# Patient Record
Sex: MALE | Race: WHITE | NOT HISPANIC OR LATINO | Employment: OTHER | URBAN - METROPOLITAN AREA
[De-identification: names, ages, dates, MRNs, and addresses within clinical notes are randomized per-mention and may not be internally consistent; named-entity substitution may affect disease eponyms.]

---

## 2019-07-02 ENCOUNTER — HOSPITAL ENCOUNTER (OUTPATIENT)
Dept: PHYSICAL THERAPY | Facility: CLINIC | Age: 59
Setting detail: RECURRING SERIES
Discharge: HOME | End: 2019-09-30
Attending: INTERNAL MEDICINE

## 2020-06-20 ENCOUNTER — HOSPITAL ENCOUNTER (OUTPATIENT)
Dept: OTHER | Facility: HOSPITAL | Age: 60
Discharge: HOME | End: 2020-06-20

## 2020-07-01 ENCOUNTER — OFFICE VISIT CONVERTED (OUTPATIENT)
Dept: ORTHOPEDIC SURGERY | Facility: CLINIC | Age: 60
End: 2020-07-01
Attending: ORTHOPAEDIC SURGERY

## 2020-07-17 ENCOUNTER — OFFICE VISIT CONVERTED (OUTPATIENT)
Dept: PULMONOLOGY | Facility: CLINIC | Age: 60
End: 2020-07-17
Attending: PHYSICIAN ASSISTANT

## 2020-07-22 ENCOUNTER — OFFICE VISIT CONVERTED (OUTPATIENT)
Dept: ORTHOPEDIC SURGERY | Facility: CLINIC | Age: 60
End: 2020-07-22
Attending: PHYSICIAN ASSISTANT

## 2020-08-03 ENCOUNTER — OFFICE VISIT CONVERTED (OUTPATIENT)
Dept: ORTHOPEDIC SURGERY | Facility: CLINIC | Age: 60
End: 2020-08-03
Attending: PHYSICIAN ASSISTANT

## 2020-08-03 ENCOUNTER — CONVERSION ENCOUNTER (OUTPATIENT)
Dept: ORTHOPEDIC SURGERY | Facility: CLINIC | Age: 60
End: 2020-08-03

## 2020-08-24 ENCOUNTER — OFFICE VISIT CONVERTED (OUTPATIENT)
Dept: ORTHOPEDIC SURGERY | Facility: CLINIC | Age: 60
End: 2020-08-24
Attending: PHYSICIAN ASSISTANT

## 2020-09-19 ENCOUNTER — HOSPITAL ENCOUNTER (OUTPATIENT)
Dept: PREADMISSION TESTING | Facility: HOSPITAL | Age: 60
Discharge: HOME | End: 2020-09-19
Attending: PHYSICIAN ASSISTANT

## 2020-09-20 LAB — SARS-COV-2 RNA SPEC QL NAA+PROBE: NOT DETECTED

## 2020-09-24 ENCOUNTER — HOSPITAL ENCOUNTER (OUTPATIENT)
Dept: CARDIOLOGY | Facility: HOSPITAL | Age: 60
Discharge: HOME | End: 2020-09-24
Attending: PHYSICIAN ASSISTANT

## 2020-09-25 ENCOUNTER — OFFICE VISIT CONVERTED (OUTPATIENT)
Dept: PULMONOLOGY | Facility: CLINIC | Age: 60
End: 2020-09-25
Attending: INTERNAL MEDICINE

## 2020-11-25 ENCOUNTER — OFFICE VISIT CONVERTED (OUTPATIENT)
Dept: ORTHOPEDIC SURGERY | Facility: CLINIC | Age: 60
End: 2020-11-25
Attending: ORTHOPAEDIC SURGERY

## 2020-11-25 ENCOUNTER — CONVERSION ENCOUNTER (OUTPATIENT)
Dept: ORTHOPEDIC SURGERY | Facility: CLINIC | Age: 60
End: 2020-11-25

## 2020-12-30 ENCOUNTER — CONVERSION ENCOUNTER (OUTPATIENT)
Dept: ORTHOPEDIC SURGERY | Facility: CLINIC | Age: 60
End: 2020-12-30

## 2020-12-30 ENCOUNTER — OFFICE VISIT CONVERTED (OUTPATIENT)
Dept: ORTHOPEDIC SURGERY | Facility: CLINIC | Age: 60
End: 2020-12-30
Attending: PHYSICIAN ASSISTANT

## 2021-01-13 ENCOUNTER — OFFICE VISIT CONVERTED (OUTPATIENT)
Dept: ORTHOPEDIC SURGERY | Facility: CLINIC | Age: 61
End: 2021-01-13
Attending: PHYSICIAN ASSISTANT

## 2021-03-11 ENCOUNTER — APPOINTMENT (OUTPATIENT)
Dept: GENERAL RADIOLOGY | Facility: HOSPITAL | Age: 61
End: 2021-03-11

## 2021-03-11 ENCOUNTER — HOSPITAL ENCOUNTER (INPATIENT)
Facility: HOSPITAL | Age: 61
LOS: 7 days | Discharge: NURSING, REHAB OR LONG-TERM CARE | End: 2021-03-18
Attending: INTERNAL MEDICINE | Admitting: INTERNAL MEDICINE

## 2021-03-11 DIAGNOSIS — A49.1 GROUP B STREPTOCOCCAL INFECTION: Primary | ICD-10-CM

## 2021-03-11 DIAGNOSIS — A49.01 MSSA (METHICILLIN SUSCEPTIBLE STAPHYLOCOCCUS AUREUS) INFECTION: ICD-10-CM

## 2021-03-11 DIAGNOSIS — T84.51XD INFECTION ASSOCIATED WITH INTERNAL RIGHT HIP PROSTHESIS, SUBSEQUENT ENCOUNTER: ICD-10-CM

## 2021-03-11 PROBLEM — G81.91 RIGHT HEMIPARESIS (HCC): Status: ACTIVE | Noted: 2021-03-11

## 2021-03-11 PROBLEM — G47.33 OSA ON CPAP: Status: ACTIVE | Noted: 2021-03-11

## 2021-03-11 PROBLEM — G40.909 SEIZURE DISORDER: Status: ACTIVE | Noted: 2021-03-11

## 2021-03-11 PROBLEM — T84.51XA INFECTION OF RIGHT PROSTHETIC HIP JOINT: Status: ACTIVE | Noted: 2021-03-11

## 2021-03-11 PROBLEM — N39.0 UTI (URINARY TRACT INFECTION): Status: ACTIVE | Noted: 2021-03-11

## 2021-03-11 PROBLEM — Z99.89 OSA ON CPAP: Status: ACTIVE | Noted: 2021-03-11

## 2021-03-11 PROBLEM — I63.9 CVA (CEREBRAL VASCULAR ACCIDENT): Status: ACTIVE | Noted: 2021-03-11

## 2021-03-11 PROBLEM — E11.9 TYPE 2 DIABETES MELLITUS (HCC): Status: ACTIVE | Noted: 2021-03-11

## 2021-03-11 PROBLEM — I10 ESSENTIAL HYPERTENSION: Status: ACTIVE | Noted: 2021-03-11

## 2021-03-11 LAB
ALBUMIN SERPL-MCNC: 3.7 G/DL (ref 3.5–5.2)
ALBUMIN/GLOB SERPL: 0.9 G/DL
ALP SERPL-CCNC: 54 U/L (ref 39–117)
ALT SERPL W P-5'-P-CCNC: 20 U/L (ref 1–41)
ANION GAP SERPL CALCULATED.3IONS-SCNC: 11 MMOL/L (ref 5–15)
AST SERPL-CCNC: 17 U/L (ref 1–40)
BASOPHILS # BLD AUTO: 0.02 10*3/MM3 (ref 0–0.2)
BASOPHILS NFR BLD AUTO: 0.2 % (ref 0–1.5)
BILIRUB SERPL-MCNC: 0.5 MG/DL (ref 0–1.2)
BUN SERPL-MCNC: 10 MG/DL (ref 6–20)
BUN/CREAT SERPL: 12.5 (ref 7–25)
CALCIUM SPEC-SCNC: 9.1 MG/DL (ref 8.6–10.5)
CHLORIDE SERPL-SCNC: 97 MMOL/L (ref 98–107)
CO2 SERPL-SCNC: 26 MMOL/L (ref 22–29)
CREAT SERPL-MCNC: 0.8 MG/DL (ref 0.76–1.27)
CRP SERPL-MCNC: 27.86 MG/DL (ref 0–0.5)
DEPRECATED RDW RBC AUTO: 43.3 FL (ref 37–54)
EOSINOPHIL # BLD AUTO: 0.16 10*3/MM3 (ref 0–0.4)
EOSINOPHIL NFR BLD AUTO: 1.3 % (ref 0.3–6.2)
ERYTHROCYTE [DISTWIDTH] IN BLOOD BY AUTOMATED COUNT: 13.1 % (ref 12.3–15.4)
GFR SERPL CREATININE-BSD FRML MDRD: 99 ML/MIN/1.73
GLOBULIN UR ELPH-MCNC: 3.9 GM/DL
GLUCOSE BLDC GLUCOMTR-MCNC: 192 MG/DL (ref 70–130)
GLUCOSE BLDC GLUCOMTR-MCNC: 246 MG/DL (ref 70–130)
GLUCOSE BLDC GLUCOMTR-MCNC: 267 MG/DL (ref 70–130)
GLUCOSE SERPL-MCNC: 197 MG/DL (ref 65–99)
HBA1C MFR BLD: 9.22 % (ref 4.8–5.6)
HCT VFR BLD AUTO: 36.6 % (ref 37.5–51)
HGB BLD-MCNC: 11.8 G/DL (ref 13–17.7)
IMM GRANULOCYTES # BLD AUTO: 0.05 10*3/MM3 (ref 0–0.05)
IMM GRANULOCYTES NFR BLD AUTO: 0.4 % (ref 0–0.5)
LYMPHOCYTES # BLD AUTO: 2.28 10*3/MM3 (ref 0.7–3.1)
LYMPHOCYTES NFR BLD AUTO: 18.9 % (ref 19.6–45.3)
MCH RBC QN AUTO: 29.2 PG (ref 26.6–33)
MCHC RBC AUTO-ENTMCNC: 32.2 G/DL (ref 31.5–35.7)
MCV RBC AUTO: 90.6 FL (ref 79–97)
MONOCYTES # BLD AUTO: 0.98 10*3/MM3 (ref 0.1–0.9)
MONOCYTES NFR BLD AUTO: 8.1 % (ref 5–12)
NEUTROPHILS NFR BLD AUTO: 71.1 % (ref 42.7–76)
NEUTROPHILS NFR BLD AUTO: 8.55 10*3/MM3 (ref 1.7–7)
NRBC BLD AUTO-RTO: 0 /100 WBC (ref 0–0.2)
PLATELET # BLD AUTO: 233 10*3/MM3 (ref 140–450)
PMV BLD AUTO: 11.1 FL (ref 6–12)
POTASSIUM SERPL-SCNC: 4 MMOL/L (ref 3.5–5.2)
PROT SERPL-MCNC: 7.6 G/DL (ref 6–8.5)
RBC # BLD AUTO: 4.04 10*6/MM3 (ref 4.14–5.8)
SARS-COV-2 ORF1AB RESP QL NAA+PROBE: NOT DETECTED
SODIUM SERPL-SCNC: 134 MMOL/L (ref 136–145)
WBC # BLD AUTO: 12.04 10*3/MM3 (ref 3.4–10.8)

## 2021-03-11 PROCEDURE — 25010000002 HYDROMORPHONE PER 4 MG: Performed by: INTERNAL MEDICINE

## 2021-03-11 PROCEDURE — 72170 X-RAY EXAM OF PELVIS: CPT

## 2021-03-11 PROCEDURE — 25010000003 CEFAZOLIN IN DEXTROSE 2-4 GM/100ML-% SOLUTION: Performed by: INTERNAL MEDICINE

## 2021-03-11 PROCEDURE — 85025 COMPLETE CBC W/AUTO DIFF WBC: CPT | Performed by: ORTHOPAEDIC SURGERY

## 2021-03-11 PROCEDURE — 86140 C-REACTIVE PROTEIN: CPT | Performed by: ORTHOPAEDIC SURGERY

## 2021-03-11 PROCEDURE — U0004 COV-19 TEST NON-CDC HGH THRU: HCPCS | Performed by: PHYSICIAN ASSISTANT

## 2021-03-11 PROCEDURE — 63710000001 INSULIN LISPRO (HUMAN) PER 5 UNITS: Performed by: NURSE PRACTITIONER

## 2021-03-11 PROCEDURE — 82962 GLUCOSE BLOOD TEST: CPT

## 2021-03-11 PROCEDURE — 99255 IP/OBS CONSLTJ NEW/EST HI 80: CPT | Performed by: INTERNAL MEDICINE

## 2021-03-11 PROCEDURE — 80053 COMPREHEN METABOLIC PANEL: CPT | Performed by: NURSE PRACTITIONER

## 2021-03-11 PROCEDURE — 73552 X-RAY EXAM OF FEMUR 2/>: CPT

## 2021-03-11 PROCEDURE — 83036 HEMOGLOBIN GLYCOSYLATED A1C: CPT | Performed by: INTERNAL MEDICINE

## 2021-03-11 RX ORDER — ACETAMINOPHEN 160 MG/5ML
650 SOLUTION ORAL EVERY 4 HOURS PRN
Status: DISCONTINUED | OUTPATIENT
Start: 2021-03-11 | End: 2021-03-18 | Stop reason: HOSPADM

## 2021-03-11 RX ORDER — METOPROLOL SUCCINATE 25 MG/1
12.5 TABLET, EXTENDED RELEASE ORAL EVERY 12 HOURS
COMMUNITY
End: 2021-03-18 | Stop reason: HOSPADM

## 2021-03-11 RX ORDER — AMOXICILLIN 250 MG
1 CAPSULE ORAL DAILY PRN
Status: DISCONTINUED | OUTPATIENT
Start: 2021-03-11 | End: 2021-03-18 | Stop reason: HOSPADM

## 2021-03-11 RX ORDER — ACETAMINOPHEN 325 MG/1
650 TABLET ORAL EVERY 8 HOURS
COMMUNITY
End: 2021-04-01 | Stop reason: HOSPADM

## 2021-03-11 RX ORDER — ACETAMINOPHEN 325 MG/1
650 TABLET ORAL EVERY 4 HOURS PRN
Status: DISCONTINUED | OUTPATIENT
Start: 2021-03-11 | End: 2021-03-18 | Stop reason: HOSPADM

## 2021-03-11 RX ORDER — CLOTRIMAZOLE AND BETAMETHASONE DIPROPIONATE 10; .64 MG/G; MG/G
CREAM TOPICAL 2 TIMES DAILY PRN
Status: DISCONTINUED | OUTPATIENT
Start: 2021-03-11 | End: 2021-03-18 | Stop reason: HOSPADM

## 2021-03-11 RX ORDER — CEFAZOLIN SODIUM 2 G/100ML
2 INJECTION, SOLUTION INTRAVENOUS EVERY 8 HOURS
Status: COMPLETED | OUTPATIENT
Start: 2021-03-11 | End: 2021-03-15

## 2021-03-11 RX ORDER — HYDROXYZINE HYDROCHLORIDE 10 MG/1
10 TABLET, FILM COATED ORAL EVERY 4 HOURS PRN
Status: DISCONTINUED | OUTPATIENT
Start: 2021-03-11 | End: 2021-03-18 | Stop reason: HOSPADM

## 2021-03-11 RX ORDER — ROSUVASTATIN CALCIUM 20 MG/1
40 TABLET, COATED ORAL DAILY
COMMUNITY

## 2021-03-11 RX ORDER — MECLIZINE HCL 25MG 25 MG/1
25 TABLET, CHEWABLE ORAL 3 TIMES DAILY PRN
Status: ON HOLD | COMMUNITY
End: 2021-03-18 | Stop reason: SDUPTHER

## 2021-03-11 RX ORDER — ROSUVASTATIN CALCIUM 40 MG/1
40 TABLET, COATED ORAL NIGHTLY
Status: DISCONTINUED | OUTPATIENT
Start: 2021-03-11 | End: 2021-03-18 | Stop reason: HOSPADM

## 2021-03-11 RX ORDER — GABAPENTIN 600 MG/1
600 TABLET ORAL 3 TIMES DAILY
COMMUNITY
End: 2021-03-18 | Stop reason: HOSPADM

## 2021-03-11 RX ORDER — CLOTRIMAZOLE AND BETAMETHASONE DIPROPIONATE 10; .64 MG/G; MG/G
CREAM TOPICAL 4 TIMES DAILY
COMMUNITY
End: 2023-01-16 | Stop reason: HOSPADM

## 2021-03-11 RX ORDER — HYDROXYZINE HYDROCHLORIDE 10 MG/1
25 TABLET, FILM COATED ORAL EVERY 8 HOURS PRN
COMMUNITY
End: 2021-04-30 | Stop reason: ALTCHOICE

## 2021-03-11 RX ORDER — INSULIN GLARGINE 100 [IU]/ML
15 INJECTION, SOLUTION SUBCUTANEOUS NIGHTLY
Status: ON HOLD | COMMUNITY
End: 2021-03-31 | Stop reason: SDUPTHER

## 2021-03-11 RX ORDER — DEXTROSE MONOHYDRATE 25 G/50ML
25 INJECTION, SOLUTION INTRAVENOUS
Status: DISCONTINUED | OUTPATIENT
Start: 2021-03-11 | End: 2021-03-14

## 2021-03-11 RX ORDER — LISINOPRIL 20 MG/1
20 TABLET ORAL DAILY
COMMUNITY
End: 2021-03-18 | Stop reason: HOSPADM

## 2021-03-11 RX ORDER — AMOXICILLIN 250 MG
1 CAPSULE ORAL DAILY PRN
Status: ON HOLD | COMMUNITY
End: 2021-03-24

## 2021-03-11 RX ORDER — NITROGLYCERIN 0.4 MG/1
0.4 TABLET SUBLINGUAL
Status: DISCONTINUED | OUTPATIENT
Start: 2021-03-11 | End: 2021-03-18 | Stop reason: HOSPADM

## 2021-03-11 RX ORDER — ASPIRIN 81 MG/1
81 TABLET, CHEWABLE ORAL DAILY
COMMUNITY

## 2021-03-11 RX ORDER — ISOSORBIDE MONONITRATE 30 MG/1
30 TABLET, EXTENDED RELEASE ORAL DAILY
COMMUNITY

## 2021-03-11 RX ORDER — ONDANSETRON 2 MG/ML
4 INJECTION INTRAMUSCULAR; INTRAVENOUS EVERY 6 HOURS PRN
Status: DISCONTINUED | OUTPATIENT
Start: 2021-03-11 | End: 2021-03-18 | Stop reason: HOSPADM

## 2021-03-11 RX ORDER — METOPROLOL SUCCINATE 25 MG/1
12.5 TABLET, EXTENDED RELEASE ORAL EVERY 12 HOURS SCHEDULED
Status: DISCONTINUED | OUTPATIENT
Start: 2021-03-11 | End: 2021-03-13

## 2021-03-11 RX ORDER — NICOTINE POLACRILEX 4 MG
15 LOZENGE BUCCAL
Status: DISCONTINUED | OUTPATIENT
Start: 2021-03-11 | End: 2021-03-14

## 2021-03-11 RX ORDER — ISOSORBIDE MONONITRATE 30 MG/1
30 TABLET, EXTENDED RELEASE ORAL DAILY
Status: DISCONTINUED | OUTPATIENT
Start: 2021-03-11 | End: 2021-03-18 | Stop reason: HOSPADM

## 2021-03-11 RX ORDER — ONDANSETRON 4 MG/1
4 TABLET, FILM COATED ORAL EVERY 6 HOURS PRN
Status: DISCONTINUED | OUTPATIENT
Start: 2021-03-11 | End: 2021-03-18 | Stop reason: HOSPADM

## 2021-03-11 RX ORDER — ASPIRIN 81 MG/1
81 TABLET, CHEWABLE ORAL DAILY
Status: DISCONTINUED | OUTPATIENT
Start: 2021-03-11 | End: 2021-03-12

## 2021-03-11 RX ORDER — INSULIN LISPRO 100 [IU]/ML
0-14 INJECTION, SOLUTION INTRAVENOUS; SUBCUTANEOUS
Status: DISCONTINUED | OUTPATIENT
Start: 2021-03-11 | End: 2021-03-14

## 2021-03-11 RX ORDER — HYDROMORPHONE HYDROCHLORIDE 1 MG/ML
0.5 INJECTION, SOLUTION INTRAMUSCULAR; INTRAVENOUS; SUBCUTANEOUS
Status: DISCONTINUED | OUTPATIENT
Start: 2021-03-11 | End: 2021-03-18 | Stop reason: HOSPADM

## 2021-03-11 RX ORDER — LISINOPRIL 20 MG/1
20 TABLET ORAL DAILY
Status: DISCONTINUED | OUTPATIENT
Start: 2021-03-11 | End: 2021-03-14

## 2021-03-11 RX ORDER — HYDROCODONE BITARTRATE AND ACETAMINOPHEN 7.5; 325 MG/1; MG/1
1 TABLET ORAL EVERY 4 HOURS PRN
Status: DISCONTINUED | OUTPATIENT
Start: 2021-03-11 | End: 2021-03-18 | Stop reason: HOSPADM

## 2021-03-11 RX ORDER — ACETAMINOPHEN 650 MG/1
650 SUPPOSITORY RECTAL EVERY 4 HOURS PRN
Status: DISCONTINUED | OUTPATIENT
Start: 2021-03-11 | End: 2021-03-18 | Stop reason: HOSPADM

## 2021-03-11 RX ORDER — CYCLOBENZAPRINE HCL 10 MG
10 TABLET ORAL 3 TIMES DAILY PRN
Status: DISCONTINUED | OUTPATIENT
Start: 2021-03-11 | End: 2021-03-18 | Stop reason: HOSPADM

## 2021-03-11 RX ADMIN — HYDROMORPHONE HYDROCHLORIDE 0.5 MG: 1 INJECTION, SOLUTION INTRAMUSCULAR; INTRAVENOUS; SUBCUTANEOUS at 15:57

## 2021-03-11 RX ADMIN — INSULIN LISPRO 5 UNITS: 100 INJECTION, SOLUTION INTRAVENOUS; SUBCUTANEOUS at 18:06

## 2021-03-11 RX ADMIN — LISINOPRIL 20 MG: 20 TABLET ORAL at 17:26

## 2021-03-11 RX ADMIN — METOPROLOL SUCCINATE 12.5 MG: 25 TABLET, EXTENDED RELEASE ORAL at 20:21

## 2021-03-11 RX ADMIN — HYDROCODONE BITARTRATE AND ACETAMINOPHEN 1 TABLET: 7.5; 325 TABLET ORAL at 20:21

## 2021-03-11 RX ADMIN — ISOSORBIDE MONONITRATE 30 MG: 30 TABLET ORAL at 17:26

## 2021-03-11 RX ADMIN — HYDROCODONE BITARTRATE AND ACETAMINOPHEN 1 TABLET: 7.5; 325 TABLET ORAL at 23:33

## 2021-03-11 RX ADMIN — CEFAZOLIN SODIUM 2 G: 2 INJECTION, SOLUTION INTRAVENOUS at 17:26

## 2021-03-11 RX ADMIN — HYDROMORPHONE HYDROCHLORIDE 0.5 MG: 1 INJECTION, SOLUTION INTRAMUSCULAR; INTRAVENOUS; SUBCUTANEOUS at 22:22

## 2021-03-11 RX ADMIN — CYCLOBENZAPRINE 10 MG: 10 TABLET, FILM COATED ORAL at 17:26

## 2021-03-11 RX ADMIN — CEFAZOLIN SODIUM 2 G: 2 INJECTION, SOLUTION INTRAVENOUS at 22:30

## 2021-03-11 RX ADMIN — ROSUVASTATIN CALCIUM 40 MG: 40 TABLET, FILM COATED ORAL at 20:21

## 2021-03-11 RX ADMIN — HYDROCODONE BITARTRATE AND ACETAMINOPHEN 1 TABLET: 7.5; 325 TABLET ORAL at 14:26

## 2021-03-11 RX ADMIN — ASPIRIN 81 MG: 81 TABLET, CHEWABLE ORAL at 17:26

## 2021-03-11 NOTE — PLAN OF CARE
Goal Outcome Evaluation:  Plan of Care Reviewed With: patient  Progress: improving  Outcome Summary: VSS. Pt admitted to  from [facility]. I&D tomorrow. Will continue to monitor.

## 2021-03-11 NOTE — CONSULTS
Referring Provider: Dr Dejesus    Reason for Consultation: right hip PJI    History of present illness:  Mandeep Tracey is a 61 y.o. who I am asked to evaluate and give opinion for right hip PJI. History is obtained from the patient, his daughter Lina, RN, and review of the outside medical records which I summarize/synthesize as follows: On Monday 3/8 he developed left knee pain and R hip pain. On Tuesday 3/9 he developed a fever and shaking chills. There were no alleviating factors. The pain was very severe, sharp, and worse with movement. He has a history of R hip replacement in June 2020 with Dr Garrido in [place]. He has no other hardware in the body. He has hemiparesis from a prior stroke.     He went to [place] [facility] and had labs done with a leukocytosis. CT showed a large R hip effusion and concerns for septic arthritis. He had a R hip aspiration done with 109,000 WBCs and the culture has grown Group B Strep per my d/w their micro lab. They are going to fax me the result. He grew MSSA in the urine but his blood cultures are negative, and he denies urinary symptoms. He does not use a Chavez catheter or intermittent catheterization. He says he usually passes his urine easily.     He was on vancomycin and cefepime at [place]. He has been transferred to [facility] for further care.     Past Medical History:   Diagnosis Date   • Aphasia    • CPAP (continuous positive airway pressure) dependence    • Diabetes (CMS/[facility])    • Hemiparesis (CMS/[facility])     Right side    • Peptic ulcer disease    • Psoriasis    • Seizures (CMS/[facility])    • Sleep apnea    • Stroke (CMS/[facility]) 2004       Past Surgical History:   Procedure Laterality Date   • CHOLECYSTECTOMY     • EYE SURGERY     • LUMBAR DISC SURGERY         Social History:  Lives w/ his wife  Retired     Family History:  No 1st degree relatives w/ Group B hip infections    Antibiotic allergies and intolerances:    1. Quinolones -  hives    Medications:    Current Facility-Administered Medications:   •  acetaminophen (TYLENOL) tablet 650 mg, 650 mg, Oral, Q4H PRN **OR** acetaminophen (TYLENOL) 160 MG/5ML solution 650 mg, 650 mg, Oral, Q4H PRN **OR** acetaminophen (TYLENOL) suppository 650 mg, 650 mg, Rectal, Q4H PRN, Apolonia Hull APRN  •  aspirin chewable tablet 81 mg, 81 mg, Oral, Daily, Apolonia Hull APRN  •  clotrimazole-betamethasone (LOTRISONE) 1-0.05 % cream, , Topical, BID PRN, Apolonia Hull APRN  •  dextrose (D50W) 25 g/ 50mL Intravenous Solution 25 g, 25 g, Intravenous, Q15 Min PRN, Apolonia Hull APRN  •  dextrose (GLUTOSE) oral gel 15 g, 15 g, Oral, Q15 Min PRN, Apolonia Hull APRN  •  glucagon (human recombinant) (GLUCAGEN DIAGNOSTIC) injection 1 mg, 1 mg, Subcutaneous, Q15 Min PRN, Apolonia Hull APRN  •  HYDROcodone-acetaminophen (NORCO) 7.5-325 MG per tablet 1 tablet, 1 tablet, Oral, Q4H PRN, Angel Luis Dejesus MD  •  HYDROmorphone (DILAUDID) injection 0.5 mg, 0.5 mg, Intravenous, Q2H PRN, Angel Luis Dejesus MD  •  hydrOXYzine (ATARAX) tablet 10 mg, 10 mg, Oral, Q4H PRN, Apolonia Hull APRN  •  insulin lispro (ADMELOG) injection 0-14 Units, 0-14 Units, Subcutaneous, TID AC, Apolonia Hull APRN  •  isosorbide mononitrate (IMDUR) 24 hr tablet 30 mg, 30 mg, Oral, Daily, Apolonia Hull APRN  •  lisinopril (PRINIVIL,ZESTRIL) tablet 20 mg, 20 mg, Oral, Daily, Apolonia Hull APRN  •  metoprolol succinate XL (TOPROL-XL) 24 hr tablet 12.5 mg, 12.5 mg, Oral, Q12H, Apolonia Hull, ESTRELLA  •  nitroglycerin (NITROSTAT) SL tablet 0.4 mg, 0.4 mg, Sublingual, Q5 Min PRN, Apolonia Hull APRN  •  ondansetron (ZOFRAN) tablet 4 mg, 4 mg, Oral, Q6H PRN **OR** ondansetron (ZOFRAN) injection 4 mg, 4 mg, Intravenous, Q6H PRN, Apolonia Hull APRN  •  rosuvastatin (CRESTOR) tablet 40 mg, 40 mg, Oral, Nightly, Apolonia Hull, ESTRELLA  •   sennosides-docusate (PERICOLACE) 8.6-50 MG per tablet 1 tablet, 1 tablet, Oral, Daily DANAN, Apolonia Hull APRN    Review of Systems  All systems were reviewed and are negative unless otherwise stated above in the HPI    Objective   Vital Signs   Temp:  [98.5 °F (36.9 °C)] 98.5 °F (36.9 °C)  Heart Rate:  [109] 109  Resp:  [20] 20  BP: (140)/(97) 140/97    Physical Exam:   General: awake, alert, in pain  Head: atraumatic  Eyes:  no scleral icterus  ENT: poor dentition  Neck: Supple  Cardiovascular: tachycardic, RR, no murmurs,  Respiratory: Lungs are clear to auscultation bilaterally, no rales or wheezing; normal work of breathing on CPAP  GI: Abdomen is obese, soft, non-tender, normal bowel sounds in all four quadrants  :  no Chavez catheter  Musculoskeletal: R hip TTP  Skin: No rashes, lesions, or embolic phenomenon  Neurological: Alert and oriented x 3  Psychiatric: very nice, uncomfortable due to pain  Vasc: no cyanosis    Labs:   OSH Labs:  WBC 17  AST 14  ALT 22  TBili 0.84  Crt 0.88  UA TNTC WBCs    OSH R Hip Arthrocentesis:   109,000 WBCs, 67% PMNs    Microbiology:  3/9 OSH BCx: NGTD  3/9 OSH UCx: >100k MSSA  3/10 OSH R Hip Synovial Cx: Group B Strep    Radiology (personally reviewed report):  OSH CT with R hip arthroplasty w/ large hip effusion and likely air; moderate fluid distension R iliopsoas bursa likely communicated w/ hip joint space cold be septic arthritis    Assessment/Plan   1. 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 Urine culture positive for MSSA  3. Obesity BMI not yet recorded  4. Diabetes type 2 - not yet known if under control  5. History of stroke and hemiparesis    For Group B Strep infection of the right prosthetic hip, I agree with orthopedic consultation for surgical needs. I will start him on cefazolin 2 g IV q8h. This will also target the MSSA in the urine though I'm not entirely convinced this is a pathogen. His blood cultures are negative for bacteria.  I have asked the [facility] to fax me their results to review fully. I anticipate a 6-week course of antibiotic therapy following surgical debridement. Check CRP and A1c.     ID will follow. I discussed the case with his hospitalist team.

## 2021-03-11 NOTE — H&P
Patient Name:  Mandeep Tracey  YOB: 1960  MRN:  [id number]  Admit Date:  3/11/2021  Patient Care Team:  Jazzy Servin APRN as PCP - General (Nurse Practitioner)      Subjective   History Present Illness     No chief complaint on file.      Mr. Tracey is a 61 y.o.  male with a history of BELKYS on Cpap, DM, CVA w/rt hemiparesis, seizures, rt hip replacement 6/2020, CAD, HTN that presents to [facility] complaining of rt hip pain and fever. He was also experiencing pubic pain and increased urinary frequency. Symptoms began on 3/9/21 where he initially presented to [facility]. There was concern for sepsis due to acute bacterial cystitis and/or rt hip septic arthritis. He was admitted and started on broad spectrum antibiotics with Vanc and cefepime. MRI rt hip showed large hip joint effusion and mod fluid distention of rt hip iliopsoas bursa. He underwent CT guided FNA of rt hip and fluid was sent to lab for analysis on 3/10/21. UA was collected w/growth >100,000 staph aureus; sensitivity pending. No labs were collected today or at least no results sent with pt; no dc summary available for review. Provided records have been reviewed; doesn't appear he has had additional fever. WBC had started to trend down. CRP was elevated, lactic acid wnl.     Review of Systems   Constitutional: Negative for chills and fever.   HENT: Negative for congestion and sore throat.    Respiratory: Negative for shortness of breath.    Cardiovascular: Negative for chest pain.   Gastrointestinal: Negative for nausea and vomiting.   Genitourinary: Positive for frequency.   Musculoskeletal: Positive for arthralgias and gait problem.        Spasms ble   Skin: Negative for rash.   Neurological: Positive for speech difficulty and weakness.   Psychiatric/Behavioral: Negative for confusion.        Personal History     Past Medical History:   Diagnosis Date   • Aphasia    • CPAP (continuous positive airway  pressure) dependence    • Diabetes (CMS/HCC)    • Hemiparesis (CMS/HCC)     Right side    • Peptic ulcer disease    • Psoriasis    • Seizures (CMS/HCC)    • Sleep apnea    • Stroke (CMS/HCC) 2004     Past Surgical History:   Procedure Laterality Date   • CHOLECYSTECTOMY     • EYE SURGERY     • LUMBAR DISC SURGERY       No family history on file.  Social History     Tobacco Use   • Smoking status: Former Smoker   • Smokeless tobacco: Never Used   • Tobacco comment: quit 22 years ago    Substance Use Topics   • Alcohol use: Never   • Drug use: Not on file     No current facility-administered medications on file prior to encounter.     Current Outpatient Medications on File Prior to Encounter   Medication Sig Dispense Refill   • acetaminophen (TYLENOL) 325 MG tablet Take 650 mg by mouth Every 4 (Four) Hours As Needed for Mild Pain .     • aspirin 81 MG chewable tablet Chew 81 mg Daily.     • clotrimazole-betamethasone (LOTRISONE) 1-0.05 % cream Apply  topically to the appropriate area as directed 2 (Two) Times a Day As Needed. For psoriasis     • gabapentin (NEURONTIN) 600 MG tablet Take 600 mg by mouth 3 (Three) Times a Day.     • hydrOXYzine (ATARAX) 10 MG tablet Take 10 mg by mouth Every 4 (Four) Hours As Needed for Itching.     • insulin glargine (LANTUS, SEMGLEE) 100 UNIT/ML injection Inject 20 Units under the skin into the appropriate area as directed Daily.     • isosorbide mononitrate (IMDUR) 30 MG 24 hr tablet Take 30 mg by mouth Daily.     • lisinopril (PRINIVIL,ZESTRIL) 20 MG tablet Take 20 mg by mouth Daily.     • meclizine 25 MG chewable tablet chewable tablet Chew 25 mg 3 (Three) Times a Day As Needed.     • metFORMIN (GLUCOPHAGE) 1000 MG tablet Take 1,000 mg by mouth 2 (Two) Times a Day With Meals.     • metoprolol succinate XL (TOPROL-XL) 25 MG 24 hr tablet Take 12.5 mg by mouth Every 12 (Twelve) Hours.     • rosuvastatin (CRESTOR) 20 MG tablet Take 40 mg by mouth Daily.     • sennosides-docusate  (senna-docusate sodium) 8.6-50 MG per tablet Take 1 tablet by mouth Daily As Needed for Constipation.       Allergies   Allergen Reactions   • Fentanyl Mental Status Change   • Ciprofloxacin Unknown - Low Severity   • Quinolones Hives       Objective    Objective     Vital Signs  Temp:  [98.5 °F (36.9 °C)] 98.5 °F (36.9 °C)  Heart Rate:  [109] 109  Resp:  [20] 20  BP: (140)/(97) 140/97  SpO2:  [95 %] 95 %  on  Flow (L/min):  [3] 3;   Device (Oxygen Therapy): nasal cannula  There is no height or weight on file to calculate BMI.    Physical Exam  Vitals and nursing note reviewed.   Constitutional:       General: He is not in acute distress.     Appearance: He is obese.   HENT:      Head: Normocephalic.      Mouth/Throat:      Mouth: Mucous membranes are moist.   Eyes:      Conjunctiva/sclera: Conjunctivae normal.   Cardiovascular:      Rate and Rhythm: Normal rate and regular rhythm.   Pulmonary:      Effort: Pulmonary effort is normal. No respiratory distress.      Breath sounds: Normal breath sounds.   Abdominal:      General: Bowel sounds are normal. There is no distension.      Palpations: Abdomen is soft.   Musculoskeletal:         General: Tenderness present.      Cervical back: Normal range of motion and neck supple.      Right lower leg: Edema present.      Comments: Rt hip pain w/movement; spasms rle > lle   Skin:     General: Skin is warm and dry.   Neurological:      Mental Status: He is alert.      Cranial Nerves: Dysarthria present.      Motor: Weakness present.   Psychiatric:         Mood and Affect: Mood normal.         Behavior: Behavior normal.         Results Review:  I reviewed the patient's new clinical results.  I reviewed the patient's new imaging results and agree with the interpretation.  I reviewed the patient's other test results and agree with the interpretation  I personally viewed and interpreted the patient's EKG/Telemetry data    Lab Results (last 24 hours)     Procedure Component Value  Units Date/Time    POC Glucose Once [[id number]]  (Abnormal) Collected: 03/11/21 1428    Specimen: Blood Updated: 03/11/21 1429     Glucose 192 mg/dL     CBC & Differential [[id number]]  (Abnormal) Collected: 03/11/21 1429    Specimen: Blood Updated: 03/11/21 1440    Narrative:      The following orders were created for panel order CBC & Differential.  Procedure                               Abnormality         Status                     ---------                               -----------         ------                     CBC Auto Differential[[id number]]        Abnormal            Final result                 Please view results for these tests on the individual orders.    C-reactive Protein [[id number]]  (Abnormal) Collected: 03/11/21 1429    Specimen: Blood Updated: 03/11/21 1510     C-Reactive Protein 27.86 mg/dL     CBC Auto Differential [[id number]]  (Abnormal) Collected: 03/11/21 1429    Specimen: Blood Updated: 03/11/21 1440     WBC 12.04 10*3/mm3      RBC 4.04 10*6/mm3      Hemoglobin 11.8 g/dL      Hematocrit 36.6 %      MCV 90.6 fL      MCH 29.2 pg      MCHC 32.2 g/dL      RDW 13.1 %      RDW-SD 43.3 fl      MPV 11.1 fL      Platelets 233 10*3/mm3      Neutrophil % 71.1 %      Lymphocyte % 18.9 %      Monocyte % 8.1 %      Eosinophil % 1.3 %      Basophil % 0.2 %      Immature Grans % 0.4 %      Neutrophils, Absolute 8.55 10*3/mm3      Lymphocytes, Absolute 2.28 10*3/mm3      Monocytes, Absolute 0.98 10*3/mm3      Eosinophils, Absolute 0.16 10*3/mm3      Basophils, Absolute 0.02 10*3/mm3      Immature Grans, Absolute 0.05 10*3/mm3      nRBC 0.0 /100 WBC     Comprehensive Metabolic Panel [[id number]]  (Abnormal) Collected: 03/11/21 1429    Specimen: Blood Updated: 03/11/21 1520     Glucose 197 mg/dL      BUN 10 mg/dL      Creatinine 0.80 mg/dL      Sodium 134 mmol/L      Potassium 4.0 mmol/L      Chloride 97 mmol/L      CO2 26.0 mmol/L      Calcium 9.1 mg/dL      Total Protein 7.6 g/dL      Albumin 3.70 g/dL       ALT (SGPT) 20 U/L      AST (SGOT) 17 U/L      Alkaline Phosphatase 54 U/L      Total Bilirubin 0.5 mg/dL      eGFR Non African Amer 99 mL/min/1.73      Globulin 3.9 gm/dL      A/G Ratio 0.9 g/dL      BUN/Creatinine Ratio 12.5     Anion Gap 11.0 mmol/L     Narrative:      GFR Normal >60  Chronic Kidney Disease <60  Kidney Failure <15      Hemoglobin A1c [[id number]] Collected: 03/11/21 1429    Specimen: Blood Updated: 03/11/21 1523          Imaging Results (Last 24 Hours)     Procedure Component Value Units Date/Time    XR Femur 2 View Right [[id number]] Resulted: 03/11/21 1524     Updated: 03/11/21 1517    XR Pelvis 1 or 2 View [[id number]] Resulted: 03/11/21 1404     Updated: 03/11/21 1515              No orders to display        Assessment/Plan     Active Hospital Problems    Diagnosis  POA   • **Infection of right prosthetic hip joint (CMS/[facility]) [T84.51XA]  Not Applicable   • CVA (cerebral vascular accident) (CMS/[facility]) [I63.9]  Yes   • Right hemiparesis (CMS/[facility]) [G81.91]  Yes   • BELKYS on CPAP [G47.33, Z99.89]  Not Applicable   • Type 2 diabetes mellitus (CMS/[facility]) [E11.9]  Yes   • Essential hypertension [I10]  Yes   • MSSA (methicillin susceptible Staphylococcus aureus) infection [A49.01]  Yes   • Group B streptococcal infection [A49.1]  Yes   • UTI (urinary tract infection) [N39.0]  Yes      Resolved Hospital Problems   No resolved problems to display.       Mr. Tracey is a 61 y.o. male with a history of BELKYS on Cpap, DM, CVA w/rt hemiparesis, seizures, rt hip replacement 6/2020, CAD, HTN who is admitted for rt hip prosthetic joint infection, MSSA UTI    Rt prosthetic hip Group B strep infection/MSSA UTI:  -ID input appreciated  -Ortho consult pending  -Cefazolin; will likely need 6 week antibiotic therapy  -Follow cultures  -Pain control  -Add flexeril prn for spasms    Hx CVA w/rt hemiparesis/Dysarthria:  -OT/PT consult when appropriate  -ASA, Statin    DM:  -Monitor BG trends  -Correctional scale insulin for now;  metformin on hold; restart Lantus based on BG trends  -Check A1C    HTN:  -BP stable  -Continue lisinopril, metoprolol    BELKYS on Cpap:  -Home machine available for use    Further management based on hospital course    · I discussed the patient's findings and my recommendations with patient, family, nursing staff and consulting provider.    VTE Prophylaxis - SCDs.  Code Status - Full code.       ESTRELLA Adams  [facility]  03/11/21  15:26 EST

## 2021-03-11 NOTE — CONSULTS
Orthopaedic Consultation      Patient: Mandeep Tracey    Date of Admission: 3/11/2021 12:30 PM    YOB: 1960    Medical Record Number: [id number]    Attending Physician:  Angel Luis Dejesus MD    Consulting Physician:  Josiah Leigh PA-C        Chief Complaints: Right hip periprosthetic joint infection    History of Present Illness:     The patient is a pleasant 61-year-old gentleman with a past medical history of obstructive sleep apnea on CPAP, diabetes mellitus, CVA with right-sided hemiparesis, seizures, aphasia, coronary artery disease and hypertension.  The patient is accompanied by his daughter who is doing a lot of communication given the patient's aphasia.  He does have previous history of right bipolar hemiarthroplasty in June 2020 by Dr. Garrido for femoral neck fracture after falling out of his motorized wheelchair at [place].  Patient was overall doing well.  However on Monday, March 8 he started developing left knee pain.  As well as right hip pain.  On Tuesday, March 9 he started developing a fever and shaking chills.  He contacted his primary care physician.  Rapid Covid test was performed which patient's daughter states was negative.  There were no alleviating factors.  He was transferred to the emergency room.  He had labs done.  He had leukocytosis at that time.  CT scan showed a large right hip effusion and concerns for septic arthritis.  He had a right hip aspiration performed showing 109,000 white blood cells and the culture has grown group B strep.  Blood cultures were negative.  Denied any urinary symptoms.  He did grow MSSA in his urine.  Does not use a Chavez catheter or intermittent catheterization at home.  While at the hospital at [facility] he was on vancomycin and cefepime.  He was transferred to [facility] for further care.  Orthopedics consulted for management of the prosthetic joint infection.  Patient is currently being monitored by the hospitalist team.  As well as  infectious disease.  He is currently on cefazolin 2 g IV every 8 hours.  Blood cultures currently negative for bacteria.  Hemoglobin A1c was 9.22.  C-reactive protein 27.86.    Patient was placed in face mask in first look. Patient was wearing facemask when I entered the room and throughout our encounter. I wore full protective equipment throughout this patient encounter including a face mask, eye shield, gown and gloves. Hand hygiene/washing of hands was performed before donning protective equipment and after removal when leaving the room.    Allergies:   Allergies   Allergen Reactions   • Fentanyl Mental Status Change   • Ciprofloxacin Unknown - Low Severity   • Quinolones Hives       Medications:   Home Medications:  Medications Prior to Admission   Medication Sig Dispense Refill Last Dose   • acetaminophen (TYLENOL) 325 MG tablet Take 650 mg by mouth Every 4 (Four) Hours As Needed for Mild Pain .      • aspirin 81 MG chewable tablet Chew 81 mg Daily.      • clotrimazole-betamethasone (LOTRISONE) 1-0.05 % cream Apply  topically to the appropriate area as directed 2 (Two) Times a Day As Needed. For psoriasis      • gabapentin (NEURONTIN) 600 MG tablet Take 600 mg by mouth 3 (Three) Times a Day.      • hydrOXYzine (ATARAX) 10 MG tablet Take 10 mg by mouth Every 4 (Four) Hours As Needed for Itching.      • insulin glargine (LANTUS, SEMGLEE) 100 UNIT/ML injection Inject 20 Units under the skin into the appropriate area as directed Daily.      • isosorbide mononitrate (IMDUR) 30 MG 24 hr tablet Take 30 mg by mouth Daily.      • lisinopril (PRINIVIL,ZESTRIL) 20 MG tablet Take 20 mg by mouth Daily.      • meclizine 25 MG chewable tablet chewable tablet Chew 25 mg 3 (Three) Times a Day As Needed.      • metFORMIN (GLUCOPHAGE) 1000 MG tablet Take 1,000 mg by mouth 2 (Two) Times a Day With Meals.      • metoprolol succinate XL (TOPROL-XL) 25 MG 24 hr tablet Take 12.5 mg by mouth Every 12 (Twelve) Hours.      • rosuvastatin  (CRESTOR) 20 MG tablet Take 40 mg by mouth Daily.      • sennosides-docusate (senna-docusate sodium) 8.6-50 MG per tablet Take 1 tablet by mouth Daily As Needed for Constipation.          Current Medications:  Scheduled Meds:aspirin, 81 mg, Oral, Daily  ceFAZolin, 2 g, Intravenous, Q8H  insulin lispro, 0-14 Units, Subcutaneous, TID AC  isosorbide mononitrate, 30 mg, Oral, Daily  lisinopril, 20 mg, Oral, Daily  metoprolol succinate XL, 12.5 mg, Oral, Q12H  rosuvastatin, 40 mg, Oral, Nightly      Continuous Infusions:   PRN Meds:.•  acetaminophen **OR** acetaminophen **OR** acetaminophen  •  clotrimazole-betamethasone  •  cyclobenzaprine  •  dextrose  •  dextrose  •  glucagon (human recombinant)  •  HYDROcodone-acetaminophen  •  HYDROmorphone  •  hydrOXYzine  •  nitroglycerin  •  ondansetron **OR** ondansetron  •  sennosides-docusate    Past Medical History:   Diagnosis Date   • Aphasia    • CPAP (continuous positive airway pressure) dependence    • Diabetes (CMS/HCC)    • Hemiparesis (CMS/HCC)     Right side    • Peptic ulcer disease    • Psoriasis    • Seizures (CMS/HCC)    • Sleep apnea    • Stroke (CMS/HCC) 2004     Past Surgical History:   Procedure Laterality Date   • CHOLECYSTECTOMY     • EYE SURGERY     • LUMBAR DISC SURGERY       Social History     Occupational History   • Not on file   Tobacco Use   • Smoking status: Former Smoker   • Smokeless tobacco: Never Used   • Tobacco comment: quit 22 years ago    Substance and Sexual Activity   • Alcohol use: Never   • Drug use: Not on file   • Sexual activity: Defer      Social History     Social History Narrative   • Not on file     No family history on file.      Review of Systems:   No other pertinent positives or negatives other than what is mentioned in the HPI and below.  Constitutional: Negative for fatigue, fever, or weight loss  HEENT: No active headache.  Pulmonary: Patient denies SOA.  Cardiovascular: Patient denies any chest pain.  Gastrointestinal:   Patient denies active vomiting or diarrhea.  Musculoskeletal: Positive for right hip pain.  Neurological: Positive for speech difficulty and weakness  Skin: Patient denies any active bleeding.    Vital signs in last 24 hours:  Temp:  [98.5 °F (36.9 °C)] 98.5 °F (36.9 °C)  Heart Rate:  [109] 109  Resp:  [20] 20  BP: (140)/(97) 140/97  Vitals:    03/11/21 1219   BP: 140/97   BP Location: Right arm   Patient Position: Lying   Pulse: 109   Resp: 20   Temp: 98.5 °F (36.9 °C)   TempSrc: Oral   SpO2: 95%          Physical Exam: 61 y.o. male         General Appearance:  Alert, cooperative, in no acute distress.  Obese gentleman resting comfortably in his hospital bed.  HEENT:    Atraumatic, Pupils are equal.  Normocephalic.   Neck:   Cervical spine midline, no appreciable JVD   Lungs:     Breathing non-labored and chest rise symmetric    Heart:   Abdomen:     Rectal:       Extremities:   Pulses  Neurovascular:   Skin:   Musculoskeletal:      Pulse regular    Soft, Non-tender or distended    Deferred        No clubbing, cyanosis, or edema    Intact  Alert awake and oriented.  Dysarthria present.  Motor weakness present.    No skin lesions  Focused physical examination of the right lower extremity was performed.  Patient is resting comfortably in his hospital bed.  He has a well-healed anterior incision over the right hip.  No erythema.  No induration.  No wound dehiscence.  No drainage.  He does have pain on attempted range of motion of the hip.  Pulses intact distally.     Diagnostic Tests:    Results from last 7 days   Lab Units 03/11/21  1429   WBC 10*3/mm3 12.04*   HEMOGLOBIN g/dL 11.8*   HEMATOCRIT % 36.6*   PLATELETS 10*3/mm3 233     Results from last 7 days   Lab Units 03/11/21  1429   SODIUM mmol/L 134*   POTASSIUM mmol/L 4.0   CHLORIDE mmol/L 97*   CO2 mmol/L 26.0   BUN mg/dL 10   CREATININE mg/dL 0.80   GLUCOSE mg/dL 197*   CALCIUM mg/dL 9.1       Hemoglobin A1c 9.22  C-reactive protein 27.86    COVID-19 test is  currently pending.    Study Result    Narrative & Impression   XR FEMUR 2 VW RIGHT-, XR PELVIS 1 OR 2 VW-     INDICATIONS: Pain     TECHNIQUE: 7 images including the pelvis and right femur     COMPARISON: None available     FINDINGS:     The lateral view of the distal femur is technically limited by  overpenetration; appearance of erosion of the patella and proximal tibia  on this image is presumably technical in origin, repeat image could be  obtained as indicated. The bones otherwise appear intact. Proximal right  femoral hemiarthroplasty hardware appears unremarkable. Arterial  calcifications are conspicuous. Follow-up/further evaluation can be  obtained as indications persist.     IMPRESSION:     As described.           This report was finalized on 3/11/2021 3:29 PM by Dr. Prem Rosado M.D.     Assessment:  Patient Active Problem List   Diagnosis   • CVA (cerebral vascular accident) (CMS/HCC)   • Right hemiparesis (CMS/HCC)   • BELKYS on CPAP   • Seizure disorder (CMS/HCC)   • Type 2 diabetes mellitus (CMS/HCC)   • Essential hypertension   • 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)   • MSSA (methicillin susceptible Staphylococcus aureus) infection   • Group B streptococcal infection   • UTI (urinary tract infection)         Plan:      I did have an extensive discussion with the patient along with his family member in the hospital today.  Have reviewed the above.  Reviewed the above case with my supervising physician Dr. Tao Andrea.  Outside hospital records and aspiration of the right hip grew group B strep.  This does seem to be more of an acute type of infection as the patient just recently started experiencing symptoms 2 to 3 days ago.  Prior to that he states he was not having any issues with the hip.  He has been currently started on cefazolin 2 g IV every 8 hours by the infectious disease team.  We have been consulted for right hip periprosthetic joint infection.  Given the acute nature of  the infection, we will plan for irrigation and debridement of the right hip.  Also discussed with the family members that if during the operation it seems that the infection is more chronic then we would plan for excision arthroplasty with placement of antibiotic laden spacer.  Discussed the above with my supervising physician.  He is in agreement with the plan.  We will make the patient n.p.o. after midnight.  We will obtain a COVID-19 test for preoperative evaluation.  We will plan for irrigation and debridement of the right hip tomorrow.  He is currently on the operative schedule for 4 PM.  Risk, benefits, and outcomes of the surgery were discussed.  He and his family member understand these risks.  Wish to proceed.    Patient understands of the risks of surgery include but are not limited to heart attack, stroke, DVT, pulmonary embolism, infection, dislocation, injury to the blood vessels or nerves, leg length inequality, continued pain, need for future surgery, chances that we are not able to fully eradicate the infection, amputation, cardiac complications, death, and others.  Understand these risks.  Wishes to proceed.    We will plan for irrigation and debridement of the right hip tomorrow with Dr. Andrea.  Further recommendations to follow throughout his hospital course.  Thank you very much for this consultation.    Date: 3/11/2021  Josiah Leigh PA-C

## 2021-03-12 ENCOUNTER — ANESTHESIA EVENT (OUTPATIENT)
Dept: PERIOP | Facility: HOSPITAL | Age: 61
End: 2021-03-12

## 2021-03-12 ENCOUNTER — ANESTHESIA (OUTPATIENT)
Dept: PERIOP | Facility: HOSPITAL | Age: 61
End: 2021-03-12

## 2021-03-12 ENCOUNTER — APPOINTMENT (OUTPATIENT)
Dept: GENERAL RADIOLOGY | Facility: HOSPITAL | Age: 61
End: 2021-03-12

## 2021-03-12 LAB
ANION GAP SERPL CALCULATED.3IONS-SCNC: 10.2 MMOL/L (ref 5–15)
BUN SERPL-MCNC: 13 MG/DL (ref 6–20)
BUN/CREAT SERPL: 19.7 (ref 7–25)
CALCIUM SPEC-SCNC: 8.4 MG/DL (ref 8.6–10.5)
CHLORIDE SERPL-SCNC: 96 MMOL/L (ref 98–107)
CO2 SERPL-SCNC: 25.8 MMOL/L (ref 22–29)
CREAT SERPL-MCNC: 0.66 MG/DL (ref 0.76–1.27)
DEPRECATED RDW RBC AUTO: 42.9 FL (ref 37–54)
ERYTHROCYTE [DISTWIDTH] IN BLOOD BY AUTOMATED COUNT: 13.1 % (ref 12.3–15.4)
GFR SERPL CREATININE-BSD FRML MDRD: 124 ML/MIN/1.73
GLUCOSE BLDC GLUCOMTR-MCNC: 181 MG/DL (ref 70–130)
GLUCOSE BLDC GLUCOMTR-MCNC: 197 MG/DL (ref 70–130)
GLUCOSE BLDC GLUCOMTR-MCNC: 227 MG/DL (ref 70–130)
GLUCOSE BLDC GLUCOMTR-MCNC: 251 MG/DL (ref 70–130)
GLUCOSE BLDC GLUCOMTR-MCNC: 269 MG/DL (ref 70–130)
GLUCOSE SERPL-MCNC: 210 MG/DL (ref 65–99)
HCT VFR BLD AUTO: 33 % (ref 37.5–51)
HGB BLD-MCNC: 10.7 G/DL (ref 13–17.7)
MAGNESIUM SERPL-MCNC: 1.9 MG/DL (ref 1.6–2.6)
MCH RBC QN AUTO: 28.9 PG (ref 26.6–33)
MCHC RBC AUTO-ENTMCNC: 32.4 G/DL (ref 31.5–35.7)
MCV RBC AUTO: 89.2 FL (ref 79–97)
PLATELET # BLD AUTO: 235 10*3/MM3 (ref 140–450)
PMV BLD AUTO: 11.3 FL (ref 6–12)
POTASSIUM SERPL-SCNC: 3.8 MMOL/L (ref 3.5–5.2)
QT INTERVAL: 374 MS
RBC # BLD AUTO: 3.7 10*6/MM3 (ref 4.14–5.8)
SODIUM SERPL-SCNC: 132 MMOL/L (ref 136–145)
WBC # BLD AUTO: 8.61 10*3/MM3 (ref 3.4–10.8)

## 2021-03-12 PROCEDURE — 25010000003 CEFAZOLIN IN DEXTROSE 2-4 GM/100ML-% SOLUTION: Performed by: INTERNAL MEDICINE

## 2021-03-12 PROCEDURE — 25010000002 HYDROMORPHONE PER 4 MG: Performed by: ORTHOPAEDIC SURGERY

## 2021-03-12 PROCEDURE — 25010000002 HYDROMORPHONE PER 4 MG: Performed by: NURSE ANESTHETIST, CERTIFIED REGISTERED

## 2021-03-12 PROCEDURE — 82962 GLUCOSE BLOOD TEST: CPT

## 2021-03-12 PROCEDURE — 83735 ASSAY OF MAGNESIUM: CPT | Performed by: NURSE PRACTITIONER

## 2021-03-12 PROCEDURE — 0S9900Z DRAINAGE OF RIGHT HIP JOINT WITH DRAINAGE DEVICE, OPEN APPROACH: ICD-10-PCS | Performed by: ORTHOPAEDIC SURGERY

## 2021-03-12 PROCEDURE — 87102 FUNGUS ISOLATION CULTURE: CPT | Performed by: ORTHOPAEDIC SURGERY

## 2021-03-12 PROCEDURE — 87070 CULTURE OTHR SPECIMN AEROBIC: CPT | Performed by: ORTHOPAEDIC SURGERY

## 2021-03-12 PROCEDURE — 93010 ELECTROCARDIOGRAM REPORT: CPT | Performed by: INTERNAL MEDICINE

## 2021-03-12 PROCEDURE — 25010000002 NEOSTIGMINE PER 0.5 MG: Performed by: ANESTHESIOLOGY

## 2021-03-12 PROCEDURE — 25010000002 SUCCINYLCHOLINE PER 20 MG: Performed by: NURSE ANESTHETIST, CERTIFIED REGISTERED

## 2021-03-12 PROCEDURE — 25010000002 FENTANYL CITRATE (PF) 100 MCG/2ML SOLUTION: Performed by: NURSE ANESTHETIST, CERTIFIED REGISTERED

## 2021-03-12 PROCEDURE — 93005 ELECTROCARDIOGRAM TRACING: CPT | Performed by: INTERNAL MEDICINE

## 2021-03-12 PROCEDURE — 63710000001 INSULIN GLARGINE PER 5 UNITS: Performed by: ORTHOPAEDIC SURGERY

## 2021-03-12 PROCEDURE — 80048 BASIC METABOLIC PNL TOTAL CA: CPT | Performed by: NURSE PRACTITIONER

## 2021-03-12 PROCEDURE — 87075 CULTR BACTERIA EXCEPT BLOOD: CPT | Performed by: ORTHOPAEDIC SURGERY

## 2021-03-12 PROCEDURE — 87015 SPECIMEN INFECT AGNT CONCNTJ: CPT | Performed by: ORTHOPAEDIC SURGERY

## 2021-03-12 PROCEDURE — 73501 X-RAY EXAM HIP UNI 1 VIEW: CPT

## 2021-03-12 PROCEDURE — 25010000002 PROPOFOL 10 MG/ML EMULSION: Performed by: NURSE ANESTHETIST, CERTIFIED REGISTERED

## 2021-03-12 PROCEDURE — 25010000002 HYDROMORPHONE PER 4 MG: Performed by: INTERNAL MEDICINE

## 2021-03-12 PROCEDURE — 25010000002 FENTANYL CITRATE (PF) 100 MCG/2ML SOLUTION: Performed by: ANESTHESIOLOGY

## 2021-03-12 PROCEDURE — 87205 SMEAR GRAM STAIN: CPT | Performed by: ORTHOPAEDIC SURGERY

## 2021-03-12 PROCEDURE — 25010000002 ONDANSETRON PER 1 MG: Performed by: ANESTHESIOLOGY

## 2021-03-12 PROCEDURE — 85027 COMPLETE CBC AUTOMATED: CPT | Performed by: NURSE PRACTITIONER

## 2021-03-12 PROCEDURE — 99232 SBSQ HOSP IP/OBS MODERATE 35: CPT | Performed by: INTERNAL MEDICINE

## 2021-03-12 PROCEDURE — 99221 1ST HOSP IP/OBS SF/LOW 40: CPT | Performed by: NURSE PRACTITIONER

## 2021-03-12 RX ORDER — ROCURONIUM BROMIDE 10 MG/ML
INJECTION, SOLUTION INTRAVENOUS AS NEEDED
Status: DISCONTINUED | OUTPATIENT
Start: 2021-03-12 | End: 2021-03-12 | Stop reason: SURG

## 2021-03-12 RX ORDER — DIPHENHYDRAMINE HYDROCHLORIDE 50 MG/ML
25 INJECTION INTRAMUSCULAR; INTRAVENOUS EVERY 6 HOURS PRN
Status: DISCONTINUED | OUTPATIENT
Start: 2021-03-12 | End: 2021-03-18 | Stop reason: HOSPADM

## 2021-03-12 RX ORDER — ONDANSETRON 2 MG/ML
4 INJECTION INTRAMUSCULAR; INTRAVENOUS ONCE AS NEEDED
Status: DISCONTINUED | OUTPATIENT
Start: 2021-03-12 | End: 2021-03-12 | Stop reason: HOSPADM

## 2021-03-12 RX ORDER — HYDROMORPHONE HYDROCHLORIDE 1 MG/ML
0.5 INJECTION, SOLUTION INTRAMUSCULAR; INTRAVENOUS; SUBCUTANEOUS
Status: DISCONTINUED | OUTPATIENT
Start: 2021-03-12 | End: 2021-03-12 | Stop reason: HOSPADM

## 2021-03-12 RX ORDER — LABETALOL HYDROCHLORIDE 5 MG/ML
5 INJECTION, SOLUTION INTRAVENOUS
Status: DISCONTINUED | OUTPATIENT
Start: 2021-03-12 | End: 2021-03-12 | Stop reason: HOSPADM

## 2021-03-12 RX ORDER — FLUMAZENIL 0.1 MG/ML
0.2 INJECTION INTRAVENOUS AS NEEDED
Status: DISCONTINUED | OUTPATIENT
Start: 2021-03-12 | End: 2021-03-12 | Stop reason: HOSPADM

## 2021-03-12 RX ORDER — LIDOCAINE HYDROCHLORIDE 10 MG/ML
0.5 INJECTION, SOLUTION EPIDURAL; INFILTRATION; INTRACAUDAL; PERINEURAL ONCE AS NEEDED
Status: DISCONTINUED | OUTPATIENT
Start: 2021-03-12 | End: 2021-03-12 | Stop reason: HOSPADM

## 2021-03-12 RX ORDER — FAMOTIDINE 10 MG/ML
20 INJECTION, SOLUTION INTRAVENOUS ONCE
Status: COMPLETED | OUTPATIENT
Start: 2021-03-12 | End: 2021-03-12

## 2021-03-12 RX ORDER — SODIUM CHLORIDE 0.9 % (FLUSH) 0.9 %
3 SYRINGE (ML) INJECTION EVERY 12 HOURS SCHEDULED
Status: DISCONTINUED | OUTPATIENT
Start: 2021-03-12 | End: 2021-03-18 | Stop reason: HOSPADM

## 2021-03-12 RX ORDER — ESMOLOL HYDROCHLORIDE 10 MG/ML
INJECTION INTRAVENOUS AS NEEDED
Status: DISCONTINUED | OUTPATIENT
Start: 2021-03-12 | End: 2021-03-12 | Stop reason: SURG

## 2021-03-12 RX ORDER — CEFAZOLIN SODIUM 2 G/100ML
2 INJECTION, SOLUTION INTRAVENOUS ONCE
Status: DISCONTINUED | OUTPATIENT
Start: 2021-03-12 | End: 2021-03-12 | Stop reason: HOSPADM

## 2021-03-12 RX ORDER — MAGNESIUM HYDROXIDE 1200 MG/15ML
LIQUID ORAL AS NEEDED
Status: DISCONTINUED | OUTPATIENT
Start: 2021-03-12 | End: 2021-03-12 | Stop reason: HOSPADM

## 2021-03-12 RX ORDER — SODIUM CHLORIDE 0.9 % (FLUSH) 0.9 %
3-10 SYRINGE (ML) INJECTION AS NEEDED
Status: DISCONTINUED | OUTPATIENT
Start: 2021-03-12 | End: 2021-03-12 | Stop reason: HOSPADM

## 2021-03-12 RX ORDER — SODIUM CHLORIDE, SODIUM LACTATE, POTASSIUM CHLORIDE, CALCIUM CHLORIDE 600; 310; 30; 20 MG/100ML; MG/100ML; MG/100ML; MG/100ML
9 INJECTION, SOLUTION INTRAVENOUS CONTINUOUS
Status: DISCONTINUED | OUTPATIENT
Start: 2021-03-12 | End: 2021-03-14

## 2021-03-12 RX ORDER — PROMETHAZINE HYDROCHLORIDE 25 MG/1
25 SUPPOSITORY RECTAL ONCE AS NEEDED
Status: DISCONTINUED | OUTPATIENT
Start: 2021-03-12 | End: 2021-03-12 | Stop reason: HOSPADM

## 2021-03-12 RX ORDER — OXYCODONE AND ACETAMINOPHEN 7.5; 325 MG/1; MG/1
1 TABLET ORAL ONCE AS NEEDED
Status: DISCONTINUED | OUTPATIENT
Start: 2021-03-12 | End: 2021-03-12 | Stop reason: HOSPADM

## 2021-03-12 RX ORDER — SUCCINYLCHOLINE CHLORIDE 20 MG/ML
INJECTION INTRAMUSCULAR; INTRAVENOUS AS NEEDED
Status: DISCONTINUED | OUTPATIENT
Start: 2021-03-12 | End: 2021-03-12 | Stop reason: SURG

## 2021-03-12 RX ORDER — EPHEDRINE SULFATE 50 MG/ML
5 INJECTION, SOLUTION INTRAVENOUS ONCE AS NEEDED
Status: DISCONTINUED | OUTPATIENT
Start: 2021-03-12 | End: 2021-03-12 | Stop reason: HOSPADM

## 2021-03-12 RX ORDER — DIPHENHYDRAMINE HCL 25 MG
25 CAPSULE ORAL EVERY 6 HOURS PRN
Status: DISCONTINUED | OUTPATIENT
Start: 2021-03-12 | End: 2021-03-18 | Stop reason: HOSPADM

## 2021-03-12 RX ORDER — ASPIRIN 81 MG/1
81 TABLET, CHEWABLE ORAL 2 TIMES DAILY WITH MEALS
Status: DISCONTINUED | OUTPATIENT
Start: 2021-03-13 | End: 2021-03-18 | Stop reason: HOSPADM

## 2021-03-12 RX ORDER — SODIUM CHLORIDE 0.9 % (FLUSH) 0.9 %
3 SYRINGE (ML) INJECTION EVERY 12 HOURS SCHEDULED
Status: DISCONTINUED | OUTPATIENT
Start: 2021-03-12 | End: 2021-03-12 | Stop reason: HOSPADM

## 2021-03-12 RX ORDER — INSULIN GLARGINE 100 [IU]/ML
20 INJECTION, SOLUTION SUBCUTANEOUS NIGHTLY
Status: DISCONTINUED | OUTPATIENT
Start: 2021-03-12 | End: 2021-03-18 | Stop reason: HOSPADM

## 2021-03-12 RX ORDER — FENTANYL CITRATE 50 UG/ML
50 INJECTION, SOLUTION INTRAMUSCULAR; INTRAVENOUS
Status: DISCONTINUED | OUTPATIENT
Start: 2021-03-12 | End: 2021-03-12 | Stop reason: HOSPADM

## 2021-03-12 RX ORDER — PROMETHAZINE HYDROCHLORIDE 25 MG/1
25 TABLET ORAL ONCE AS NEEDED
Status: DISCONTINUED | OUTPATIENT
Start: 2021-03-12 | End: 2021-03-12 | Stop reason: HOSPADM

## 2021-03-12 RX ORDER — PROPOFOL 10 MG/ML
VIAL (ML) INTRAVENOUS AS NEEDED
Status: DISCONTINUED | OUTPATIENT
Start: 2021-03-12 | End: 2021-03-12 | Stop reason: SURG

## 2021-03-12 RX ORDER — ONDANSETRON 2 MG/ML
INJECTION INTRAMUSCULAR; INTRAVENOUS AS NEEDED
Status: DISCONTINUED | OUTPATIENT
Start: 2021-03-12 | End: 2021-03-12 | Stop reason: SURG

## 2021-03-12 RX ORDER — HYDROCODONE BITARTRATE AND ACETAMINOPHEN 7.5; 325 MG/1; MG/1
1 TABLET ORAL ONCE AS NEEDED
Status: DISCONTINUED | OUTPATIENT
Start: 2021-03-12 | End: 2021-03-12 | Stop reason: HOSPADM

## 2021-03-12 RX ORDER — SODIUM CHLORIDE, SODIUM LACTATE, POTASSIUM CHLORIDE, CALCIUM CHLORIDE 600; 310; 30; 20 MG/100ML; MG/100ML; MG/100ML; MG/100ML
100 INJECTION, SOLUTION INTRAVENOUS CONTINUOUS
Status: DISCONTINUED | OUTPATIENT
Start: 2021-03-12 | End: 2021-03-17

## 2021-03-12 RX ORDER — LABETALOL HYDROCHLORIDE 5 MG/ML
INJECTION, SOLUTION INTRAVENOUS AS NEEDED
Status: DISCONTINUED | OUTPATIENT
Start: 2021-03-12 | End: 2021-03-12 | Stop reason: SURG

## 2021-03-12 RX ORDER — GLYCOPYRROLATE 0.2 MG/ML
INJECTION INTRAMUSCULAR; INTRAVENOUS AS NEEDED
Status: DISCONTINUED | OUTPATIENT
Start: 2021-03-12 | End: 2021-03-12 | Stop reason: SURG

## 2021-03-12 RX ORDER — FENTANYL CITRATE 50 UG/ML
25 INJECTION, SOLUTION INTRAMUSCULAR; INTRAVENOUS ONCE
Status: COMPLETED | OUTPATIENT
Start: 2021-03-12 | End: 2021-03-12

## 2021-03-12 RX ORDER — UREA 10 %
1 LOTION (ML) TOPICAL NIGHTLY PRN
Status: DISCONTINUED | OUTPATIENT
Start: 2021-03-12 | End: 2021-03-18 | Stop reason: HOSPADM

## 2021-03-12 RX ORDER — ACETAMINOPHEN 500 MG
1000 TABLET ORAL EVERY 6 HOURS
Status: DISCONTINUED | OUTPATIENT
Start: 2021-03-12 | End: 2021-03-18 | Stop reason: HOSPADM

## 2021-03-12 RX ORDER — DOCUSATE SODIUM 100 MG/1
200 CAPSULE, LIQUID FILLED ORAL 2 TIMES DAILY
Status: DISCONTINUED | OUTPATIENT
Start: 2021-03-12 | End: 2021-03-18 | Stop reason: HOSPADM

## 2021-03-12 RX ORDER — HYDROMORPHONE HCL 110MG/55ML
PATIENT CONTROLLED ANALGESIA SYRINGE INTRAVENOUS AS NEEDED
Status: DISCONTINUED | OUTPATIENT
Start: 2021-03-12 | End: 2021-03-12 | Stop reason: SURG

## 2021-03-12 RX ORDER — HYDROCODONE BITARTRATE AND ACETAMINOPHEN 10; 325 MG/1; MG/1
1 TABLET ORAL EVERY 4 HOURS PRN
Status: DISCONTINUED | OUTPATIENT
Start: 2021-03-12 | End: 2021-03-15 | Stop reason: SDUPTHER

## 2021-03-12 RX ORDER — NALOXONE HCL 0.4 MG/ML
0.2 VIAL (ML) INJECTION AS NEEDED
Status: DISCONTINUED | OUTPATIENT
Start: 2021-03-12 | End: 2021-03-12 | Stop reason: HOSPADM

## 2021-03-12 RX ORDER — HYDROCODONE BITARTRATE AND ACETAMINOPHEN 10; 325 MG/1; MG/1
1 TABLET ORAL EVERY 4 HOURS PRN
Status: DISCONTINUED | OUTPATIENT
Start: 2021-03-12 | End: 2021-03-18 | Stop reason: HOSPADM

## 2021-03-12 RX ORDER — SODIUM CHLORIDE 0.9 % (FLUSH) 0.9 %
10 SYRINGE (ML) INJECTION AS NEEDED
Status: DISCONTINUED | OUTPATIENT
Start: 2021-03-12 | End: 2021-03-18 | Stop reason: HOSPADM

## 2021-03-12 RX ADMIN — ROSUVASTATIN CALCIUM 40 MG: 40 TABLET, FILM COATED ORAL at 20:27

## 2021-03-12 RX ADMIN — HYDROMORPHONE HYDROCHLORIDE 0.5 MG: 1 INJECTION, SOLUTION INTRAMUSCULAR; INTRAVENOUS; SUBCUTANEOUS at 20:28

## 2021-03-12 RX ADMIN — FENTANYL CITRATE 25 MCG: 50 INJECTION, SOLUTION INTRAMUSCULAR; INTRAVENOUS at 16:00

## 2021-03-12 RX ADMIN — HYDROMORPHONE HYDROCHLORIDE 0.5 MG: 1 INJECTION, SOLUTION INTRAMUSCULAR; INTRAVENOUS; SUBCUTANEOUS at 00:51

## 2021-03-12 RX ADMIN — DOCUSATE SODIUM 200 MG: 100 CAPSULE, LIQUID FILLED ORAL at 20:27

## 2021-03-12 RX ADMIN — LABETALOL HYDROCHLORIDE 5 MG: 5 INJECTION, SOLUTION INTRAVENOUS at 17:18

## 2021-03-12 RX ADMIN — METOPROLOL SUCCINATE 12.5 MG: 25 TABLET, EXTENDED RELEASE ORAL at 20:27

## 2021-03-12 RX ADMIN — FENTANYL CITRATE 50 MCG: 50 INJECTION, SOLUTION INTRAMUSCULAR; INTRAVENOUS at 18:50

## 2021-03-12 RX ADMIN — CEFAZOLIN SODIUM 2 G: 2 INJECTION, SOLUTION INTRAVENOUS at 06:31

## 2021-03-12 RX ADMIN — FENTANYL CITRATE 50 MCG: 50 INJECTION, SOLUTION INTRAMUSCULAR; INTRAVENOUS at 19:10

## 2021-03-12 RX ADMIN — LABETALOL HYDROCHLORIDE 5 MG: 5 INJECTION, SOLUTION INTRAVENOUS at 17:33

## 2021-03-12 RX ADMIN — LABETALOL HYDROCHLORIDE 10 MG: 5 INJECTION, SOLUTION INTRAVENOUS at 17:02

## 2021-03-12 RX ADMIN — CEFAZOLIN SODIUM 2 G: 2 INJECTION, SOLUTION INTRAVENOUS at 14:33

## 2021-03-12 RX ADMIN — LISINOPRIL 20 MG: 20 TABLET ORAL at 07:51

## 2021-03-12 RX ADMIN — HYDROMORPHONE HYDROCHLORIDE 0.5 MG: 2 INJECTION, SOLUTION INTRAMUSCULAR; INTRAVENOUS; SUBCUTANEOUS at 16:47

## 2021-03-12 RX ADMIN — HYDROMORPHONE HYDROCHLORIDE 0.5 MG: 1 INJECTION, SOLUTION INTRAMUSCULAR; INTRAVENOUS; SUBCUTANEOUS at 19:00

## 2021-03-12 RX ADMIN — CEFAZOLIN SODIUM 2 G: 2 INJECTION, SOLUTION INTRAVENOUS at 16:30

## 2021-03-12 RX ADMIN — PROPOFOL 250 MG: 10 INJECTION, EMULSION INTRAVENOUS at 16:30

## 2021-03-12 RX ADMIN — FAMOTIDINE 20 MG: 10 INJECTION INTRAVENOUS at 22:20

## 2021-03-12 RX ADMIN — ISOSORBIDE MONONITRATE 30 MG: 30 TABLET ORAL at 07:52

## 2021-03-12 RX ADMIN — INSULIN GLARGINE 20 UNITS: 100 INJECTION, SOLUTION SUBCUTANEOUS at 20:29

## 2021-03-12 RX ADMIN — FENTANYL CITRATE 50 MCG: 50 INJECTION, SOLUTION INTRAMUSCULAR; INTRAVENOUS at 18:25

## 2021-03-12 RX ADMIN — HYDROMORPHONE HYDROCHLORIDE 0.5 MG: 1 INJECTION, SOLUTION INTRAMUSCULAR; INTRAVENOUS; SUBCUTANEOUS at 18:35

## 2021-03-12 RX ADMIN — GLYCOPYRROLATE 0.4 MG: 0.2 INJECTION INTRAMUSCULAR; INTRAVENOUS at 17:20

## 2021-03-12 RX ADMIN — HYDROCODONE BITARTRATE AND ACETAMINOPHEN 1 TABLET: 7.5; 325 TABLET ORAL at 12:38

## 2021-03-12 RX ADMIN — SODIUM CHLORIDE, POTASSIUM CHLORIDE, SODIUM LACTATE AND CALCIUM CHLORIDE 9 ML/HR: 600; 310; 30; 20 INJECTION, SOLUTION INTRAVENOUS at 15:53

## 2021-03-12 RX ADMIN — ESMOLOL HYDROCHLORIDE 30 MCG: 100 INJECTION, SOLUTION INTRAVENOUS at 17:09

## 2021-03-12 RX ADMIN — HYDROCODONE BITARTRATE AND ACETAMINOPHEN 1 TABLET: 7.5; 325 TABLET ORAL at 07:51

## 2021-03-12 RX ADMIN — ACETAMINOPHEN 1000 MG: 500 TABLET, FILM COATED ORAL at 20:27

## 2021-03-12 RX ADMIN — SUCCINYLCHOLINE CHLORIDE 200 MG: 20 INJECTION, SOLUTION INTRAMUSCULAR; INTRAVENOUS; PARENTERAL at 16:32

## 2021-03-12 RX ADMIN — NEOSTIGMINE METHYLSULFATE 2 MG: 1 INJECTION INTRAMUSCULAR; INTRAVENOUS; SUBCUTANEOUS at 17:20

## 2021-03-12 RX ADMIN — HYDROMORPHONE HYDROCHLORIDE 0.5 MG: 1 INJECTION, SOLUTION INTRAMUSCULAR; INTRAVENOUS; SUBCUTANEOUS at 02:59

## 2021-03-12 RX ADMIN — ONDANSETRON 4 MG: 2 INJECTION INTRAMUSCULAR; INTRAVENOUS at 17:22

## 2021-03-12 RX ADMIN — PROPOFOL 40 MG: 10 INJECTION, EMULSION INTRAVENOUS at 16:59

## 2021-03-12 RX ADMIN — CYCLOBENZAPRINE 10 MG: 10 TABLET, FILM COATED ORAL at 12:33

## 2021-03-12 RX ADMIN — METOPROLOL SUCCINATE 12.5 MG: 25 TABLET, EXTENDED RELEASE ORAL at 07:51

## 2021-03-12 RX ADMIN — HYDROMORPHONE HYDROCHLORIDE 0.5 MG: 1 INJECTION, SOLUTION INTRAMUSCULAR; INTRAVENOUS; SUBCUTANEOUS at 19:20

## 2021-03-12 RX ADMIN — FAMOTIDINE 20 MG: 10 INJECTION INTRAVENOUS at 15:52

## 2021-03-12 RX ADMIN — SODIUM CHLORIDE, PRESERVATIVE FREE 3 ML: 5 INJECTION INTRAVENOUS at 20:28

## 2021-03-12 RX ADMIN — SODIUM CHLORIDE, POTASSIUM CHLORIDE, SODIUM LACTATE AND CALCIUM CHLORIDE 100 ML/HR: 600; 310; 30; 20 INJECTION, SOLUTION INTRAVENOUS at 20:28

## 2021-03-12 RX ADMIN — FENTANYL CITRATE 50 MCG: 50 INJECTION, SOLUTION INTRAMUSCULAR; INTRAVENOUS at 19:30

## 2021-03-12 RX ADMIN — ROCURONIUM BROMIDE 50 MG: 50 INJECTION INTRAVENOUS at 16:37

## 2021-03-12 NOTE — CONSULTS
Patient Name: Mandeep Tracey  :1960  61 y.o.    Date of Admission: 3/11/2021  Date of Consultation:  21  Encounter Provider: ESTRELLA Roberts  Place of Service: [facility]  Referring Provider: Peggy Franklin MD  Patient Care Team:  Jazzy Servin APRN as PCP - General (Nurse Practitioner)      Chief complaint: right hip pain, fever    Reason for Consult: bigeminy/trigeminy     History of Present Illness: Mr Tracey is a 61 year old male patient with history of hypertension, hyperlipidemia, diabetes, previous CVA () with right-sided hemiparesis/severe aphasia and BELKYS (CPAP). He had a previous right bipolar hemiarthroplasty in 2020 by Dr. Garrido for femoral neck fracture after falling out of his motorized wheelchair at [place]. He presented to [facility] 3/11/21 from [facility] after reports of right hip pain and subjective fevers. There was concern for sepsis due to acute bacterial cystitis and septic arthritis, and he was subsequently started on broad spectrum IV antibiotics. Previously aspirated joint fluid from 3/10/21 was noted to be MSSA positive, for which ID is following. He is scheduled to undergo an I&D with Dr. Andrea today.    He has been noted to have frequent PVC's, including bigeminy and trigeminy on telemetry, for which we have been consulted. He has a reported documented history of coronary artery disease per his H/P, but both he and his wife deny any previous cardiac history or prior cardiac testing. He is currently taking Metoprolol Succinate 12.5mg twice daily, per home dosaging. Electrolytes stable. He has been asymptomatic. History somewhat difficult to obtain due to aphasia. I attempted to call his wife. No answer.     No prior cardiac testing in our system.     CXR 3/11/21  The lateral view of the distal femur is technically limited by  overpenetration; appearance of erosion of the patella and proximal  tibia  on this image is presumably technical in origin, repeat image could be  obtained as indicated. The bones otherwise appear intact. Proximal right  femoral hemiarthroplasty hardware appears unremarkable. Arterial  calcifications are conspicuous. Follow-up/further evaluation can be  obtained as indications persist.        Past Medical History:   Diagnosis Date   • Aphasia    • CPAP (continuous positive airway pressure) dependence    • Diabetes (CMS/HCC)    • Hemiparesis (CMS/HCC)     Right side    • Peptic ulcer disease    • Psoriasis    • Seizures (CMS/HCC)    • Sleep apnea    • Stroke (CMS/HCC) 2004       Past Surgical History:   Procedure Laterality Date   • CHOLECYSTECTOMY     • EYE SURGERY     • LUMBAR DISC SURGERY           Prior to Admission medications    Medication Sig Start Date End Date Taking? Authorizing Provider   acetaminophen (TYLENOL) 325 MG tablet Take 650 mg by mouth Every 4 (Four) Hours As Needed for Mild Pain .   Yes Rachel Perales MD   aspirin 81 MG chewable tablet Chew 81 mg Daily.   Yes Rachel Perales MD   clotrimazole-betamethasone (LOTRISONE) 1-0.05 % cream Apply  topically to the appropriate area as directed 2 (Two) Times a Day As Needed. For psoriasis   Yes Rachel Perales MD   gabapentin (NEURONTIN) 600 MG tablet Take 600 mg by mouth 3 (Three) Times a Day.   Yes Rachel Perales MD   hydrOXYzine (ATARAX) 10 MG tablet Take 10 mg by mouth Every 4 (Four) Hours As Needed for Itching.   Yes Rachel Perales MD   insulin glargine (LANTUS, SEMGLEE) 100 UNIT/ML injection Inject 20 Units under the skin into the appropriate area as directed Daily.   Yes Rachel Perales MD   isosorbide mononitrate (IMDUR) 30 MG 24 hr tablet Take 30 mg by mouth Daily.   Yes Rachel Perales MD   lisinopril (PRINIVIL,ZESTRIL) 20 MG tablet Take 20 mg by mouth Daily.   Yes Rachel Perales MD   meclizine 25 MG chewable tablet chewable tablet Chew 25 mg 3 (Three) Times a  "Day As Needed.   Yes ProviderRachel MD   metFORMIN (GLUCOPHAGE) 1000 MG tablet Take 1,000 mg by mouth 2 (Two) Times a Day With Meals.   Yes Rachel Perales MD   metoprolol succinate XL (TOPROL-XL) 25 MG 24 hr tablet Take 12.5 mg by mouth Every 12 (Twelve) Hours.   Yes Rachel Perales MD   rosuvastatin (CRESTOR) 20 MG tablet Take 40 mg by mouth Daily.   Yes Rachel Perales MD   sennosides-docusate (senna-docusate sodium) 8.6-50 MG per tablet Take 1 tablet by mouth Daily As Needed for Constipation.   Yes ProviderRachel MD       Allergies   Allergen Reactions   • Fentanyl Mental Status Change   • Ciprofloxacin Unknown - Low Severity   • Quinolones Hives       Social History     Socioeconomic History   • Marital status:      Spouse name: Not on file   • Number of children: Not on file   • Years of education: Not on file   • Highest education level: Not on file   Tobacco Use   • Smoking status: Former Smoker   • Smokeless tobacco: Never Used   • Tobacco comment: quit 22 years ago    Substance and Sexual Activity   • Alcohol use: Never   • Sexual activity: Defer       No family history on file.    REVIEW OF SYSTEMS:   All systems reviewed.  Pertinent positives identified in HPI.  All other systems are negative.      Objective:     Vitals:    03/12/21 0023 03/12/21 0500 03/12/21 0709 03/12/21 1250   BP:  127/74 115/76    BP Location:  Left arm Right arm    Patient Position:  Lying Lying    Pulse:   88    Resp:  20 20    Temp:   99.2 °F (37.3 °C)    TempSrc:   Oral    SpO2:   96%    Weight:       Height: 165.1 cm (65\")   165.1 cm (65\")     Body mass index is 50.56 kg/m².    General Appearance:    Alert, cooperative, in no acute distress   Head:    Normocephalic, without obvious abnormality, atraumatic   Eyes:            Lids and lashes normal, conjunctivae and sclerae normal, no icterus, no pallor, corneas clear, PERRLA   Ears:    Ears appear intact with no abnormalities noted "   Throat:   No oral lesions, no thrush, oral mucosa moist   Neck:   No adenopathy, supple, trachea midline, no thyromegaly, no carotid bruit, no JVD   Back:     No kyphosis present, no scoliosis present, no skin lesions, erythema or scars, no tenderness to percussion or palpation, range of motion normal   Lungs:     Clear to auscultation, respirations regular, even and unlabored    Heart:    Regular rhythm and normal rate, normal S1 and S2, no murmur, no gallop, no rub, no click   Chest Wall:    No abnormalities observed   Abdomen:     Normal bowel sounds, no masses, no organomegaly, soft, nontender, nondistended, no guarding, no rebound  tenderness   Extremities:   Moves all extremities well, no edema, no cyanosis, no redness   Pulses:   Pulses palpable and equal bilaterally. Normal radial, carotid, femoral, dorsalis pedis and posterior tibial pulses bilaterally. Normal abdominal aorta   Skin:  Psychiatric:   No bleeding, bruising or rash    Alert and oriented x 3, normal mood and affect   Lab Review:     Results from last 7 days   Lab Units 03/12/21  0423 03/11/21  1429   SODIUM mmol/L 132* 134*   POTASSIUM mmol/L 3.8 4.0   CHLORIDE mmol/L 96* 97*   CO2 mmol/L 25.8 26.0   BUN mg/dL 13 10   CREATININE mg/dL 0.66* 0.80   CALCIUM mg/dL 8.4* 9.1   BILIRUBIN mg/dL  --  0.5   ALK PHOS U/L  --  54   ALT (SGPT) U/L  --  20   AST (SGOT) U/L  --  17   GLUCOSE mg/dL 210* 197*         Results from last 7 days   Lab Units 03/12/21  0423   WBC 10*3/mm3 8.61   HEMOGLOBIN g/dL 10.7*   HEMATOCRIT % 33.0*   PLATELETS 10*3/mm3 235         Results from last 7 days   Lab Units 03/12/21  0423   MAGNESIUM mg/dL 1.9                   EKG 3/12/21    Telemetry                  Assessment and Plan:       1. Ventricular ectopy - asymptomatic. Electrolytes stable. No prior cardiac history or evaluation that we know of, although he is on isosorbide? Will check echo. Continue beta blocker.   2. Right prosthetic hip joint infection - due to  group B strep. I & D planned for today. Infectious disease is following.  3. Obstructive sleep apnea compliant with cpap.  4. History of stroke with residual right hemiparesis and severe aphasia   5. Hypertension - adequately controlled.   6. Diabetes mellitus type II - poorly controlled A1c 9.2  7. Morbid obesity    Courtney Alarcon, APRN  03/12/21  12:54 EST

## 2021-03-12 NOTE — ANESTHESIA PREPROCEDURE EVALUATION
Anesthesia Evaluation     Patient summary reviewed and Nursing notes reviewed   NPO Solid Status: > 8 hours  NPO Liquid Status: > 2 hours           Airway   Mallampati: III  TM distance: >3 FB  Neck ROM: full  Possible difficult intubation  Dental    (+) upper dentures        Pulmonary - normal exam   (+) sleep apnea on CPAP,   Cardiovascular - normal exam    ECG reviewed  Patient on routine beta blocker and Beta blocker given within 24 hours of surgery    (+) hypertension,       Neuro/Psych  (+) seizures, CVA (hemiparesis, dysarthria) residual symptoms,     GI/Hepatic/Renal/Endo    (+)  PUD,  diabetes mellitus (A1c > 9) type 2 poorly controlled using insulin,     Musculoskeletal     Abdominal  - normal exam   Substance History      OB/GYN          Other                      Anesthesia Plan    ASA 4     general   (I have reviewed the patient's history with the patient and the chart, including all pertinent laboratory results and imaging. I have explained the risks of anesthesia including but not limited to dental damage, corneal abrasion, nerve injury, MI, stroke, and death.      OK for surgery before echo done per cardiology)  intravenous induction     Anesthetic plan, all risks, benefits, and alternatives have been provided, discussed and informed consent has been obtained with: patient.    Plan discussed with CRNA.

## 2021-03-12 NOTE — PROGRESS NOTES
" LOS: 1 day     Chief Complaint:  Follow-up R hip PJI due to Group B Strep    Interval History:  No acute events. NO fever. Tolerating cefazolin. Going to OR later today for I&D of the R hip prosthetic joint.     ROS: no n/v/d    Vital Signs  Temp:  [98.5 °F (36.9 °C)-99.2 °F (37.3 °C)] 99.2 °F (37.3 °C)  Heart Rate:  [] 88  Resp:  [18-20] 20  BP: (106-140)/(70-97) 115/76    Physical Exam:  General: resting comfortably  Eyes:  no scleral icterus  ENT: poor dentition  Cardiovascular: NR  Respiratory: normal work of breathing on CPAP  GI: Abdomen is obese, soft, non-tender, normal bowel sounds in all four quadrants  :  no Chavez catheter  Musculoskeletal: R hip TTP  Skin: No rashes    Antibiotics:  •  ceFAZolin in dextrose (ANCEF) IVPB solution 2 g, 2 g, Intravenous, Q8H    LABS:  CBC, BMP, CRP, A1c, micro reviewed today  Lab Results   Component Value Date    WBC 8.61 03/12/2021    HGB 10.7 (L) 03/12/2021    HCT 33.0 (L) 03/12/2021    MCV 89.2 03/12/2021     03/12/2021     Lab Results   Component Value Date    GLUCOSE 210 (H) 03/12/2021    CALCIUM 8.4 (L) 03/12/2021     (L) 03/12/2021    K 3.8 03/12/2021    CO2 25.8 03/12/2021    CL 96 (L) 03/12/2021    BUN 13 03/12/2021    CREATININE 0.66 (L) 03/12/2021    EGFRIFNONA 124 03/12/2021    BCR 19.7 03/12/2021    ANIONGAP 10.2 03/12/2021     Lab Results   Component Value Date    CRP 27.86 (H) 03/11/2021     Lab Results   Component Value Date    HGBA1C 9.22 (H) 03/11/2021     Microbiology:  3/9 OSH BCx: NGTD  3/9 OSH UCx: >100k MSSA  3/10 OSH R Hip Synovial Cx: Group B Strep  3/11 COVID; negative    Radiology (report reviewed):  Femur XR 3/11/21: \"Proximal right femoral hemiarthroplasty hardware appears unremarkable\"    Assessment/Plan   1. 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 Urine culture positive for MSSA  3. Super obesity BMI 50  4. Uncontrolled DM2 - A1c 9.2%  5. History of stroke and hemiparesis     For Group B Strep " infection of the right prosthetic hip (cultures scanned in under media tab), continue cefazolin 2 g IV q8h. Noted plans for operative debridement with orthopedics today. Final antibiotics plan pending results of the operative debridement but most likely something like penicillin, cefazolin, or ceftriaxone for a 6 week course.     ID will follow. We will follow his chart for cultures results from the OR and operative findings over the weekend, and I will see him again on 3/15/21.

## 2021-03-12 NOTE — SIGNIFICANT NOTE
03/12/21 0829   OTHER   Discipline physical therapist   Rehab Time/Intention   Session Not Performed other (see comments)  (Pt gong for I and D of R hip today. PT will follow up post-op.)   Recommendation   PT - Next Appointment 03/13/21

## 2021-03-12 NOTE — DISCHARGE PLACEMENT REQUEST
"Mandeep Tong (61 y.o. Male)     Date of Birth Social Security Number Address Home Phone MRN    1960  [address] [id number] [phone] [id number]    Catholic Marital Status          Episcopal        Admission Date Admission Type Admitting Provider Attending Provider Department, Room/Bed    3/11/21 Urgent Angel Luis Dejesus MD Lykins, Matthew D, MD [address], [address]/1    Discharge Date Discharge Disposition Discharge Destination                       Attending Provider: Angel Luis Dejesus MD    Allergies: Fentanyl, Ciprofloxacin, Quinolones    Isolation: None   Infection: None   Code Status: CPR    Ht: 165.1 cm (65\")   Wt: 138 kg (303 lb 13.1 oz)    Admission Cmt: None   Principal Problem: 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) [T84.51XA]                 Active Insurance as of 3/11/2021     Primary Coverage     Payor Plan Insurance Group Employer/Plan Group    [facility] [id number]      Payor Plan Address Payor Plan Phone Number Payor Plan Fax Number Effective Dates    [facility] OFFICE OF COMMUNITY CARE [phone]  1/1/2021 - None Entered    [address] [address]       [facility] [address]       Subscriber Name Subscriber Birth Date Member ID       MANDEEP TONG 1960 [id number]                 Emergency Contacts      (Rel.) Home Phone Work Phone Mobile Phone    JOSETTE TONG (Spouse) -- -- [phone]    Mandy Tong (Daughter) -- -- [phone]            {Outbreak/Travel/Exposure Documentation......;  Question Available Choices Patient Response   Outbreak Screen: Do you currently have a new onset of the following symptoms?        Fever/Chils, Cough, Shortness of air, Loss of taste or smell, No, Unknown  Unknown (03/11/21 1217)   Outbreak Screen: In the last 14 days, have you had contact with anyone who is ill, has show any of the symptoms listed above and/or has been diagnosis with the 2019 Novel Coronavirus? This includes any " immediate household members but excludes any patients with whom you have been in contact within your normal work duties wearing proper PPE, if you are a healthcare worker.  Yes, No, Unknown              Unknown (03/11/21 1217)   Outbreak Screen: Who was notified?    Free text  (not recorded)   Travel Screen: Have you traveled in the last month? If so, to what country have you traveled? If [place] what state? Yes, No, Unknown  List of all countries  List of all States No (03/11/21 1447)  (not recorded)  (not recorded)   Infection Risk: Do you currently have the following symptoms?  (If cough is selected, the Tuberculosis Screen is performed.) Cough, Fever, Rash, No No (03/11/21 1447)   Tuberculosis Screen: Do you have any of the following Tuberculosis Risks?  · Have you lived or spent time with anyone who had or may have TB?  · Have you lived in or visited any of the following areas for more than one month: [place], [place], [place], [place] or [place], [place] or [place]?  · Do you have HIV/AIDS?  · Have you lived in or worked in a nursing home, homeless shelter, correctional facility, or substance abuse treatment facility?   · No    If Yes do you have any of the following symptoms? Yes responses display to the right    If Yes, symptoms listed are:  Cough greater than or equal to 3 weeks, Loss of appetite, Unexplained weight loss, Night sweats, Bloody sputum or hemoptysis, Hoarseness, Fever, Fatigue, Chest pain, No (not recorded)  (not recorded)   Exposure Screen: Have you been exposed to any of these contagious diseases in the last month? Measles, Chickenpox, Meningitis, Pertussis, Whooping Cough, No No (03/11/21 1447)

## 2021-03-12 NOTE — OP NOTE
HIP INCISION AND DRAINAGE WITH HANA TABLE  Procedure Note    Mandeep Tracey  3/12/2021    Pre-op Diagnosis: Right Septic Bipolar Hip Arthroplasty  Post-op Diagnosis: Same  Procedure:  Right Hip Irrigation and Debridement  Surgeon:   Tao Andrea MD  Assistant:  Josiah Leigh PA-C  Anesthesia: General, Anesthesiologist: Alex Moore MD; Glendy Paulino MD  CRNA: Haydee Asencio CRNA  Staff: Circulator: Addie Myers RN  : Jakub Unger  Scrub Person: Rigo Almonte; Jakub Olsen  Vendor Representative: Derrick Sanz  Assistant: Josiah Leigh PA-C  Estimated Blood Loss:100ml  Specimens:   Order Name Source Comment Collection Info Order Time   ANAEROBIC CULTURE Hip, Right  Collected By: Tao Andrea MD 3/12/2021  5:06 PM   ANAEROBIC CULTURE Hip, Right  Collected By: Tao Andrea MD 3/12/2021  5:06 PM   FUNGUS CULTURE Hip, Right  Collected By: Tao Andrea MD 3/12/2021  5:06 PM   FUNGUS CULTURE Hip, Right  Collected By: Tao Andrea MD 3/12/2021  5:06 PM   WOUND CULTURE Hip, Right  Collected By: Tao Andrea MD 3/12/2021  5:06 PM   WOUND CULTURE Hip, Right  Collected By: Tao Andrea MD 3/12/2021  5:06 PM     Drains: one   Complications: None    Components Utilized:      Nothing was implanted during the procedure    Indication for Procedure:   The patient is a 61 y.o. male presents today for an irrigation and debridement for a right infected bipolar hip arthroplasty.  He has history of surgery by Dr. Garrido in [place].  He has 2-3 day history of acute onset of right sided hip pain.   He was transferred to [facility] [place] and orthopedics was consulted for definitive management.  The patient was educated in risks of surgery that could include possible risk of persistent infection, deep venous thrombosis, pulmonary embolism, fracture, neurovascular injury, leg length discrepancy, dislocation, possible persistent pain, need for additional surgeries, anesthetic risks,  medical risks including heart attack and stroke, and death.  The discussion occurred in the office pre-operatively, and patient had the opportunity to ask questions, and concerns about the proposed surgery.  The patient also understood that medicine is not an exact science, and that outcomes of the surgical procedure may be less than desired. The patient wished to proceed.      Protocols for intravenous antibiotics and venous thrombosis were followed for this patient.  IV antibiotics were infused prior to surgery and will be continued likely for 6 weeks post procedure.  Thrombosis prophylaxis will be initiated within 24 hours of the completion of the surgical procedure.      Procedure:   After the patient was identified in the preoperative area, and the surgical site confirmed and marked, the patient was brought to the operating room on a stretcher.  The above anesthetic was placed uneventfully on the stretcher and the patient was transferred to the [place] operating room table.  A time-out procedure was performed.  The intravenous ancef antibiotics infusion was completed. The operative leg was then prepped and draped in usual sterile fashion.     A 10 blade scalpel was then used to make an anterior approach incision over the operative hip.   The TFL fascia was split longitudinally.  Upon entry into the hip joint approx. 100cc of purulent white cloudy drainage was expressed.  New cultures were obtained.  The remaining purulent fluid was aspirated.  The hip was then subluxed and the  hip was copiously irrigated with 3 L of betadine laced normal saline followed by 1 L of bactosure followed by 1 L of normal saline.   The femoral head was then reduced into the acetabulum.   The hip was then ranged and was found to be stable in all planes.   A drain was placed exiting out the hip inferolaterally.  The wound was closed in layers with TFL fascia with #1 vicryl, 2-0 vicryl to close deep dermis and 3-0 nylon to close the skin.   Sterile dressings were placed. Sponge and needle counts were completed and were found to be correct.  The patient was awakened from the anesthetic and was brought into the recovery room in stable condition.      Tao Andrea MD     Date: 3/12/2021  Time: 17:26 EST

## 2021-03-12 NOTE — PLAN OF CARE
Goal Outcome Evaluation:     Progress: no change  Outcome Summary: pt continues with pain this shift, medicated with IV pain medication and PO pain meds until MN. EKG ordered this shift for trigemeny and bigemeny, d/t no basline EKG from [facility] or upon admission. Pt via tele with frequent PVC's since admission, NSR with PVC's this shift, minimal ST. Pt wearing CPAP and able to rest comfortable once pain was controlled. SCD applied, IV Kefzol 2grams admin as ordered. NPO after MN for I/D of rt hip with Dr. Arana 3/12/21. Allow pt additional time to respond to questions, turned and repositioned Q 2hr. #20 s/l to Rt a/c.

## 2021-03-12 NOTE — PROGRESS NOTES
Name: Mandeep Tracey ADMIT: 3/11/2021   : 1960  PCP: Jazzy Servin APRN    MRN: [id number] LOS: 1 days   AGE/SEX: 61 y.o. male  ROOM: [facility]     Subjective   Subjective   Pt sleeping. D/W RN. Had uneventful night. Pain seems to be improved with current pain regimen. Afebrile.     Review of Systems   Reason unable to perform ROS: pt sleeping.        Objective   Objective   Vital Signs  Temp:  [98.5 °F (36.9 °C)-99.2 °F (37.3 °C)] 98.8 °F (37.1 °C)  Heart Rate:  [88-99] 89  Resp:  [18-20] 20  BP: (106-142)/(70-87) 142/87  SpO2:  [93 %-96 %] 93 %  on  Flow (L/min):  [3] 3;   Device (Oxygen Therapy): CPAP  Body mass index is 50.56 kg/m².  Physical Exam  Vitals and nursing note reviewed.   Constitutional:       General: He is not in acute distress.     Appearance: He is obese.   HENT:      Head: Normocephalic.   Eyes:      General: Lids are normal.   Cardiovascular:      Rate and Rhythm: Normal rate and regular rhythm.   Pulmonary:      Effort: Pulmonary effort is normal. No respiratory distress.   Abdominal:      Palpations: Abdomen is soft.   Musculoskeletal:      Cervical back: Neck supple.      Right lower leg: Edema present.      Comments: RLE externally rotated   Skin:     General: Skin is warm and dry.         Results Review     I reviewed the patient's new clinical results.  Results from last 7 days   Lab Units 21  0423 21  1429   WBC 10*3/mm3 8.61 12.04*   HEMOGLOBIN g/dL 10.7* 11.8*   PLATELETS 10*3/mm3 235 233     Results from last 7 days   Lab Units 21  0423 21  1429   SODIUM mmol/L 132* 134*   POTASSIUM mmol/L 3.8 4.0   CHLORIDE mmol/L 96* 97*   CO2 mmol/L 25.8 26.0   BUN mg/dL 13 10   CREATININE mg/dL 0.66* 0.80   GLUCOSE mg/dL 210* 197*   Estimated Creatinine Clearance: 157 mL/min (A) (by C-G formula based on SCr of 0.66 mg/dL (L)).  Results from last 7 days   Lab Units 21  1429   ALBUMIN g/dL 3.70   BILIRUBIN mg/dL 0.5   ALK PHOS U/L 54   AST (SGOT) U/L 17    ALT (SGPT) U/L 20     Results from last 7 days   Lab Units 03/12/21  0423 03/11/21  1429   CALCIUM mg/dL 8.4* 9.1   ALBUMIN g/dL  --  3.70   MAGNESIUM mg/dL 1.9  --        COVID19   Date Value Ref Range Status   03/11/2021 Not Detected Not Detected - Ref. Range Final     Hemoglobin A1C   Date/Time Value Ref Range Status   03/11/2021 1429 9.22 (H) 4.80 - 5.60 % Final     Glucose   Date/Time Value Ref Range Status   03/12/2021 1114 197 (H) 70 - 130 mg/dL Final   03/12/2021 0613 251 (H) 70 - 130 mg/dL Final   03/11/2021 2006 267 (H) 70 - 130 mg/dL Final   03/11/2021 1602 246 (H) 70 - 130 mg/dL Final   03/11/2021 1428 192 (H) 70 - 130 mg/dL Final       XR Femur 2 View Right  Narrative: XR FEMUR 2 VW RIGHT-, XR PELVIS 1 OR 2 VW-     INDICATIONS: Pain     TECHNIQUE: 7 images including the pelvis and right femur     COMPARISON: None available     FINDINGS:     The lateral view of the distal femur is technically limited by  overpenetration; appearance of erosion of the patella and proximal tibia  on this image is presumably technical in origin, repeat image could be  obtained as indicated. The bones otherwise appear intact. Proximal right  femoral hemiarthroplasty hardware appears unremarkable. Arterial  calcifications are conspicuous. Follow-up/further evaluation can be  obtained as indications persist.     Impression:    As described.           This report was finalized on 3/11/2021 3:29 PM by Dr. Prem Rosado M.D.     XR Pelvis 1 or 2 View  Narrative: XR FEMUR 2 VW RIGHT-, XR PELVIS 1 OR 2 VW-     INDICATIONS: Pain     TECHNIQUE: 7 images including the pelvis and right femur     COMPARISON: None available     FINDINGS:     The lateral view of the distal femur is technically limited by  overpenetration; appearance of erosion of the patella and proximal tibia  on this image is presumably technical in origin, repeat image could be  obtained as indicated. The bones otherwise appear intact. Proximal right  femoral  hemiarthroplasty hardware appears unremarkable. Arterial  calcifications are conspicuous. Follow-up/further evaluation can be  obtained as indications persist.     Impression:    As described.           This report was finalized on 3/11/2021 3:29 PM by Dr. Prem Rosado M.D.       Scheduled Medications  aspirin, 81 mg, Oral, Daily  ceFAZolin, 2 g, Intravenous, Q8H  insulin glargine, 20 Units, Subcutaneous, Nightly  insulin lispro, 0-14 Units, Subcutaneous, TID AC  isosorbide mononitrate, 30 mg, Oral, Daily  lisinopril, 20 mg, Oral, Daily  metoprolol succinate XL, 12.5 mg, Oral, Q12H  rosuvastatin, 40 mg, Oral, Nightly    Infusions   Diet  NPO Diet       Assessment/Plan     Active Hospital Problems    Diagnosis  POA   • **Infection of right prosthetic hip joint (CMS/[facility]) [T84.51XA]  Not Applicable   • CVA (cerebral vascular accident) (CMS/[facility]) [I63.9]  Yes   • Right hemiparesis (CMS/[facility]) [G81.91]  Yes   • BELKYS on CPAP [G47.33, Z99.89]  Not Applicable   • Type 2 diabetes mellitus (CMS/[facility]) [E11.9]  Yes   • Essential hypertension [I10]  Yes   • MSSA (methicillin susceptible Staphylococcus aureus) infection [A49.01]  Yes   • Group B streptococcal infection [A49.1]  Yes   • UTI (urinary tract infection) [N39.0]  Yes      Resolved Hospital Problems   No resolved problems to display.       61 y.o. male admitted with 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).    Rt prosthetic hip Group B strep infection/MSSA UTI:  -ID input appreciated  -Ortho planning I&D today  -Follow cultures for final antibiotic plan; will likely require 6 weeks treatment  -Pain control  -Cardiology clearance for surgery due to frequent PVC's on telemetry     Hx CVA w/rt hemiparesis/Dysarthria:  -OT/PT consult when appropriate  -ASA, Statin     DM:  -Monitor BG trends  -Correctional scale insulin for; metformin on hold. Restart home Lantus dose  -A1C 9.22%     HTN:  -BP stable  -Continue lisinopril, metoprolol     BELKYS on Cpap:  -Home  machine available  -Compliant w/use      · SCDs for DVT prophylaxis.  · Full code.  · Discussed with nursing staff.  · Anticipate discharge TBD.       ESTRELLA Adams  [facility]  03/12/21  13:31 EST

## 2021-03-12 NOTE — PROGRESS NOTES
"Adult Nutrition  Assessment/PES    Patient Name:  Mandeep Tracey  YOB: 1960  MRN: [id number]  Admit Date:  3/11/2021    Assessment Date:  3/12/2021    Comments:  Nutrition assessment complete due to pressure injury on coccyx. Pt currently NPO for surgery today (Plan is I&D of hip).  Was eating well prior to being NPO. Will follow as needed.    Reason for Assessment     Row Name 03/12/21 1248          Reason for Assessment    Reason For Assessment  identified at risk by screening criteria     Diagnosis  infection/sepsis Infected R Hip prosthesis, Obesity, History of DM, stroke and hemiparesis     Identified At Risk by Screening Criteria  large or nonhealing wound, burn or pressure injury           Anthropometrics     Row Name 03/12/21 1250          Anthropometrics    Height  165.1 cm (65\")        Admit Weight    Admit Weight  138 kg (303 lb 12.7 oz)        Ideal Body Weight (IBW)    Ideal Body Weight (IBW) (kg)  62.51     % of Ideal Body Weight Assessment  -- 224% IBW        Body Mass Index (BMI)    BMI Assessment  BMI 40 or greater: obesity grade III BMI 50.5         Labs/Tests/Procedures/Meds     Row Name 03/12/21 1251          Labs/Procedures/Meds    Lab Results Reviewed  reviewed, pertinent     Lab Results Comments  Na+, Glu, Cr, crp, h/h        Diagnostic Tests/Procedures    Diagnostic Test/Procedure Reviewed  reviewed, pertinent     Diagnostic Test/Procedures Comments  to OR today for I% D of hip wound        Medications    Pertinent Medications Reviewed  reviewed, pertinent     Pertinent Medications Comments  asa, abx, insulin , imdur         Physical Findings     Row Name 03/12/21 1252          Physical Findings    Overall Physical Appearance  obese;on oxygen therapy     Skin  other (see comments);pressure injury PI to coccyx, excoriation, bruising; B=13         Estimated/Assessed Needs     Row Name 03/12/21 1254 03/12/21 1250       Calculation Measurements    Weight Used For Calculations  138 " "kg (304 lb 3.8 oz)  --    Height  --  165.1 cm (65\")       Estimated/Assessed Needs    Additional Documentation  KCAL/KG (Group);Protein Requirements (Group);Fluid Requirements (Group)  --       KCAL/KG    KCAL/KG  14 Kcal/Kg (kcal)  --    14 Kcal/Kg (kcal)  1932  --       Protein Requirements    Weight Used For Protein Calculations  61.5 kg (135 lb 9.3 oz) IBW  --    Est Protein Requirement Amount (gms/kg)  2.0 gm protein  --    Estimated Protein Requirements (gms/day)  123  --       Fluid Requirements    Fluid Requirements (mL/day)  2000  --       --        Nutrition Prescription Ordered     Row Name 03/12/21 1255          Nutrition Prescription PO    Current PO Diet  NPO         Evaluation of Received Nutrient/Fluid Intake     Row Name 03/12/21 1255          PO Evaluation    % PO Intake  100% yesterday         Problem/Interventions:  Problem 1     Row Name 03/12/21 1255          Nutrition Diagnoses Problem 1    Problem 1  Nutrition Appropriate for Condition at this Time         Intervention Goal     Row Name 03/12/21 1251          Intervention Goal    General  Maintain nutrition;Disease management/therapy;Improved nutrition related lab(s);Reduce/improve symptoms;Meet nutritional needs for age/condition     PO  Tolerate PO;Maintain intake     Weight  Appropriate weight loss         Nutrition Intervention     Row Name 03/12/21 1256          Nutrition Intervention    RD/Tech Action  Follow Tx progress;Care plan reviewd           Education/Evaluation     Row Name 03/12/21 1250          Education    Education  Will Instruct as appropriate        Monitor/Evaluation    Monitor  Skin status;Per protocol;I&O;Symptoms;PO intake;Pertinent labs;Weight           Electronically signed by:  Mandy Hernandez RD  03/12/21 12:56 EST  "

## 2021-03-12 NOTE — PROGRESS NOTES
Discharge Planning Assessment  [facility]     Patient Name: Mandeep Tracey  MRN: [id number]  Today's Date: 3/12/2021    Admit Date: 3/11/2021    Discharge Needs Assessment     Row Name 03/12/21 2244       Living Environment    Lives With  spouse    Name(s) of Who Lives With Patient  wife Erica Tracey [phone]    Current Living Arrangements  home/apartment/condo    Primary Care Provided by  self;spouse/significant other    Provides Primary Care For  no one, unable/limited ability to care for self    Family Caregiver if Needed  spouse    Family Caregiver Names  wife Erica Tracey [phone]    Quality of Family Relationships  helpful;involved;supportive    Able to Return to Prior Arrangements  yes       Resource/Environmental Concerns    Resource/Environmental Concerns  none    Transportation Concerns  car, none       Transition Planning    Patient/Family Anticipates Transition to  home with family    Patient/Family Anticipated Services at Transition  home health care;rehabilitation services    Transportation Anticipated  family or friend will provide;other (see comments) Ambulance       Discharge Needs Assessment    Current Outpatient/Agency/Support Group  homecare agency    Equipment Currently Used at Home  cane, straight;walker, rolling;wheelchair;shower chair;hospital bed;cpap;ramp    Concerns to be Addressed  discharge planning    Anticipated Changes Related to Illness  inability to care for self    Equipment Needed After Discharge  none    Discharge Facility/Level of Care Needs  home with home health;acute rehab    Current Discharge Risk  physical impairment        Discharge Plan     Row Name 03/12/21 8936       Plan    Plan  Follow for P.T. eval and recommendations to discuss if the patient will d/c home with HH or to rehab ([facility] in [place])    Provided Post Acute Provider List?  N/A    N/A Provider List Comment  The patient’s wife was not provide with a HH/SNF  list nor a print out of the HH/nursing home compare list from Medicare.[facility] as the patient’s wife requested referrals be made to [facility]  and [facility] in [place].    Provided Post Acute Provider Quality & Resource List?  N/A    N/A Quality & Resource List Comment  The patient’s wife was not provide with a HH/SNF list nor a print out of the HH/nursing home compare list from Medicare.[facility] as the patient’s wife requested referrals be made to [facility]  and [facility] in [place].    Patient/Family in Agreement with Plan  yes    Plan Comments  Spoke to the patient’s wife Erica Tracey [phone] as the patient is aphasic.  Explained role of CCP, verified facesheet and discussed discharge planning needs.  The patient’s wife states that if the patient is able to return home upon d/c they would want to use [facility] - referral to Radha who is covering for Rose.  The patient’s wife states that if the patient needs to go to rehab she would like him to go to [facility] in [place] and referral was made to Bea.  The patient’s wife was not provide with a HH/SNF list nor a print out of the HH/nursing home compare list from Medicare.[facility] as the patient’s wife requested referrals be made to [facility]  and [facility] in [place].  The patient’s wife reports that the patient has a cane, walker, wheelchair, shower chair, hospital bed, cpap, scooter and a ramp inside the single story home with no steps to enter.  The patient’s PCP is Pipe Servin [facility] [address], [facility] [place].  The patient’s wife assists him with taking his medication and they have no trouble affording his medication.  The patient’s wife states that they have in home care provided 3 hours a day/ 3 days a week with [facility].  The patient’s wife was informed that insurance coverage is not guaranteed for an ambulance transport home or to rehab upon d/c.  CCP will follow up with the patient’s wife  after the patient has an I & D, will follow for P.T. eval and recommendations to discuss if the patient will d/c home with  or to rehab (referrals made to [facility] and [facility] in [place]) and will discuss transportation arrangements upon d/c.  MARY ELLEN Adam        Continued Care and Services - Admitted Since 3/11/2021     Destination     Service Provider Request Status Selected Services Address Phone Fax Patient Preferred    [facility] [place]  Pending - Request Sent N/A [address] [facility] [id number] [phone] --          Home Medical Care     Service Provider Request Status Selected Services Address Phone Fax Patient Preferred    [facility] [place] [facility]  Pending - Request Sent N/A [id number] [place] [id number] [phone] --                Demographic Summary     Row Name 03/12/21 1333       General Information    Admission Type  inpatient    Arrived From  home    Referral Source  admission list    Reason for Consult  discharge planning    Preferred Language  English     Used During This Interaction  no       Contact Information    Permission Granted to Share Info With  family/designee        Functional Status     Row Name 03/12/21 1334       Functional Status    Usual Activity Tolerance  poor    Current Activity Tolerance  poor       Functional Status, IADL    Medications  assistive equipment and person    Meal Preparation  assistive equipment and person    Housekeeping  assistive equipment and person    Laundry  assistive equipment and person    Shopping  assistive equipment and person       Mental Status Summary    Recent Changes in Mental Status/Cognitive Functioning  unable to assess        Psychosocial    No documentation.       Abuse/Neglect    No documentation.       Legal    No documentation.       Substance Abuse    No documentation.       Patient Forms    No documentation.            Lorene Bailey, W

## 2021-03-12 NOTE — ANESTHESIA PROCEDURE NOTES
Airway  Urgency: elective    Date/Time: 3/12/2021 4:33 PM  Airway not difficult    General Information and Staff    Patient location during procedure: OR  Anesthesiologist: Alex Moore MD  CRNA: Haydee Asencio CRNA    Indications and Patient Condition  Indications for airway management: airway protection    Preoxygenated: yes  Mask difficulty assessment: 1 - vent by mask    Final Airway Details  Final airway type: endotracheal airway      Successful airway: ETT  Cuffed: yes   Successful intubation technique: direct laryngoscopy  Endotracheal tube insertion site: oral  Blade: Niall  Blade size: 4  ETT size (mm): 7.5  Cormack-Lehane Classification: grade I - full view of glottis  Placement verified by: chest auscultation and capnometry   Measured from: lips  Number of attempts at approach: 1  Assessment: lips, teeth, and gum same as pre-op and atraumatic intubation    Additional Comments  Smooth IV/mask induction/intubation. Easy bag-mask ventilation. Orally intubated, easy, atraumatic, lips/teeth/mouth left intact, as preop. Direct visual of vocal cords. +ETCO2, bilateral breath sounds and equal.

## 2021-03-13 ENCOUNTER — APPOINTMENT (OUTPATIENT)
Dept: CARDIOLOGY | Facility: HOSPITAL | Age: 61
End: 2021-03-13

## 2021-03-13 ENCOUNTER — APPOINTMENT (OUTPATIENT)
Dept: CT IMAGING | Facility: HOSPITAL | Age: 61
End: 2021-03-13

## 2021-03-13 PROBLEM — Z98.890 POSTOPERATIVE NAUSEA AND VOMITING: Status: ACTIVE | Noted: 2021-03-13

## 2021-03-13 PROBLEM — R11.2 POSTOPERATIVE NAUSEA AND VOMITING: Status: ACTIVE | Noted: 2021-03-13

## 2021-03-13 LAB
ANION GAP SERPL CALCULATED.3IONS-SCNC: 11.6 MMOL/L (ref 5–15)
AORTIC DIMENSIONLESS INDEX: 0.4 (DI)
BH CV ECHO MEAS - AO MAX PG (FULL): 6 MMHG
BH CV ECHO MEAS - AO MAX PG: 8.1 MMHG
BH CV ECHO MEAS - AO MEAN PG (FULL): 4 MMHG
BH CV ECHO MEAS - AO MEAN PG: 5 MMHG
BH CV ECHO MEAS - AO ROOT AREA (BSA CORRECTED): 1.5
BH CV ECHO MEAS - AO ROOT AREA: 8.6 CM^2
BH CV ECHO MEAS - AO ROOT DIAM: 3.3 CM
BH CV ECHO MEAS - AO V2 MAX: 142 CM/SEC
BH CV ECHO MEAS - AO V2 MEAN: 106 CM/SEC
BH CV ECHO MEAS - AO V2 VTI: 28.6 CM
BH CV ECHO MEAS - ASC AORTA: 3.2 CM
BH CV ECHO MEAS - AVA(I,A): 1.9 CM^2
BH CV ECHO MEAS - AVA(I,D): 1.9 CM^2
BH CV ECHO MEAS - AVA(V,A): 2.3 CM^2
BH CV ECHO MEAS - AVA(V,D): 2.3 CM^2
BH CV ECHO MEAS - BSA(HAYCOCK): 2.5 M^2
BH CV ECHO MEAS - BSA: 2.3 M^2
BH CV ECHO MEAS - BZI_BMI: 46.3 KILOGRAMS/M^2
BH CV ECHO MEAS - BZI_METRIC_HEIGHT: 165.1 CM
BH CV ECHO MEAS - BZI_METRIC_WEIGHT: 126.1 KG
BH CV ECHO MEAS - EDV(CUBED): 405.2 ML
BH CV ECHO MEAS - EDV(MOD-SP4): 241 ML
BH CV ECHO MEAS - EDV(TEICH): 289.4 ML
BH CV ECHO MEAS - EF(CUBED): 61.1 %
BH CV ECHO MEAS - EF(MOD-SP4): 24.5 %
BH CV ECHO MEAS - EF(TEICH): 51.2 %
BH CV ECHO MEAS - ESV(CUBED): 157.5 ML
BH CV ECHO MEAS - ESV(MOD-SP4): 182 ML
BH CV ECHO MEAS - ESV(TEICH): 141.3 ML
BH CV ECHO MEAS - FS: 27 %
BH CV ECHO MEAS - IVS/LVPW: 0.89
BH CV ECHO MEAS - IVSD: 0.8 CM
BH CV ECHO MEAS - LAT PEAK E' VEL: 7.2 CM/SEC
BH CV ECHO MEAS - LV DIASTOLIC VOL/BSA (35-75): 105.9 ML/M^2
BH CV ECHO MEAS - LV MASS(C)D: 290.4 GRAMS
BH CV ECHO MEAS - LV MASS(C)DI: 127.6 GRAMS/M^2
BH CV ECHO MEAS - LV MAX PG: 2.1 MMHG
BH CV ECHO MEAS - LV MEAN PG: 1 MMHG
BH CV ECHO MEAS - LV SYSTOLIC VOL/BSA (12-30): 80 ML/M^2
BH CV ECHO MEAS - LV V1 MAX: 72.5 CM/SEC
BH CV ECHO MEAS - LV V1 MEAN: 46.6 CM/SEC
BH CV ECHO MEAS - LV V1 VTI: 12.2 CM
BH CV ECHO MEAS - LVIDD: 7.4 CM
BH CV ECHO MEAS - LVIDS: 5.4 CM
BH CV ECHO MEAS - LVLD AP4: 9.7 CM
BH CV ECHO MEAS - LVLS AP4: 8.9 CM
BH CV ECHO MEAS - LVOT AREA (M): 4.5 CM^2
BH CV ECHO MEAS - LVOT AREA: 4.5 CM^2
BH CV ECHO MEAS - LVOT DIAM: 2.4 CM
BH CV ECHO MEAS - LVPWD: 0.9 CM
BH CV ECHO MEAS - MED PEAK E' VEL: 7.9 CM/SEC
BH CV ECHO MEAS - MV A MAX VEL: 108 CM/SEC
BH CV ECHO MEAS - MV DEC SLOPE: 508 CM/SEC^2
BH CV ECHO MEAS - MV DEC TIME: 155 SEC
BH CV ECHO MEAS - MV E MAX VEL: 112 CM/SEC
BH CV ECHO MEAS - MV E/A: 1
BH CV ECHO MEAS - MV MAX PG: 5.5 MMHG
BH CV ECHO MEAS - MV MEAN PG: 2 MMHG
BH CV ECHO MEAS - MV P1/2T MAX VEL: 109 CM/SEC
BH CV ECHO MEAS - MV P1/2T: 62.8 MSEC
BH CV ECHO MEAS - MV V2 MAX: 117 CM/SEC
BH CV ECHO MEAS - MV V2 MEAN: 69.8 CM/SEC
BH CV ECHO MEAS - MV V2 VTI: 39 CM
BH CV ECHO MEAS - MVA P1/2T LCG: 2 CM^2
BH CV ECHO MEAS - MVA(P1/2T): 3.5 CM^2
BH CV ECHO MEAS - MVA(VTI): 1.4 CM^2
BH CV ECHO MEAS - RAP SYSTOLE: 8 MMHG
BH CV ECHO MEAS - SI(AO): 107.5 ML/M^2
BH CV ECHO MEAS - SI(CUBED): 108.9 ML/M^2
BH CV ECHO MEAS - SI(LVOT): 24.3 ML/M^2
BH CV ECHO MEAS - SI(MOD-SP4): 25.9 ML/M^2
BH CV ECHO MEAS - SI(TEICH): 65.1 ML/M^2
BH CV ECHO MEAS - SV(AO): 244.6 ML
BH CV ECHO MEAS - SV(CUBED): 247.8 ML
BH CV ECHO MEAS - SV(LVOT): 55.2 ML
BH CV ECHO MEAS - SV(MOD-SP4): 59 ML
BH CV ECHO MEAS - SV(TEICH): 148.1 ML
BH CV ECHO MEAS - TAPSE (>1.6): 2.5 CM
BH CV ECHO MEASUREMENTS AVERAGE E/E' RATIO: 14.83
BH CV XLRA - TDI S': 17.2 CM/SEC
BUN SERPL-MCNC: 9 MG/DL (ref 6–20)
BUN/CREAT SERPL: 14.5 (ref 7–25)
CALCIUM SPEC-SCNC: 8.7 MG/DL (ref 8.6–10.5)
CHLORIDE SERPL-SCNC: 97 MMOL/L (ref 98–107)
CO2 SERPL-SCNC: 27.4 MMOL/L (ref 22–29)
CREAT SERPL-MCNC: 0.62 MG/DL (ref 0.76–1.27)
DEPRECATED RDW RBC AUTO: 41.6 FL (ref 37–54)
ERYTHROCYTE [DISTWIDTH] IN BLOOD BY AUTOMATED COUNT: 13 % (ref 12.3–15.4)
GFR SERPL CREATININE-BSD FRML MDRD: 133 ML/MIN/1.73
GLUCOSE BLDC GLUCOMTR-MCNC: 205 MG/DL (ref 70–130)
GLUCOSE BLDC GLUCOMTR-MCNC: 223 MG/DL (ref 70–130)
GLUCOSE BLDC GLUCOMTR-MCNC: 234 MG/DL (ref 70–130)
GLUCOSE BLDC GLUCOMTR-MCNC: 297 MG/DL (ref 70–130)
GLUCOSE SERPL-MCNC: 232 MG/DL (ref 65–99)
HCT VFR BLD AUTO: 34.1 % (ref 37.5–51)
HGB BLD-MCNC: 11.2 G/DL (ref 13–17.7)
LV EF 2D ECHO EST: 30 %
MCH RBC QN AUTO: 28.9 PG (ref 26.6–33)
MCHC RBC AUTO-ENTMCNC: 32.8 G/DL (ref 31.5–35.7)
MCV RBC AUTO: 88.1 FL (ref 79–97)
PLATELET # BLD AUTO: 254 10*3/MM3 (ref 140–450)
PMV BLD AUTO: 11.1 FL (ref 6–12)
POTASSIUM SERPL-SCNC: 4.4 MMOL/L (ref 3.5–5.2)
RBC # BLD AUTO: 3.87 10*6/MM3 (ref 4.14–5.8)
SODIUM SERPL-SCNC: 136 MMOL/L (ref 136–145)
WBC # BLD AUTO: 9.55 10*3/MM3 (ref 3.4–10.8)

## 2021-03-13 PROCEDURE — 99233 SBSQ HOSP IP/OBS HIGH 50: CPT | Performed by: INTERNAL MEDICINE

## 2021-03-13 PROCEDURE — 93306 TTE W/DOPPLER COMPLETE: CPT | Performed by: INTERNAL MEDICINE

## 2021-03-13 PROCEDURE — 25010000002 HYDROMORPHONE PER 4 MG: Performed by: ORTHOPAEDIC SURGERY

## 2021-03-13 PROCEDURE — 63710000001 INSULIN GLARGINE PER 5 UNITS: Performed by: ORTHOPAEDIC SURGERY

## 2021-03-13 PROCEDURE — 25010000002 DIPHENHYDRAMINE PER 50 MG: Performed by: ORTHOPAEDIC SURGERY

## 2021-03-13 PROCEDURE — 25010000003 CEFAZOLIN IN DEXTROSE 2-4 GM/100ML-% SOLUTION: Performed by: ORTHOPAEDIC SURGERY

## 2021-03-13 PROCEDURE — 85027 COMPLETE CBC AUTOMATED: CPT | Performed by: ORTHOPAEDIC SURGERY

## 2021-03-13 PROCEDURE — 74176 CT ABD & PELVIS W/O CONTRAST: CPT

## 2021-03-13 PROCEDURE — 82962 GLUCOSE BLOOD TEST: CPT

## 2021-03-13 PROCEDURE — 25010000002 ONDANSETRON PER 1 MG: Performed by: ORTHOPAEDIC SURGERY

## 2021-03-13 PROCEDURE — 80048 BASIC METABOLIC PNL TOTAL CA: CPT | Performed by: ORTHOPAEDIC SURGERY

## 2021-03-13 PROCEDURE — 25010000002 PERFLUTREN (DEFINITY) 8.476 MG IN SODIUM CHLORIDE (PF) 0.9 % 10 ML INJECTION: Performed by: ORTHOPAEDIC SURGERY

## 2021-03-13 PROCEDURE — 25010000002 PROCHLORPERAZINE 10 MG/2ML SOLUTION: Performed by: NURSE PRACTITIONER

## 2021-03-13 PROCEDURE — 93306 TTE W/DOPPLER COMPLETE: CPT

## 2021-03-13 PROCEDURE — 63710000001 INSULIN LISPRO (HUMAN) PER 5 UNITS: Performed by: ORTHOPAEDIC SURGERY

## 2021-03-13 RX ORDER — PROCHLORPERAZINE EDISYLATE 5 MG/ML
5 INJECTION INTRAMUSCULAR; INTRAVENOUS EVERY 6 HOURS PRN
Status: DISCONTINUED | OUTPATIENT
Start: 2021-03-13 | End: 2021-03-18 | Stop reason: HOSPADM

## 2021-03-13 RX ORDER — METOPROLOL SUCCINATE 25 MG/1
25 TABLET, EXTENDED RELEASE ORAL EVERY 12 HOURS SCHEDULED
Status: DISCONTINUED | OUTPATIENT
Start: 2021-03-13 | End: 2021-03-14

## 2021-03-13 RX ORDER — SCOLOPAMINE TRANSDERMAL SYSTEM 1 MG/1
1 PATCH, EXTENDED RELEASE TRANSDERMAL
Status: DISCONTINUED | OUTPATIENT
Start: 2021-03-13 | End: 2021-03-15

## 2021-03-13 RX ADMIN — HYDROMORPHONE HYDROCHLORIDE 0.5 MG: 1 INJECTION, SOLUTION INTRAMUSCULAR; INTRAVENOUS; SUBCUTANEOUS at 09:22

## 2021-03-13 RX ADMIN — METOPROLOL SUCCINATE 25 MG: 25 TABLET, EXTENDED RELEASE ORAL at 20:57

## 2021-03-13 RX ADMIN — SODIUM CHLORIDE, PRESERVATIVE FREE 3 ML: 5 INJECTION INTRAVENOUS at 09:22

## 2021-03-13 RX ADMIN — CEFAZOLIN SODIUM 2 G: 2 INJECTION, SOLUTION INTRAVENOUS at 09:49

## 2021-03-13 RX ADMIN — ONDANSETRON 4 MG: 2 INJECTION INTRAMUSCULAR; INTRAVENOUS at 21:08

## 2021-03-13 RX ADMIN — SODIUM CHLORIDE, PRESERVATIVE FREE 3 ML: 5 INJECTION INTRAVENOUS at 21:14

## 2021-03-13 RX ADMIN — CEFAZOLIN SODIUM 2 G: 2 INJECTION, SOLUTION INTRAVENOUS at 01:02

## 2021-03-13 RX ADMIN — ASPIRIN 81 MG: 81 TABLET, CHEWABLE ORAL at 20:57

## 2021-03-13 RX ADMIN — HYDROMORPHONE HYDROCHLORIDE 0.5 MG: 1 INJECTION, SOLUTION INTRAMUSCULAR; INTRAVENOUS; SUBCUTANEOUS at 12:48

## 2021-03-13 RX ADMIN — DIPHENHYDRAMINE HYDROCHLORIDE 25 MG: 50 INJECTION, SOLUTION INTRAMUSCULAR; INTRAVENOUS at 04:35

## 2021-03-13 RX ADMIN — METOPROLOL TARTRATE 5 MG: 5 INJECTION INTRAVENOUS at 17:07

## 2021-03-13 RX ADMIN — PROCHLORPERAZINE EDISYLATE 5 MG: 5 INJECTION INTRAMUSCULAR; INTRAVENOUS at 22:37

## 2021-03-13 RX ADMIN — LISINOPRIL 20 MG: 20 TABLET ORAL at 15:13

## 2021-03-13 RX ADMIN — CEFAZOLIN SODIUM 2 G: 2 INJECTION, SOLUTION INTRAVENOUS at 22:37

## 2021-03-13 RX ADMIN — SCOPALAMINE 1 PATCH: 1 PATCH, EXTENDED RELEASE TRANSDERMAL at 18:19

## 2021-03-13 RX ADMIN — INSULIN LISPRO 8 UNITS: 100 INJECTION, SOLUTION INTRAVENOUS; SUBCUTANEOUS at 17:07

## 2021-03-13 RX ADMIN — DIPHENHYDRAMINE HYDROCHLORIDE 25 MG: 50 INJECTION, SOLUTION INTRAMUSCULAR; INTRAVENOUS at 11:09

## 2021-03-13 RX ADMIN — INSULIN GLARGINE 20 UNITS: 100 INJECTION, SOLUTION SUBCUTANEOUS at 20:56

## 2021-03-13 RX ADMIN — ONDANSETRON 4 MG: 2 INJECTION INTRAMUSCULAR; INTRAVENOUS at 02:41

## 2021-03-13 RX ADMIN — DIPHENHYDRAMINE HYDROCHLORIDE 25 MG: 50 INJECTION, SOLUTION INTRAMUSCULAR; INTRAVENOUS at 20:57

## 2021-03-13 RX ADMIN — HYDROCODONE BITARTRATE AND ACETAMINOPHEN 1 TABLET: 10; 325 TABLET ORAL at 00:46

## 2021-03-13 RX ADMIN — HYDROMORPHONE HYDROCHLORIDE 0.5 MG: 1 INJECTION, SOLUTION INTRAMUSCULAR; INTRAVENOUS; SUBCUTANEOUS at 04:40

## 2021-03-13 RX ADMIN — CEFAZOLIN SODIUM 2 G: 2 INJECTION, SOLUTION INTRAVENOUS at 17:07

## 2021-03-13 RX ADMIN — ONDANSETRON 4 MG: 2 INJECTION INTRAMUSCULAR; INTRAVENOUS at 15:08

## 2021-03-13 RX ADMIN — SODIUM CHLORIDE, POTASSIUM CHLORIDE, SODIUM LACTATE AND CALCIUM CHLORIDE 100 ML/HR: 600; 310; 30; 20 INJECTION, SOLUTION INTRAVENOUS at 04:40

## 2021-03-13 RX ADMIN — PERFLUTREN 3 ML: 6.52 INJECTION, SUSPENSION INTRAVENOUS at 14:15

## 2021-03-13 RX ADMIN — ONDANSETRON 4 MG: 2 INJECTION INTRAMUSCULAR; INTRAVENOUS at 07:43

## 2021-03-13 NOTE — SIGNIFICANT NOTE
03/13/21 1423   OTHER   Discipline physical therapist   Rehab Time/Intention   Session Not Performed patient unavailable for evaluation  (at cardio, also very nauseous and lethargic today)   Recommendation   PT - Next Appointment 03/14/21

## 2021-03-13 NOTE — PROGRESS NOTES
Name: Mandeep Tracey ADMIT: 3/11/2021   : 1960  PCP: Jazzy Servin APRN    MRN: [id number] LOS: 2 days   AGE/SEX: 61 y.o. male  ROOM: [facility]/     Subjective   Subjective   CC: nausea and vomiting  Patient went to OR yesterday. He has had frequent PVCs overnight but has not been symptomatic. He has had nausea and vomiting throughout the day despite zofran. No fevers or chills.     Objective   Objective   Vital Signs  Temp:  [97.8 °F (36.6 °C)-98.6 °F (37 °C)] 97.8 °F (36.6 °C)  Heart Rate:  [] 95  Resp:  [14-16] 16  BP: (113-161)/(55-98) 154/98  SpO2:  [90 %-100 %] 95 %  on  Flow (L/min):  [3-12] 4;   Device (Oxygen Therapy): nasal cannula  Body mass index is 46.28 kg/m².  Physical Exam  Vitals and nursing note reviewed.   Constitutional:       General: He is not in acute distress.  HENT:      Head: Normocephalic and atraumatic.      Nose: Nose normal.      Mouth/Throat:      Mouth: Mucous membranes are moist.      Pharynx: Oropharynx is clear.   Eyes:      Conjunctiva/sclera: Conjunctivae normal.      Pupils: Pupils are equal, round, and reactive to light.   Cardiovascular:      Rate and Rhythm: Normal rate and regular rhythm.      Pulses: Normal pulses.   Pulmonary:      Effort: Pulmonary effort is normal.      Breath sounds: Examination of the right-lower field reveals decreased breath sounds. Examination of the left-lower field reveals decreased breath sounds. Decreased breath sounds present.   Abdominal:      General: Bowel sounds are normal.      Palpations: Abdomen is soft.      Tenderness: There is no abdominal tenderness.   Musculoskeletal:         General: Swelling (trace BLE) present. No tenderness.      Cervical back: Normal range of motion and neck supple.      Comments: Right hip surgical dressing c/d/i, drain in place   Skin:     General: Skin is warm and dry.      Capillary Refill: Capillary refill takes less than 2 seconds.   Neurological:      General: No focal deficit present.       Mental Status: He is alert.      Comments: +right hemiparesis (known from previous CVA)   Psychiatric:         Mood and Affect: Mood normal.         Behavior: Behavior normal.       Results Review     I reviewed the patient's new clinical results.  I reviewed the patient's telemetry  Results from last 7 days   Lab Units 03/13/21  0547 03/12/21  0423 03/11/21  1429   WBC 10*3/mm3 9.55 8.61 12.04*   HEMOGLOBIN g/dL 11.2* 10.7* 11.8*   PLATELETS 10*3/mm3 254 235 233     Results from last 7 days   Lab Units 03/13/21  0547 03/12/21  0423 03/11/21  1429   SODIUM mmol/L 136 132* 134*   POTASSIUM mmol/L 4.4 3.8 4.0   CHLORIDE mmol/L 97* 96* 97*   CO2 mmol/L 27.4 25.8 26.0   BUN mg/dL 9 13 10   CREATININE mg/dL 0.62* 0.66* 0.80   GLUCOSE mg/dL 232* 210* 197*   Estimated Creatinine Clearance: 158.4 mL/min (A) (by C-G formula based on SCr of 0.62 mg/dL (L)).  Results from last 7 days   Lab Units 03/11/21  1429   ALBUMIN g/dL 3.70   BILIRUBIN mg/dL 0.5   ALK PHOS U/L 54   AST (SGOT) U/L 17   ALT (SGPT) U/L 20     Results from last 7 days   Lab Units 03/13/21  0547 03/12/21  0423 03/11/21  1429   CALCIUM mg/dL 8.7 8.4* 9.1   ALBUMIN g/dL  --   --  3.70   MAGNESIUM mg/dL  --  1.9  --        COVID19   Date Value Ref Range Status   03/11/2021 Not Detected Not Detected - Ref. Range Final     Hemoglobin A1C   Date/Time Value Ref Range Status   03/11/2021 1429 9.22 (H) 4.80 - 5.60 % Final     Glucose   Date/Time Value Ref Range Status   03/13/2021 1608 297 (H) 70 - 130 mg/dL Final   03/13/2021 1134 223 (H) 70 - 130 mg/dL Final   03/13/2021 0611 234 (H) 70 - 130 mg/dL Final   03/12/2021 2021 269 (H) 70 - 130 mg/dL Final   03/12/2021 1755 227 (H) 70 - 130 mg/dL Final   03/12/2021 1515 181 (H) 70 - 130 mg/dL Final   03/12/2021 1114 197 (H) 70 - 130 mg/dL Final       Adult Transthoracic Echo Complete W/ Cont if Necessary Per Protocol  · Estimated left ventricular EF = 30% The left ventricular cavity is   dilated. Left ventricular  diastolic function was indeterminate. This is an   extremely difficult study due to the patient's body habitus, positioning,   and frequent ventricular ectopy. The ventricle appears to be moderately   dilated. There appears to be moderate to severely reduced left ventricular   systolic function, ejection fraction 30%. There is hypokinesis of the apex   as well as the distal lateral and distal inferior walls.  · The mitral valve is not well-visualized. There is likely severe or   moderate to severe mitral regurgitation which is extremely eccentric.       Scheduled Medications  acetaminophen, 1,000 mg, Oral, Q6H  aspirin, 81 mg, Oral, BID With Meals  ceFAZolin, 2 g, Intravenous, Q8H  docusate sodium, 200 mg, Oral, BID  insulin glargine, 20 Units, Subcutaneous, Nightly  insulin lispro, 0-14 Units, Subcutaneous, TID AC  isosorbide mononitrate, 30 mg, Oral, Daily  lisinopril, 20 mg, Oral, Daily  metoprolol succinate XL, 25 mg, Oral, Q12H  rosuvastatin, 40 mg, Oral, Nightly  Scopolamine, 1 patch, Transdermal, Q72H  sodium chloride, 3 mL, Intravenous, Q12H    Infusions  lactated ringers, 9 mL/hr, Last Rate: 9 mL/hr (03/12/21 1624)  lactated ringers, 100 mL/hr, Last Rate: 100 mL/hr (03/13/21 0440)    Diet  Diet Regular; Consistent Carbohydrate       Assessment/Plan     Active Hospital Problems    Diagnosis  POA   • **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) [T84.51XA]  Not Applicable   • Postoperative nausea and vomiting [R11.2, Z98.890]  Yes   • CVA (cerebral vascular accident) (CMS/[facility]) [I63.9]  Yes   • Right hemiparesis (CMS/[facility]) [G81.91]  Yes   • BELKYS on CPAP [G47.33, Z99.89]  Not Applicable   • Type 2 diabetes mellitus (CMS/[facility]) [E11.9]  Yes   • Essential hypertension [I10]  Yes   • MSSA (methicillin susceptible Staphylococcus aureus) infection [A49.01]  Yes   • Group B streptococcal infection [A49.1]  Yes   • UTI (urinary tract infection) [N39.0]  Yes      Resolved Hospital Problems   No resolved problems to  display.   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  - s/p right hip I&D 3/12/21-will follow up operative culture  - continue on cefazolin  - continue pain control, encourage incentive spirometry  - appreciate ID and Orthopedic Surgery recs    Postoperative Nausea and Vomiting  - continue PRN zofran  - add scopolamine patch  - abdominal examination is benign    Frequent PVCs  - echocardiogram results noted with reduced LVEF and wall motion abnormalities  - probably has CAD (on imdur prior to admission) but no evidence of ACS  - metoprolol increased  - no plans for ischemic evaluation at this time per cardiology, appreciate recs    Type 2 DM  - he is not taking PO currently due to nausea and vomiting but remains hyperglycemic  - continue lantus 20 units nightly and cover with ssi/hypoglycemia protocol    BELKYS  - has home CPAP, continue    Hx of CVA with Residual Right Hemiparesis  - no acute neurologic changes  - continue on ASA and statin    HTN  - continue lisinopril    SCDs for DVT prophylaxis.  Full code.  Discussed with patient, family and nursing staff.  Anticipate discharge TBD.  timing yet to be determined.      Angel Luis Dejesus MD  [facility]  03/13/21  17:15 EST

## 2021-03-13 NOTE — ANESTHESIA POSTPROCEDURE EVALUATION
"Patient: Mandeep Tracey    Procedure Summary     Date: 03/12/21 Room / Location: [facility] OR [address] [place] [address] MAIN OR    Anesthesia Start: 1626 Anesthesia Stop: 1751    Procedure: HIP ANTERIOR INCISION AND DRAINAGE WITH HANA TABLE (Right Hip) Diagnosis:       Group B streptococcal infection      (Group B streptococcal infection [A49.1])    Surgeons: Tao Andrea MD Provider: Glendy Paulino MD    Anesthesia Type: general ASA Status: 4          Anesthesia Type: general    Vitals  Vitals Value Taken Time   /81 03/12/21 1945   Temp 37 °C (98.6 °F) 03/12/21 1945   Pulse 101 03/12/21 1955   Resp 14 03/12/21 1945   SpO2 93 % 03/12/21 1957   Vitals shown include unvalidated device data.        Post Anesthesia Care and Evaluation    Patient location during evaluation: PACU  Patient participation: complete - patient cannot participate  Pain management: adequate  Airway patency: patent  Anesthetic complications: No anesthetic complications    Cardiovascular status: acceptable  Respiratory status: acceptable  Hydration status: acceptable    Comments: /87   Pulse 99   Temp 37 °C (98.6 °F) (Oral)   Resp 14   Ht 165.1 cm (65\")   Wt (!) 138 kg (303 lb 13.1 oz)   SpO2 94%   BMI 50.56 kg/m²     Patient discharged before being seen by an Anesthesiologist.  No anesthetic complications noted per record.      "

## 2021-03-13 NOTE — PLAN OF CARE
Goal Outcome Evaluation:  Plan of Care Reviewed With: patient  Progress: no change  Outcome Summary: Increased nausea and vomiting today. PRN meds given for pain and nausea. will be trying other meds to control nausea. IV lopressor ordered prn since pt not taking PO. Hemovac ok. VSS, will continue to monitor.

## 2021-03-13 NOTE — PLAN OF CARE
Goal Outcome Evaluation:     Progress: improving  Outcome Summary: VSS; pt came to the floor from PACU; dilaudid and Norco are given for pain; zofran given for n/v; hemovac in place; currently pt is restting peacefully; pt did refuse to be turned few times and education was given; contiue to monitor.

## 2021-03-13 NOTE — PROGRESS NOTES
"   LOS: 2 days   Patient Care Team:  Jazzy Servin APRN as PCP - General (Nurse Practitioner)    Chief Complaint: following for ventricular ectopy     Interval History: Continues to have PVCs (bi- and trigeminy), couplets, and triplets (some multifocal).  He is very lethargic and nauseated today.  He had his surgery yesterday and there were no major events.       Objective   Vital Signs  Temp:  [97.8 °F (36.6 °C)-99.7 °F (37.6 °C)] 97.8 °F (36.6 °C)  Heart Rate:  [] 93  Resp:  [14-18] 16  BP: (113-161)/(55-93) 140/93    Intake/Output Summary (Last 24 hours) at 3/13/2021 1431  Last data filed at 3/13/2021 1242  Gross per 24 hour   Intake 2153 ml   Output 1120 ml   Net 1033 ml       Last Weight and Admission Weight        03/13/21  0500   Weight: 126 kg (278 lb 1.6 oz)     Flowsheet Rows      First Filed Value   Admission Height  165.1 cm (65\") Documented at 03/12/2021 0023   Admission Weight  (!) 138 kg (303 lb 13.1 oz) Documented at 03/11/2021 2239                  Physical Exam  Constitutional:       Appearance: He is morbidly obese.   HENT:      Head: Normocephalic.      Nose: Nose normal.   Cardiovascular:      Rate and Rhythm: Normal rate. Frequent extrasystoles are present.     Heart sounds: Heart sounds are distant.   Pulmonary:      Effort: Pulmonary effort is normal.      Breath sounds: Normal breath sounds.   Abdominal:      Palpations: Abdomen is soft.   Musculoskeletal:         General: Swelling (trace-1+ pedal edema) present.   Skin:     Coloration: Skin is pale.   Neurological:      Mental Status: He is lethargic.         Results Review:      Results from last 7 days   Lab Units 03/13/21  0547 03/12/21  0423 03/11/21  1429   SODIUM mmol/L 136 132* 134*   POTASSIUM mmol/L 4.4 3.8 4.0   CHLORIDE mmol/L 97* 96* 97*   CO2 mmol/L 27.4 25.8 26.0   BUN mg/dL 9 13 10   CREATININE mg/dL 0.62* 0.66* 0.80   GLUCOSE mg/dL 232* 210* 197*   CALCIUM mg/dL 8.7 8.4* 9.1         Results from last 7 days   Lab " Units 03/13/21  0547 03/12/21  0423 03/11/21  1429   WBC 10*3/mm3 9.55 8.61 12.04*   HEMOGLOBIN g/dL 11.2* 10.7* 11.8*   HEMATOCRIT % 34.1* 33.0* 36.6*   PLATELETS 10*3/mm3 254 235 233             Results from last 7 days   Lab Units 03/12/21  0423   MAGNESIUM mg/dL 1.9           I reviewed the patient's new clinical results.  I personally viewed and interpreted the patient's EKG/Telemetry data        Medication Review:   acetaminophen, 1,000 mg, Oral, Q6H  aspirin, 81 mg, Oral, BID With Meals  ceFAZolin, 2 g, Intravenous, Q8H  docusate sodium, 200 mg, Oral, BID  insulin glargine, 20 Units, Subcutaneous, Nightly  insulin lispro, 0-14 Units, Subcutaneous, TID AC  isosorbide mononitrate, 30 mg, Oral, Daily  lisinopril, 20 mg, Oral, Daily  metoprolol succinate XL, 12.5 mg, Oral, Q12H  rosuvastatin, 40 mg, Oral, Nightly  sodium chloride, 3 mL, Intravenous, Q12H        lactated ringers, 9 mL/hr, Last Rate: 9 mL/hr (03/12/21 1624)  lactated ringers, 100 mL/hr, Last Rate: 100 mL/hr (03/13/21 0440)        Assessment/Plan     1. Ventricular ectopy  2. Right prosthetic hip infection due to group B strep, POD#1 I&D   3. Morbid obesity  4. HTN  5. DM2  6. BELKYS  7. History of stroke with severe aphasia    I called his wife and spoke with her.  She states that he is normally followed at [facility].  She denies any known history of heart disease.  That being said, he was on isosorbide as an outpatient, which would suggest some underlying ischemic heart disease.  He had an echocardiogram performed today, and it was remarkably technically difficult due to his size and his inability to position correctly due to hemiparesis and recent orthopedic surgery.  It was also difficult because he was having frequent PVCs during the study.  His ventricle appears dilated, and moderately to severely dysfunctional, ejection fraction 30%.  There appears to be a wall motion abnormality affecting the apex, and distal lateral and distal  inferior walls.  It does not appear that he is having any angina.  His EKG does not appear ischemic.  I am going to increase his metoprolol and we will follow along.  At this point, I do not feel that an ischemic evaluation is indicated given his numerous other comorbidities and active infection.    Martínez Vasquez MD  03/13/21  14:31 EST

## 2021-03-13 NOTE — PROGRESS NOTES
Orthopedic Progress Note    Subjective :   Patient seen and examined.  Resting comfortably in his hospital bed.  No acute issues overnight.  Hemovac drain output was 40 cc overnight.  Intraoperatively there was found to be purulent fluid.  Continues on Ancef 2 g IV every 8 hours.  Intraoperative cultures currently pending.    Objective :    Vital signs in last 24 hours:  Temp:  [98 °F (36.7 °C)-99.7 °F (37.6 °C)] 98.6 °F (37 °C)  Heart Rate:  [] 103  Resp:  [14-20] 16  BP: (113-161)/(55-93) 145/93  Vitals:    03/12/21 1945 03/12/21 2027 03/12/21 2253 03/13/21 0500   BP: 161/81 142/87 145/93    BP Location:  Right arm Right arm    Patient Position:  Lying Lying    Pulse: 100 99 103    Resp: 14 16 16    Temp: 98.6 °F (37 °C)  98.6 °F (37 °C)    TempSrc: Oral  Oral    SpO2: 94% 93% 96%    Weight:    126 kg (278 lb 1.6 oz)   Height:           PHYSICAL EXAM:  Patient is calm, in no acute distress, awake and oriented x 3.  Dressing clean, dry and intact.  Drain intact.  Swelling is appropriate.  Ecchymosis is appropriate in amount.  Homans test is negative.  Foot warm and well-perfused.  Pulses intact.    LABS:  Results from last 7 days   Lab Units 03/13/21  0547   WBC 10*3/mm3 9.55   HEMOGLOBIN g/dL 11.2*   HEMATOCRIT % 34.1*   PLATELETS 10*3/mm3 254     Results from last 7 days   Lab Units 03/13/21  0547   SODIUM mmol/L 136   POTASSIUM mmol/L 4.4   CHLORIDE mmol/L 97*   CO2 mmol/L 27.4   BUN mg/dL 9   CREATININE mg/dL 0.62*   GLUCOSE mg/dL 232*   CALCIUM mg/dL 8.7        Study Result    Narrative & Impression   RIGHT HIP X-RAYS     CLINICAL HISTORY: Postop arthroplasty     2 somewhat underpenetrated views were obtained and demonstrate a right  total hip arthroplasty that appears in satisfactory position.     This report was finalized on 3/12/2021 6:34 PM by Dr. Tao Malin M.D.     Wound Culture - Wound, Hip, Right  Order: [id number]  Status:  Preliminary result   Visible to patient:  No (not released) Next  appt:  None Dx:  Group B streptococcal infection  Specimen Information: Hip, Right; Wound        Gram Stain  No WBCs or organisms seen            Resulting Agency:  [facility]         Specimen Collected: 03/12/21 15:37 Last Resulted: 03/12/21 23:45        Order Details      View Encounter      Lab and Collection Details      Routing      Result History             Wound Culture - Wound, Hip, Right  Order: [id number]  Status:  Preliminary result   Visible to patient:  No (not released) Next appt:  None Dx:  Group B streptococcal infection  Specimen Information: Hip, Right; Wound        Gram Stain  Rare (1+) WBCs seen      No organisms seen            Resulting Agency:  [facility]         Specimen Collected: 03/12/21 17:06 Last Resulted: 03/12/21 23:53        Order Details      View Encounter      Lab and Collection Details      Routing      Result History             ASSESSMENT:  Status post right hip irrigation and debridement for prosthetic joint infection, POD #1    Plan:  Continue Physical Therapy and Occupational Therapy, weightbearing as tolerated.  Continue SCDs, Continue DVT prophylaxis.  Monitor drain output.  Monitor cultures.  Continue antibiotics under the care of infectious disease.  Dispo planning for likely rehab when medically stable.    Josiah Leigh PA-C    Date: 3/13/2021  Time: 07:22 EST

## 2021-03-14 PROBLEM — I49.3 PVCS (PREMATURE VENTRICULAR CONTRACTIONS): Status: ACTIVE | Noted: 2021-03-14

## 2021-03-14 PROBLEM — I34.0 NONRHEUMATIC MITRAL VALVE REGURGITATION: Status: ACTIVE | Noted: 2021-03-14

## 2021-03-14 PROBLEM — I42.9 CARDIOMYOPATHY: Status: ACTIVE | Noted: 2021-03-14

## 2021-03-14 LAB
ANION GAP SERPL CALCULATED.3IONS-SCNC: 13.6 MMOL/L (ref 5–15)
BUN SERPL-MCNC: 11 MG/DL (ref 6–20)
BUN/CREAT SERPL: 16.7 (ref 7–25)
CALCIUM SPEC-SCNC: 9.2 MG/DL (ref 8.6–10.5)
CHLORIDE SERPL-SCNC: 95 MMOL/L (ref 98–107)
CO2 SERPL-SCNC: 27.4 MMOL/L (ref 22–29)
CREAT SERPL-MCNC: 0.66 MG/DL (ref 0.76–1.27)
DEPRECATED RDW RBC AUTO: 40.7 FL (ref 37–54)
ERYTHROCYTE [DISTWIDTH] IN BLOOD BY AUTOMATED COUNT: 12.9 % (ref 12.3–15.4)
GFR SERPL CREATININE-BSD FRML MDRD: 124 ML/MIN/1.73
GLUCOSE BLDC GLUCOMTR-MCNC: 164 MG/DL (ref 70–130)
GLUCOSE BLDC GLUCOMTR-MCNC: 213 MG/DL (ref 70–130)
GLUCOSE BLDC GLUCOMTR-MCNC: 235 MG/DL (ref 70–130)
GLUCOSE BLDC GLUCOMTR-MCNC: 251 MG/DL (ref 70–130)
GLUCOSE SERPL-MCNC: 246 MG/DL (ref 65–99)
HCT VFR BLD AUTO: 37.9 % (ref 37.5–51)
HGB BLD-MCNC: 12.6 G/DL (ref 13–17.7)
MCH RBC QN AUTO: 29 PG (ref 26.6–33)
MCHC RBC AUTO-ENTMCNC: 33.2 G/DL (ref 31.5–35.7)
MCV RBC AUTO: 87.1 FL (ref 79–97)
PLATELET # BLD AUTO: 349 10*3/MM3 (ref 140–450)
PMV BLD AUTO: 10.7 FL (ref 6–12)
POTASSIUM SERPL-SCNC: 4 MMOL/L (ref 3.5–5.2)
RBC # BLD AUTO: 4.35 10*6/MM3 (ref 4.14–5.8)
SODIUM SERPL-SCNC: 136 MMOL/L (ref 136–145)
WBC # BLD AUTO: 12.25 10*3/MM3 (ref 3.4–10.8)

## 2021-03-14 PROCEDURE — 63710000001 INSULIN GLARGINE PER 5 UNITS: Performed by: ORTHOPAEDIC SURGERY

## 2021-03-14 PROCEDURE — 80048 BASIC METABOLIC PNL TOTAL CA: CPT | Performed by: INTERNAL MEDICINE

## 2021-03-14 PROCEDURE — 99232 SBSQ HOSP IP/OBS MODERATE 35: CPT | Performed by: INTERNAL MEDICINE

## 2021-03-14 PROCEDURE — 85027 COMPLETE CBC AUTOMATED: CPT | Performed by: ORTHOPAEDIC SURGERY

## 2021-03-14 PROCEDURE — 25010000002 PROCHLORPERAZINE 10 MG/2ML SOLUTION: Performed by: NURSE PRACTITIONER

## 2021-03-14 PROCEDURE — 63710000001 INSULIN LISPRO (HUMAN) PER 5 UNITS: Performed by: ORTHOPAEDIC SURGERY

## 2021-03-14 PROCEDURE — 97162 PT EVAL MOD COMPLEX 30 MIN: CPT | Performed by: PHYSICAL THERAPIST

## 2021-03-14 PROCEDURE — 25010000002 ONDANSETRON PER 1 MG: Performed by: ORTHOPAEDIC SURGERY

## 2021-03-14 PROCEDURE — 25010000003 CEFAZOLIN IN DEXTROSE 2-4 GM/100ML-% SOLUTION: Performed by: ORTHOPAEDIC SURGERY

## 2021-03-14 PROCEDURE — 94799 UNLISTED PULMONARY SVC/PX: CPT

## 2021-03-14 PROCEDURE — 63710000001 INSULIN LISPRO (HUMAN) PER 5 UNITS: Performed by: INTERNAL MEDICINE

## 2021-03-14 PROCEDURE — 97530 THERAPEUTIC ACTIVITIES: CPT | Performed by: PHYSICAL THERAPIST

## 2021-03-14 PROCEDURE — 82962 GLUCOSE BLOOD TEST: CPT

## 2021-03-14 PROCEDURE — 25010000002 HYDROMORPHONE PER 4 MG: Performed by: ORTHOPAEDIC SURGERY

## 2021-03-14 RX ORDER — INSULIN LISPRO 100 [IU]/ML
0-9 INJECTION, SOLUTION INTRAVENOUS; SUBCUTANEOUS
Status: DISCONTINUED | OUTPATIENT
Start: 2021-03-14 | End: 2021-03-18 | Stop reason: HOSPADM

## 2021-03-14 RX ORDER — DEXTROSE MONOHYDRATE 25 G/50ML
25 INJECTION, SOLUTION INTRAVENOUS
Status: DISCONTINUED | OUTPATIENT
Start: 2021-03-14 | End: 2021-03-18 | Stop reason: HOSPADM

## 2021-03-14 RX ORDER — INSULIN LISPRO 100 [IU]/ML
6 INJECTION, SOLUTION INTRAVENOUS; SUBCUTANEOUS
Status: DISCONTINUED | OUTPATIENT
Start: 2021-03-14 | End: 2021-03-17

## 2021-03-14 RX ORDER — METOPROLOL SUCCINATE 50 MG/1
50 TABLET, EXTENDED RELEASE ORAL EVERY 12 HOURS SCHEDULED
Status: DISCONTINUED | OUTPATIENT
Start: 2021-03-14 | End: 2021-03-18 | Stop reason: HOSPADM

## 2021-03-14 RX ORDER — NICOTINE POLACRILEX 4 MG
15 LOZENGE BUCCAL
Status: DISCONTINUED | OUTPATIENT
Start: 2021-03-14 | End: 2021-03-18 | Stop reason: HOSPADM

## 2021-03-14 RX ADMIN — INSULIN LISPRO 5 UNITS: 100 INJECTION, SOLUTION INTRAVENOUS; SUBCUTANEOUS at 11:41

## 2021-03-14 RX ADMIN — CEFAZOLIN SODIUM 2 G: 2 INJECTION, SOLUTION INTRAVENOUS at 06:33

## 2021-03-14 RX ADMIN — CEFAZOLIN SODIUM 2 G: 2 INJECTION, SOLUTION INTRAVENOUS at 14:34

## 2021-03-14 RX ADMIN — ACETAMINOPHEN 650 MG: 325 TABLET, FILM COATED ORAL at 17:58

## 2021-03-14 RX ADMIN — CEFAZOLIN SODIUM 2 G: 2 INJECTION, SOLUTION INTRAVENOUS at 23:40

## 2021-03-14 RX ADMIN — ROSUVASTATIN CALCIUM 40 MG: 40 TABLET, FILM COATED ORAL at 20:50

## 2021-03-14 RX ADMIN — METOPROLOL TARTRATE 5 MG: 5 INJECTION INTRAVENOUS at 14:34

## 2021-03-14 RX ADMIN — DOCUSATE SODIUM 200 MG: 100 CAPSULE, LIQUID FILLED ORAL at 20:50

## 2021-03-14 RX ADMIN — SODIUM CHLORIDE, POTASSIUM CHLORIDE, SODIUM LACTATE AND CALCIUM CHLORIDE 100 ML/HR: 600; 310; 30; 20 INJECTION, SOLUTION INTRAVENOUS at 06:33

## 2021-03-14 RX ADMIN — INSULIN LISPRO 4 UNITS: 100 INJECTION, SOLUTION INTRAVENOUS; SUBCUTANEOUS at 17:58

## 2021-03-14 RX ADMIN — INSULIN GLARGINE 20 UNITS: 100 INJECTION, SOLUTION SUBCUTANEOUS at 21:16

## 2021-03-14 RX ADMIN — LISINOPRIL 20 MG: 20 TABLET ORAL at 08:16

## 2021-03-14 RX ADMIN — METOPROLOL SUCCINATE 25 MG: 25 TABLET, EXTENDED RELEASE ORAL at 08:16

## 2021-03-14 RX ADMIN — INSULIN LISPRO 8 UNITS: 100 INJECTION, SOLUTION INTRAVENOUS; SUBCUTANEOUS at 08:15

## 2021-03-14 RX ADMIN — ACETAMINOPHEN 1000 MG: 500 TABLET, FILM COATED ORAL at 20:50

## 2021-03-14 RX ADMIN — METOPROLOL SUCCINATE 50 MG: 50 TABLET, EXTENDED RELEASE ORAL at 20:50

## 2021-03-14 RX ADMIN — METOPROLOL TARTRATE 5 MG: 5 INJECTION INTRAVENOUS at 00:31

## 2021-03-14 RX ADMIN — HYDROMORPHONE HYDROCHLORIDE 0.5 MG: 1 INJECTION, SOLUTION INTRAMUSCULAR; INTRAVENOUS; SUBCUTANEOUS at 17:10

## 2021-03-14 RX ADMIN — HYDROMORPHONE HYDROCHLORIDE 0.5 MG: 1 INJECTION, SOLUTION INTRAMUSCULAR; INTRAVENOUS; SUBCUTANEOUS at 11:29

## 2021-03-14 RX ADMIN — SODIUM CHLORIDE, PRESERVATIVE FREE 3 ML: 5 INJECTION INTRAVENOUS at 20:50

## 2021-03-14 NOTE — PROGRESS NOTES
"   LOS: 3 days   Patient Care Team:  Jazzy Servin APRN as PCP - General (Nurse Practitioner)    Chief Complaint: following for ventricular ectopy     Interval History: Telemetry shows a lower burden of PVCs.     Objective   Vital Signs  Temp:  [97.4 °F (36.3 °C)-98.6 °F (37 °C)] 97.4 °F (36.3 °C)  Heart Rate:  [] 103  Resp:  [16-18] 18  BP: (154-185)/() 172/106    Intake/Output Summary (Last 24 hours) at 3/14/2021 1329  Last data filed at 3/14/2021 1150  Gross per 24 hour   Intake 1000 ml   Output 2075 ml   Net -1075 ml       Last Weight and Admission Weight        03/14/21  0300   Weight: 128 kg (283 lb 1.6 oz)     Flowsheet Rows      First Filed Value   Admission Height  165.1 cm (65\") Documented at 03/12/2021 0023   Admission Weight  (!) 138 kg (303 lb 13.1 oz) Documented at 03/11/2021 2239                  Physical Exam  Constitutional:       Appearance: He is morbidly obese.   HENT:      Head: Normocephalic.      Nose: Nose normal.   Cardiovascular:      Rate and Rhythm: Normal rate. Frequent extrasystoles are present.     Heart sounds: Heart sounds are distant.   Pulmonary:      Effort: Pulmonary effort is normal.      Breath sounds: Normal breath sounds.   Abdominal:      Palpations: Abdomen is soft.   Musculoskeletal:         General: Swelling (trace-1+ pedal edema) present.   Skin:     Coloration: Skin is pale.   Neurological:      Mental Status: He is lethargic.      Comments: aphasia         Results Review:      Results from last 7 days   Lab Units 03/14/21 0442 03/13/21  0547 03/12/21  0423   SODIUM mmol/L 136 136 132*   POTASSIUM mmol/L 4.0 4.4 3.8   CHLORIDE mmol/L 95* 97* 96*   CO2 mmol/L 27.4 27.4 25.8   BUN mg/dL 11 9 13   CREATININE mg/dL 0.66* 0.62* 0.66*   GLUCOSE mg/dL 246* 232* 210*   CALCIUM mg/dL 9.2 8.7 8.4*         Results from last 7 days   Lab Units 03/14/21  0442 03/13/21  0547 03/12/21  0423   WBC 10*3/mm3 12.25* 9.55 8.61   HEMOGLOBIN g/dL 12.6* 11.2* 10.7* "   HEMATOCRIT % 37.9 34.1* 33.0*   PLATELETS 10*3/mm3 349 254 235             Results from last 7 days   Lab Units 03/12/21  0423   MAGNESIUM mg/dL 1.9           I reviewed the patient's new clinical results.  I personally viewed and interpreted the patient's EKG/Telemetry data        Medication Review:   acetaminophen, 1,000 mg, Oral, Q6H  aspirin, 81 mg, Oral, BID With Meals  ceFAZolin, 2 g, Intravenous, Q8H  docusate sodium, 200 mg, Oral, BID  insulin glargine, 20 Units, Subcutaneous, Nightly  insulin lispro, 0-14 Units, Subcutaneous, TID AC  isosorbide mononitrate, 30 mg, Oral, Daily  lisinopril, 20 mg, Oral, Daily  metoprolol succinate XL, 25 mg, Oral, Q12H  rosuvastatin, 40 mg, Oral, Nightly  Scopolamine, 1 patch, Transdermal, Q72H  sodium chloride, 3 mL, Intravenous, Q12H        lactated ringers, 100 mL/hr, Last Rate: 100 mL/hr (03/14/21 0633)        Assessment/Plan     1. Ventricular ectopy/PVCs  2. Abnormal echo/cardiomyopathy with RWMA  3. Probable severe MR  4. Right prosthetic hip infection due to group B strep, POD#2 I&D   5. Morbid obesity  6. HTN  7. DM2  8. BELKYS  9. History of stroke with severe aphasia    His echo was technically difficult, and visualization was really tough (due to his size and inability to position correctly), but his LVEF appears to be 30% with distal lateral akinesis.  At this point, he is not a candidate for an ischemic evaluation due to his significant comorbidities, and I will treat him medically for presumed CAD/ICM.   His volume status is a bit tough to discern because of his size, but he does not have any pulmonary edema on exam. He's on IVF as he's not eating/drinking well; we'll need to watch for signs of volume overload.     My next area of concern is that he had a prosthetic hip infection with GBS, and has at least mod-severe MR if not severe MR.  He did not have blood cultures drawn, and there's little utility to drawing them now as he's on IV antibiotics.  This does  beg the question of whether or not he needs a LAVELLE, though, and I suspect he does. I think tomorrow is premature given his recent knee surgery and difficulty moving. We'll continue to reassess this daily.     I will increase his metoprolol succinate dose.  I will stop lisinopril today with the plan to start sacubitril-valsartan tomorrow. He was on ISMN at home (despite his wife stating he has no cardiac history, and the [facility] having no cardiac records on him) and that will be continued.     ADD:    He was transferred here from [place] -- Dr Miller's initial consult states that his urine culture grew MSSA but his blood cultures were negative.  I asked for an updated copy of his labs and it finally grew coag negative Staph, which was felt to be a contaminant. His hip grew Group B Strep.  This gentleman will eventually need a LAVELLE.    Martínez Vasquez MD  03/14/21  13:29 EDT

## 2021-03-14 NOTE — PLAN OF CARE
Goal Outcome Evaluation:  Plan of Care Reviewed With: patient, spouse     Outcome Summary: PT EVAL completed. Pt AO x 3 with expressive aphasia or difficulty word finding. Shows clear understanding of what PT is communicating to him. Pt is at times mildly impulsive, but follows 100% of commands as well as his physical ability allows. Pt sitting EOB with spouse present. Pt transferred sit to stand with mod to max x 1 with PT blocking R knee to prevent buckling. Pt unable to keep RLE in contact with ground upon initial stand and demonstrated non-sustained clonus response through RLE. Pt completed 6 x sit to stand with mod-max x 1 assist and CNA assisting with lines. Pt transferred stand pivot sit mod to max x 1 to sit in chair. Pt demonstrates some movement of RLE, but very flaccid in RUE. Pt requires skilled therapy due to decreased strength, transfers, ambulation, and functional activity tolerance. Recommend DC to inpatient rehab or other setting to continue to receive skilled PT intervention. Pt utilizes R AFO built into shoe for transfers and standing.    PPE: Patient was placed in face mask in first look. Patient was wearing facemask when I entered the room and throughout our encounter. I wore full protective equipment throughout this patient encounter including a face mask, and gloves. Hand hygiene was performed before donning protective equipment and after removal when leaving the room.

## 2021-03-14 NOTE — PLAN OF CARE
Goal Outcome Evaluation:     Progress: no change  Outcome Summary: Pt had few episodes of nausea and vomiting, IV zofran given without having much improvement, pt became tachycardiac and hypertensive, called to Sayda Sosa from [facility], IV compazine was ordered and given,CT was done to r/o ileaus/obstruction. Pt was still vomiting and hypertensive, condition and CT result was notified to Rehana Rucker from [facility].  no new orders, Okay for pt's SBP to be less than 180.pt has refused to be turned, educated pt about the importance of skin care. continue to monitor.

## 2021-03-14 NOTE — THERAPY EVALUATION
Patient Name: Mandeep Tracey  : 1960    MRN: [id number]                              Today's Date: 3/14/2021       Admit Date: 3/11/2021    Visit Dx:     ICD-10-CM ICD-9-CM   1. Group B streptococcal infection  A49.1 041.02     Patient Active Problem List   Diagnosis   • CVA (cerebral vascular accident) (CMS/[facility])   • Right hemiparesis (CMS/[facility])   • BELKYS on CPAP   • Seizure disorder (CMS/[facility])   • Type 2 diabetes mellitus (CMS/[facility])   • Essential hypertension   • Infection of right prosthetic hip joint (CMS/[facility])   • MSSA (methicillin susceptible Staphylococcus aureus) infection   • Group B streptococcal infection   • UTI (urinary tract infection)   • Postoperative nausea and vomiting   • Nonrheumatic mitral valve regurgitation   • Cardiomyopathy (CMS/[facility])   • PVCs (premature ventricular contractions)     Past Medical History:   Diagnosis Date   • Aphasia    • CPAP (continuous positive airway pressure) dependence    • Diabetes (CMS/[facility])    • Hemiparesis (CMS/[facility])     Right side    • Peptic ulcer disease    • Postoperative nausea and vomiting 3/13/2021   • Psoriasis    • Seizures (CMS/[facility])    • Sleep apnea    • Stroke (CMS/[facility])      Past Surgical History:   Procedure Laterality Date   • CHOLECYSTECTOMY     • EYE SURGERY     • LUMBAR DISC SURGERY       General Information     Row Name 21 0515          Physical Therapy Time and Intention    Document Type  evaluation  -     Mode of Treatment  individual therapy;physical therapy  -     Row Name 21 1348          General Information    Patient Profile Reviewed  yes  -LC     Prior Level of Function  min assist:;mod assist:;all household mobility;gait;transfer;w/c or scooter;bed mobility;ADL's  -     Existing Precautions/Restrictions  (S) fall;weight bearing RIGHT hemiparesis, difficulty maintaining RLE in contact with ground  -     Barriers to Rehab  previous functional deficit;impaired sensation  -     Row Name 21 5978          Living  Environment    Lives With  spouse  -     Row Name 03/14/21 1346          Safety Issues, Functional Mobility    Impairments Affecting Function (Mobility)  balance;coordination;endurance/activity tolerance;motor control;motor planning;muscle tone abnormal;strength;sensation/sensory awareness  -       User Key  (r) = Recorded By, (t) = Taken By, (c) = Cosigned By    Initials Name Provider Type     Leandro Anders, PT DPT Physical Therapist        Mobility     Row Name 03/14/21 1348          Bed Mobility    Bed Mobility  bed mobility (all) activities  -     All Activities, [place] (Bed Mobility)  moderate assist (50% patient effort)  -     Assistive Device (Bed Mobility)  bed rails;draw sheet;head of bed elevated  -     Row Name 03/14/21 1348          Transfers    Comment (Transfers)  Pt transferred sit to stand with mod to max x 1 with PT blocking R knee to prevent buckling. Pt unable to keep RLE in contact with ground upon initial stand and demonstrated non-sustained clonus response through RLE. Pt completed 6 x sit to stand with mod-max x 1 assist and CNA assisting with lines. Pt transferred stand pivot sit mod to max x 1 to sit in chair.  -     Row Name 03/14/21 1348          Bed-Chair Transfer    Bed-Chair [place] (Transfers)  moderate assist (50% patient effort);maximum assist (25% patient effort);1 person assist;1 person to manage equipment  -     Assistive Device (Bed-Chair Transfers)  cane, quad  -     Row Name 03/14/21 1348          Sit-Stand Transfer    Sit-Stand [place] (Transfers)  moderate assist (50% patient effort);maximum assist (25% patient effort)  -     Assistive Device (Sit-Stand Transfers)  -- HHA  -       User Key  (r) = Recorded By, (t) = Taken By, (c) = Cosigned By    Initials Name Provider Type    Leandro Gama, PT DPT Physical Therapist        Obj/Interventions     Row Name 03/14/21 1358          Range of Motion Comprehensive    Comment, General Range  of Motion  RLE AROM impaired 100%, LLE AROM WNL  -     Row Name 03/14/21 1358          Strength Comprehensive (MMT)    Comment, General Manual Muscle Testing (MMT) Assessment  RLE MMT grossly 2-/5; LLE MMT grossly 3+/5  -LC     Row Name 03/14/21 1358          Motor Skills    Motor Skills  coordination  -     Coordination  right;lower extremity;ataxia  -     Row Name 03/14/21 1358          Balance    Balance Assessment  sitting static balance;sitting dynamic balance;standing static balance;standing dynamic balance  -     Static Sitting Balance  WFL  -LC     Dynamic Sitting Balance  WFL  -LC     Static Standing Balance  severe impairment  -     Dynamic Standing Balance  severe impairment  -       User Key  (r) = Recorded By, (t) = Taken By, (c) = Cosigned By    Initials Name Provider Type    LC Leandro Anders, PT DPT Physical Therapist        Goals/Plan     Row Name 03/14/21 1400          Bed Mobility Goal 1 (PT)    Activity/Assistive Device (Bed Mobility Goal 1, PT)  bed mobility activities, all  -LC     [place] Level/Cues Needed (Bed Mobility Goal 1, PT)  minimum assist (75% or more patient effort)  -LC     Time Frame (Bed Mobility Goal 1, PT)  1 week  -     Row Name 03/14/21 1400          Transfer Goal 1 (PT)    Activity/Assistive Device (Transfer Goal 1, PT)  transfers, all;cane, quad  -LC     [place] Level/Cues Needed (Transfer Goal 1, PT)  minimum assist (75% or more patient effort)  -LC     Time Frame (Transfer Goal 1, PT)  1 week  -     Row Name 03/14/21 1400          Gait Training Goal 1 (PT)    Activity/Assistive Device (Gait Training Goal 1, PT)  gait (walking locomotion);cane, quad  -LC     [place] Level (Gait Training Goal 1, PT)  minimum assist (75% or more patient effort)  -LC     Distance (Gait Training Goal 1, PT)  40  -LC     Time Frame (Gait Training Goal 1, PT)  1 week  -       User Key  (r) = Recorded By, (t) = Taken By, (c) = Cosigned By    Initials Name Provider  Type    LC Leandro Anders, PT DPT Physical Therapist        Clinical Impression     Row Name 03/14/21 5447          Pain    Additional Documentation  Pain Scale: Numbers Pre/Post-Treatment (Group)  -LC     Row Name 03/14/21 4614          Pain Scale: Numbers Pre/Post-Treatment    Pretreatment Pain Rating  7/10  -LC     Posttreatment Pain Rating  7/10  -LC     Pain Location - Side  Right  -LC     Pain Location  hip  -LC     Pain Intervention(s)  Medication (See MAR);Repositioned  -LC     Row Name 03/14/21 3607          Plan of Care Review    Plan of Care Reviewed With  patient;spouse  -     Outcome Summary  PT EVAL completed. Pt AO x 3 with expressive aphasia or difficulty word finding. Shows clear understanding of what PT is communicating to him. Pt is at times mildly impulsive, but follows 100% of commands as well as his physical ability allows. Pt sitting EOB with spouse present. Pt transferred sit to stand with mod to max x 1 with PT blocking R knee to prevent buckling. Pt unable to keep RLE in contact with ground upon initial stand and demonstrated non-sustained clonus response through RLE. Pt completed 6 x sit to stand with mod-max x 1 assist and CNA assisting with lines. Pt transferred stand pivot sit mod to max x 1 to sit in chair. Pt demonstrates some movement of RLE, but very flaccid in RUE. Pt requires skilled therapy due to decreased strength, transfers, ambulation, and functional activity tolerance. Recommend DC to inpatient rehab or other setting to continue to receive skilled PT intervention. Pt utilizes R AFO built into shoe for transfers and standing.  -     Row Name 03/14/21 9301          Therapy Assessment/Plan (PT)    Patient/Family Therapy Goals Statement (PT)  return home with assist from spouse  -     Rehab Potential (PT)  fair, will monitor progress closely  -     Criteria for Skilled Interventions Met (PT)  yes;skilled treatment is necessary  -     Predicted Duration of Therapy  Intervention (PT)  1 week  -     Row Name 03/14/21 1355          Vital Signs    O2 Delivery Pre Treatment  room air  -     O2 Delivery Intra Treatment  room air  -LC     O2 Delivery Post Treatment  room air  -     Row Name 03/14/21 1355          Positioning and Restraints    Pre-Treatment Position  in bed  -     Post Treatment Position  chair  -LC     In Chair  notified nsg;sitting;call light within reach;encouraged to call for assist;exit alarm on;with other staff  -       User Key  (r) = Recorded By, (t) = Taken By, (c) = Cosigned By    Initials Name Provider Type    Leandro Gama, PT DPT Physical Therapist        Outcome Measures     Row Name 03/14/21 1403          How much help from another person do you currently need...    Turning from your back to your side while in flat bed without using bedrails?  2  -LC     Moving from lying on back to sitting on the side of a flat bed without bedrails?  2  -LC     Moving to and from a bed to a chair (including a wheelchair)?  2  -LC     Standing up from a chair using your arms (e.g., wheelchair, bedside chair)?  2  -LC     Climbing 3-5 steps with a railing?  1  -LC     To walk in hospital room?  1  -LC     AM-PAC 6 Clicks Score (PT)  10  -     Row Name 03/14/21 1403          Functional Assessment    Outcome Measure Options  AM-PAC 6 Clicks Basic Mobility (PT)  -       User Key  (r) = Recorded By, (t) = Taken By, (c) = Cosigned By    Initials Name Provider Type    Leandro Gama, PT DPT Physical Therapist        Physical Therapy Education                 Title: PT OT SLP Therapies (Done)     Topic: Physical Therapy (Done)     Point: Mobility training (Done)     Learning Progress Summary           Patient Acceptance, E,D, NR,DU,VU by  at 3/14/2021 1403   Family Acceptance, E,D, NR,DU,VU by  at 3/14/2021 1403                   Point: Home exercise program (Done)     Learning Progress Summary           Patient Acceptance, E,D, NR,DU,VU by  at  3/14/2021 1403   Family Acceptance, E,D, NR,DU,VU by  at 3/14/2021 1403                   Point: Body mechanics (Done)     Learning Progress Summary           Patient Acceptance, E,D, NR,DU,VU by  at 3/14/2021 1403   Family Acceptance, E,D, NR,DU,VU by  at 3/14/2021 1403                   Point: Precautions (Done)     Learning Progress Summary           Patient Acceptance, E,D, NR,DU,VU by  at 3/14/2021 1403   Family Acceptance, E,D, NR,DU,VU by  at 3/14/2021 1403                               User Key     Initials Effective Dates Name Provider Type Discipline     07/02/20 -  Leandro Anders, PT DPT Physical Therapist PT              PT Recommendation and Plan     Plan of Care Reviewed With: patient, spouse  Outcome Summary: PT EVAL completed. Pt AO x 3 with expressive aphasia or difficulty word finding. Shows clear understanding of what PT is communicating to him. Pt is at times mildly impulsive, but follows 100% of commands as well as his physical ability allows. Pt sitting EOB with spouse present. Pt transferred sit to stand with mod to max x 1 with PT blocking R knee to prevent buckling. Pt unable to keep RLE in contact with ground upon initial stand and demonstrated non-sustained clonus response through RLE. Pt completed 6 x sit to stand with mod-max x 1 assist and CNA assisting with lines. Pt transferred stand pivot sit mod to max x 1 to sit in chair. Pt demonstrates some movement of RLE, but very flaccid in RUE. Pt requires skilled therapy due to decreased strength, transfers, ambulation, and functional activity tolerance. Recommend DC to inpatient rehab or other setting to continue to receive skilled PT intervention. Pt utilizes R AFO built into shoe for transfers and standing.     Time Calculation:   PT Charges     Row Name 03/14/21 1404             Time Calculation    Start Time  1315  -      Stop Time  1400  -      Time Calculation (min)  45 min  -      PT Received On  03/14/21  -       PT - Next Appointment  03/15/21  -      PT Goal Re-Cert Due Date  03/21/21  -         Time Calculation- PT    Total Timed Code Minutes- PT  45 minute(s)  -         Timed Charges    47085 - PT Therapeutic Activity Minutes  30  -        User Key  (r) = Recorded By, (t) = Taken By, (c) = Cosigned By    Initials Name Provider Type    Leandro Gama, PT DPT Physical Therapist        Therapy Charges for Today     Code Description Service Date Service Provider Modifiers Qty    99776280574  PT THERAPEUTIC ACT EA 15 MIN 3/14/2021 Leandro Anders, PT DPT GP 2    [id number] HC PT EVAL MOD COMPLEXITY 1 3/14/2021 Leandro Anders, PT DPT GP 1          PT G-Codes  Outcome Measure Options: AM-PAC 6 Clicks Basic Mobility (PT)  AM-PAC 6 Clicks Score (PT): 10    Leandro Anders PT DPT  3/14/2021

## 2021-03-14 NOTE — PROGRESS NOTES
Orthopedic Progress Note    Subjective :   Patient seen and examined.  Currently resting comfortably.  Overnight was having some nausea and vomiting.  He also was having frequent PVCs overnight.  Cardiology is following.  As well as the medicine team.  Echocardiogram was performed.  As well as CT of his abdomen.  Drain output was minimal yesterday.  Continues on Ancef 2 g IV every 8 hours.  Intraoperative culture still pending.  Still showing 1+ growth rare white blood cells.    Objective :    Vital signs in last 24 hours:  Temp:  [97.4 °F (36.3 °C)-98.6 °F (37 °C)] 97.4 °F (36.3 °C)  Heart Rate:  [] 80  Resp:  [16-18] 18  BP: (154-185)/() 172/106  Vitals:    03/14/21 0321 03/14/21 0400 03/14/21 0514 03/14/21 0729   BP: (!) 185/83 168/92  (!) 172/106   BP Location:    Right arm   Patient Position:    Lying   Pulse: 88 79 77 80   Resp:    18   Temp:    97.4 °F (36.3 °C)   TempSrc:    Oral   SpO2: 92% 93% 96% 98%   Weight:       Height:           PHYSICAL EXAM:  Patient is calm, in no acute distress, awake and oriented x 3.  Dressing clean, dry and intact.  Drain intact.  Swelling is appropriate.  Ecchymosis is appropriate in amount.  Homans test is negative.  Foot warm and well-perfused.  Pulses intact.    LABS:  Results from last 7 days   Lab Units 03/14/21  0442   WBC 10*3/mm3 12.25*   HEMOGLOBIN g/dL 12.6*   HEMATOCRIT % 37.9   PLATELETS 10*3/mm3 349     Results from last 7 days   Lab Units 03/14/21  0442   SODIUM mmol/L 136   POTASSIUM mmol/L 4.0   CHLORIDE mmol/L 95*   CO2 mmol/L 27.4   BUN mg/dL 11   CREATININE mg/dL 0.66*   GLUCOSE mg/dL 246*   CALCIUM mg/dL 9.2        Study Result    Narrative & Impression      Patient: OSCAR TONG  Time Out: 00:02  Exam(s): CT ABDOMEN + PELVIS Without Contrast      EXAM:    CT Abdomen and Pelvis Without Intravenous Contrast     CLINICAL HISTORY:     Reason for exam: post op ileus.     TECHNIQUE:    Axial computed tomography images of the abdomen and pelvis  without   intravenous contrast.  CTDI is 32.0 mGy and DLP is 2364.60 mGy-cm.  This   CT exam was performed according to the principle of ALARA (As Low As   Reasonably Achievable) by using one or more of the following dose   reduction techniques: automated exposure control, adjustment of the mA   and or kV according to patient size, and or use of iterative   reconstruction technique.     COMPARISON:    No relevant prior studies available.     FINDINGS:    Limitations:  Limited due to metallic artifact.    Lung bases:  Right lower lobe consolidation atelectasis.      ABDOMEN:    Liver:  Unremarkable.    Gallbladder and bile ducts:  Cholecystectomy.    Pancreas:  Unremarkable.    Spleen:  Unremarkable.    Adrenals:  Unremarkable.    Kidneys and ureters:  No hydronephrosis or ureteral calculus.    Nonspecific bilateral perinephric stranding, cannot exclude   infection inflammation.  Left renal cysts.  Possible right   nephrolithiasis.    Stomach and bowel:  No mechanical bowel obstruction.  Colonic   diverticulosis.  No diverticulitis.      PELVIS:    Appendix:  No findings to suggest acute appendicitis.    Bladder:  Distended bladder.    Reproductive:  Unremarkable as visualized.      ABDOMEN and PELVIS:    Intraperitoneal space:  No free air.    Bones joints:  Right hip arthroplasty with associated postop changes.    Soft tissues:  Right thigh hip soft tissue edema and small air likely   postop.    Vasculature:  Atherosclerosis.    Lymph nodes:  Unremarkable.     IMPRESSION:       1.  No mechanical bowel obstruction.  Colonic diverticulosis.  No   diverticulitis.  2.  No hydronephrosis or ureteral calculus.  Nonspecific bilateral   perinephric stranding, cannot exclude infection inflammation.  3.  Right lower lobe consolidation atelectasis.     IMPRESSION:        Electronically signed by Kike Kelly M.D. on 03-14-21 at 0002     Interpretation Summary    · Estimated left ventricular EF = 30% The left ventricular  cavity is dilated. Left ventricular diastolic function was indeterminate. This is an extremely difficult study due to the patient's body habitus, positioning, and frequent ventricular ectopy. The ventricle appears to be moderately dilated. There appears to be moderate to severely reduced left ventricular systolic function, ejection fraction 30%. There is hypokinesis of the apex as well as the distal lateral and distal inferior walls.  · The mitral valve is not well-visualized. There is likely severe or moderate to severe mitral regurgitation which is extremely eccentric.       ASSESSMENT:  Status post right hip irrigation and debridement for prosthetic joint infection, POD #2    Plan:  Continue Physical Therapy and Occupational Therapy, weightbearing as tolerated.  Continue SCDs, Continue DVT prophylaxis.  We will DC his Hemovac drain today.  Continue antibiotics under the care of infectious disease.  Dispo planning for likely rehab when medically stable.  We will sign off from the orthopedic standpoint.  Please call if needed.  Thank you very much for allowing us to be a part of the patient's care.  Patient to follow-up in Dr. Andrea office in 2 weeks.    Josiah Leigh PA-C    Date: 3/14/2021  Time: 08:01 EDT

## 2021-03-14 NOTE — PROGRESS NOTES
Name: Mandeep Tracey ADMIT: 3/11/2021   : 1960  PCP: Jazzy Servin ESTRELLA    MRN: [id number] LOS: 3 days   AGE/SEX: 61 y.o. male  ROOM: [facility]     Subjective   Subjective   CC: right hip pain  No acute events. Patient had nausea and vomiting yesterday into the evening but this has resolved and he feels much better. He has tolerated PO. He has some right hip pain as expected but this is well-controlled. No CP/dyspnea/f/c.    Objective   Objective   Vital Signs  Temp:  [97.4 °F (36.3 °C)-98.6 °F (37 °C)] 97.7 °F (36.5 °C)  Heart Rate:  [] 123  Resp:  [16-18] 18  BP: ()/() 77/65  SpO2:  [92 %-99 %] 98 %  on  Flow (L/min):  [4] 4;   Device (Oxygen Therapy): nasal cannula  Body mass index is 47.11 kg/m².  Physical Exam  Vitals and nursing note reviewed.   Constitutional:       General: He is not in acute distress.     Appearance: He is not toxic-appearing.   HENT:      Head: Normocephalic and atraumatic.      Nose: Nose normal.      Mouth/Throat:      Mouth: Mucous membranes are moist.      Pharynx: Oropharynx is clear.   Eyes:      Conjunctiva/sclera: Conjunctivae normal.      Pupils: Pupils are equal, round, and reactive to light.   Cardiovascular:      Rate and Rhythm: Normal rate and regular rhythm.      Pulses: Normal pulses.   Pulmonary:      Effort: Pulmonary effort is normal.      Breath sounds: Examination of the right-lower field reveals decreased breath sounds. Examination of the left-lower field reveals decreased breath sounds. Decreased breath sounds present.   Abdominal:      General: Bowel sounds are normal.      Palpations: Abdomen is soft.      Tenderness: There is no abdominal tenderness.   Musculoskeletal:         General: Swelling (trace BLE) present. No tenderness.      Cervical back: Normal range of motion and neck supple.      Comments: Right hip surgical dressing c/d/i, drain in place   Skin:     General: Skin is warm and dry.      Capillary Refill: Capillary refill  takes less than 2 seconds.   Neurological:      General: No focal deficit present.      Mental Status: He is alert.      Comments: +right hemiparesis (known from previous CVA)   Psychiatric:         Mood and Affect: Mood normal.         Behavior: Behavior normal.       Results Review     I reviewed the patient's new clinical results.  I reviewed the patient's telemetry  I reviewed the patient's CT abdomen and pelvis.  Results from last 7 days   Lab Units 03/14/21 0442 03/13/21 0547 03/12/21 0423 03/11/21  1429   WBC 10*3/mm3 12.25* 9.55 8.61 12.04*   HEMOGLOBIN g/dL 12.6* 11.2* 10.7* 11.8*   PLATELETS 10*3/mm3 349 254 235 233     Results from last 7 days   Lab Units 03/14/21 0442 03/13/21 0547 03/12/21 0423 03/11/21  1429   SODIUM mmol/L 136 136 132* 134*   POTASSIUM mmol/L 4.0 4.4 3.8 4.0   CHLORIDE mmol/L 95* 97* 96* 97*   CO2 mmol/L 27.4 27.4 25.8 26.0   BUN mg/dL 11 9 13 10   CREATININE mg/dL 0.66* 0.62* 0.66* 0.80   GLUCOSE mg/dL 246* 232* 210* 197*   Estimated Creatinine Clearance: 150.2 mL/min (A) (by C-G formula based on SCr of 0.66 mg/dL (L)).  Results from last 7 days   Lab Units 03/11/21  1429   ALBUMIN g/dL 3.70   BILIRUBIN mg/dL 0.5   ALK PHOS U/L 54   AST (SGOT) U/L 17   ALT (SGPT) U/L 20     Results from last 7 days   Lab Units 03/14/21 0442 03/13/21 0547 03/12/21 0423 03/11/21  1429   CALCIUM mg/dL 9.2 8.7 8.4* 9.1   ALBUMIN g/dL  --   --   --  3.70   MAGNESIUM mg/dL  --   --  1.9  --        COVID19   Date Value Ref Range Status   03/11/2021 Not Detected Not Detected - Ref. Range Final     Hemoglobin A1C   Date/Time Value Ref Range Status   03/11/2021 1429 9.22 (H) 4.80 - 5.60 % Final     Glucose   Date/Time Value Ref Range Status   03/14/2021 1133 235 (H) 70 - 130 mg/dL Final   03/14/2021 0603 251 (H) 70 - 130 mg/dL Final   03/13/2021 2011 205 (H) 70 - 130 mg/dL Final   03/13/2021 1608 297 (H) 70 - 130 mg/dL Final   03/13/2021 1134 223 (H) 70 - 130 mg/dL Final   03/13/2021 0611 234 (H) 70 -  130 mg/dL Final   03/12/2021 2021 269 (H) 70 - 130 mg/dL Final       CT Abdomen Pelvis Without Contrast  Narrative: Patient: OSCAR TONG  Time Out: 00:02  Exam(s): CT ABDOMEN + PELVIS Without Contrast     EXAM:    CT Abdomen and Pelvis Without Intravenous Contrast    CLINICAL HISTORY:     Reason for exam: post op ileus.    TECHNIQUE:    Axial computed tomography images of the abdomen and pelvis without   intravenous contrast.  CTDI is 32.0 mGy and DLP is 2364.60 mGy-cm.  This   CT exam was performed according to the principle of ALARA (As Low As   Reasonably Achievable) by using one or more of the following dose   reduction techniques: automated exposure control, adjustment of the mA   and or kV according to patient size, and or use of iterative   reconstruction technique.    COMPARISON:    No relevant prior studies available.    FINDINGS:    Limitations:  Limited due to metallic artifact.    Lung bases:  Right lower lobe consolidation atelectasis.     ABDOMEN:    Liver:  Unremarkable.    Gallbladder and bile ducts:  Cholecystectomy.    Pancreas:  Unremarkable.    Spleen:  Unremarkable.    Adrenals:  Unremarkable.    Kidneys and ureters:  No hydronephrosis or ureteral calculus.    Nonspecific bilateral perinephric stranding, cannot exclude   infection inflammation.  Left renal cysts.  Possible right   nephrolithiasis.    Stomach and bowel:  No mechanical bowel obstruction.  Colonic   diverticulosis.  No diverticulitis.     PELVIS:    Appendix:  No findings to suggest acute appendicitis.    Bladder:  Distended bladder.    Reproductive:  Unremarkable as visualized.     ABDOMEN and PELVIS:    Intraperitoneal space:  No free air.    Bones joints:  Right hip arthroplasty with associated postop changes.    Soft tissues:  Right thigh hip soft tissue edema and small air likely   postop.    Vasculature:  Atherosclerosis.    Lymph nodes:  Unremarkable.    IMPRESSION:       1.  No mechanical bowel obstruction.  Colonic  diverticulosis.  No   diverticulitis.  2.  No hydronephrosis or ureteral calculus.  Nonspecific bilateral   perinephric stranding, cannot exclude infection inflammation.  3.  Right lower lobe consolidation atelectasis.  Impression: Electronically signed by Kike Kelly M.D. on 03-14-21 at 0002    Scheduled Medications  acetaminophen, 1,000 mg, Oral, Q6H  aspirin, 81 mg, Oral, BID With Meals  ceFAZolin, 2 g, Intravenous, Q8H  docusate sodium, 200 mg, Oral, BID  insulin glargine, 20 Units, Subcutaneous, Nightly  insulin lispro, 0-14 Units, Subcutaneous, TID AC  isosorbide mononitrate, 30 mg, Oral, Daily  metoprolol succinate XL, 50 mg, Oral, Q12H  rosuvastatin, 40 mg, Oral, Nightly  Scopolamine, 1 patch, Transdermal, Q72H  sodium chloride, 3 mL, Intravenous, Q12H    Infusions  lactated ringers, 100 mL/hr, Last Rate: 100 mL/hr (03/14/21 0633)    Diet  Diet Regular; Consistent Carbohydrate       Assessment/Plan     Active Hospital Problems    Diagnosis  POA   • **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) [T84.51XA]  Not Applicable   • Nonrheumatic mitral valve regurgitation [I34.0]  Unknown   • Cardiomyopathy (CMS/HCC) [I42.9]  Unknown   • PVCs (premature ventricular contractions) [I49.3]  Unknown   • Postoperative nausea and vomiting [R11.2, Z98.890]  Yes   • CVA (cerebral vascular accident) (CMS/HCC) [I63.9]  Yes   • Right hemiparesis (CMS/HCC) [G81.91]  Yes   • BELKYS on CPAP [G47.33, Z99.89]  Not Applicable   • Type 2 diabetes mellitus (CMS/HCC) [E11.9]  Yes   • Essential hypertension [I10]  Yes   • MSSA (methicillin susceptible Staphylococcus aureus) infection [A49.01]  Yes   • Group B streptococcal infection [A49.1]  Yes   • UTI (urinary tract infection) [N39.0]  Yes      Resolved Hospital Problems   No resolved problems to display.   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  - s/p right hip I&D 3/12/21-will follow up operative culture  - continue on cefazolin  - continue pain  control, encourage incentive spirometry  - mitral regurgitation noted on echocardiogram-LAVELLE being considered by cardiology-will request blood cultures from [facility]  - appreciate ID, cardiology, and Orthopedic Surgery recs    Postoperative Nausea and Vomiting  - continue PRN zofran and scopolamine patch  - abdominal examination is benign  - had abdomen/pelvis CT scan overnight which shows nothing acute    Frequent PVCs  - echocardiogram results noted with reduced LVEF and wall motion abnormalities  - probably has CAD (on imdur prior to admission) but no evidence of ACS  - metoprolol increased  - no plans for ischemic evaluation at this time per cardiology, appreciate recs    Type 2 DM  - continue lantus 20 units nightly  - will schedule 6 units of prandial insulin TID  - change ssi/hypoglycemia protocol to moderate dose  - A1C 9.22%    BELKYS  - has home CPAP, continue    Hx of CVA with Residual Right Hemiparesis  - no acute neurologic changes  - continue on ASA and statin    HTN  - cn imdur and metoprolol    ASA 81mg BID per orthopedic surgery for DVT prophylaxis.  Full code.  Discussed with patient, family and nursing staff.  Anticipate discharge TBD.  timing yet to be determined.      Angel Luis Dejesus MD  [facility]  03/14/21  13:45 EDT

## 2021-03-14 NOTE — PLAN OF CARE
Goal Outcome Evaluation:  Plan of Care Reviewed With: patient     Outcome Summary: No major issues today, pt still somewhat nauseous, had trouble with pills in am, though around lunch time, requested meals again, diet changed back, pt able to tolerate food so far but only in small amounts, hemovac drain removed with no issues late this am, PT worked with pt and got him up in the cahir for a bit, spouse in to see pt for some of the afternoon, pt currently resting comfortably in bed, will continue to monitor

## 2021-03-14 NOTE — DISCHARGE INSTRUCTIONS
Patient's sutures to be removed in the office in 2 weeks.  Follow-up in Dr. Andrea office in 2 weeks.

## 2021-03-15 LAB
ANION GAP SERPL CALCULATED.3IONS-SCNC: 10.2 MMOL/L (ref 5–15)
BACTERIA SPEC AEROBE CULT: NORMAL
BACTERIA SPEC AEROBE CULT: NORMAL
BUN SERPL-MCNC: 13 MG/DL (ref 6–20)
BUN/CREAT SERPL: 19.1 (ref 7–25)
CALCIUM SPEC-SCNC: 8.9 MG/DL (ref 8.6–10.5)
CHLORIDE SERPL-SCNC: 97 MMOL/L (ref 98–107)
CO2 SERPL-SCNC: 30.8 MMOL/L (ref 22–29)
CREAT SERPL-MCNC: 0.68 MG/DL (ref 0.76–1.27)
DEPRECATED RDW RBC AUTO: 40.9 FL (ref 37–54)
ERYTHROCYTE [DISTWIDTH] IN BLOOD BY AUTOMATED COUNT: 13 % (ref 12.3–15.4)
GFR SERPL CREATININE-BSD FRML MDRD: 119 ML/MIN/1.73
GLUCOSE BLDC GLUCOMTR-MCNC: 153 MG/DL (ref 70–130)
GLUCOSE BLDC GLUCOMTR-MCNC: 188 MG/DL (ref 70–130)
GLUCOSE BLDC GLUCOMTR-MCNC: 203 MG/DL (ref 70–130)
GLUCOSE BLDC GLUCOMTR-MCNC: 225 MG/DL (ref 70–130)
GLUCOSE SERPL-MCNC: 210 MG/DL (ref 65–99)
GRAM STN SPEC: NORMAL
HCT VFR BLD AUTO: 36.5 % (ref 37.5–51)
HGB BLD-MCNC: 12 G/DL (ref 13–17.7)
MCH RBC QN AUTO: 28.8 PG (ref 26.6–33)
MCHC RBC AUTO-ENTMCNC: 32.9 G/DL (ref 31.5–35.7)
MCV RBC AUTO: 87.5 FL (ref 79–97)
PLATELET # BLD AUTO: 407 10*3/MM3 (ref 140–450)
PMV BLD AUTO: 10.4 FL (ref 6–12)
POTASSIUM SERPL-SCNC: 3.7 MMOL/L (ref 3.5–5.2)
RBC # BLD AUTO: 4.17 10*6/MM3 (ref 4.14–5.8)
SODIUM SERPL-SCNC: 138 MMOL/L (ref 136–145)
WBC # BLD AUTO: 12.09 10*3/MM3 (ref 3.4–10.8)

## 2021-03-15 PROCEDURE — 99232 SBSQ HOSP IP/OBS MODERATE 35: CPT | Performed by: INTERNAL MEDICINE

## 2021-03-15 PROCEDURE — 63710000001 INSULIN GLARGINE PER 5 UNITS: Performed by: ORTHOPAEDIC SURGERY

## 2021-03-15 PROCEDURE — 82962 GLUCOSE BLOOD TEST: CPT

## 2021-03-15 PROCEDURE — 63710000001 INSULIN LISPRO (HUMAN) PER 5 UNITS: Performed by: INTERNAL MEDICINE

## 2021-03-15 PROCEDURE — 25010000003 CEFAZOLIN IN DEXTROSE 2-4 GM/100ML-% SOLUTION: Performed by: ORTHOPAEDIC SURGERY

## 2021-03-15 PROCEDURE — 97110 THERAPEUTIC EXERCISES: CPT

## 2021-03-15 PROCEDURE — 97535 SELF CARE MNGMENT TRAINING: CPT

## 2021-03-15 PROCEDURE — C1751 CATH, INF, PER/CENT/MIDLINE: HCPCS

## 2021-03-15 PROCEDURE — 97530 THERAPEUTIC ACTIVITIES: CPT

## 2021-03-15 PROCEDURE — 80048 BASIC METABOLIC PNL TOTAL CA: CPT | Performed by: INTERNAL MEDICINE

## 2021-03-15 PROCEDURE — 85027 COMPLETE CBC AUTOMATED: CPT | Performed by: ORTHOPAEDIC SURGERY

## 2021-03-15 PROCEDURE — 25010000003 CEFAZOLIN IN DEXTROSE 2-4 GM/100ML-% SOLUTION: Performed by: INTERNAL MEDICINE

## 2021-03-15 PROCEDURE — 97166 OT EVAL MOD COMPLEX 45 MIN: CPT

## 2021-03-15 RX ORDER — SODIUM CHLORIDE 0.9 % (FLUSH) 0.9 %
10 SYRINGE (ML) INJECTION AS NEEDED
Status: DISCONTINUED | OUTPATIENT
Start: 2021-03-15 | End: 2021-03-18 | Stop reason: HOSPADM

## 2021-03-15 RX ORDER — SODIUM CHLORIDE 0.9 % (FLUSH) 0.9 %
20 SYRINGE (ML) INJECTION AS NEEDED
Status: DISCONTINUED | OUTPATIENT
Start: 2021-03-15 | End: 2021-03-18 | Stop reason: HOSPADM

## 2021-03-15 RX ORDER — SODIUM CHLORIDE 0.9 % (FLUSH) 0.9 %
10 SYRINGE (ML) INJECTION EVERY 12 HOURS SCHEDULED
Status: DISCONTINUED | OUTPATIENT
Start: 2021-03-15 | End: 2021-03-18 | Stop reason: HOSPADM

## 2021-03-15 RX ADMIN — METOPROLOL SUCCINATE 50 MG: 50 TABLET, EXTENDED RELEASE ORAL at 09:59

## 2021-03-15 RX ADMIN — CEFAZOLIN SODIUM 2 G: 2 INJECTION, SOLUTION INTRAVENOUS at 22:44

## 2021-03-15 RX ADMIN — METOPROLOL SUCCINATE 50 MG: 50 TABLET, EXTENDED RELEASE ORAL at 22:43

## 2021-03-15 RX ADMIN — HYDROCODONE BITARTRATE AND ACETAMINOPHEN 1 TABLET: 7.5; 325 TABLET ORAL at 16:13

## 2021-03-15 RX ADMIN — ISOSORBIDE MONONITRATE 30 MG: 30 TABLET ORAL at 09:59

## 2021-03-15 RX ADMIN — ACETAMINOPHEN 1000 MG: 500 TABLET, FILM COATED ORAL at 03:08

## 2021-03-15 RX ADMIN — SODIUM CHLORIDE, PRESERVATIVE FREE 10 ML: 5 INJECTION INTRAVENOUS at 21:19

## 2021-03-15 RX ADMIN — ASPIRIN 81 MG: 81 TABLET, CHEWABLE ORAL at 09:58

## 2021-03-15 RX ADMIN — CEFAZOLIN SODIUM 2 G: 2 INJECTION, SOLUTION INTRAVENOUS at 16:12

## 2021-03-15 RX ADMIN — SODIUM CHLORIDE, POTASSIUM CHLORIDE, SODIUM LACTATE AND CALCIUM CHLORIDE 100 ML/HR: 600; 310; 30; 20 INJECTION, SOLUTION INTRAVENOUS at 16:31

## 2021-03-15 RX ADMIN — ROSUVASTATIN CALCIUM 40 MG: 40 TABLET, FILM COATED ORAL at 21:19

## 2021-03-15 RX ADMIN — ACETAMINOPHEN 1000 MG: 500 TABLET, FILM COATED ORAL at 09:56

## 2021-03-15 RX ADMIN — INSULIN GLARGINE 20 UNITS: 100 INJECTION, SOLUTION SUBCUTANEOUS at 21:17

## 2021-03-15 RX ADMIN — DOCUSATE SODIUM 200 MG: 100 CAPSULE, LIQUID FILLED ORAL at 09:58

## 2021-03-15 RX ADMIN — SODIUM CHLORIDE, PRESERVATIVE FREE 3 ML: 5 INJECTION INTRAVENOUS at 10:02

## 2021-03-15 RX ADMIN — ASPIRIN 81 MG: 81 TABLET, CHEWABLE ORAL at 17:26

## 2021-03-15 RX ADMIN — HYDROCODONE BITARTRATE AND ACETAMINOPHEN 1 TABLET: 7.5; 325 TABLET ORAL at 21:18

## 2021-03-15 RX ADMIN — METOPROLOL SUCCINATE 50 MG: 50 TABLET, EXTENDED RELEASE ORAL at 10:01

## 2021-03-15 RX ADMIN — INSULIN LISPRO 4 UNITS: 100 INJECTION, SOLUTION INTRAVENOUS; SUBCUTANEOUS at 10:00

## 2021-03-15 RX ADMIN — SODIUM CHLORIDE, PRESERVATIVE FREE 3 ML: 5 INJECTION INTRAVENOUS at 21:19

## 2021-03-15 RX ADMIN — INSULIN LISPRO 6 UNITS: 100 INJECTION, SOLUTION INTRAVENOUS; SUBCUTANEOUS at 09:59

## 2021-03-15 RX ADMIN — CEFAZOLIN SODIUM 2 G: 2 INJECTION, SOLUTION INTRAVENOUS at 06:49

## 2021-03-15 NOTE — PROGRESS NOTES
[facility] Acute Inpt Rehab Note     Received consult request for acute inpatient rehab.  Patient has [employer] insurance which does not have coverage for acute inpt rehab.  [facility]PBryon, aware and states the same of patient's insurance.  Bryon, will assist to make other arrangements for patient at discharge.  Thank you for the referral and we will sign off at this time.     Thank you,   Charlene Ellis RN  Rehab Admission Nurse   [phone]

## 2021-03-15 NOTE — PLAN OF CARE
Goal Outcome Evaluation:  Plan of Care Reviewed With: patient  Progress: no change  Outcome Summary: VSS, NSR, , scapolamine patch on R ear, scheduled Tylenol - pain 5, right hip dsg c/d/i, pt asked for turkey sandwich - ate <50%, had issues taking meds - needs to take slowly & appears at times to cough up & re-swallow, no change in voice or coughing, CPAP in place, rested well

## 2021-03-15 NOTE — PROGRESS NOTES
Hospital Follow Up    LOS:  LOS: 4 days   Patient Name: Mandeep Tracey  Age/Sex: 61 y.o. male  : 1960  MRN: [id number]    Day of Service: 03/15/21   Length of Stay: 4  Encounter Provider: Erasmo Rodas MD  Place of Service: [facility]  Patient Care Team:  Jazzy Servin APRN as PCP - General (Nurse Practitioner)    Subjective:     Chief Complaint: Cardiomyopathy, PVCs, severe mitral regurgitation    Interval History: Patient says he hurts all over.  Breathing appears to be okay.  Patient speech was difficult to understand but I thought he communicate appropriately.    Objective:     Objective:  Temp:  [97.7 °F (36.5 °C)-98.2 °F (36.8 °C)] 98 °F (36.7 °C)  Heart Rate:  [] 71  Resp:  [18-20] 18  BP: ()/() 147/80     Intake/Output Summary (Last 24 hours) at 3/15/2021 1134  Last data filed at 3/15/2021 0735  Gross per 24 hour   Intake 2009 ml   Output 1200 ml   Net 809 ml     Body mass index is 46.83 kg/m².      21  0500 21  0300 03/15/21  0358   Weight: 126 kg (278 lb 1.6 oz) 128 kg (283 lb 1.6 oz) 128 kg (281 lb 6.4 oz)     Weight change: -0.771 kg (-1 lb 11.2 oz)      Physical Exam:   General : Alert, cooperative, in no acute distress.  Neuro: Alert,cooperative and oriented.  Lungs: CTAB. Normal respiratory effort and rate.  CV: Regular rate and rhythm, normal S1 and S2, no murmurs, gallops or rubs.  ABD: Obese  Extr: No edema or cyanosis, moves all extremities.    Lab Review:   Results from last 7 days   Lab Units 03/15/21  0604 21  0442 21  1429   SODIUM mmol/L 138 136 134*   POTASSIUM mmol/L 3.7 4.0 4.0   CHLORIDE mmol/L 97* 95* 97*   CO2 mmol/L 30.8* 27.4 26.0   BUN mg/dL 13 11 10   CREATININE mg/dL 0.68* 0.66* 0.80   GLUCOSE mg/dL 210* 246* 197*   CALCIUM mg/dL 8.9 9.2 9.1   AST (SGOT) U/L  --   --  17   ALT (SGPT) U/L  --   --  20         Results from last 7 days   Lab Units 03/15/21  0604 21  0442   WBC  10*3/mm3 12.09* 12.25*   HEMOGLOBIN g/dL 12.0* 12.6*   HEMATOCRIT % 36.5* 37.9   PLATELETS 10*3/mm3 407 349         Results from last 7 days   Lab Units 03/12/21  0423   MAGNESIUM mg/dL 1.9           Invalid input(s): LDLCALC            Current Medications:   Scheduled Meds:acetaminophen, 1,000 mg, Oral, Q6H  aspirin, 81 mg, Oral, BID With Meals  ceFAZolin, 2 g, Intravenous, Q8H  docusate sodium, 200 mg, Oral, BID  insulin glargine, 20 Units, Subcutaneous, Nightly  insulin lispro, 0-9 Units, Subcutaneous, TID AC  insulin lispro, 6 Units, Subcutaneous, TID With Meals  isosorbide mononitrate, 30 mg, Oral, Daily  metoprolol succinate XL, 50 mg, Oral, Q12H  [START ON 3/16/2021] penicillin g (potassium), 4 Million Units, Intravenous, Q4H  rosuvastatin, 40 mg, Oral, Nightly  Scopolamine, 1 patch, Transdermal, Q72H  sodium chloride, 3 mL, Intravenous, Q12H      Continuous Infusions:lactated ringers, 100 mL/hr, Last Rate: 100 mL/hr (03/14/21 0633)        Allergies:  Allergies   Allergen Reactions   • Fentanyl Mental Status Change   • Ciprofloxacin Unknown - Low Severity   • Quinolones Hives       Assessment:       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  Abnormal echo with a cardiomyopathy regional wall motion of normality.  No chest discomfort says he is breathing okay.  2.  Probable severe mitral regurgitation.  Will ultimately need a LAVELLE he is currently in no condition to do 1.  3.  Hypertension  4.  Morbid obesity  5.  Right prosthetic hip infection due to  group B strep.  6.  Diabetes  7.  Obstructive sleep apnea patient had his CPAP on when I was in the room.  8.  We will follow for now.        Erasmo Rodas MD  03/15/21  11:34 EDT

## 2021-03-15 NOTE — PLAN OF CARE
Goal Outcome Evaluation:  Plan of Care Reviewed With: patient  Progress: improving  Outcome Summary: Patient is agreeable to PT this date and is motivated. The patient completed supine to sitting EOB with modA this date. PT donned R AFO and L shoe prior to standing. The patient completed seated exercises and x2 STS with mod-maxAx2 using LUE. The patient will continue to benefit from skilled PT to increase level of independence.    Patient was intermittently wearing a face mask during this therapy encounter. Therapist used appropriate personal protective equipment including eye protection, mask, and gloves.  Mask used was standard procedure mask. Appropriate PPE was worn during the entire therapy session. Hand hygiene was completed before and after therapy session. Patient is not in enhanced droplet precautions.  Macario baugh.

## 2021-03-15 NOTE — DISCHARGE PLACEMENT REQUEST
"Mandeep Tong (61 y.o. Male)     Date of Birth Social Security Number Address Home Phone MRN    1960  [address] [id number] [phone] [id number]    Amish Marital Status          Latter day        Admission Date Admission Type Admitting Provider Attending Provider Department, Room/Bed    3/11/21 Urgent Angel Luis Dejesus MD Lykins, Matthew D, MD [address], E468/1    Discharge Date Discharge Disposition Discharge Destination                       Attending Provider: Angel Luis Dejesus MD    Allergies: Fentanyl, Ciprofloxacin, Quinolones    Isolation: None   Infection: None   Code Status: CPR    Ht: 165.1 cm (65\")   Wt: 128 kg (281 lb 6.4 oz)    Admission Cmt: None   Principal Problem: 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) [T84.51XA]                 Active Insurance as of 3/11/2021     Primary Coverage     Payor Plan Insurance Group Employer/Plan Group    [facility] DEPT [id number]      Payor Plan Address Payor Plan Phone Number Payor Plan Fax Number Effective Dates    [facility] OFFICE OF COMMUNITY CARE [phone]  1/1/2021 - None Entered    [address] [address]       [facility] [address]       Subscriber Name Subscriber Birth Date Member ID       MANDEEP TONG 1960 [id number]                 Emergency Contacts      (Rel.) Home Phone Work Phone Mobile Phone    JOSETTE TONG (Spouse) -- -- [phone]    Mandy Tong (Daughter) -- -- [phone]               History & Physical      Apolonia Hull APRN at 03/11/21 1350     Attestation signed by Angel Luis Dejesus MD at 03/12/21 0910    I have personally interviewed and examined the patient as well as reviewed his clinical data and I agree with the above note. Patient with a history of HTN, Type 2 DM, CAD, CVA with residual right hemiparesis, seizure disorder, and right hip replacement in June 2020 presenting with right hip pain and fevers. He presented to [facility] " hospital with these symptoms and had evidence of right hip septic arthritis and an arthrocentesis was done which is growing GBS. He was transferred here for infectious diseases consultation which was not available at [place]. Will continue on pain control, abx, and NWB. ID and orthopedics are consulted and OR is planned for tomorrow. Will cover with correctional insulin for his DM.     Attending Physical Exam  Vitals and nursing note reviewed.   Constitutional:       General: He is not in acute distress.     Appearance: He is obese.  HENT:      Head: Normocephalic.      Mouth/Throat:      Mouth: Mucous membranes are moist.   Eyes:      Conjunctiva/sclera: Conjunctivae normal.   Cardiovascular:      Rate and Rhythm: Normal rate and regular rhythm.   Pulmonary:      Effort: Pulmonary effort is normal. No respiratory distress.      Breath sounds: Normal breath sounds.   Abdominal:      General: Bowel sounds are normal. There is no distension.      Palpations: Abdomen is soft.   Musculoskeletal:         Right hip tender with palpation and movement     Mild edema around the right hip  Skin:     General: Skin is warm and dry.   Neurological:      Mental Status: He is alert.      Cranial Nerves: +right-sided weakness (known from previous CVA)  Psychiatric:         Mood and Affect: Mood normal.         Behavior: Behavior normal.     Angel Luis Dejesus MD  3/11/2021  17:35 EST                      Patient Name:  Mandeep Tracey  YOB: 1960  MRN:  [id number]  Admit Date:  3/11/2021  Patient Care Team:  Jazzy Servin APRN as PCP - General (Nurse Practitioner)      Subjective   History Present Illness     No chief complaint on file.      Mr. Tracey is a 61 y.o.  male with a history of BELKYS on Cpap, DM, CVA w/rt hemiparesis, seizures, rt hip replacement 6/2020, CAD, HTN that presents to [facility] complaining of rt hip pain and fever. He was also experiencing pubic pain and increased urinary  frequency. Symptoms began on 3/9/21 where he initially presented to [facility]. There was concern for sepsis due to acute bacterial cystitis and/or rt hip septic arthritis. He was admitted and started on broad spectrum antibiotics with Vanc and cefepime. MRI rt hip showed large hip joint effusion and mod fluid distention of rt hip iliopsoas bursa. He underwent CT guided FNA of rt hip and fluid was sent to lab for analysis on 3/10/21. UA was collected w/growth >100,000 staph aureus; sensitivity pending. No labs were collected today or at least no results sent with pt; no dc summary available for review. Provided records have been reviewed; doesn't appear he has had additional fever. WBC had started to trend down. CRP was elevated, lactic acid wnl.     Review of Systems   Constitutional: Negative for chills and fever.   HENT: Negative for congestion and sore throat.    Respiratory: Negative for shortness of breath.    Cardiovascular: Negative for chest pain.   Gastrointestinal: Negative for nausea and vomiting.   Genitourinary: Positive for frequency.   Musculoskeletal: Positive for arthralgias and gait problem.        Spasms ble   Skin: Negative for rash.   Neurological: Positive for speech difficulty and weakness.   Psychiatric/Behavioral: Negative for confusion.        Personal History     Past Medical History:   Diagnosis Date   • Aphasia    • CPAP (continuous positive airway pressure) dependence    • Diabetes (CMS/HCC)    • Hemiparesis (CMS/HCC)     Right side    • Peptic ulcer disease    • Psoriasis    • Seizures (CMS/HCC)    • Sleep apnea    • Stroke (CMS/HCC) 2004     Past Surgical History:   Procedure Laterality Date   • CHOLECYSTECTOMY     • EYE SURGERY     • LUMBAR DISC SURGERY       No family history on file.  Social History     Tobacco Use   • Smoking status: Former Smoker   • Smokeless tobacco: Never Used   • Tobacco comment: quit 22 years ago    Substance Use Topics   • Alcohol use: Never    • Drug use: Not on file     No current facility-administered medications on file prior to encounter.     Current Outpatient Medications on File Prior to Encounter   Medication Sig Dispense Refill   • acetaminophen (TYLENOL) 325 MG tablet Take 650 mg by mouth Every 4 (Four) Hours As Needed for Mild Pain .     • aspirin 81 MG chewable tablet Chew 81 mg Daily.     • clotrimazole-betamethasone (LOTRISONE) 1-0.05 % cream Apply  topically to the appropriate area as directed 2 (Two) Times a Day As Needed. For psoriasis     • gabapentin (NEURONTIN) 600 MG tablet Take 600 mg by mouth 3 (Three) Times a Day.     • hydrOXYzine (ATARAX) 10 MG tablet Take 10 mg by mouth Every 4 (Four) Hours As Needed for Itching.     • insulin glargine (LANTUS, SEMGLEE) 100 UNIT/ML injection Inject 20 Units under the skin into the appropriate area as directed Daily.     • isosorbide mononitrate (IMDUR) 30 MG 24 hr tablet Take 30 mg by mouth Daily.     • lisinopril (PRINIVIL,ZESTRIL) 20 MG tablet Take 20 mg by mouth Daily.     • meclizine 25 MG chewable tablet chewable tablet Chew 25 mg 3 (Three) Times a Day As Needed.     • metFORMIN (GLUCOPHAGE) 1000 MG tablet Take 1,000 mg by mouth 2 (Two) Times a Day With Meals.     • metoprolol succinate XL (TOPROL-XL) 25 MG 24 hr tablet Take 12.5 mg by mouth Every 12 (Twelve) Hours.     • rosuvastatin (CRESTOR) 20 MG tablet Take 40 mg by mouth Daily.     • sennosides-docusate (senna-docusate sodium) 8.6-50 MG per tablet Take 1 tablet by mouth Daily As Needed for Constipation.       Allergies   Allergen Reactions   • Fentanyl Mental Status Change   • Ciprofloxacin Unknown - Low Severity   • Quinolones Hives       Objective    Objective     Vital Signs  Temp:  [98.5 °F (36.9 °C)] 98.5 °F (36.9 °C)  Heart Rate:  [109] 109  Resp:  [20] 20  BP: (140)/(97) 140/97  SpO2:  [95 %] 95 %  on  Flow (L/min):  [3] 3;   Device (Oxygen Therapy): nasal cannula  There is no height or weight on file to calculate  BMI.    Physical Exam  Vitals and nursing note reviewed.   Constitutional:       General: He is not in acute distress.     Appearance: He is obese.   HENT:      Head: Normocephalic.      Mouth/Throat:      Mouth: Mucous membranes are moist.   Eyes:      Conjunctiva/sclera: Conjunctivae normal.   Cardiovascular:      Rate and Rhythm: Normal rate and regular rhythm.   Pulmonary:      Effort: Pulmonary effort is normal. No respiratory distress.      Breath sounds: Normal breath sounds.   Abdominal:      General: Bowel sounds are normal. There is no distension.      Palpations: Abdomen is soft.   Musculoskeletal:         General: Tenderness present.      Cervical back: Normal range of motion and neck supple.      Right lower leg: Edema present.      Comments: Rt hip pain w/movement; spasms rle > lle   Skin:     General: Skin is warm and dry.   Neurological:      Mental Status: He is alert.      Cranial Nerves: Dysarthria present.      Motor: Weakness present.   Psychiatric:         Mood and Affect: Mood normal.         Behavior: Behavior normal.         Results Review:  I reviewed the patient's new clinical results.  I reviewed the patient's new imaging results and agree with the interpretation.  I reviewed the patient's other test results and agree with the interpretation  I personally viewed and interpreted the patient's EKG/Telemetry data    Lab Results (last 24 hours)     Procedure Component Value Units Date/Time    POC Glucose Once [[id number]]  (Abnormal) Collected: 03/11/21 1428    Specimen: Blood Updated: 03/11/21 1429     Glucose 192 mg/dL     CBC & Differential [[id number]]  (Abnormal) Collected: 03/11/21 1429    Specimen: Blood Updated: 03/11/21 1440    Narrative:      The following orders were created for panel order CBC & Differential.  Procedure                               Abnormality         Status                     ---------                               -----------         ------                      CBC Auto Differential[[id number]]        Abnormal            Final result                 Please view results for these tests on the individual orders.    C-reactive Protein [[id number]]  (Abnormal) Collected: 03/11/21 1429    Specimen: Blood Updated: 03/11/21 1510     C-Reactive Protein 27.86 mg/dL     CBC Auto Differential [[id number]]  (Abnormal) Collected: 03/11/21 1429    Specimen: Blood Updated: 03/11/21 1440     WBC 12.04 10*3/mm3      RBC 4.04 10*6/mm3      Hemoglobin 11.8 g/dL      Hematocrit 36.6 %      MCV 90.6 fL      MCH 29.2 pg      MCHC 32.2 g/dL      RDW 13.1 %      RDW-SD 43.3 fl      MPV 11.1 fL      Platelets 233 10*3/mm3      Neutrophil % 71.1 %      Lymphocyte % 18.9 %      Monocyte % 8.1 %      Eosinophil % 1.3 %      Basophil % 0.2 %      Immature Grans % 0.4 %      Neutrophils, Absolute 8.55 10*3/mm3      Lymphocytes, Absolute 2.28 10*3/mm3      Monocytes, Absolute 0.98 10*3/mm3      Eosinophils, Absolute 0.16 10*3/mm3      Basophils, Absolute 0.02 10*3/mm3      Immature Grans, Absolute 0.05 10*3/mm3      nRBC 0.0 /100 WBC     Comprehensive Metabolic Panel [[id number]]  (Abnormal) Collected: 03/11/21 1429    Specimen: Blood Updated: 03/11/21 1520     Glucose 197 mg/dL      BUN 10 mg/dL      Creatinine 0.80 mg/dL      Sodium 134 mmol/L      Potassium 4.0 mmol/L      Chloride 97 mmol/L      CO2 26.0 mmol/L      Calcium 9.1 mg/dL      Total Protein 7.6 g/dL      Albumin 3.70 g/dL      ALT (SGPT) 20 U/L      AST (SGOT) 17 U/L      Alkaline Phosphatase 54 U/L      Total Bilirubin 0.5 mg/dL      eGFR Non African Amer 99 mL/min/1.73      Globulin 3.9 gm/dL      A/G Ratio 0.9 g/dL      BUN/Creatinine Ratio 12.5     Anion Gap 11.0 mmol/L     Narrative:      GFR Normal >60  Chronic Kidney Disease <60  Kidney Failure <15      Hemoglobin A1c [[id number]] Collected: 03/11/21 1429    Specimen: Blood Updated: 03/11/21 1523          Imaging Results (Last 24 Hours)     Procedure Component Value Units Date/Time     XR Femur 2 View Right [[id number]] Resulted: 03/11/21 1524     Updated: 03/11/21 1517    XR Pelvis 1 or 2 View [[id number]] Resulted: 03/11/21 1404     Updated: 03/11/21 1515              No orders to display        Assessment/Plan     Active Hospital Problems    Diagnosis  POA   • **Infection of right prosthetic hip joint (CMS/[facility]) [T84.51XA]  Not Applicable   • CVA (cerebral vascular accident) (CMS/[facility]) [I63.9]  Yes   • Right hemiparesis (CMS/[facility]) [G81.91]  Yes   • BELKYS on CPAP [G47.33, Z99.89]  Not Applicable   • Type 2 diabetes mellitus (CMS/[facility]) [E11.9]  Yes   • Essential hypertension [I10]  Yes   • MSSA (methicillin susceptible Staphylococcus aureus) infection [A49.01]  Yes   • Group B streptococcal infection [A49.1]  Yes   • UTI (urinary tract infection) [N39.0]  Yes      Resolved Hospital Problems   No resolved problems to display.       Mr. Tracey is a 61 y.o. male with a history of BELKYS on Cpap, DM, CVA w/rt hemiparesis, seizures, rt hip replacement 6/2020, CAD, HTN who is admitted for rt hip prosthetic joint infection, MSSA UTI    Rt prosthetic hip Group B strep infection/MSSA UTI:  -ID input appreciated  -Ortho consult pending  -Cefazolin; will likely need 6 week antibiotic therapy  -Follow cultures  -Pain control  -Add flexeril prn for spasms    Hx CVA w/rt hemiparesis/Dysarthria:  -OT/PT consult when appropriate  -ASA, Statin    DM:  -Monitor BG trends  -Correctional scale insulin for now; metformin on hold; restart Lantus based on BG trends  -Check A1C    HTN:  -BP stable  -Continue lisinopril, metoprolol    BELKYS on Cpap:  -Home machine available for use    Further management based on hospital course    · I discussed the patient's findings and my recommendations with patient, family, nursing staff and consulting provider.    VTE Prophylaxis - SCDs.  Code Status - Full code.       ESTRELLA Adams  [facility]  03/11/21  15:26 EST      Electronically signed by Anjelica  Angel Luis BRYANT MD at 03/12/21 0910          Operative/Procedure Notes (all)      Tao Andrea MD at 03/12/21 1658  Version 1 of 1             HIP INCISION AND DRAINAGE WITH HANA TABLE  Procedure Note    Mandeep BRYANT Tracey  3/12/2021    Pre-op Diagnosis: Right Septic Bipolar Hip Arthroplasty  Post-op Diagnosis: Same  Procedure:  Right Hip Irrigation and Debridement  Surgeon:   Tao Andrea MD  Assistant:  Josiah Leigh PA-C  Anesthesia: General, Anesthesiologist: Alex Moore MD; Glendy Paulino MD  CRNA: Haydee Asencio CRNA  Staff: Circulator: Addie Myers RN  : Jakub Unger  Scrub Person: Rigo Almonte; Jakub Olsen  Vendor Representative: Derrick Sanz  Assistant: Josiah Leigh PA-C  Estimated Blood Loss:100ml  Specimens:   Order Name Source Comment Collection Info Order Time   ANAEROBIC CULTURE Hip, Right  Collected By: Tao Adnrea MD 3/12/2021  5:06 PM   ANAEROBIC CULTURE Hip, Right  Collected By: Tao Andrea MD 3/12/2021  5:06 PM   FUNGUS CULTURE Hip, Right  Collected By: Tao Andrea MD 3/12/2021  5:06 PM   FUNGUS CULTURE Hip, Right  Collected By: Tao Andrea MD 3/12/2021  5:06 PM   WOUND CULTURE Hip, Right  Collected By: Tao Andrea MD 3/12/2021  5:06 PM   WOUND CULTURE Hip, Right  Collected By: Tao Andrea MD 3/12/2021  5:06 PM     Drains: one   Complications: None    Components Utilized:      Nothing was implanted during the procedure    Indication for Procedure:   The patient is a 61 y.o. male presents today for an irrigation and debridement for a right infected bipolar hip arthroplasty.  He has history of surgery by Dr. Garrido in [place].  He has 2-3 day history of acute onset of right sided hip pain.   He was transferred to [facility] and orthopedics was consulted for definitive management.  The patient was educated in risks of surgery that could include possible risk of persistent infection, deep venous thrombosis, pulmonary embolism, fracture,  neurovascular injury, leg length discrepancy, dislocation, possible persistent pain, need for additional surgeries, anesthetic risks, medical risks including heart attack and stroke, and death.  The discussion occurred in the office pre-operatively, and patient had the opportunity to ask questions, and concerns about the proposed surgery.  The patient also understood that medicine is not an exact science, and that outcomes of the surgical procedure may be less than desired. The patient wished to proceed.      Protocols for intravenous antibiotics and venous thrombosis were followed for this patient.  IV antibiotics were infused prior to surgery and will be continued likely for 6 weeks post procedure.  Thrombosis prophylaxis will be initiated within 24 hours of the completion of the surgical procedure.      Procedure:   After the patient was identified in the preoperative area, and the surgical site confirmed and marked, the patient was brought to the operating room on a stretcher.  The above anesthetic was placed uneventfully on the stretcher and the patient was transferred to the [facility] operating room table.  A time-out procedure was performed.  The intravenous ancef antibiotics infusion was completed. The operative leg was then prepped and draped in usual sterile fashion.     A 10 blade scalpel was then used to make an anterior approach incision over the operative hip.   The TFL fascia was split longitudinally.  Upon entry into the hip joint approx. 100cc of purulent white cloudy drainage was expressed.  New cultures were obtained.  The remaining purulent fluid was aspirated.  The hip was then subluxed and the  hip was copiously irrigated with 3 L of betadine laced normal saline followed by 1 L of bactosure followed by 1 L of normal saline.   The femoral head was then reduced into the acetabulum.   The hip was then ranged and was found to be stable in all planes.   A drain was placed exiting out the hip  inferolaterally.  The wound was closed in layers with TFL fascia with #1 vicryl, 2-0 vicryl to close deep dermis and 3-0 nylon to close the skin.  Sterile dressings were placed. Sponge and needle counts were completed and were found to be correct.  The patient was awakened from the anesthetic and was brought into the recovery room in stable condition.      Tao Andrea MD     Date: 3/12/2021  Time: 17:26 EST    Electronically signed by Tao Andrea MD at 21 1734          Physician Progress Notes (last 24 hours) (Notes from 21 1315 through 03/15/21 1315)      Erasmo Rodas MD at 03/15/21 1134              Hospital Follow Up    LOS:  LOS: 4 days   Patient Name: Mandeep Tracey  Age/Sex: 61 y.o. male  : 1960  MRN: [id number]    Day of Service: 03/15/21   Length of Stay: 4  Encounter Provider: Erasmo Rodas MD  Place of Service: [facility] CARDIOLOGY  Patient Care Team:  Jazzy Servin APRN as PCP - General (Nurse Practitioner)    Subjective:     Chief Complaint: Cardiomyopathy, PVCs, severe meds regurgitation    Interval History: Patient says he hurts all over.  Breathing appears to be okay.  Patient speech was difficult to understand but I thought he communicate appropriately.    Objective:     Objective:  Temp:  [97.7 °F (36.5 °C)-98.2 °F (36.8 °C)] 98 °F (36.7 °C)  Heart Rate:  [] 71  Resp:  [18-20] 18  BP: ()/() 147/80     Intake/Output Summary (Last 24 hours) at 3/15/2021 1134  Last data filed at 3/15/2021 0735  Gross per 24 hour   Intake 2009 ml   Output 1200 ml   Net 809 ml     Body mass index is 46.83 kg/m².      21  0500 21  0300 03/15/21  0358   Weight: 126 kg (278 lb 1.6 oz) 128 kg (283 lb 1.6 oz) 128 kg (281 lb 6.4 oz)     Weight change: -0.771 kg (-1 lb 11.2 oz)      Physical Exam:   General : Alert, cooperative, in no acute distress.  Neuro: Alert,cooperative and oriented.  Lungs: CTAB. Normal respiratory effort  and rate.  CV: Regular rate and rhythm, normal S1 and S2, no murmurs, gallops or rubs.  ABD: Obese  Extr: No edema or cyanosis, moves all extremities.    Lab Review:   Results from last 7 days   Lab Units 03/15/21  0604 03/14/21  0442 03/11/21  1429   SODIUM mmol/L 138 136 134*   POTASSIUM mmol/L 3.7 4.0 4.0   CHLORIDE mmol/L 97* 95* 97*   CO2 mmol/L 30.8* 27.4 26.0   BUN mg/dL 13 11 10   CREATININE mg/dL 0.68* 0.66* 0.80   GLUCOSE mg/dL 210* 246* 197*   CALCIUM mg/dL 8.9 9.2 9.1   AST (SGOT) U/L  --   --  17   ALT (SGPT) U/L  --   --  20         Results from last 7 days   Lab Units 03/15/21  0604 03/14/21  0442   WBC 10*3/mm3 12.09* 12.25*   HEMOGLOBIN g/dL 12.0* 12.6*   HEMATOCRIT % 36.5* 37.9   PLATELETS 10*3/mm3 407 349         Results from last 7 days   Lab Units 03/12/21  0423   MAGNESIUM mg/dL 1.9           Invalid input(s): LDLCALC            Current Medications:   Scheduled Meds:acetaminophen, 1,000 mg, Oral, Q6H  aspirin, 81 mg, Oral, BID With Meals  ceFAZolin, 2 g, Intravenous, Q8H  docusate sodium, 200 mg, Oral, BID  insulin glargine, 20 Units, Subcutaneous, Nightly  insulin lispro, 0-9 Units, Subcutaneous, TID AC  insulin lispro, 6 Units, Subcutaneous, TID With Meals  isosorbide mononitrate, 30 mg, Oral, Daily  metoprolol succinate XL, 50 mg, Oral, Q12H  [START ON 3/16/2021] penicillin g (potassium), 4 Million Units, Intravenous, Q4H  rosuvastatin, 40 mg, Oral, Nightly  Scopolamine, 1 patch, Transdermal, Q72H  sodium chloride, 3 mL, Intravenous, Q12H      Continuous Infusions:lactated ringers, 100 mL/hr, Last Rate: 100 mL/hr (03/14/21 0633)        Allergies:  Allergies   Allergen Reactions   • Fentanyl Mental Status Change   • Ciprofloxacin Unknown - Low Severity   • Quinolones Hives       Assessment:       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)    CVA (cerebral vascular accident) (CMS/HCC)    Right hemiparesis (CMS/HCC)    BELKYS on CPAP    Type 2 diabetes mellitus (CMS/HCC)    Essential  hypertension    MSSA (methicillin susceptible Staphylococcus aureus) infection    Group B streptococcal infection    UTI (urinary tract infection)    Postoperative nausea and vomiting    Nonrheumatic mitral valve regurgitation    Cardiomyopathy (CMS/HCC)    PVCs (premature ventricular contractions)        Plan:   1.  Abnormal echo with a cardiomyopathy regional wall motion of normality.  No chest discomfort says he is breathing okay.  2.  Probable severe mitral regurgitation.  Will ultimately need a LAVELLE he is currently in no condition to do 1.  3.  Hypertension  4.  Morbid obesity  5.  Right prosthetic hip infection due to group B strep.  6.  Diabetes  7.  Obstructive sleep apnea patient had his CPAP on when I was in the room.  8.  We will follow for now.        Erasmo Rodas MD  03/15/21  11:34 EDT        Electronically signed by Erasmo Rodas MD at 03/15/21 1137     Raymundo Miller MD at 03/15/21 0864           LOS: 4 days     Chief Complaint:  Follow-up R hip PJI due to Group B Strep    Interval History:  OR 3/12 for I&D of the R hip. He reports some right hip pain. He had nausea yesterday. NO rashes. No fever.     D/W his wife by phone.    ROS: no chest pain or headache    Vital Signs  Temp:  [97.7 °F (36.5 °C)-98.2 °F (36.8 °C)] 98 °F (36.7 °C)  Heart Rate:  [] 71  Resp:  [18-20] 18  BP: ()/() 147/80    Physical Exam:  General: awake, alert  Eyes:  no scleral icterus  ENT: poor dentition  Cardiovascular: NR  Respiratory: normal work of breathing on CPAP  GI: Abdomen is obese, soft, non-tender  :  no Chavez catheter  Musculoskeletal: R hip w/ bandage  Skin: No rashes    Antibiotics:  •  ceFAZolin in dextrose (ANCEF) IVPB solution 2 g, 2 g, Intravenous, Q8H    LABS:  CBC, BMP, CRP, A1c, micro reviewed today  Lab Results   Component Value Date    WBC 12.09 (H) 03/15/2021    HGB 12.0 (L) 03/15/2021    HCT 36.5 (L) 03/15/2021    MCV 87.5 03/15/2021     03/15/2021      Lab Results   Component Value Date    GLUCOSE 210 (H) 03/15/2021    CALCIUM 8.9 03/15/2021     03/15/2021    K 3.7 03/15/2021    CO2 30.8 (H) 03/15/2021    CL 97 (L) 03/15/2021    BUN 13 03/15/2021    CREATININE 0.68 (L) 03/15/2021    EGFRIFNONA 119 03/15/2021    BCR 19.1 03/15/2021    ANIONGAP 10.2 03/15/2021     Lab Results   Component Value Date    CRP 27.86 (H) 2021     Lab Results   Component Value Date    HGBA1C 9.22 (H) 2021     Microbiology:  3/9 OSH BCx: coag-negative Staph in 1/2 sets  3/9 OSH UCx: >100k MSSA  3/10 OSH R Hip Synovial Cx: Group B Strep  3/11 COVID; negative  3/12 R Hip OR Cx: negative    Radiology (report reviewed):  CT A/P negative for obstruction    Assessment/Plan   1. 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 Urine culture positive for MSSA (blood culture with coag-neg Staph 1/2 sets)  3. Super obesity BMI 50  4. Uncontrolled DM2 - A1c 9.2%  5. History of stroke and hemiparesis     On 3/12/21, he underwent I&D of the R hip with retention of hardware. 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 Weekly CBC w/ diff, BMP, CRP faxed to me at [phone]. At his ID follow-up visit, I'll plan to switch him to at least 3 months of oral antibiotic suppression. PICC today.     ID will follow.         Electronically signed by Raymundo Miller MD at 03/15/21 0901     Angel Luis Dejesus MD at 21 6554              Name: Mandeep Tracey ADMIT: 3/11/2021   : 1960  PCP: Jazzy Servin APRN    MRN: [id number] LOS: 3 days   AGE/SEX: 61 y.o. male  ROOM: [facility]     Subjective   Subjective   CC: right hip pain  No acute events. Patient had nausea and vomiting yesterday into the evening but this has resolved and he feels much better. He has tolerated PO. He has  some right hip pain as expected but this is well-controlled. No CP/dyspnea/f/c.    Objective   Objective   Vital Signs  Temp:  [97.4 °F (36.3 °C)-98.6 °F (37 °C)] 97.7 °F (36.5 °C)  Heart Rate:  [] 123  Resp:  [16-18] 18  BP: ()/() 77/65  SpO2:  [92 %-99 %] 98 %  on  Flow (L/min):  [4] 4;   Device (Oxygen Therapy): nasal cannula  Body mass index is 47.11 kg/m².  Physical Exam  Vitals and nursing note reviewed.   Constitutional:       General: He is not in acute distress.     Appearance: He is not toxic-appearing.   HENT:      Head: Normocephalic and atraumatic.      Nose: Nose normal.      Mouth/Throat:      Mouth: Mucous membranes are moist.      Pharynx: Oropharynx is clear.   Eyes:      Conjunctiva/sclera: Conjunctivae normal.      Pupils: Pupils are equal, round, and reactive to light.   Cardiovascular:      Rate and Rhythm: Normal rate and regular rhythm.      Pulses: Normal pulses.   Pulmonary:      Effort: Pulmonary effort is normal.      Breath sounds: Examination of the right-lower field reveals decreased breath sounds. Examination of the left-lower field reveals decreased breath sounds. Decreased breath sounds present.   Abdominal:      General: Bowel sounds are normal.      Palpations: Abdomen is soft.      Tenderness: There is no abdominal tenderness.   Musculoskeletal:         General: Swelling (trace BLE) present. No tenderness.      Cervical back: Normal range of motion and neck supple.      Comments: Right hip surgical dressing c/d/i, drain in place   Skin:     General: Skin is warm and dry.      Capillary Refill: Capillary refill takes less than 2 seconds.   Neurological:      General: No focal deficit present.      Mental Status: He is alert.      Comments: +right hemiparesis (known from previous CVA)   Psychiatric:         Mood and Affect: Mood normal.         Behavior: Behavior normal.       Results Review     I reviewed the patient's new clinical results.  I reviewed the  patient's telemetry  I reviewed the patient's CT abdomen and pelvis.  Results from last 7 days   Lab Units 03/14/21 0442 03/13/21  0547 03/12/21 0423 03/11/21  1429   WBC 10*3/mm3 12.25* 9.55 8.61 12.04*   HEMOGLOBIN g/dL 12.6* 11.2* 10.7* 11.8*   PLATELETS 10*3/mm3 349 254 235 233     Results from last 7 days   Lab Units 03/14/21 0442 03/13/21  0547 03/12/21 0423 03/11/21  1429   SODIUM mmol/L 136 136 132* 134*   POTASSIUM mmol/L 4.0 4.4 3.8 4.0   CHLORIDE mmol/L 95* 97* 96* 97*   CO2 mmol/L 27.4 27.4 25.8 26.0   BUN mg/dL 11 9 13 10   CREATININE mg/dL 0.66* 0.62* 0.66* 0.80   GLUCOSE mg/dL 246* 232* 210* 197*   Estimated Creatinine Clearance: 150.2 mL/min (A) (by C-G formula based on SCr of 0.66 mg/dL (L)).  Results from last 7 days   Lab Units 03/11/21  1429   ALBUMIN g/dL 3.70   BILIRUBIN mg/dL 0.5   ALK PHOS U/L 54   AST (SGOT) U/L 17   ALT (SGPT) U/L 20     Results from last 7 days   Lab Units 03/14/21 0442 03/13/21  0547 03/12/21 0423 03/11/21  1429   CALCIUM mg/dL 9.2 8.7 8.4* 9.1   ALBUMIN g/dL  --   --   --  3.70   MAGNESIUM mg/dL  --   --  1.9  --        COVID19   Date Value Ref Range Status   03/11/2021 Not Detected Not Detected - Ref. Range Final     Hemoglobin A1C   Date/Time Value Ref Range Status   03/11/2021 1429 9.22 (H) 4.80 - 5.60 % Final     Glucose   Date/Time Value Ref Range Status   03/14/2021 1133 235 (H) 70 - 130 mg/dL Final   03/14/2021 0603 251 (H) 70 - 130 mg/dL Final   03/13/2021 2011 205 (H) 70 - 130 mg/dL Final   03/13/2021 1608 297 (H) 70 - 130 mg/dL Final   03/13/2021 1134 223 (H) 70 - 130 mg/dL Final   03/13/2021 0611 234 (H) 70 - 130 mg/dL Final   03/12/2021 2021 269 (H) 70 - 130 mg/dL Final       CT Abdomen Pelvis Without Contrast  Narrative: Patient: OSCAR TONG  Time Out: 00:02  Exam(s): CT ABDOMEN + PELVIS Without Contrast     EXAM:    CT Abdomen and Pelvis Without Intravenous Contrast    CLINICAL HISTORY:     Reason for exam: post op ileus.    TECHNIQUE:    Axial  computed tomography images of the abdomen and pelvis without   intravenous contrast.  CTDI is 32.0 mGy and DLP is 2364.60 mGy-cm.  This   CT exam was performed according to the principle of ALARA (As Low As   Reasonably Achievable) by using one or more of the following dose   reduction techniques: automated exposure control, adjustment of the mA   and or kV according to patient size, and or use of iterative   reconstruction technique.    COMPARISON:    No relevant prior studies available.    FINDINGS:    Limitations:  Limited due to metallic artifact.    Lung bases:  Right lower lobe consolidation atelectasis.     ABDOMEN:    Liver:  Unremarkable.    Gallbladder and bile ducts:  Cholecystectomy.    Pancreas:  Unremarkable.    Spleen:  Unremarkable.    Adrenals:  Unremarkable.    Kidneys and ureters:  No hydronephrosis or ureteral calculus.    Nonspecific bilateral perinephric stranding, cannot exclude   infection inflammation.  Left renal cysts.  Possible right   nephrolithiasis.    Stomach and bowel:  No mechanical bowel obstruction.  Colonic   diverticulosis.  No diverticulitis.     PELVIS:    Appendix:  No findings to suggest acute appendicitis.    Bladder:  Distended bladder.    Reproductive:  Unremarkable as visualized.     ABDOMEN and PELVIS:    Intraperitoneal space:  No free air.    Bones joints:  Right hip arthroplasty with associated postop changes.    Soft tissues:  Right thigh hip soft tissue edema and small air likely   postop.    Vasculature:  Atherosclerosis.    Lymph nodes:  Unremarkable.    IMPRESSION:       1.  No mechanical bowel obstruction.  Colonic diverticulosis.  No   diverticulitis.  2.  No hydronephrosis or ureteral calculus.  Nonspecific bilateral   perinephric stranding, cannot exclude infection inflammation.  3.  Right lower lobe consolidation atelectasis.  Impression: Electronically signed by Kike Kelly M.D. on 03-14-21 at 0002    Scheduled Medications  acetaminophen, 1,000 mg, Oral,  Q6H  aspirin, 81 mg, Oral, BID With Meals  ceFAZolin, 2 g, Intravenous, Q8H  docusate sodium, 200 mg, Oral, BID  insulin glargine, 20 Units, Subcutaneous, Nightly  insulin lispro, 0-14 Units, Subcutaneous, TID AC  isosorbide mononitrate, 30 mg, Oral, Daily  metoprolol succinate XL, 50 mg, Oral, Q12H  rosuvastatin, 40 mg, Oral, Nightly  Scopolamine, 1 patch, Transdermal, Q72H  sodium chloride, 3 mL, Intravenous, Q12H    Infusions  lactated ringers, 100 mL/hr, Last Rate: 100 mL/hr (03/14/21 0633)    Diet  Diet Regular; Consistent Carbohydrate      Assessment/Plan     Active Hospital Problems    Diagnosis  POA   • **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) [T84.51XA]  Not Applicable   • Nonrheumatic mitral valve regurgitation [I34.0]  Unknown   • Cardiomyopathy (CMS/HCC) [I42.9]  Unknown   • PVCs (premature ventricular contractions) [I49.3]  Unknown   • Postoperative nausea and vomiting [R11.2, Z98.890]  Yes   • CVA (cerebral vascular accident) (CMS/HCC) [I63.9]  Yes   • Right hemiparesis (CMS/HCC) [G81.91]  Yes   • BELKYS on CPAP [G47.33, Z99.89]  Not Applicable   • Type 2 diabetes mellitus (CMS/HCC) [E11.9]  Yes   • Essential hypertension [I10]  Yes   • MSSA (methicillin susceptible Staphylococcus aureus) infection [A49.01]  Yes   • Group B streptococcal infection [A49.1]  Yes   • UTI (urinary tract infection) [N39.0]  Yes      Resolved Hospital Problems   No resolved problems to display.   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  - s/p right hip I&D 3/12/21-will follow up operative culture  - continue on cefazolin  - continue pain control, encourage incentive spirometry  - mitral regurgitation noted on echocardiogram-LAVELLE being considered by cardiology-will request blood cultures from [facility]  - appreciate ID, cardiology, and Orthopedic Surgery recs    Postoperative Nausea and Vomiting  - continue PRN zofran and scopolamine patch  - abdominal examination is benign  - had abdomen/pelvis  "CT scan overnight which shows nothing acute    Frequent PVCs  - echocardiogram results noted with reduced LVEF and wall motion abnormalities  - probably has CAD (on imdur prior to admission) but no evidence of ACS  - metoprolol increased  - no plans for ischemic evaluation at this time per cardiology, appreciate recs    Type 2 DM  - continue lantus 20 units nightly  - will schedule 6 units of prandial insulin TID  - change ssi/hypoglycemia protocol to moderate dose  - A1C 9.22%    BELKYS  - has home CPAP, continue    Hx of CVA with Residual Right Hemiparesis  - no acute neurologic changes  - continue on ASA and statin    HTN  - cn imdur and metoprolol    ASA 81mg BID per orthopedic surgery for DVT prophylaxis.  Full code.  Discussed with patient, family and nursing staff.  Anticipate discharge TBD.  timing yet to be determined.      Angel Luis Dejesus MD  [facility]  03/14/21  13:45 EDT      Electronically signed by Angel Luis Dejesus MD at 03/14/21 1351     Martínez Vasquez MD at 03/14/21 1329             LOS: 3 days   Patient Care Team:  Jazzy Servin APRN as PCP - General (Nurse Practitioner)    Chief Complaint: following for ventricular ectopy     Interval History: Telemetry shows a lower burden of PVCs.     Objective   Vital Signs  Temp:  [97.4 °F (36.3 °C)-98.6 °F (37 °C)] 97.4 °F (36.3 °C)  Heart Rate:  [] 103  Resp:  [16-18] 18  BP: (154-185)/() 172/106    Intake/Output Summary (Last 24 hours) at 3/14/2021 1329  Last data filed at 3/14/2021 1150  Gross per 24 hour   Intake 1000 ml   Output 2075 ml   Net -1075 ml       Last Weight and Admission Weight        03/14/21  0300   Weight: 128 kg (283 lb 1.6 oz)     Flowsheet Rows      First Filed Value   Admission Height  165.1 cm (65\") Documented at 03/12/2021 0023   Admission Weight  (!) 138 kg (303 lb 13.1 oz) Documented at 03/11/2021 2239                  Physical Exam  Constitutional:       Appearance: He is morbidly obese. "   HENT:      Head: Normocephalic.      Nose: Nose normal.   Cardiovascular:      Rate and Rhythm: Normal rate. Frequent extrasystoles are present.     Heart sounds: Heart sounds are distant.   Pulmonary:      Effort: Pulmonary effort is normal.      Breath sounds: Normal breath sounds.   Abdominal:      Palpations: Abdomen is soft.   Musculoskeletal:         General: Swelling (trace-1+ pedal edema) present.   Skin:     Coloration: Skin is pale.   Neurological:      Mental Status: He is lethargic.      Comments: aphasia         Results Review:      Results from last 7 days   Lab Units 03/14/21  0442 03/13/21  0547 03/12/21  0423   SODIUM mmol/L 136 136 132*   POTASSIUM mmol/L 4.0 4.4 3.8   CHLORIDE mmol/L 95* 97* 96*   CO2 mmol/L 27.4 27.4 25.8   BUN mg/dL 11 9 13   CREATININE mg/dL 0.66* 0.62* 0.66*   GLUCOSE mg/dL 246* 232* 210*   CALCIUM mg/dL 9.2 8.7 8.4*         Results from last 7 days   Lab Units 03/14/21 0442 03/13/21  0547 03/12/21  0423   WBC 10*3/mm3 12.25* 9.55 8.61   HEMOGLOBIN g/dL 12.6* 11.2* 10.7*   HEMATOCRIT % 37.9 34.1* 33.0*   PLATELETS 10*3/mm3 349 254 235             Results from last 7 days   Lab Units 03/12/21  0423   MAGNESIUM mg/dL 1.9           I reviewed the patient's new clinical results.  I personally viewed and interpreted the patient's EKG/Telemetry data        Medication Review:   acetaminophen, 1,000 mg, Oral, Q6H  aspirin, 81 mg, Oral, BID With Meals  ceFAZolin, 2 g, Intravenous, Q8H  docusate sodium, 200 mg, Oral, BID  insulin glargine, 20 Units, Subcutaneous, Nightly  insulin lispro, 0-14 Units, Subcutaneous, TID AC  isosorbide mononitrate, 30 mg, Oral, Daily  lisinopril, 20 mg, Oral, Daily  metoprolol succinate XL, 25 mg, Oral, Q12H  rosuvastatin, 40 mg, Oral, Nightly  Scopolamine, 1 patch, Transdermal, Q72H  sodium chloride, 3 mL, Intravenous, Q12H        lactated ringers, 100 mL/hr, Last Rate: 100 mL/hr (03/14/21 0625)        Assessment/Plan     1. Ventricular ectopy/PVCs  2.  Abnormal echo/cardiomyopathy with RWMA  3. Probable severe MR  4. Right prosthetic hip infection due to group B strep, POD#2 I&D   5. Morbid obesity  6. HTN  7. DM2  8. BELKYS  9. History of stroke with severe aphasia    His echo was technically difficult, and visualization was really tough (due to his size and inability to position correctly), but his LVEF appears to be 30% with distal lateral akinesis.  At this point, he is not a candidate for an ischemic evaluation due to his significant comorbidities, and I will treat him medically for presumed CAD/ICM.   His volume status is a bit tough to discern because of his size, but he does not have any pulmonary edema on exam. He's on IVF as he's not eating/drinking well; we'll need to watch for signs of volume overload.     My next area of concern is that he had a prosthetic hip infection with GBS, and has at least mod-severe MR if not severe MR.  He did not have blood cultures drawn, and there's little utility to drawing them now as he's on IV antibiotics.  This does beg the question of whether or not he needs a LAVELLE, though, and I suspect he does. I think tomorrow is premature given his recent knee surgery and difficulty moving. We'll continue to reassess this daily.     I will increase his metoprolol succinate dose.  I will stop lisinopril today with the plan to start sacubitril-valsartan tomorrow. He was on ISMN at home (despite his wife stating he has no cardiac history, and the [facility] having no cardiac records on him) and that will be continued.     ADD:    He was transferred here from [place] -- Dr Miller's initial consult states that his urine culture grew MSSA but his blood cultures were negative.  I asked for an updated copy of his labs and it finally grew coag negative Staph, which was felt to be a contaminant. His hip grew Group B Strep.  This gentleman will eventually need a LAVELLE.    Martínez Vasquez MD  03/14/21  13:29 EDT        Electronically signed by Pedro,  Martínez BRYANT MD at 03/14/21 1637          Physical Therapy Notes (last 48 hours) (Notes from 03/13/21 1315 through 03/15/21 1315)      Leadnro Anders, PT DPT at 03/13/21 1423  Version 1 of 1 03/13/21 1423   OTHER   Discipline physical therapist   Rehab Time/Intention   Session Not Performed patient unavailable for evaluation  (at cardio, also very nauseous and lethargic today)   Recommendation   PT - Next Appointment 03/14/21       Electronically signed by Leandro Anders, PT DPT at 03/13/21 1423     Leandro Anders PT DPT at 03/14/21 1403  Version 1 of 1       Goal Outcome Evaluation:  Plan of Care Reviewed With: patient, spouse     Outcome Summary: PT EVAL completed. Pt AO x 3 with expressive aphasia or difficulty word finding. Shows clear understanding of what PT is communicating to him. Pt is at times mildly impulsive, but follows 100% of commands as well as his physical ability allows. Pt sitting EOB with spouse present. Pt transferred sit to stand with mod to max x 1 with PT blocking R knee to prevent buckling. Pt unable to keep RLE in contact with ground upon initial stand and demonstrated non-sustained clonus response through RLE. Pt completed 6 x sit to stand with mod-max x 1 assist and CNA assisting with lines. Pt transferred stand pivot sit mod to max x 1 to sit in chair. Pt demonstrates some movement of RLE, but very flaccid in RUE. Pt requires skilled therapy due to decreased strength, transfers, ambulation, and functional activity tolerance. Recommend DC to inpatient rehab or other setting to continue to receive skilled PT intervention. Pt utilizes R AFO built into shoe for transfers and standing.    PPE: Patient was placed in face mask in first look. Patient was wearing facemask when I entered the room and throughout our encounter. I wore full protective equipment throughout this patient encounter including a face mask, and gloves. Hand hygiene was performed before donning protective  equipment and after removal when leaving the room.      Electronically signed by Leandro Anders, PT DPT at 21 1403     Leandro Anders PT DPT at 21 1404  Version 1 of          Patient Name: Mandeep Tracey  : 1960    MRN: [id number]                              Today's Date: 3/14/2021       Admit Date: 3/11/2021    Visit Dx:     ICD-10-CM ICD-9-CM   1. Group B streptococcal infection  A49.1 041.02     Patient Active Problem List   Diagnosis   • CVA (cerebral vascular accident) (CMS/[facility])   • Right hemiparesis (CMS/[facility])   • BELKYS on CPAP   • Seizure disorder (CMS/[facility])   • Type 2 diabetes mellitus (CMS/[facility])   • Essential hypertension   • Infection of right prosthetic hip joint (CMS/[facility])   • MSSA (methicillin susceptible Staphylococcus aureus) infection   • Group B streptococcal infection   • UTI (urinary tract infection)   • Postoperative nausea and vomiting   • Nonrheumatic mitral valve regurgitation   • Cardiomyopathy (CMS/[facility])   • PVCs (premature ventricular contractions)     Past Medical History:   Diagnosis Date   • Aphasia    • CPAP (continuous positive airway pressure) dependence    • Diabetes (CMS/[facility])    • Hemiparesis (CMS/[facility])     Right side    • Peptic ulcer disease    • Postoperative nausea and vomiting 3/13/2021   • Psoriasis    • Seizures (CMS/[facility])    • Sleep apnea    • Stroke (CMS/[facility])      Past Surgical History:   Procedure Laterality Date   • CHOLECYSTECTOMY     • EYE SURGERY     • LUMBAR DISC SURGERY       General Information     Row Name 21 1346          Physical Therapy Time and Intention    Document Type  evaluation  -LC     Mode of Treatment  individual therapy;physical therapy  -     Row Name 21 1346          General Information    Patient Profile Reviewed  yes  -LC     Prior Level of Function  min assist:;mod assist:;all household mobility;gait;transfer;w/c or scooter;bed mobility;ADL's  -LC     Existing Precautions/Restrictions  (S) fall;weight bearing RIGHT  hemiparesis, difficulty maintaining RLE in contact with ground  -     Barriers to Rehab  previous functional deficit;impaired sensation  -     Row Name 03/14/21 1346          Living Environment    Lives With  spouse  -     Row Name 03/14/21 1346          Safety Issues, Functional Mobility    Impairments Affecting Function (Mobility)  balance;coordination;endurance/activity tolerance;motor control;motor planning;muscle tone abnormal;strength;sensation/sensory awareness  -       User Key  (r) = Recorded By, (t) = Taken By, (c) = Cosigned By    Initials Name Provider Type     Leandro Anders, PT DPT Physical Therapist        Mobility     Row Name 03/14/21 1348          Bed Mobility    Bed Mobility  bed mobility (all) activities  -     All Activities, [place] (Bed Mobility)  moderate assist (50% patient effort)  -     Assistive Device (Bed Mobility)  bed rails;draw sheet;head of bed elevated  -     Row Name 03/14/21 1348          Transfers    Comment (Transfers)  Pt transferred sit to stand with mod to max x 1 with PT blocking R knee to prevent buckling. Pt unable to keep RLE in contact with ground upon initial stand and demonstrated non-sustained clonus response through RLE. Pt completed 6 x sit to stand with mod-max x 1 assist and CNA assisting with lines. Pt transferred stand pivot sit mod to max x 1 to sit in chair.  -     Row Name 03/14/21 1348          Bed-Chair Transfer    Bed-Chair [place] (Transfers)  moderate assist (50% patient effort);maximum assist (25% patient effort);1 person assist;1 person to manage equipment  -     Assistive Device (Bed-Chair Transfers)  cane, quad  -     Row Name 03/14/21 1348          Sit-Stand Transfer    Sit-Stand [place] (Transfers)  moderate assist (50% patient effort);maximum assist (25% patient effort)  -     Assistive Device (Sit-Stand Transfers)  -- HHA  -       User Key  (r) = Recorded By, (t) = Taken By, (c) = Cosigned By    Initials  Name Provider Type     Leandro Anders, PT DPT Physical Therapist        Obj/Interventions     Row Name 03/14/21 1358          Range of Motion Comprehensive    Comment, General Range of Motion  RLE AROM impaired 100%, LLE AROM WNL  -     Row Name 03/14/21 1358          Strength Comprehensive (MMT)    Comment, General Manual Muscle Testing (MMT) Assessment  RLE MMT grossly 2-/5; LLE MMT grossly 3+/5  -LC     Row Name 03/14/21 1358          Motor Skills    Motor Skills  coordination  -     Coordination  right;lower extremity;ataxia  -     Row Name 03/14/21 1358          Balance    Balance Assessment  sitting static balance;sitting dynamic balance;standing static balance;standing dynamic balance  -     Static Sitting Balance  WFL  -     Dynamic Sitting Balance  WFL  -     Static Standing Balance  severe impairment  -     Dynamic Standing Balance  severe impairment  -       User Key  (r) = Recorded By, (t) = Taken By, (c) = Cosigned By    Initials Name Provider Type     Leandro Anders, PT DPT Physical Therapist        Goals/Plan     Row Name 03/14/21 1400          Bed Mobility Goal 1 (PT)    Activity/Assistive Device (Bed Mobility Goal 1, PT)  bed mobility activities, all  -LC     [place] Level/Cues Needed (Bed Mobility Goal 1, PT)  minimum assist (75% or more patient effort)  -LC     Time Frame (Bed Mobility Goal 1, PT)  1 week  -     Row Name 03/14/21 1400          Transfer Goal 1 (PT)    Activity/Assistive Device (Transfer Goal 1, PT)  transfers, all;cane, quad  -LC     [place] Level/Cues Needed (Transfer Goal 1, PT)  minimum assist (75% or more patient effort)  -     Time Frame (Transfer Goal 1, PT)  1 week  -     Row Name 03/14/21 1400          Gait Training Goal 1 (PT)    Activity/Assistive Device (Gait Training Goal 1, PT)  gait (walking locomotion);cane, quad  -LC     [place] Level (Gait Training Goal 1, PT)  minimum assist (75% or more patient effort)  -     Distance  (Gait Training Goal 1, PT)  40  -LC     Time Frame (Gait Training Goal 1, PT)  1 week  -       User Key  (r) = Recorded By, (t) = Taken By, (c) = Cosigned By    Initials Name Provider Type    Leandro Gama, PT DPT Physical Therapist        Clinical Impression     Row Name 03/14/21 9685          Pain    Additional Documentation  Pain Scale: Numbers Pre/Post-Treatment (Group)  -     Row Name 03/14/21 0051          Pain Scale: Numbers Pre/Post-Treatment    Pretreatment Pain Rating  7/10  -LC     Posttreatment Pain Rating  7/10  -LC     Pain Location - Side  Right  -LC     Pain Location  hip  -LC     Pain Intervention(s)  Medication (See MAR);Repositioned  -     Row Name 03/14/21 4564          Plan of Care Review    Plan of Care Reviewed With  patient;spouse  -     Outcome Summary  PT EVAL completed. Pt AO x 3 with expressive aphasia or difficulty word finding. Shows clear understanding of what PT is communicating to him. Pt is at times mildly impulsive, but follows 100% of commands as well as his physical ability allows. Pt sitting EOB with spouse present. Pt transferred sit to stand with mod to max x 1 with PT blocking R knee to prevent buckling. Pt unable to keep RLE in contact with ground upon initial stand and demonstrated non-sustained clonus response through RLE. Pt completed 6 x sit to stand with mod-max x 1 assist and CNA assisting with lines. Pt transferred stand pivot sit mod to max x 1 to sit in chair. Pt demonstrates some movement of RLE, but very flaccid in RUE. Pt requires skilled therapy due to decreased strength, transfers, ambulation, and functional activity tolerance. Recommend DC to inpatient rehab or other setting to continue to receive skilled PT intervention. Pt utilizes R AFO built into shoe for transfers and standing.  -     Row Name 03/14/21 8855          Therapy Assessment/Plan (PT)    Patient/Family Therapy Goals Statement (PT)  return home with assist from spouse  -      Rehab Potential (PT)  fair, will monitor progress closely  -     Criteria for Skilled Interventions Met (PT)  yes;skilled treatment is necessary  -     Predicted Duration of Therapy Intervention (PT)  1 week  -     Row Name 03/14/21 1355          Vital Signs    O2 Delivery Pre Treatment  room air  -     O2 Delivery Intra Treatment  room air  -     O2 Delivery Post Treatment  room air  -     Row Name 03/14/21 1355          Positioning and Restraints    Pre-Treatment Position  in bed  -     Post Treatment Position  chair  -     In Chair  notified nsg;sitting;call light within reach;encouraged to call for assist;exit alarm on;with other staff  -       User Key  (r) = Recorded By, (t) = Taken By, (c) = Cosigned By    Initials Name Provider Type    Leandro Gama, PT DPT Physical Therapist        Outcome Measures     Row Name 03/14/21 1403          How much help from another person do you currently need...    Turning from your back to your side while in flat bed without using bedrails?  2  -LC     Moving from lying on back to sitting on the side of a flat bed without bedrails?  2  -LC     Moving to and from a bed to a chair (including a wheelchair)?  2  -LC     Standing up from a chair using your arms (e.g., wheelchair, bedside chair)?  2  -LC     Climbing 3-5 steps with a railing?  1  -LC     To walk in hospital room?  1  -     AM-PAC 6 Clicks Score (PT)  10  -     Row Name 03/14/21 1403          Functional Assessment    Outcome Measure Options  AM-PAC 6 Clicks Basic Mobility (PT)  -       User Key  (r) = Recorded By, (t) = Taken By, (c) = Cosigned By    Initials Name Provider Type    Leandro Gama, PT DPT Physical Therapist        Physical Therapy Education                 Title: PT OT SLP Therapies (Done)     Topic: Physical Therapy (Done)     Point: Mobility training (Done)     Learning Progress Summary           Patient Acceptance, E,D, NR,DU,VU by  at 3/14/2021 1403   Family  Acceptance, E,D, NR,DU,VU by  at 3/14/2021 1403                   Point: Home exercise program (Done)     Learning Progress Summary           Patient Acceptance, E,D, NR,DU,VU by  at 3/14/2021 1403   Family Acceptance, E,D, NR,DU,VU by  at 3/14/2021 1403                   Point: Body mechanics (Done)     Learning Progress Summary           Patient Acceptance, E,D, NR,DU,VU by  at 3/14/2021 1403   Family Acceptance, E,D, NR,DU,VU by  at 3/14/2021 1403                   Point: Precautions (Done)     Learning Progress Summary           Patient Acceptance, E,D, NR,DU,VU by  at 3/14/2021 1403   Family Acceptance, E,D, NR,DU,VU by  at 3/14/2021 1403                               User Key     Initials Effective Dates Name Provider Type Discipline     07/02/20 -  Leandro Anders, PT DPT Physical Therapist PT              PT Recommendation and Plan     Plan of Care Reviewed With: patient, spouse  Outcome Summary: PT EVAL completed. Pt AO x 3 with expressive aphasia or difficulty word finding. Shows clear understanding of what PT is communicating to him. Pt is at times mildly impulsive, but follows 100% of commands as well as his physical ability allows. Pt sitting EOB with spouse present. Pt transferred sit to stand with mod to max x 1 with PT blocking R knee to prevent buckling. Pt unable to keep RLE in contact with ground upon initial stand and demonstrated non-sustained clonus response through RLE. Pt completed 6 x sit to stand with mod-max x 1 assist and CNA assisting with lines. Pt transferred stand pivot sit mod to max x 1 to sit in chair. Pt demonstrates some movement of RLE, but very flaccid in RUE. Pt requires skilled therapy due to decreased strength, transfers, ambulation, and functional activity tolerance. Recommend DC to inpatient rehab or other setting to continue to receive skilled PT intervention. Pt utilizes R AFO built into shoe for transfers and standing.     Time Calculation:   PT Charges      Row Name 21 1404             Time Calculation    Start Time  1315  -LC      Stop Time  1400  -LC      Time Calculation (min)  45 min  -LC      PT Received On  21  -LC      PT - Next Appointment  03/15/21  -LC      PT Goal Re-Cert Due Date  21  -LC         Time Calculation- PT    Total Timed Code Minutes- PT  45 minute(s)  -LC         Timed Charges    72766 - PT Therapeutic Activity Minutes  30  -LC        User Key  (r) = Recorded By, (t) = Taken By, (c) = Cosigned By    Initials Name Provider Type     Leandro Anders, PT DPT Physical Therapist        Therapy Charges for Today     Code Description Service Date Service Provider Modifiers Qty    43206195801 HC PT THERAPEUTIC ACT EA 15 MIN 3/14/2021 Leandro Anders, PT DPT GP 2    20134198570 HC PT EVAL MOD COMPLEXITY 1 3/14/2021 Leandro Anders, PT DPT GP 1          PT G-Codes  Outcome Measure Options: AM-PAC 6 Clicks Basic Mobility (PT)  AM-PAC 6 Clicks Score (PT): 10    Leandro Anders PT DPT  3/14/2021      Electronically signed by Leandro Anders, PT DPT at 21 1406          Occupational Therapy Notes (last 24 hours) (Notes from 21 1315 through 03/15/21 1315)      Glendy Alston, OTR at 03/15/21 0912          Patient Name: Mandeep Tracey  : 1960    MRN: [id number]                              Today's Date: 3/15/2021       Admit Date: 3/11/2021    Visit Dx:     ICD-10-CM ICD-9-CM   1. Group B streptococcal infection  A49.1 041.02     Patient Active Problem List   Diagnosis   • CVA (cerebral vascular accident) (CMS/[facility])   • Right hemiparesis (CMS/[facility])   • BELKYS on CPAP   • Seizure disorder (CMS/[facility])   • Type 2 diabetes mellitus (CMS/[facility])   • Essential hypertension   • Infection of right prosthetic hip joint (CMS/[facility])   • MSSA (methicillin susceptible Staphylococcus aureus) infection   • Group B streptococcal infection   • UTI (urinary tract infection)   • Postoperative nausea and vomiting   • Nonrheumatic mitral valve  "regurgitation   • Cardiomyopathy (CMS/HCC)   • PVCs (premature ventricular contractions)     Past Medical History:   Diagnosis Date   • Aphasia    • CPAP (continuous positive airway pressure) dependence    • Diabetes (CMS/HCC)    • Hemiparesis (CMS/HCC)     Right side    • Peptic ulcer disease    • Postoperative nausea and vomiting 3/13/2021   • Psoriasis    • Seizures (CMS/HCC)    • Sleep apnea    • Stroke (CMS/HCC) 2004     Past Surgical History:   Procedure Laterality Date   • CHOLECYSTECTOMY     • EYE SURGERY     • LUMBAR DISC SURGERY       General Information     Row Name 03/15/21 0904          OT Time and Intention    Document Type  evaluation;therapy note (daily note)  -LE     Mode of Treatment  individual therapy;occupational therapy  -     Row Name 03/15/21 0904          General Information    Patient Profile Reviewed  yes  -LE     Prior Level of Function  -- Facetime with wife who states pt pivoted to w/c, took w/c to bathroom. pt always has assist/supervision but able to assist with ADL and xferred \"with light assist\".  -LE     Existing Precautions/Restrictions  fall;weight bearing WBAT R LE.  -LE     Barriers to Rehab  medically complex;previous functional deficit  -     Row Name 03/15/21 0904          Occupational Profile    Reason for Services/Referral (Occupational Profile)  pt admit [facility] with h/o CVA with R yoly, h/o R THR with infection and now s/p I&D on 3/12/21.  Pt lives with wife and uses w/c at home and some assist with all tasks.  -     Row Name 03/15/21 0904          Living Environment    Lives With  spouse has caregivers 3x/week for 3 hours per wife report.  -[facility] Name 03/15/21 0904          Cognition    Orientation Status (Cognition)  oriented to;person pt aphasic and needs repetition for communication. uses gestures to help with communication.  -[facility] Name 03/15/21 0904          Safety Issues, Functional Mobility    Comment, Safety Issues/Impairments (Mobility)  gait " "belt and non skid socks used.  -TIFFANY       User Key  (r) = Recorded By, (t) = Taken By, (c) = Cosigned By    Initials Name Provider Type    Glendy Hill OTR Occupational Therapist          Mobility/ADL's     Row Name 03/15/21 0907          Bed Mobility    Bed Mobility  scooting/bridging;sit-supine;supine-sit  -LE     Scooting/Bridging [place] (Bed Mobility)  maximum assist (25% patient effort);2 person assist  -LE     Supine-Sit [place] (Bed Mobility)  maximum assist (25% patient effort);verbal cues;nonverbal cues (demo/gesture)  -LE     Sit-Supine [place] (Bed Mobility)  verbal cues;nonverbal cues (demo/gesture)  -LE     Bed Mobility, Safety Issues  decreased use of arms for pushing/pulling;decreased use of legs for bridging/pushing;impaired trunk control for bed mobility  -LE     Assistive Device (Bed Mobility)  bed rails;head of bed elevated;draw sheet  -TIFFANY     Comment (Bed Mobility)  pt hooks R leg with L leg automatically during movement to/from bed.  -TIFFANY     Row Name 03/15/21 0907          Transfers    Transfers  sit-stand transfer;bed-chair transfer  -TIFFANY     Comment (Transfers)  4-5 stands with OT and aid in attempt to xfer to chair.  Max A of 2 and pt sits almost immediately each time.  Modifications attempted for both stand pivot and squat pivot but unsuccessful.  Pt does scoot back on bed twice with mod A when cued to due to getting too close to edge of air bed.  -LE     Bed-Chair [place] (Transfers)  other (see comments) unable to complete safely  -TIFFANY     Row Name 03/15/21 0907          Functional Mobility    Functional Mobility- Comment  non ambulatory at baseline as uses w/c \"almost all the time\"per wife.  -TIFFANY     Row Name 03/15/21 0907          Activities of Daily Living    BADL Assessment/Intervention  toileting;feeding;grooming;upper body dressing;lower body dressing  -     Row Name 03/15/21 0907          Toileting Assessment/Training    [place] Level (Toileting)  maximum " assist (25% patient effort)  -LE     Comment (Toileting)  urinal at this time.  wife reports urinal at night at home and brings w/c to toilet during daytime  -[facility] Name 03/15/21 0907          Self-Feeding Assessment/Training    Position (Self-Feeding)  sitting up in bed  -LE     Comment (Feeding)  set up to eat with L hand after reposition pt upright in bed.  -[facility] Name 03/15/21 0907          Grooming Assessment/Training    [place] Level (Grooming)  wash face, hands;set up  -LE     Position (Grooming)  supine  -[facility] Name 03/15/21 0907          Upper Body Dressing Assessment/Training    Comment (Upper Body Dressing)  max to edgardo gown.  -[facility] Name 03/15/21 0907          Lower Body Dressing Assessment/Training    [place] Level (Lower Body Dressing)  don;doff;shoes/slippers;socks;dependent (less than 25% patient effort)  -LE     Position (Lower Body Dressing)  edge of bed sitting  -LE     Comment (Lower Body Dressing)  OT donns B socks, R shoe/brace, L shoe.   doff B shoes/brace at end of OT for return to bed.  -       User Key  (r) = Recorded By, (t) = Taken By, (c) = Cosigned By    Initials Name Provider Type    Glendy Hill OTR Occupational Therapist        Obj/Interventions     [facility] Name 03/15/21 0912          Sensory Assessment (Somatosensory)    Sensory Assessment (Somatosensory)  unable/difficult to assess  -[facility] Name 03/15/21 0912          Vision Assessment/Intervention    Visual Impairment/Limitations  unable/difficult to assess  -LE     Row Name 03/15/21 0912          Range of Motion Comprehensive    Comment, General Range of Motion  no AROM noted R UE. h/o hemiparesis from CVA.  L Ue 2/3 AROM.  -[facility] Name 03/15/21 0912          Strength Comprehensive (MMT)    Comment, General Manual Muscle Testing (MMT) Assessment  pt pulls with L UE during tasks.  -LE     Row Name 03/15/21 0912          Balance    Balance Assessment  sitting static balance;standing static  balance  -LE     Static Sitting Balance  mild impairment improves to SBA when adjusted seating at edge of air bed.  -LE     Static Standing Balance  severe impairment;supported  -LE     Balance Interventions  core stability exercise  -LE     Comment, Balance  assist of 2 for several stands.  OT blocks R knee and donns R brace and L shoe.  -LE       User Key  (r) = Recorded By, (t) = Taken By, (c) = Cosigned By    Initials Name Provider Type    Glendy Hill OTR Occupational Therapist        Goals/Plan     Row Name 03/15/21 0921          Transfer Goal 1 (OT)    Activity/Assistive Device (Transfer Goal 1, OT)  sit-to-stand/stand-to-sit;bed-to-chair/chair-to-bed;toilet;commode, 3-in-1  -LE     [place] Level/Cues Needed (Transfer Goal 1, OT)  maximum assist (25-49% patient effort) of 2 person assist  -LE     Time Frame (Transfer Goal 1, OT)  2 weeks  -LE     Progress/Outcome (Transfer Goal 1, OT)  goal ongoing  -LE     Row Name 03/15/21 0921          Dressing Goal 1 (OT)    Activity/Device (Dressing Goal 1, OT)  upper body dressing at chair or EOB level.  -LE     [place]/Cues Needed (Dressing Goal 1, OT)  set-up required;standby assist  -LE     Time Frame (Dressing Goal 1, OT)  2 weeks  -LE     Progress/Outcome (Dressing Goal 1, OT)  goal ongoing  -LE     Row Name 03/15/21 0921          Grooming Goal 1 (OT)    Activity/Device (Grooming Goal 1, OT)  oral care;wash face, hands  -LE     [place] (Grooming Goal 1, OT)  set-up required;standby assist at chair or EOB level .  -LE     Time Frame (Grooming Goal 1, OT)  2 weeks  -LE     Progress/Outcome (Grooming Goal 1, OT)  goal ongoing  -LE     Row Name 03/15/21 0921          Therapy Assessment/Plan (OT)    Planned Therapy Interventions (OT)  activity tolerance training;adaptive equipment training;BADL retraining;occupation/activity based interventions;patient/caregiver education/training;transfer/mobility retraining  -LE       User Key  (r) = Recorded By,  (t) = Taken By, (c) = Cosigned By    Initials Name Provider Type    TIFFANY Alston Glendy, OTR Occupational Therapist        Clinical Impression     Row Name 03/15/21 0915          Pain Assessment    Additional Documentation  Pain Scale: FACES Pre/Post-Treatment (Group)  -TIFFANY [place] Name 03/15/21 0915          Pain Scale: Numbers Pre/Post-Treatment    Pain Location - Side  Right  -LE     Pain Location  hip;extremity  -LE     Pain Intervention(s)  Repositioned;Rest  -TIFFANY [place] Name 03/15/21 0915          Pain Scale: FACES Pre/Post-Treatment    Pain: FACES Scale, Pretreatment  0-->no hurt at rest.  -LE     Posttreatment Pain Rating  8-->hurts whole lot mild grimace when edgardo R sock and then tight grimace during sit to supine.  grimace decreases with rest.  -TIFFANY     Row Name 03/15/21 0915          Plan of Care Review    Plan of Care Reviewed With  patient;spouse spoke with wife on Facetime after Dr Miller spoke with wife.  -LE     Outcome Summary  Pt admit from home with R hip pain and now s/p I&D R hip. Pt with h/o R hemiparesis from CVA and does not have active movement of R UE.  Pt seen by OT today and is max A to move to/from EOB with max A of 2 to attempt standing and xfer.  Xfer to chair unsuccessful to complete safely.  Pt lives with wife and uses w/c at baseline and able to assist with all tasks.  Pt may benefit from skilled OT to increase safety and independence.  At current level recommend SNU at d/c.  -TIFFANY     Row Name 03/15/21 0915          Therapy Assessment/Plan (OT)    Patient/Family Therapy Goal Statement (OT)  return to prior level of function .  -LE     Rehab Potential (OT)  good, to achieve stated therapy goals  -LE     Criteria for Skilled Therapeutic Interventions Met (OT)  meets criteria;yes  -LE     Therapy Frequency (OT)  5 times/wk  -TIFFANY     Row Name 03/15/21 0915          Therapy Plan Review/Discharge Plan (OT)    Equipment Needs Upon Discharge (OT)  -- owns w/c, grab bars, urinal, shower chair.   -LE     Anticipated Discharge Disposition (OT)  skilled nursing facility  -     Row Name 03/15/21 0915          Vital Signs    O2 Delivery Pre Treatment  room air  -LE     O2 Delivery Intra Treatment  room air  -LE     O2 Delivery Post Treatment  room air  -LE     Pre Patient Position  Supine  -LE     Intra Patient Position  Standing  -LE     Post Patient Position  Supine  -LE     Activity Duration  -- asleep on CPAP when enter. several minutes to waken and then to stay alert.  pt with nause during session and holds emesis bag without overt vomitting. (RN aware)  -     Row Name 03/15/21 0915          Positioning and Restraints    Pre-Treatment Position  in bed  -LE     Post Treatment Position  bed  -LE     In Bed  notified nsg;fowlers;call light within reach;encouraged to call for assist;exit alarm on with aid. discuss pt with RN, CCP re: attempts to stand and rec. for SNU at d/c.  -LE       User Key  (r) = Recorded By, (t) = Taken By, (c) = Cosigned By    Initials Name Provider Type    Glendy Hill OTR Occupational Therapist        Outcome Measures     Row Name 03/15/21 0922          How much help from another is currently needed...    Putting on and taking off regular lower body clothing?  1  -LE     Bathing (including washing, rinsing, and drying)  1  -LE     Toileting (which includes using toilet bed pan or urinal)  1  -LE     Putting on and taking off regular upper body clothing  2  -LE     Taking care of personal grooming (such as brushing teeth)  2  -LE     Eating meals  3  -LE     AM-PAC 6 Clicks Score (OT)  10  -LE     Row Name 03/15/21 0922          Modified [place] Scale    Modified [place] Scale  5 - Severe disability.  Bedridden, incontinent, and requiring constant nursing care and attention.  -     Row Name 03/15/21 0922          Functional Assessment    Outcome Measure Options  AM-PAC 6 Clicks Daily Activity (OT);Modified [place]  -LE       User Key  (r) = Recorded By, (t) = Taken By, (c) =  Cosigned By    Initials Name Provider Type    Glendy Hill OTR Occupational Therapist        Occupational Therapy Education                 Title: PT OT SLP Therapies (Done)     Topic: Occupational Therapy (Done)     Point: ADL training (Done)     Description:   Instruct learner(s) on proper safety adaptation and remediation techniques during self care or transfers.   Instruct in proper use of assistive devices.              Learning Progress Summary           Patient Acceptance, E,D, VU,DU by TIFFANY at 3/15/2021 0923    Comment: fall risk, plan of care, role of OT.  ed pt on body  mechanics during standing attempts and for sitting EOB.  spoke with wife via Facetime in room with pt.   Family Acceptance, E,D, VU,DU by TIFFANY at 3/15/2021 0923    Comment: fall risk, plan of care, role of OT.  ed pt on body  mechanics during standing attempts and for sitting EOB.  spoke with wife via Facetime in room with pt.                   Point: Precautions (Done)     Description:   Instruct learner(s) on prescribed precautions during self-care and functional transfers.              Learning Progress Summary           Patient Acceptance, E,D, VU,DU by TIFFANY at 3/15/2021 0923    Comment: fall risk, plan of care, role of OT.  ed pt on body  mechanics during standing attempts and for sitting EOB.  spoke with wife via Facetime in room with pt.   Family Acceptance, E,D, VU,DU by TIFFANY at 3/15/2021 0923    Comment: fall risk, plan of care, role of OT.  ed pt on body  mechanics during standing attempts and for sitting EOB.  spoke with wife via Facetime in room with pt.                   Point: Body mechanics (Done)     Description:   Instruct learner(s) on proper positioning and spine alignment during self-care, functional mobility activities and/or exercises.              Learning Progress Summary           Patient Acceptance, E,D, VU,DU by TIFFANY at 3/15/2021 0923    Comment: fall risk, plan of care, role of OT.  ed pt on body  mechanics during  standing attempts and for sitting EOB.  spoke with wife via Facetime in room with pt.   Family Acceptance, E,D, VU,DU by TIFFANY at 3/15/2021 0923    Comment: fall risk, plan of care, role of OT.  ed pt on body  mechanics during standing attempts and for sitting EOB.  spoke with wife via Facetime in room with pt.                               User Key     Initials Effective Dates Name Provider Type Discipline    LE 06/08/18 -  Glendy Alston OTR Occupational Therapist OT              OT Recommendation and Plan  Planned Therapy Interventions (OT): activity tolerance training, adaptive equipment training, BADL retraining, occupation/activity based interventions, patient/caregiver education/training, transfer/mobility retraining  Therapy Frequency (OT): 5 times/wk  Plan of Care Review  Plan of Care Reviewed With: patient, spouse (spoke with wife on Facetime after Dr Miller spoke with wife.)  Outcome Summary: Pt admit from home with R hip pain and now s/p I&D R hip. Pt with h/o R hemiparesis from CVA and does not have active movement of R UE.  Pt seen by OT today and is max A to move to/from EOB with max A of 2 to attempt standing and xfer.  Xfer to chair unsuccessful to complete safely.  Pt lives with wife and uses w/c at baseline and able to assist with all tasks.  Pt may benefit from skilled OT to increase safety and independence.  At current level recommend SNU at d/c.     Time Calculation:   Time Calculation- OT     Row Name 03/15/21 0926             Time Calculation- OT    OT Start Time  0817  -LE      OT Stop Time  0857  -LE      OT Time Calculation (min)  40 min  -LE      Total Timed Code Minutes- OT  30 minute(s)  -LE      OT Received On  03/15/21  -LE      OT - Next Appointment  03/16/21  -LE      OT Goal Re-Cert Due Date  03/29/21  -LE        User Key  (r) = Recorded By, (t) = Taken By, (c) = Cosigned By    Initials Name Provider Type    LE Glendy Alston OTR Occupational Therapist        Therapy Charges for  Today     Code Description Service Date Service Provider Modifiers Qty    16652192232  OT EVAL MOD COMPLEXITY 2 3/15/2021 Glendy Alston OTR GO 1    [id number]  OT SELF CARE/MGMT/TRAIN EA 15 MIN 3/15/2021 Glendy Alston OTR GO 1    [id number]  OT THERAPEUTIC ACT EA 15 MIN 3/15/2021 Glendy Alston OTR GO 1               BRANDEN Tierney  3/15/2021    Electronically signed by Glendy Alston OTR at 03/15/21 0926     Glendy Alston OTR at 03/15/21 0923        Goal Outcome Evaluation:  Plan of Care Reviewed With: patient, spouse (spoke with wife on Facetime after Dr Miller spoke with wife.)     Outcome Summary: Pt admit from home with R hip pain and now s/p I&D R hip. Pt with h/o R hemiparesis from CVA and does not have active movement of R UE.  Pt seen by OT today and is max A to move to/from EOB with max A of 2 to attempt standing and xfer.  Xfer to chair unsuccessful to complete safely.  Pt lives with wife and uses w/c at baseline and able to assist with all tasks.  Pt may benefit from skilled OT to increase safety and independence.  At current level recommend SNU at d/c.        Patient was placed in a face mask during this therapy encounter intermittently, mostly off during session due to on CPAP when enter, then grooming and using emesis bag for nausea.  Therapist used appropriate personal protective equipment including surgical mask, eye shield and gloves during the entire therapy session. Hand hygiene was completed before and after therapy session. Patient is not in enhanced droplet precautions.            Electronically signed by Glendy Alston OTR at 03/15/21 0963

## 2021-03-15 NOTE — THERAPY TREATMENT NOTE
Patient Name: Mandeep Tracey  : 1960    MRN: [id number]                              Today's Date: 3/15/2021       Admit Date: 3/11/2021    Visit Dx:     ICD-10-CM ICD-9-CM   1. Group B streptococcal infection  A49.1 041.02     Patient Active Problem List   Diagnosis   • CVA (cerebral vascular accident) (CMS/HCC)   • Right hemiparesis (CMS/[facility])   • BELKYS on CPAP   • Seizure disorder (CMS/HCC)   • Type 2 diabetes mellitus (CMS/HCC)   • Essential hypertension   • 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)   • MSSA (methicillin susceptible Staphylococcus aureus) infection   • Group B streptococcal infection   • UTI (urinary tract infection)   • Postoperative nausea and vomiting   • Nonrheumatic mitral valve regurgitation   • Cardiomyopathy (CMS/[facility])   • PVCs (premature ventricular contractions)     Past Medical History:   Diagnosis Date   • Aphasia    • CPAP (continuous positive airway pressure) dependence    • Diabetes (CMS/[facility])    • Hemiparesis (CMS/[facility])     Right side    • Peptic ulcer disease    • Postoperative nausea and vomiting 3/13/2021   • Psoriasis    • Seizures (CMS/[facility])    • Sleep apnea    • Stroke (CMS/[facility])      Past Surgical History:   Procedure Laterality Date   • CHOLECYSTECTOMY     • EYE SURGERY     • INCISION AND DRAINAGE HIP Right 3/12/2021    Procedure: HIP ANTERIOR INCISION AND DRAINAGE WITH HANA TABLE;  Surgeon: Tao Andrea MD;  Location: [facility];  Service: Orthopedics;  Laterality: Right;   • LUMBAR DISC SURGERY       General Information     Row Name 03/15/21 143          Physical Therapy Time and Intention    Document Type  therapy note (daily note)  -CB     Mode of Treatment  individual therapy;physical therapy  -CB     Row Name 03/15/21 143          General Information    Patient Profile Reviewed  yes  -CB     Existing Precautions/Restrictions  fall;weight bearing WBAT RLE  -CB     Row Name 03/15/21 143          Cognition    Orientation Status (Cognition)  oriented  to;person  -CB     Row Name 03/15/21 1431          Safety Issues, Functional Mobility    Impairments Affecting Function (Mobility)  balance;coordination;endurance/activity tolerance;motor control;motor planning;muscle tone abnormal;strength;sensation/sensory awareness  -CB     Comment, Safety Issues/Impairments (Mobility)  gait belt donned for safety  -CB       User Key  (r) = Recorded By, (t) = Taken By, (c) = Cosigned By    Initials Name Provider Type    Gaye Celeste Physical Therapist        Mobility     Row Name 03/15/21 1432          Bed Mobility    Bed Mobility  supine-sit  -CB     Supine-Sit [place] (Bed Mobility)  verbal cues;nonverbal cues (demo/gesture);moderate assist (50% patient effort)  -CB     Row Name 03/15/21 1432          Bed-Chair Transfer    Bed-Chair [place] (Transfers)  other (see comments) unable to safely complete this date.  -CB     Row Name 03/15/21 1432          Sit-Stand Transfer    Sit-Stand [place] (Transfers)  maximum assist (25% patient effort);2 person assist;moderate assist (50% patient effort)  -CB     Assistive Device (Sit-Stand Transfers)  -- HHA LUE  -CB       User Key  (r) = Recorded By, (t) = Taken By, (c) = Cosigned By    Initials Name Provider Type    Gaye Celeste Physical Alondra        Obj/Interventions     Row Name 03/15/21 1434          Motor Skills    Therapeutic Exercise  -- seated ther ex for LAQ, marching, DF/PF(not on RLE) x10 reps  -CB     Row Name 03/15/21 1434          Balance    Balance Assessment  sitting static balance;sitting dynamic balance;standing static balance  -CB     Static Sitting Balance  sitting, edge of bed;WFL;unsupported  -CB     Dynamic Sitting Balance  mild impairment;sitting, edge of bed;unsupported  -CB     Static Standing Balance  severe impairment;supported;standing  -CB       User Key  (r) = Recorded By, (t) = Taken By, (c) = Cosigned By    Initials Name Provider Type    Gaye Celeste Physical Alondra         Goals/Plan    No documentation.       Clinical Impression     Row Name 03/15/21 1435          Pain    Additional Documentation  Pain Scale: FACES Pre/Post-Treatment (Group)  -CB     Row Name 03/15/21 1435          Pain Scale: FACES Pre/Post-Treatment    Pain: FACES Scale, Pretreatment  0-->no hurt  -CB     Posttreatment Pain Rating  4-->hurts little more with seated ther ex  -CB     Pain Location - Side  Right  -CB     Pain Location  extremity  -CB     Row Name 03/15/21 1432          Plan of Care Review    Plan of Care Reviewed With  patient  -CB     Progress  improving  -CB     Outcome Summary  Patient is agreeable to PT this date and is motivated. The patient completed supine to sitting EOB with modA this date. PT donned R AFO and L shoe prior to standing. The patient completed seated exercises and x2 STS with mod-maxAx2 using LUE. The patient will continue to benefit from skilled PT to increase level of independence.  -CB     Row Name 03/15/21 1435          Positioning and Restraints    Post Treatment Position  bed sitting EOB. Pt also continually stating no to returning supine to bed to eat lunch. Nursing stated ok to leave patient sitting EOB to eat lunch.  -CB     In Bed  sitting;call light within reach;encouraged to call for assist;exit alarm on;notified nsg  -CB       User Key  (r) = Recorded By, (t) = Taken By, (c) = Cosigned By    Initials Name Provider Type    Gaye Celeste Physical Therapist        Outcome Measures     Row Name 03/15/21 2067          How much help from another person do you currently need...    Turning from your back to your side while in flat bed without using bedrails?  2  -CB     Moving from lying on back to sitting on the side of a flat bed without bedrails?  2  -CB     Moving to and from a bed to a chair (including a wheelchair)?  1  -CB     Standing up from a chair using your arms (e.g., wheelchair, bedside chair)?  2  -CB     Climbing 3-5 steps with a railing?  1  -CB      To walk in hospital room?  1  -CB     AM-PAC 6 Clicks Score (PT)  9  -CB     Row Name 03/15/21 1440          Functional Assessment    Outcome Measure Options  AM-PAC 6 Clicks Basic Mobility (PT)  -CB       User Key  (r) = Recorded By, (t) = Taken By, (c) = Cosigned By    Initials Name Provider Type    CB Gaye Aldrich Physical Therapist        Physical Therapy Education                 Title: PT OT SLP Therapies (Done)     Topic: Physical Therapy (Done)     Point: Mobility training (Done)     Learning Progress Summary           Patient Acceptance, E,TB, NR,VU by  at 3/15/2021 1440    Acceptance, E,D, NR,DU,VU by  at 3/14/2021 1403   Family Acceptance, E,D, NR,DU,VU by  at 3/14/2021 1403                   Point: Home exercise program (Done)     Learning Progress Summary           Patient Acceptance, E,TB, NR,VU by  at 3/15/2021 1440    Acceptance, E,D, NR,DU,VU by  at 3/14/2021 1403   Family Acceptance, E,D, NR,DU,VU by  at 3/14/2021 1403                   Point: Body mechanics (Done)     Learning Progress Summary           Patient Acceptance, E,TB, NR,VU by  at 3/15/2021 1440    Acceptance, E,D, NR,DU,VU by  at 3/14/2021 1403   Family Acceptance, E,D, NR,DU,VU by  at 3/14/2021 1403                   Point: Precautions (Done)     Learning Progress Summary           Patient Acceptance, E,TB, NR,VU by  at 3/15/2021 1440    Acceptance, E,D, NR,DU,VU by  at 3/14/2021 1403   Family Acceptance, E,D, NR,DU,VU by  at 3/14/2021 1403                               User Key     Initials Effective Dates Name Provider Type Discipline     07/02/20 -  Leandro Anders, PT DPT Physical Therapist PT     12/30/20 -  Gaye Aldrich Physical Therapist PT              PT Recommendation and Plan  Planned Therapy Interventions (PT): balance training, bed mobility training, home exercise program, patient/family education, transfer training, strengthening  Plan of Care Reviewed With: patient  Progress:  improving  Outcome Summary: Patient is agreeable to PT this date and is motivated. The patient completed supine to sitting EOB with modA this date. PT donned R AFO and L shoe prior to standing. The patient completed seated exercises and x2 STS with mod-maxAx2 using LUE. The patient will continue to benefit from skilled PT to increase level of independence.     Time Calculation:   PT Charges     Row Name 03/15/21 1442             Time Calculation    Start Time  1318  -CB      Stop Time  1342  -CB      Time Calculation (min)  24 min  -CB      PT Received On  03/15/21  -CB      PT - Next Appointment  03/16/21  -CB         Time Calculation- PT    Total Timed Code Minutes- PT  24 minute(s)  -CB        User Key  (r) = Recorded By, (t) = Taken By, (c) = Cosigned By    Initials Name Provider Type    Gaye Celeste Physical Therapist        Therapy Charges for Today     Code Description Service Date Service Provider Modifiers Qty    49196684894 HC PT THER PROC EA 15 MIN 3/15/2021 Gaye Aldrich GP 2    75264558057 HC PT THER SUPP EA 15 MIN 3/15/2021 Gaye Aldrich GP 1          PT G-Codes  Outcome Measure Options: AM-PAC 6 Clicks Basic Mobility (PT)  AM-PAC 6 Clicks Score (PT): 9  AM-PAC 6 Clicks Score (OT): 10  Modified [place] Scale: 5 - Severe disability.  Bedridden, incontinent, and requiring constant nursing care and attention.    Gaye Aldrich  3/15/2021

## 2021-03-15 NOTE — PROGRESS NOTES
Continued Stay Note  [facility]     Patient Name: Mandeep Tracey  MRN: [id number]  Today's Date: 3/15/2021    Admit Date: 3/11/2021    Discharge Plan     Row Name 03/15/21 1507       Plan    Plan  [facility] D[facility] (Referrals Pending) Cannot go to Acute Rehab as it is not [facility] by [facility]. Will need ambulance transport. Pt will need cefazolin 2 g IV q8h through today then tomorrow will transition to penicillin G 4 million units IV q4h w/ stop date 4/23/21.    Patient/Family in Agreement with Plan  yes    Plan Comments  Called pt's wife Erica Tracey [phone] to discuss D/C planning. Discussed that [facility] is not a [facility] contracted facility and pt needs to go to a [facility] contracted Facility. Gave list of [facility] contracted SNF to wife. Stated she wants to research them and will let me know of choices. Bryon John RN-BC        Discharge Codes    No documentation.             Bryon John RN

## 2021-03-15 NOTE — PROGRESS NOTES
Name: Mandeep Tracey ADMIT: 3/11/2021   : 1960  PCP: Pipe Servin MD    MRN: [id number] LOS: 4 days   AGE/SEX: 61 y.o. male  ROOM: E4/     Subjective   Subjective   CC: right hip pain  No acute events. Patient has no new complaints. No more nausea or vomiting. Pain is well-controlled. Taking PO. No CP/dyspnea/f/c.    Objective   Objective   Vital Signs  Temp:  [98 °F (36.7 °C)-98.2 °F (36.8 °C)] 98 °F (36.7 °C)  Heart Rate:  [62-89] 71  Resp:  [18-20] 18  BP: (129-147)/(72-80) 147/80     on  Flow (L/min):  [4] 4;   Device (Oxygen Therapy): CPAP  Body mass index is 46.83 kg/m².  Physical Exam  Vitals and nursing note reviewed.   Constitutional:       General: He is not in acute distress.     Appearance: He is not toxic-appearing.   HENT:      Head: Normocephalic and atraumatic.      Nose: Nose normal.      Mouth/Throat:      Mouth: Mucous membranes are moist.      Pharynx: Oropharynx is clear.   Eyes:      Conjunctiva/sclera: Conjunctivae normal.      Pupils: Pupils are equal, round, and reactive to light.   Cardiovascular:      Rate and Rhythm: Normal rate and regular rhythm.      Pulses: Normal pulses.   Pulmonary:      Effort: Pulmonary effort is normal.      Breath sounds: Examination of the right-lower field reveals decreased breath sounds. Examination of the left-lower field reveals decreased breath sounds. Decreased breath sounds present.   Abdominal:      General: Bowel sounds are normal.      Palpations: Abdomen is soft.      Tenderness: There is no abdominal tenderness.   Musculoskeletal:         General: Swelling (trace BLE) present. No tenderness.      Cervical back: Normal range of motion and neck supple.      Comments: Right hip surgical dressing c/d/i, drain in place   Skin:     General: Skin is warm and dry.      Capillary Refill: Capillary refill takes less than 2 seconds.   Neurological:      General: No focal deficit present.      Mental Status: He is alert.      Comments: +right  hemiparesis (known from previous CVA)   Psychiatric:         Mood and Affect: Mood normal.         Behavior: Behavior normal.       Results Review     I reviewed the patient's new clinical results.  I reviewed the patient's telemetry  Results from last 7 days   Lab Units 03/15/21  0604 03/14/21 0442 03/13/21 0547 03/12/21 0423   WBC 10*3/mm3 12.09* 12.25* 9.55 8.61   HEMOGLOBIN g/dL 12.0* 12.6* 11.2* 10.7*   PLATELETS 10*3/mm3 407 349 254 235     Results from last 7 days   Lab Units 03/15/21  0604 03/14/21 0442 03/13/21 0547 03/12/21  0423   SODIUM mmol/L 138 136 136 132*   POTASSIUM mmol/L 3.7 4.0 4.4 3.8   CHLORIDE mmol/L 97* 95* 97* 96*   CO2 mmol/L 30.8* 27.4 27.4 25.8   BUN mg/dL 13 11 9 13   CREATININE mg/dL 0.68* 0.66* 0.62* 0.66*   GLUCOSE mg/dL 210* 246* 232* 210*   Estimated Creatinine Clearance: 145.8 mL/min (A) (by C-G formula based on SCr of 0.68 mg/dL (L)).  Results from last 7 days   Lab Units 03/11/21  1429   ALBUMIN g/dL 3.70   BILIRUBIN mg/dL 0.5   ALK PHOS U/L 54   AST (SGOT) U/L 17   ALT (SGPT) U/L 20     Results from last 7 days   Lab Units 03/15/21  0604 03/14/21 0442 03/13/21 0547 03/12/21 0423 03/11/21  1429   CALCIUM mg/dL 8.9 9.2 8.7 8.4* 9.1   ALBUMIN g/dL  --   --   --   --  3.70   MAGNESIUM mg/dL  --   --   --  1.9  --        COVID19   Date Value Ref Range Status   03/11/2021 Not Detected Not Detected - Ref. Range Final     Glucose   Date/Time Value Ref Range Status   03/15/2021 1628 153 (H) 70 - 130 mg/dL Final   03/15/2021 1134 225 (H) 70 - 130 mg/dL Final   03/15/2021 0618 203 (H) 70 - 130 mg/dL Final   03/14/2021 2110 164 (H) 70 - 130 mg/dL Final   03/14/2021 1631 213 (H) 70 - 130 mg/dL Final   03/14/2021 1133 235 (H) 70 - 130 mg/dL Final   03/14/2021 0603 251 (H) 70 - 130 mg/dL Final       CT Abdomen Pelvis Without Contrast  Narrative: Patient: OSCAR TONG  Time Out: 00:02  Exam(s): CT ABDOMEN + PELVIS Without Contrast     EXAM:    CT Abdomen and Pelvis Without Intravenous  Contrast    CLINICAL HISTORY:     Reason for exam: post op ileus.    TECHNIQUE:    Axial computed tomography images of the abdomen and pelvis without   intravenous contrast.  CTDI is 32.0 mGy and DLP is 2364.60 mGy-cm.  This   CT exam was performed according to the principle of ALARA (As Low As   Reasonably Achievable) by using one or more of the following dose   reduction techniques: automated exposure control, adjustment of the mA   and or kV according to patient size, and or use of iterative   reconstruction technique.    COMPARISON:    No relevant prior studies available.    FINDINGS:    Limitations:  Limited due to metallic artifact.    Lung bases:  Right lower lobe consolidation atelectasis.     ABDOMEN:    Liver:  Unremarkable.    Gallbladder and bile ducts:  Cholecystectomy.    Pancreas:  Unremarkable.    Spleen:  Unremarkable.    Adrenals:  Unremarkable.    Kidneys and ureters:  No hydronephrosis or ureteral calculus.    Nonspecific bilateral perinephric stranding, cannot exclude   infection inflammation.  Left renal cysts.  Possible right   nephrolithiasis.    Stomach and bowel:  No mechanical bowel obstruction.  Colonic   diverticulosis.  No diverticulitis.     PELVIS:    Appendix:  No findings to suggest acute appendicitis.    Bladder:  Distended bladder.    Reproductive:  Unremarkable as visualized.     ABDOMEN and PELVIS:    Intraperitoneal space:  No free air.    Bones joints:  Right hip arthroplasty with associated postop changes.    Soft tissues:  Right thigh hip soft tissue edema and small air likely   postop.    Vasculature:  Atherosclerosis.    Lymph nodes:  Unremarkable.    IMPRESSION:       1.  No mechanical bowel obstruction.  Colonic diverticulosis.  No   diverticulitis.  2.  No hydronephrosis or ureteral calculus.  Nonspecific bilateral   perinephric stranding, cannot exclude infection inflammation.  3.  Right lower lobe consolidation atelectasis.  Impression: Electronically signed by  Kike Kelly M.D. on 03-14-21 at 0002    Scheduled Medications  acetaminophen, 1,000 mg, Oral, Q6H  aspirin, 81 mg, Oral, BID With Meals  ceFAZolin, 2 g, Intravenous, Q8H  docusate sodium, 200 mg, Oral, BID  insulin glargine, 20 Units, Subcutaneous, Nightly  insulin lispro, 0-9 Units, Subcutaneous, TID AC  insulin lispro, 6 Units, Subcutaneous, TID With Meals  isosorbide mononitrate, 30 mg, Oral, Daily  metoprolol succinate XL, 50 mg, Oral, Q12H  [START ON 3/16/2021] penicillin g (potassium), 4 Million Units, Intravenous, Q4H  rosuvastatin, 40 mg, Oral, Nightly  Scopolamine, 1 patch, Transdermal, Q72H  sodium chloride, 10 mL, Intravenous, Q12H  sodium chloride, 3 mL, Intravenous, Q12H    Infusions  lactated ringers, 100 mL/hr, Last Rate: 100 mL/hr (03/15/21 1631)    Diet  Diet Regular; Consistent Carbohydrate       Assessment/Plan     Active Hospital Problems    Diagnosis  POA   • **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) [T84.51XA]  Not Applicable   • Nonrheumatic mitral valve regurgitation [I34.0]  Unknown   • Cardiomyopathy (CMS/HCC) [I42.9]  Unknown   • PVCs (premature ventricular contractions) [I49.3]  Unknown   • Postoperative nausea and vomiting [R11.2, Z98.890]  Yes   • CVA (cerebral vascular accident) (CMS/[facility]) [I63.9]  Yes   • Right hemiparesis (CMS/[facility]) [G81.91]  Yes   • BELKYS on CPAP [G47.33, Z99.89]  Not Applicable   • Type 2 diabetes mellitus (CMS/HCC) [E11.9]  Yes   • Essential hypertension [I10]  Yes   • MSSA (methicillin susceptible Staphylococcus aureus) infection [A49.01]  Yes   • Group B streptococcal infection [A49.1]  Yes   • UTI (urinary tract infection) [N39.0]  Yes      Resolved Hospital Problems   No resolved problems to display.   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  - s/p right hip I&D 3/12/21-will follow up operative culture  - continue on cefazolin  - continue pain control, encourage incentive spirometry  - mitral regurgitation noted on  echocardiogram-LAVELLE considered by cardiology but not at this time  - appreciate ID, cardiology, and Orthopedic Surgery recs    Postoperative Nausea and Vomiting  - continue PRN zofran and scopolamine patch  - abdominal examination is benign  - had abdomen/pelvis CT scan overnight which showed nothing acute    Frequent PVCs  - echocardiogram results noted with reduced LVEF and wall motion abnormalities  - probably has CAD (on imdur prior to admission) but no evidence of ACS  - metoprolol increased  - no plans for ischemic evaluation at this time per cardiology, appreciate recs    Type 2 DM  - continue lantus 20 units nightly  - continue scheduled 6 units of prandial insulin TID  - change ssi/hypoglycemia protocol to moderate dose  - A1C 9.22%    BELKYS  - has home CPAP, continue    Hx of CVA with Residual Right Hemiparesis  - no acute neurologic changes  - continue on ASA and statin    HTN  - on imdur and metoprolol    ASA 81mg BID per orthopedic surgery for DVT prophylaxis.  Full code.  Discussed with patient, family and nursing staff.  Anticipate discharge to SNU facility in 1-2 days.      Angel Luis Dejesus MD  [facility]  03/15/21  18:34 EDT

## 2021-03-15 NOTE — PROGRESS NOTES
Continued Stay Note  [facility]     Patient Name: Mandeep Tracey  MRN: [id number]  Today's Date: 3/15/2021    Admit Date: 3/11/2021    Discharge Plan     Row Name 03/15/21 1328       Plan    Plan Comments  Call placed to [facility].  They confirm pt's PCP is Dr. Pipe Servin.  Pt is % service connected.  Due to the need for SNF placement, they request clinicals which will be forwarded to Dr. Servin for review.  [facility] at [phone].  Await recommendations/authorizations. CALOS Perdomo RN, HRCCP.        Discharge Codes    No documentation.             Tere Perdomo RN

## 2021-03-15 NOTE — PROGRESS NOTES
LOS: 4 days     Chief Complaint:  Follow-up R hip PJI due to Group B Strep    Interval History:  OR 3/12 for I&D of the R hip. He reports some right hip pain. He had nausea yesterday. NO rashes. No fever.     D/W his wife by phone.    ROS: no chest pain or headache    Vital Signs  Temp:  [97.7 °F (36.5 °C)-98.2 °F (36.8 °C)] 98 °F (36.7 °C)  Heart Rate:  [] 71  Resp:  [18-20] 18  BP: ()/() 147/80    Physical Exam:  General: awake, alert  Eyes:  no scleral icterus  ENT: poor dentition  Cardiovascular: NR  Respiratory: normal work of breathing on CPAP  GI: Abdomen is obese, soft, non-tender  :  no Chavez catheter  Musculoskeletal: R hip w/ bandage  Skin: No rashes    Antibiotics:  •  ceFAZolin in dextrose (ANCEF) IVPB solution 2 g, 2 g, Intravenous, Q8H    LABS:  CBC, BMP, CRP, A1c, micro reviewed today  Lab Results   Component Value Date    WBC 12.09 (H) 03/15/2021    HGB 12.0 (L) 03/15/2021    HCT 36.5 (L) 03/15/2021    MCV 87.5 03/15/2021     03/15/2021     Lab Results   Component Value Date    GLUCOSE 210 (H) 03/15/2021    CALCIUM 8.9 03/15/2021     03/15/2021    K 3.7 03/15/2021    CO2 30.8 (H) 03/15/2021    CL 97 (L) 03/15/2021    BUN 13 03/15/2021    CREATININE 0.68 (L) 03/15/2021    EGFRIFNONA 119 03/15/2021    BCR 19.1 03/15/2021    ANIONGAP 10.2 03/15/2021     Lab Results   Component Value Date    CRP 27.86 (H) 03/11/2021     Lab Results   Component Value Date    HGBA1C 9.22 (H) 03/11/2021     Microbiology:  3/9 OSH BCx: coag-negative Staph in 1/2 sets  3/9 OSH UCx: >100k MSSA  3/10 OSH R Hip Synovial Cx: Group B Strep  3/11 COVID; negative  3/12 R Hip OR Cx: negative    Radiology (report reviewed):  CT A/P negative for obstruction    Assessment/Plan   1. 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 Urine culture positive for MSSA (blood culture with coag-neg Staph 1/2 sets)  3. Super obesity BMI 50  4. Uncontrolled DM2 - A1c 9.2%  5. History of stroke and  hemiparesis     On 3/12/21, he underwent I&D of the R hip with retention of hardware. For Group B Strep infection of the right prosthetic hip (cultures scanned in under media tab), continue cefazolin 2 g IV q8h through today then tomorrow will transition to penicillin G 4 million units IV q4h w/ stop date 4/23/21. Weekly CBC w/ diff, BMP, CRP faxed to me at [phone]. At his ID follow-up visit, I'll plan to switch him to at least 3 months of oral antibiotic suppression. PICC today.     ID will follow.

## 2021-03-15 NOTE — THERAPY EVALUATION
"Patient Name: Mandeep Tracey  : 1960    MRN: [id number]                              Today's Date: 3/15/2021       Admit Date: 3/11/2021    Visit Dx:     ICD-10-CM ICD-9-CM   1. Group B streptococcal infection  A49.1 041.02     Patient Active Problem List   Diagnosis   • CVA (cerebral vascular accident) (CMS/[facility])   • Right hemiparesis (CMS/[facility])   • BELKYS on CPAP   • Seizure disorder (CMS/[facility])   • Type 2 diabetes mellitus (CMS/[facility])   • Essential hypertension   • Infection of right prosthetic hip joint (CMS/[facility])   • MSSA (methicillin susceptible Staphylococcus aureus) infection   • Group B streptococcal infection   • UTI (urinary tract infection)   • Postoperative nausea and vomiting   • Nonrheumatic mitral valve regurgitation   • Cardiomyopathy (CMS/[facility])   • PVCs (premature ventricular contractions)     Past Medical History:   Diagnosis Date   • Aphasia    • CPAP (continuous positive airway pressure) dependence    • Diabetes (CMS/[facility])    • Hemiparesis (CMS/[facility])     Right side    • Peptic ulcer disease    • Postoperative nausea and vomiting 3/13/2021   • Psoriasis    • Seizures (CMS/[facility])    • Sleep apnea    • Stroke (CMS/[facility])      Past Surgical History:   Procedure Laterality Date   • CHOLECYSTECTOMY     • EYE SURGERY     • LUMBAR DISC SURGERY       General Information     Row Name 03/15/21 0904          OT Time and Intention    Document Type  evaluation;therapy note (daily note)  -LE     Mode of Treatment  individual therapy;occupational therapy  -     Row Name 03/15/21 0904          General Information    Patient Profile Reviewed  yes  -LE     Prior Level of Function  -- Facetime with wife who states pt pivoted to w/c, took w/c to bathroom. pt always has assist/supervision but able to assist with ADL and xferred \"with light assist\".  -LE     Existing Precautions/Restrictions  fall;weight bearing WBAT R LE.  -LE     Barriers to Rehab  medically complex;previous functional deficit  -     Row Name 03/15/21 " 0904          Occupational Profile    Reason for Services/Referral (Occupational Profile)  [facility] [place] [facility] with h/o CVA with R yoly, h/o R THR with infection and now s/p I&D on 3/12/21.  Pt lives with wife and uses w/c at home and some assist with all tasks.  -LE     Row Name 03/15/21 0904          Living Environment    Lives With  spouse has caregivers 3x/week for 3 hours per wife report.  -LE     Row Name 03/15/21 0904          Cognition    Orientation Status (Cognition)  oriented to;person pt aphasic and needs repetition for communication. uses gestures to help with communication.  -LE     Row Name 03/15/21 0904          Safety Issues, Functional Mobility    Comment, Safety Issues/Impairments (Mobility)  gait belt and non skid socks used.  -TIFFANY       User Key  (r) = Recorded By, (t) = Taken By, (c) = Cosigned By    Initials Name Provider Type    Glendy Hill OTR Occupational Therapist          Mobility/ADL's     Row Name 03/15/21 0907          Bed Mobility    Bed Mobility  scooting/bridging;sit-supine;supine-sit  -LE     Scooting/Bridging [place] (Bed Mobility)  maximum assist (25% patient effort);2 person assist  -LE     Supine-Sit [place] (Bed Mobility)  maximum assist (25% patient effort);verbal cues;nonverbal cues (demo/gesture)  -LE     Sit-Supine [place] (Bed Mobility)  verbal cues;nonverbal cues (demo/gesture)  -LE     Bed Mobility, Safety Issues  decreased use of arms for pushing/pulling;decreased use of legs for bridging/pushing;impaired trunk control for bed mobility  -LE     Assistive Device (Bed Mobility)  bed rails;head of bed elevated;draw sheet  -LE     Comment (Bed Mobility)  pt hooks R leg with L leg automatically during movement to/from bed.  -LE     Row Name 03/15/21 0907          Transfers    Transfers  sit-stand transfer;bed-chair transfer  -TIFFANY     Comment (Transfers)  4-5 stands with OT and aid in attempt to xfer to chair.  Max A of 2 and pt sits almost immediately each  "time.  Modifications attempted for both stand pivot and squat pivot but unsuccessful.  Pt does scoot back on bed twice with mod A when cued to due to getting too close to edge of air bed.  -LE     Bed-Chair [place] (Transfers)  other (see comments) unable to complete safely  -     Row Name 03/15/21 [facility]          Functional Mobility    Functional Mobility- Comment  non ambulatory at baseline as uses w/c \"almost all the time\"per wife.  -[facility] Name 03/15/21 [facility]          Activities of Daily Living    BADL Assessment/Intervention  toileting;feeding;grooming;upper body dressing;lower body dressing  -[facility] Name 03/15/21 09          Toileting Assessment/Training    [place] Level (Toileting)  maximum assist (25% patient effort)  -LE     Comment (Toileting)  urinal at this time.  wife reports urinal at night at home and brings w/c to toilet during daytime  -[facility] Name 03/15/21 [facility]          Self-Feeding Assessment/Training    Position (Self-Feeding)  sitting up in bed  -LE     Comment (Feeding)  set up to eat with L hand after reposition pt upright in bed.  -[facility] Name 03/15/21 09          Grooming Assessment/Training    [place] Level (Grooming)  wash face, hands;set up  -LE     Position (Grooming)  supine  -[facility] Name 03/15/21 09          Upper Body Dressing Assessment/Training    Comment (Upper Body Dressing)  max to edgardo gown.  -     Row Name 03/15/21 09          Lower Body Dressing Assessment/Training    [place] Level (Lower Body Dressing)  don;doff;shoes/slippers;socks;dependent (less than 25% patient effort)  -LE     Position (Lower Body Dressing)  edge of bed sitting  -LE     Comment (Lower Body Dressing)  OT donns B socks, R shoe/brace, L shoe.   doff B shoes/brace at end of OT for return to bed.  -LE       User Key  (r) = Recorded By, (t) = Taken By, (c) = Cosigned By    Initials Name Provider Type    Glendy Hill OTR Occupational Therapist    "     Obj/Interventions     [facility] Name 03/15/21 0912          Sensory Assessment (Somatosensory)    Sensory Assessment (Somatosensory)  unable/difficult to assess  -LE     Row Name 03/15/21 0912          Vision Assessment/Intervention    Visual Impairment/Limitations  unable/difficult to assess  -LE     Row Name 03/15/21 0912          Range of Motion Comprehensive    Comment, General Range of Motion  no AROM noted R UE. h/o hemiparesis from CVA.  L Ue 2/3 AROM.  -LE     Row Name 03/15/21 0912          Strength Comprehensive (MMT)    Comment, General Manual Muscle Testing (MMT) Assessment  pt pulls with L UE during tasks.  -[facility] Name 03/15/21 0912          Balance    Balance Assessment  sitting static balance;standing static balance  -LE     Static Sitting Balance  mild impairment improves to SBA when adjusted seating at edge of air bed.  -LE     Static Standing Balance  severe impairment;supported  -LE     Balance Interventions  core stability exercise  -LE     Comment, Balance  assist of 2 for several stands.  OT blocks R knee and donns R brace and L shoe.  -LE       User Key  (r) = Recorded By, (t) = Taken By, (c) = Cosigned By    Initials Name Provider Type    Glendy Hill OTR Occupational Therapist        Goals/Plan     Row Name 03/15/21 0921          Transfer Goal 1 (OT)    Activity/Assistive Device (Transfer Goal 1, OT)  sit-to-stand/stand-to-sit;bed-to-chair/chair-to-bed;toilet;commode, 3-in-1  -LE     [place] Level/Cues Needed (Transfer Goal 1, OT)  maximum assist (25-49% patient effort) of 2 person assist  -LE     Time Frame (Transfer Goal 1, OT)  2 weeks  -LE     Progress/Outcome (Transfer Goal 1, OT)  goal ongoing  -LE     Row Name 03/15/21 0921          Dressing Goal 1 (OT)    Activity/Device (Dressing Goal 1, OT)  upper body dressing at chair or EOB level.  -LE     [place]/Cues Needed (Dressing Goal 1, OT)  set-up required;standby assist  -LE     Time Frame (Dressing Goal 1, OT)  2 weeks   -LE     Progress/Outcome (Dressing Goal 1, OT)  goal ongoing  -LE     Row Name 03/15/21 0921          Grooming Goal 1 (OT)    Activity/Device (Grooming Goal 1, OT)  oral care;wash face, hands  -LE     [place] (Grooming Goal 1, OT)  set-up required;standby assist at chair or EOB level .  -LE     Time Frame (Grooming Goal 1, OT)  2 weeks  -LE     Progress/Outcome (Grooming Goal 1, OT)  goal ongoing  -LE     Row Name 03/15/21 0921          Therapy Assessment/Plan (OT)    Planned Therapy Interventions (OT)  activity tolerance training;adaptive equipment training;BADL retraining;occupation/activity based interventions;patient/caregiver education/training;transfer/mobility retraining  -LE       User Key  (r) = Recorded By, (t) = Taken By, (c) = Cosigned By    Initials Name Provider Type    Glendy Hill OTR Occupational Therapist        Clinical Impression     Row Name 03/15/21 0915          Pain Assessment    Additional Documentation  Pain Scale: FACES Pre/Post-Treatment (Group)  -     Row Name 03/15/21 0915          Pain Scale: Numbers Pre/Post-Treatment    Pain Location - Side  Right  -LE     Pain Location  hip;extremity  -LE     Pain Intervention(s)  Repositioned;Rest  -LE     Row Name 03/15/21 0915          Pain Scale: FACES Pre/Post-Treatment    Pain: FACES Scale, Pretreatment  0-->no hurt at rest.  -LE     Posttreatment Pain Rating  8-->hurts whole lot mild grimace when edgardo R sock and then tight grimace during sit to supine.  grimace decreases with rest.  -     Row Name 03/15/21 0915          Plan of Care Review    Plan of Care Reviewed With  patient;spouse spoke with wife on Facetime after Dr Miller spoke with wife.  -LE     Outcome Summary  Pt admit from home with R hip pain and now s/p I&D R hip. Pt with h/o R hemiparesis from CVA and does not have active movement of R UE.  Pt seen by OT today and is max A to move to/from EOB with max A of 2 to attempt standing and xfer.  Xfer to chair  unsuccessful to complete safely.  Pt lives with wife and uses w/c at baseline and able to assist with all tasks.  Pt may benefit from skilled OT to increase safety and independence.  At current level recommend SNU at d/c.  -TIFFANY     Row Name 03/15/21 0915          Therapy Assessment/Plan (OT)    Patient/Family Therapy Goal Statement (OT)  return to prior level of function .  -LE     Rehab Potential (OT)  good, to achieve stated therapy goals  -LE     Criteria for Skilled Therapeutic Interventions Met (OT)  meets criteria;yes  -LE     Therapy Frequency (OT)  5 times/wk  -TIFFANY     Row Name 03/15/21 0915          Therapy Plan Review/Discharge Plan (OT)    Equipment Needs Upon Discharge (OT)  -- owns w/c, grab bars, urinal, shower chair.  -LE     Anticipated Discharge Disposition (OT)  skilled nursing facility  -TIFFANY     Row Name 03/15/21 0915          Vital Signs    O2 Delivery Pre Treatment  room air  -LE     O2 Delivery Intra Treatment  room air  -LE     O2 Delivery Post Treatment  room air  -LE     Pre Patient Position  Supine  -LE     Intra Patient Position  Standing  -LE     Post Patient Position  Supine  -LE     Activity Duration  -- asleep on CPAP when enter. several minutes to waken and then to stay alert.  pt with nause during session and holds emesis bag without overt vomitting. (RN aware)  -TIFFANY     Row Name 03/15/21 0915          Positioning and Restraints    Pre-Treatment Position  in bed  -LE     Post Treatment Position  bed  -LE     In Bed  notified nsg;fowlers;call light within reach;encouraged to call for assist;exit alarm on with aid. discuss pt with RN, CCP re: attempts to stand and rec. for SNU at d/c.  -TIFFANY       User Key  (r) = Recorded By, (t) = Taken By, (c) = Cosigned By    Initials Name Provider Type    Glendy Hill OTR Occupational Therapist        Outcome Measures     Row Name 03/15/21 0922          How much help from another is currently needed...    Putting on and taking off regular lower body  clothing?  1  -LE     Bathing (including washing, rinsing, and drying)  1  -LE     Toileting (which includes using toilet bed pan or urinal)  1  -LE     Putting on and taking off regular upper body clothing  2  -LE     Taking care of personal grooming (such as brushing teeth)  2  -LE     Eating meals  3  -LE     AM-PAC 6 Clicks Score (OT)  10  -LE     Row Name 03/15/21 0922          Modified [place] Scale    Modified [place] Scale  5 - Severe disability.  Bedridden, incontinent, and requiring constant nursing care and attention.  -LE     Row Name 03/15/21 0922          Functional Assessment    Outcome Measure Options  AM-PAC 6 Clicks Daily Activity (OT);Modified [place]  -LE       User Key  (r) = Recorded By, (t) = Taken By, (c) = Cosigned By    Initials Name Provider Type    Glendy Hill OTR Occupational Therapist        Occupational Therapy Education                 Title: PT OT SLP Therapies (Done)     Topic: Occupational Therapy (Done)     Point: ADL training (Done)     Description:   Instruct learner(s) on proper safety adaptation and remediation techniques during self care or transfers.   Instruct in proper use of assistive devices.              Learning Progress Summary           Patient Acceptance, E,D, VU,DU by TIFFANY at 3/15/2021 0923    Comment: fall risk, plan of care, role of OT.  ed pt on body  mechanics during standing attempts and for sitting EOB.  spoke with wife via Facetime in room with pt.   Family Acceptance, E,D, VU,DU by TIFFANY at 3/15/2021 0923    Comment: fall risk, plan of care, role of OT.  ed pt on body  mechanics during standing attempts and for sitting EOB.  spoke with wife via Facetime in room with pt.                   Point: Precautions (Done)     Description:   Instruct learner(s) on prescribed precautions during self-care and functional transfers.              Learning Progress Summary           Patient Acceptance, E,D, VU,DU by TIFFANY at 3/15/2021 0923    Comment: fall risk, plan of care,  role of OT.  ed pt on body  mechanics during standing attempts and for sitting EOB.  spoke with wife via Facetime in room with pt.   Family Acceptance, E,D, VU,DU by TIFFANY at 3/15/2021 0923    Comment: fall risk, plan of care, role of OT.  ed pt on body  mechanics during standing attempts and for sitting EOB.  spoke with wife via Facetime in room with pt.                   Point: Body mechanics (Done)     Description:   Instruct learner(s) on proper positioning and spine alignment during self-care, functional mobility activities and/or exercises.              Learning Progress Summary           Patient Acceptance, E,D, VU,DU by TIFFANY at 3/15/2021 0923    Comment: fall risk, plan of care, role of OT.  ed pt on body  mechanics during standing attempts and for sitting EOB.  spoke with wife via Facetime in room with pt.   Family Acceptance, E,D, VU,DU by TIFFANY at 3/15/2021 0923    Comment: fall risk, plan of care, role of OT.  ed pt on body  mechanics during standing attempts and for sitting EOB.  spoke with wife via Facetime in room with pt.                               User Key     Initials Effective Dates Name Provider Type Discipline    TIFFANY 06/08/18 -  Glendy Alston, OTR Occupational Therapist OT              OT Recommendation and Plan  Planned Therapy Interventions (OT): activity tolerance training, adaptive equipment training, BADL retraining, occupation/activity based interventions, patient/caregiver education/training, transfer/mobility retraining  Therapy Frequency (OT): 5 times/wk  Plan of Care Review  Plan of Care Reviewed With: patient, spouse (spoke with wife on Facetime after Dr Miller spoke with wife.)  Outcome Summary: Pt admit from home with R hip pain and now s/p I&D R hip. Pt with h/o R hemiparesis from CVA and does not have active movement of R UE.  Pt seen by OT today and is max A to move to/from EOB with max A of 2 to attempt standing and xfer.  Xfer to chair unsuccessful to complete safely.  Pt lives  with wife and uses w/c at baseline and able to assist with all tasks.  Pt may benefit from skilled OT to increase safety and independence.  At current level recommend SNU at d/c.     Time Calculation:   Time Calculation- OT     Row Name 03/15/21 0926             Time Calculation- OT    OT Start Time  0817  -LE      OT Stop Time  0857  -LE      OT Time Calculation (min)  40 min  -LE      Total Timed Code Minutes- OT  30 minute(s)  -LE      OT Received On  03/15/21  -LE      OT - Next Appointment  03/16/21  -LE      OT Goal Re-Cert Due Date  03/29/21  -LE        User Key  (r) = Recorded By, (t) = Taken By, (c) = Cosigned By    Initials Name Provider Type    Glendy Hill OTR Occupational Therapist        Therapy Charges for Today     Code Description Service Date Service Provider Modifiers Qty    88784224734 HC OT EVAL MOD COMPLEXITY 2 3/15/2021 Glendy Alston OTR GO 1    39035453151 HC OT SELF CARE/MGMT/TRAIN EA 15 MIN 3/15/2021 Glendy Alston OTR GO 1    40101440797 HC OT THERAPEUTIC ACT EA 15 MIN 3/15/2021 Glendy Alston OTR GO 1               BRANDEN Tierney  3/15/2021

## 2021-03-15 NOTE — PLAN OF CARE
Goal Outcome Evaluation:  Plan of Care Reviewed With: patient, spouse (spoke with wife on Facetime after Dr Miller spoke with wife.)     Outcome Summary: Pt admit from home with R hip pain and now s/p I&D R hip. Pt with h/o R hemiparesis from CVA and does not have active movement of R UE.  Pt seen by OT today and is max A to move to/from EOB with max A of 2 to attempt standing and xfer.  Xfer to chair unsuccessful to complete safely.  Pt lives with wife and uses w/c at baseline and able to assist with all tasks.  Pt may benefit from skilled OT to increase safety and independence.  At current level recommend SNU at d/c.        Patient was placed in a face mask during this therapy encounter intermittently, mostly off during session due to on CPAP when enter, then grooming and using emesis bag for nausea.  Therapist used appropriate personal protective equipment including surgical mask, eye shield and gloves during the entire therapy session. Hand hygiene was completed before and after therapy session. Patient is not in enhanced droplet precautions.

## 2021-03-15 NOTE — SIGNIFICANT NOTE
03/15/21 1603   PICC Single Lumen 03/15/21 Left Cephalic   Placement Date/Time: 03/15/21 1601   Hand Hygiene Completed: Yes  Size (Fr): 4  Description (optional): power picc, lot [id number], exp 03-  Length (cm): 47 cm  Orientation: Left  Location: Cephalic  Site Prep: Chlorhexidine isopropyl alcohol  Al...   Site Assessment Clean;Dry;Intact   #1 Lumen Status Blood return noted;Capped;Flushed;Normal saline locked   Length nancy (cm) 47 cm   Line Care Connections checked and tightened   Extremity Circumference (cm) 39 cm   Dressing Type Border Dressing;Securing device;Antimicrobial dressing/disc   Dressing Status Clean;Dry;Intact   Dressing Intervention New dressing   Liquid Adhesive Applied   Dressing Change Due 03/22/21   PICC Line tip in SVC per 3cg technology

## 2021-03-15 NOTE — PLAN OF CARE
Goal Outcome Evaluation:  Plan of Care Reviewed With: patient  Progress: improving  Outcome Summary: VSS. PICC line placed today. IVF and abx continue. Will switch to penicillin g tomorrow. Pain pill given x1. Worked with PT and OT. Will continue to monitor.

## 2021-03-16 LAB
ANION GAP SERPL CALCULATED.3IONS-SCNC: 9 MMOL/L (ref 5–15)
BUN SERPL-MCNC: 10 MG/DL (ref 6–20)
BUN/CREAT SERPL: 14.3 (ref 7–25)
CALCIUM SPEC-SCNC: 8.3 MG/DL (ref 8.6–10.5)
CHLORIDE SERPL-SCNC: 96 MMOL/L (ref 98–107)
CO2 SERPL-SCNC: 31 MMOL/L (ref 22–29)
CREAT SERPL-MCNC: 0.7 MG/DL (ref 0.76–1.27)
CRP SERPL-MCNC: 3.01 MG/DL (ref 0–0.5)
DEPRECATED RDW RBC AUTO: 40.6 FL (ref 37–54)
ERYTHROCYTE [DISTWIDTH] IN BLOOD BY AUTOMATED COUNT: 13.1 % (ref 12.3–15.4)
GFR SERPL CREATININE-BSD FRML MDRD: 115 ML/MIN/1.73
GLUCOSE BLDC GLUCOMTR-MCNC: 128 MG/DL (ref 70–130)
GLUCOSE BLDC GLUCOMTR-MCNC: 159 MG/DL (ref 70–130)
GLUCOSE BLDC GLUCOMTR-MCNC: 162 MG/DL (ref 70–130)
GLUCOSE BLDC GLUCOMTR-MCNC: 191 MG/DL (ref 70–130)
GLUCOSE BLDC GLUCOMTR-MCNC: 194 MG/DL (ref 70–130)
GLUCOSE SERPL-MCNC: 150 MG/DL (ref 65–99)
HCT VFR BLD AUTO: 32.1 % (ref 37.5–51)
HGB BLD-MCNC: 10.6 G/DL (ref 13–17.7)
MCH RBC QN AUTO: 28.7 PG (ref 26.6–33)
MCHC RBC AUTO-ENTMCNC: 33 G/DL (ref 31.5–35.7)
MCV RBC AUTO: 87 FL (ref 79–97)
PLATELET # BLD AUTO: 402 10*3/MM3 (ref 140–450)
PMV BLD AUTO: 10 FL (ref 6–12)
POTASSIUM SERPL-SCNC: 3.3 MMOL/L (ref 3.5–5.2)
RBC # BLD AUTO: 3.69 10*6/MM3 (ref 4.14–5.8)
SODIUM SERPL-SCNC: 136 MMOL/L (ref 136–145)
WBC # BLD AUTO: 10.65 10*3/MM3 (ref 3.4–10.8)

## 2021-03-16 PROCEDURE — 86140 C-REACTIVE PROTEIN: CPT | Performed by: INTERNAL MEDICINE

## 2021-03-16 PROCEDURE — 63710000001 INSULIN LISPRO (HUMAN) PER 5 UNITS: Performed by: INTERNAL MEDICINE

## 2021-03-16 PROCEDURE — 99232 SBSQ HOSP IP/OBS MODERATE 35: CPT | Performed by: INTERNAL MEDICINE

## 2021-03-16 PROCEDURE — 25010000003 PENICILLIN G POTASSIUM PER 600000 UNITS: Performed by: INTERNAL MEDICINE

## 2021-03-16 PROCEDURE — 63710000001 INSULIN GLARGINE PER 5 UNITS: Performed by: ORTHOPAEDIC SURGERY

## 2021-03-16 PROCEDURE — 80048 BASIC METABOLIC PNL TOTAL CA: CPT | Performed by: INTERNAL MEDICINE

## 2021-03-16 PROCEDURE — 97110 THERAPEUTIC EXERCISES: CPT

## 2021-03-16 PROCEDURE — 82962 GLUCOSE BLOOD TEST: CPT

## 2021-03-16 PROCEDURE — 85027 COMPLETE CBC AUTOMATED: CPT | Performed by: ORTHOPAEDIC SURGERY

## 2021-03-16 RX ADMIN — ASPIRIN 81 MG: 81 TABLET, CHEWABLE ORAL at 17:17

## 2021-03-16 RX ADMIN — INSULIN GLARGINE 20 UNITS: 100 INJECTION, SOLUTION SUBCUTANEOUS at 22:33

## 2021-03-16 RX ADMIN — SODIUM CHLORIDE, PRESERVATIVE FREE 10 ML: 5 INJECTION INTRAVENOUS at 22:33

## 2021-03-16 RX ADMIN — INSULIN LISPRO 2 UNITS: 100 INJECTION, SOLUTION INTRAVENOUS; SUBCUTANEOUS at 10:19

## 2021-03-16 RX ADMIN — DOCUSATE SODIUM 200 MG: 100 CAPSULE, LIQUID FILLED ORAL at 10:21

## 2021-03-16 RX ADMIN — ASPIRIN 81 MG: 81 TABLET, CHEWABLE ORAL at 10:19

## 2021-03-16 RX ADMIN — METOPROLOL SUCCINATE 50 MG: 50 TABLET, EXTENDED RELEASE ORAL at 23:01

## 2021-03-16 RX ADMIN — SODIUM CHLORIDE, POTASSIUM CHLORIDE, SODIUM LACTATE AND CALCIUM CHLORIDE 100 ML/HR: 600; 310; 30; 20 INJECTION, SOLUTION INTRAVENOUS at 14:14

## 2021-03-16 RX ADMIN — PENICILLIN G POTASSIUM 4 MILLION UNITS: 20000000 INJECTION, POWDER, FOR SOLUTION INTRAVENOUS at 14:13

## 2021-03-16 RX ADMIN — PENICILLIN G POTASSIUM 4 MILLION UNITS: 20000000 INJECTION, POWDER, FOR SOLUTION INTRAVENOUS at 17:17

## 2021-03-16 RX ADMIN — HYDROCODONE BITARTRATE AND ACETAMINOPHEN 1 TABLET: 7.5; 325 TABLET ORAL at 02:03

## 2021-03-16 RX ADMIN — ACETAMINOPHEN 1000 MG: 500 TABLET, FILM COATED ORAL at 14:13

## 2021-03-16 RX ADMIN — PENICILLIN G POTASSIUM 4 MILLION UNITS: 20000000 INJECTION, POWDER, FOR SOLUTION INTRAVENOUS at 10:19

## 2021-03-16 RX ADMIN — INSULIN LISPRO 6 UNITS: 100 INJECTION, SOLUTION INTRAVENOUS; SUBCUTANEOUS at 13:01

## 2021-03-16 RX ADMIN — ROSUVASTATIN CALCIUM 40 MG: 40 TABLET, FILM COATED ORAL at 22:31

## 2021-03-16 RX ADMIN — ISOSORBIDE MONONITRATE 30 MG: 30 TABLET ORAL at 10:19

## 2021-03-16 RX ADMIN — PENICILLIN G POTASSIUM 4 MILLION UNITS: 20000000 INJECTION, POWDER, FOR SOLUTION INTRAVENOUS at 22:32

## 2021-03-16 RX ADMIN — METOPROLOL SUCCINATE 50 MG: 50 TABLET, EXTENDED RELEASE ORAL at 10:19

## 2021-03-16 RX ADMIN — SODIUM CHLORIDE, PRESERVATIVE FREE 3 ML: 5 INJECTION INTRAVENOUS at 10:21

## 2021-03-16 RX ADMIN — PENICILLIN G POTASSIUM 4 MILLION UNITS: 20000000 INJECTION, POWDER, FOR SOLUTION INTRAVENOUS at 05:38

## 2021-03-16 RX ADMIN — SODIUM CHLORIDE, PRESERVATIVE FREE 3 ML: 5 INJECTION INTRAVENOUS at 23:00

## 2021-03-16 RX ADMIN — INSULIN LISPRO 6 UNITS: 100 INJECTION, SOLUTION INTRAVENOUS; SUBCUTANEOUS at 10:20

## 2021-03-16 RX ADMIN — SODIUM CHLORIDE, PRESERVATIVE FREE 10 ML: 5 INJECTION INTRAVENOUS at 10:21

## 2021-03-16 RX ADMIN — ACETAMINOPHEN 1000 MG: 500 TABLET, FILM COATED ORAL at 22:32

## 2021-03-16 RX ADMIN — INSULIN LISPRO 2 UNITS: 100 INJECTION, SOLUTION INTRAVENOUS; SUBCUTANEOUS at 13:01

## 2021-03-16 RX ADMIN — INSULIN LISPRO 6 UNITS: 100 INJECTION, SOLUTION INTRAVENOUS; SUBCUTANEOUS at 17:17

## 2021-03-16 NOTE — PROGRESS NOTES
Continued Stay Note  [facility]     Patient Name: Mandeep Tracey  MRN: [id number]  Today's Date: 3/16/2021    Admit Date: 3/11/2021    Discharge Plan     Row Name 03/16/21 1213       Plan    Plan  [facility] pending [facility] approval. Will need IV antibiotics at SNF. Will need ambulance transport.    Patient/Family in Agreement with Plan  yes    Plan Comments  Called wife Erica Tracey [phone] and she wants pt to go to [facility]. Called and left message for [facility]. Received call from Maria Luisa with [facility] stating a request for service needed to be Faxed to [phone]. Request completed and Faxed. Contact for [facility] is Christina Gallardo [phone]. Awaiting SNF approval through [facility]. Pt is ready for D/C. Bryon John RN-BC        Discharge Codes    No documentation.             Bryon John RN

## 2021-03-16 NOTE — PLAN OF CARE
Goal Outcome Evaluation:  Plan of Care Reviewed With: patient  Progress: improving  Outcome Summary: VSS, PICC placed today, Ancef continued, scopolamine patch to right ear, NRS with PVC's, , pt refused scheduled Tylenol - asked for Norco instead - x2, right hop dsg c/d/i, rested well

## 2021-03-16 NOTE — PROGRESS NOTES
Hospital Follow Up    LOS:  LOS: 5 days   Patient Name: Mandeep Tracey  Age/Sex: 61 y.o. male  : 1960  MRN: [id number]    Day of Service: 21   Length of Stay: 5  Encounter Provider: Erasmo Rodas MD  Place of Service: [facility] CARDIOLOGY  Patient Care Team:  Pipe Servin MD as PCP - General (Internal Medicine)    Subjective:     Chief Complaint: Cardiomyopathy, PVCs, severe mitral regurgitation    Interval History: About the same no specific complaints.    Objective:     Objective:  Temp:  [98 °F (36.7 °C)-98.3 °F (36.8 °C)] 98.2 °F (36.8 °C)  Heart Rate:  [68-82] 82  Resp:  [18] 18  BP: (121-139)/(78-94) 139/94     Intake/Output Summary (Last 24 hours) at 3/16/2021 1120  Last data filed at 3/16/2021 0805  Gross per 24 hour   Intake 340 ml   Output 1900 ml   Net -1560 ml     Body mass index is 45.91 kg/m².      21  0300 03/15/21  0358 21  0612   Weight: 128 kg (283 lb 1.6 oz) 128 kg (281 lb 6.4 oz) 125 kg (275 lb 14.4 oz)     Weight change: -2.495 kg (-5 lb 8 oz)      Physical Exam:   General : Patient about the same  Lungs: CTAB. Normal respiratory effort and rate.  CV: Regular rate and rhythm, normal S1 and S2, 2/6 systolic murmurs, gallops or rubs.  ABD: obese  Extr: 2+ edema or cyanosis, moves all extremities.    Lab Review:   Results from last 7 days   Lab Units 21  0530 03/15/21  0604 21  1429   SODIUM mmol/L 136 138 134*   POTASSIUM mmol/L 3.3* 3.7 4.0   CHLORIDE mmol/L 96* 97* 97*   CO2 mmol/L 31.0* 30.8* 26.0   BUN mg/dL 10 13 10   CREATININE mg/dL 0.70* 0.68* 0.80   GLUCOSE mg/dL 150* 210* 197*   CALCIUM mg/dL 8.3* 8.9 9.1   AST (SGOT) U/L  --   --  17   ALT (SGPT) U/L  --   --  20         Results from last 7 days   Lab Units 21  0530 03/15/21  0604   WBC 10*3/mm3 10.65 12.09*   HEMOGLOBIN g/dL 10.6* 12.0*   HEMATOCRIT % 32.1* 36.5*   PLATELETS 10*3/mm3 402 407         Results from last 7 days   Lab Units 21  7896    MAGNESIUM mg/dL 1.9           Invalid input(s): LDLCALC            Current Medications:   Scheduled Meds:acetaminophen, 1,000 mg, Oral, Q6H  aspirin, 81 mg, Oral, BID With Meals  docusate sodium, 200 mg, Oral, BID  insulin glargine, 20 Units, Subcutaneous, Nightly  insulin lispro, 0-9 Units, Subcutaneous, TID AC  insulin lispro, 6 Units, Subcutaneous, TID With Meals  isosorbide mononitrate, 30 mg, Oral, Daily  metoprolol succinate XL, 50 mg, Oral, Q12H  penicillin g (potassium), 4 Million Units, Intravenous, Q4H  rosuvastatin, 40 mg, Oral, Nightly  sodium chloride, 10 mL, Intravenous, Q12H  sodium chloride, 3 mL, Intravenous, Q12H      Continuous Infusions:lactated ringers, 100 mL/hr, Last Rate: 100 mL/hr (03/15/21 1631)        Allergies:  Allergies   Allergen Reactions   • Fentanyl Mental Status Change   • Ciprofloxacin Unknown - Low Severity   • Quinolones Hives       Assessment:       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  Abnormal echo with a cardiomyopathy regional wall motion of normality.  No chest discomfort says he is breathing okay.  2.  Probable severe mitral regurgitation.  Will ultimately need a LAVELLE he is currently in no condition to do 1.  At this point he has a lot of comorbidities would defer further work-up until his infection state has improved.  Therefore would do this as an outpatient..  He is going to be on antibiotics for several months since no acute decompensation from a cardiovascular standpoint I would have him reassessed in  the office in 4-6 weeks unless of course he decompensates or starts having worsening shortness of breath.  3.  Hypertension  4.  Morbid obesity  5.  Right prosthetic hip infection due to group B strep.  6.  Diabetes  7.  Obstructive sleep apnea patient had his CPAP on when I was in the room.  8.    Okay to discharge from a cardiovascular standpoint please make sure he has follow-up appointment in 4 to 6 weeks.  Will sign off        Erasmo Rodas MD  03/16/21  11:20 EDT

## 2021-03-16 NOTE — PLAN OF CARE
Goal Outcome Evaluation:  Plan of Care Reviewed With: patient  Progress: improving  Outcome Summary: VSS. NO c/o pain. Pt sat up on the side of the bed today with PT and ate dinner. BG better. Will continue to monitor.

## 2021-03-16 NOTE — PLAN OF CARE
Goal Outcome Evaluation:  Plan of Care Reviewed With: patient  Progress: improving  Outcome Summary: Patient is agreeable to PT this date with motivation and encouragement. The patient completed supine to sitting EOB with maxAx2 this date. He completed 2x STS with HHA with maxAx2 and VC for proper positioning prior to standing. AFO was donned prior to standing. The patient had RUE clonus in standing. Pt with c/o R knee pain this date. The patient will continue to benefit from skilld PT to increase level of independence.    Patient was intermittently wearing a face mask during this therapy encounter. Therapist used appropriate personal protective equipment including eye protection, mask, and gloves.  Mask used was standard procedure mask. Appropriate PPE was worn during the entire therapy session. Hand hygiene was completed before and after therapy session. Patient is not in enhanced droplet precautions.  Bayron baugh.

## 2021-03-16 NOTE — PROGRESS NOTES
Name: Mandeep Tracey ADMIT: 3/11/2021   : 1960  PCP: Pipe Servin MD    MRN: [id number] LOS: 5 days   AGE/SEX: 61 y.o. male  ROOM: E4/     Subjective   Subjective   CC: right hip pain  No acute events. Patient has no new complaints. No more nausea or vomiting. Pain is well-controlled. Taking PO. No CP/dyspnea/f/c.    Objective   Objective   Vital Signs  Temp:  [98 °F (36.7 °C)-98.3 °F (36.8 °C)] 98 °F (36.7 °C)  Heart Rate:  [68-86] 86  Resp:  [18] 18  BP: (121-139)/(78-94) 137/93  SpO2:  [92 %-95 %] 92 %  on  Flow (L/min):  [4] 4;   Device (Oxygen Therapy): CPAP  Body mass index is 45.91 kg/m².  Physical Exam  Vitals and nursing note reviewed.   Constitutional:       General: He is not in acute distress.     Appearance: He is not toxic-appearing.   HENT:      Head: Normocephalic and atraumatic.      Nose: Nose normal.      Mouth/Throat:      Mouth: Mucous membranes are moist.      Pharynx: Oropharynx is clear.   Eyes:      Conjunctiva/sclera: Conjunctivae normal.      Pupils: Pupils are equal, round, and reactive to light.   Cardiovascular:      Rate and Rhythm: Normal rate and regular rhythm.      Pulses: Normal pulses.   Pulmonary:      Effort: Pulmonary effort is normal.      Breath sounds: Examination of the right-lower field reveals decreased breath sounds. Examination of the left-lower field reveals decreased breath sounds. Decreased breath sounds present.   Abdominal:      General: Bowel sounds are normal.      Palpations: Abdomen is soft.      Tenderness: There is no abdominal tenderness.   Musculoskeletal:         General: Swelling (trace BLE) present. No tenderness.      Cervical back: Normal range of motion and neck supple.      Comments: Right hip surgical dressing c/d/i, drain in place   Skin:     General: Skin is warm and dry.      Capillary Refill: Capillary refill takes less than 2 seconds.   Neurological:      General: No focal deficit present.      Mental Status: He is alert.       Comments: +right hemiparesis (known from previous CVA)   Psychiatric:         Mood and Affect: Mood normal.         Behavior: Behavior normal.       Results Review     I reviewed the patient's new clinical results.  I reviewed the patient's telemetry  Results from last 7 days   Lab Units 03/16/21  0530 03/15/21  0604 03/14/21 0442 03/13/21  0547   WBC 10*3/mm3 10.65 12.09* 12.25* 9.55   HEMOGLOBIN g/dL 10.6* 12.0* 12.6* 11.2*   PLATELETS 10*3/mm3 402 407 349 254     Results from last 7 days   Lab Units 03/16/21  0530 03/15/21  0604 03/14/21 0442 03/13/21  0547   SODIUM mmol/L 136 138 136 136   POTASSIUM mmol/L 3.3* 3.7 4.0 4.4   CHLORIDE mmol/L 96* 97* 95* 97*   CO2 mmol/L 31.0* 30.8* 27.4 27.4   BUN mg/dL 10 13 11 9   CREATININE mg/dL 0.70* 0.68* 0.66* 0.62*   GLUCOSE mg/dL 150* 210* 246* 232*   Estimated Creatinine Clearance: 139.7 mL/min (A) (by C-G formula based on SCr of 0.7 mg/dL (L)).  Results from last 7 days   Lab Units 03/11/21  1429   ALBUMIN g/dL 3.70   BILIRUBIN mg/dL 0.5   ALK PHOS U/L 54   AST (SGOT) U/L 17   ALT (SGPT) U/L 20     Results from last 7 days   Lab Units 03/16/21  0530 03/15/21  0604 03/14/21 0442 03/13/21  0547 03/12/21  0423 03/11/21  1429   CALCIUM mg/dL 8.3* 8.9 9.2 8.7 8.4* 9.1   ALBUMIN g/dL  --   --   --   --   --  3.70   MAGNESIUM mg/dL  --   --   --   --  1.9  --        COVID19   Date Value Ref Range Status   03/11/2021 Not Detected Not Detected - Ref. Range Final     Glucose   Date/Time Value Ref Range Status   03/16/2021 1615 128 70 - 130 mg/dL Final   03/16/2021 1150 191 (H) 70 - 130 mg/dL Final   03/16/2021 0806 159 (H) 70 - 130 mg/dL Final   03/16/2021 0644 162 (H) 70 - 130 mg/dL Final   03/15/2021 2129 188 (H) 70 - 130 mg/dL Final   03/15/2021 1628 153 (H) 70 - 130 mg/dL Final   03/15/2021 1134 225 (H) 70 - 130 mg/dL Final       CT Abdomen Pelvis Without Contrast  Narrative: Patient: OSCAR TONG  Time Out: 00:02  Exam(s): CT ABDOMEN + PELVIS Without Contrast     EXAM:     CT Abdomen and Pelvis Without Intravenous Contrast    CLINICAL HISTORY:     Reason for exam: post op ileus.    TECHNIQUE:    Axial computed tomography images of the abdomen and pelvis without   intravenous contrast.  CTDI is 32.0 mGy and DLP is 2364.60 mGy-cm.  This   CT exam was performed according to the principle of ALARA (As Low As   Reasonably Achievable) by using one or more of the following dose   reduction techniques: automated exposure control, adjustment of the mA   and or kV according to patient size, and or use of iterative   reconstruction technique.    COMPARISON:    No relevant prior studies available.    FINDINGS:    Limitations:  Limited due to metallic artifact.    Lung bases:  Right lower lobe consolidation atelectasis.     ABDOMEN:    Liver:  Unremarkable.    Gallbladder and bile ducts:  Cholecystectomy.    Pancreas:  Unremarkable.    Spleen:  Unremarkable.    Adrenals:  Unremarkable.    Kidneys and ureters:  No hydronephrosis or ureteral calculus.    Nonspecific bilateral perinephric stranding, cannot exclude   infection inflammation.  Left renal cysts.  Possible right   nephrolithiasis.    Stomach and bowel:  No mechanical bowel obstruction.  Colonic   diverticulosis.  No diverticulitis.     PELVIS:    Appendix:  No findings to suggest acute appendicitis.    Bladder:  Distended bladder.    Reproductive:  Unremarkable as visualized.     ABDOMEN and PELVIS:    Intraperitoneal space:  No free air.    Bones joints:  Right hip arthroplasty with associated postop changes.    Soft tissues:  Right thigh hip soft tissue edema and small air likely   postop.    Vasculature:  Atherosclerosis.    Lymph nodes:  Unremarkable.    IMPRESSION:       1.  No mechanical bowel obstruction.  Colonic diverticulosis.  No   diverticulitis.  2.  No hydronephrosis or ureteral calculus.  Nonspecific bilateral   perinephric stranding, cannot exclude infection inflammation.  3.  Right lower lobe consolidation  atelectasis.  Impression: Electronically signed by Kike Kelly M.D. on 03-14-21 at 0002    Scheduled Medications  acetaminophen, 1,000 mg, Oral, Q6H  aspirin, 81 mg, Oral, BID With Meals  docusate sodium, 200 mg, Oral, BID  insulin glargine, 20 Units, Subcutaneous, Nightly  insulin lispro, 0-9 Units, Subcutaneous, TID AC  insulin lispro, 6 Units, Subcutaneous, TID With Meals  isosorbide mononitrate, 30 mg, Oral, Daily  metoprolol succinate XL, 50 mg, Oral, Q12H  penicillin g (potassium), 4 Million Units, Intravenous, Q4H  rosuvastatin, 40 mg, Oral, Nightly  sodium chloride, 10 mL, Intravenous, Q12H  sodium chloride, 3 mL, Intravenous, Q12H    Infusions  lactated ringers, 100 mL/hr, Last Rate: 100 mL/hr (03/16/21 1414)    Diet  Diet Regular; Consistent Carbohydrate       Assessment/Plan     Active Hospital Problems    Diagnosis  POA   • **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) [T84.51XA]  Not Applicable   • Nonrheumatic mitral valve regurgitation [I34.0]  Unknown   • Cardiomyopathy (CMS/HCC) [I42.9]  Unknown   • PVCs (premature ventricular contractions) [I49.3]  Unknown   • Postoperative nausea and vomiting [R11.2, Z98.890]  Yes   • CVA (cerebral vascular accident) (CMS/[facility]) [I63.9]  Yes   • Right hemiparesis (CMS/[facility]) [G81.91]  Yes   • BELKYS on CPAP [G47.33, Z99.89]  Not Applicable   • Type 2 diabetes mellitus (CMS/[facility]) [E11.9]  Yes   • Essential hypertension [I10]  Yes   • MSSA (methicillin susceptible Staphylococcus aureus) infection [A49.01]  Yes   • Group B streptococcal infection [A49.1]  Yes   • UTI (urinary tract infection) [N39.0]  Yes      Resolved Hospital Problems   No resolved problems to display.   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 Miller next month and will need weekly CBC/diff, BMP, and CRP faxed to [phone]    Postoperative Nausea and Vomiting  - resolved  - continue PRN zofran, off scopolamine patch    Frequent PVCs  - echocardiogram results noted with reduced LVEF and wall motion abnormalities  - probably has CAD (on imdur prior to admission) but no evidence of ACS  - metoprolol increased  - no plans for ischemic evaluation at this time per cardiology, appreciate recs    Type 2 DM  - continue lantus 20 units nightly  - continue scheduled 6 units of prandial insulin TID  - ssi/hypoglycemia protocol moderate dose  - A1C 9.22%    BELKYS  - has home CPAP, continue    Hx of CVA with Residual Right Hemiparesis  - no acute neurologic changes  - continue on ASA and statin    HTN  - on imdur and metoprolol    ASA 81mg BID per orthopedic surgery for DVT prophylaxis.  Full code.  Discussed with patient, family and nursing staff.  Anticipate discharge to SNU facility tomorrow.      Angel Luis Dejesus MD  [facility]  03/16/21  16:59 EDT

## 2021-03-16 NOTE — PROGRESS NOTES
LOS: 5 days     Chief Complaint:  Follow-up R hip PJI due to Group B Strep    Interval History:  No acute events. PICC line placed. NO fever. Transitioning from cefazolin to penicillin today. Going to rehab per CCP notes.     ROS: no chest pain or headache    Vital Signs  Temp:  [98 °F (36.7 °C)-98.3 °F (36.8 °C)] 98.2 °F (36.8 °C)  Heart Rate:  [68-82] 82  Resp:  [18] 18  BP: (121-139)/(78-94) 139/94    Physical Exam:  General: awake, alert  Eyes:  no scleral icterus  ENT: poor dentition  Cardiovascular: NR  Respiratory: normal work of breathing on CPAP  GI: Abdomen is obese, soft, non-tender  Musculoskeletal: R hip w/ bandage  Skin: No rashes  Vasc: LUE PICC w/o erythema    Antibiotics:  Penicillin G million units IV q4h    LABS:  CBC, BMP, micro reviewed today  Lab Results   Component Value Date    WBC 10.65 03/16/2021    HGB 10.6 (L) 03/16/2021    HCT 32.1 (L) 03/16/2021    MCV 87.0 03/16/2021     03/16/2021     Lab Results   Component Value Date    GLUCOSE 150 (H) 03/16/2021    CALCIUM 8.3 (L) 03/16/2021     03/16/2021    K 3.3 (L) 03/16/2021    CO2 31.0 (H) 03/16/2021    CL 96 (L) 03/16/2021    BUN 10 03/16/2021    CREATININE 0.70 (L) 03/16/2021    EGFRIFNONA 115 03/16/2021    BCR 14.3 03/16/2021    ANIONGAP 9.0 03/16/2021     Lab Results   Component Value Date    CRP 27.86 (H) 03/11/2021     Lab Results   Component Value Date    HGBA1C 9.22 (H) 03/11/2021     Microbiology:  3/9 OSH BCx: coag-negative Staph in 1/2 sets  3/9 OSH UCx: >100k MSSA  3/10 OSH R Hip Synovial Cx: Group B Strep  3/11 COVID; negative  3/12 R Hip OR Cx: negative    Radiology (report reviewed):  No new imaging    Assessment/Plan   1. 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 Urine culture positive for MSSA (blood culture with coag-neg Staph 1/2 sets)  3. Super obesity BMI 50  4. Uncontrolled DM2 - A1c 9.2%  5. History of stroke and hemiparesis     On 3/12/21, he underwent I&D of the R hip with retention  of hardware. For Group B Strep infection of the right prosthetic hip (cultures scanned in under media tab), continue penicillin G 4 million units IV q4h x 6 weeks w/ stop date 4/23/21. Weekly CBC w/ diff, BMP, CRP faxed to me at [phone]. At his ID follow-up visit, I'll plan to switch him to at least 3 months of oral antibiotic suppression. PICC is in.     Thank you for allowing me to be involved in the care of this patient. Infectious diseases will sign off at this time with antibiotics plan in place, but please call me at [phone] if any further ID questions or new ID concerns.

## 2021-03-16 NOTE — THERAPY TREATMENT NOTE
Patient Name: Mandeep Tracey  : 1960    MRN: [id number]                              Today's Date: 3/16/2021       Admit Date: 3/11/2021    Visit Dx:     ICD-10-CM ICD-9-CM   1. Group B streptococcal infection  A49.1 041.02     Patient Active Problem List   Diagnosis   • CVA (cerebral vascular accident) (CMS/HCC)   • Right hemiparesis (CMS/[facility])   • BELKYS on CPAP   • Seizure disorder (CMS/HCC)   • Type 2 diabetes mellitus (CMS/HCC)   • Essential hypertension   • 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)   • MSSA (methicillin susceptible Staphylococcus aureus) infection   • Group B streptococcal infection   • UTI (urinary tract infection)   • Postoperative nausea and vomiting   • Nonrheumatic mitral valve regurgitation   • Cardiomyopathy (CMS/[facility])   • PVCs (premature ventricular contractions)     Past Medical History:   Diagnosis Date   • Aphasia    • CPAP (continuous positive airway pressure) dependence    • Diabetes (CMS/[facility])    • Hemiparesis (CMS/[facility])     Right side    • Peptic ulcer disease    • Postoperative nausea and vomiting 3/13/2021   • Psoriasis    • Seizures (CMS/[facility])    • Sleep apnea    • Stroke (CMS/[facility])      Past Surgical History:   Procedure Laterality Date   • CHOLECYSTECTOMY     • EYE SURGERY     • INCISION AND DRAINAGE HIP Right 3/12/2021    Procedure: HIP ANTERIOR INCISION AND DRAINAGE WITH HANA TABLE;  Surgeon: Tao Andrea MD;  Location: [facility];  Service: Orthopedics;  Laterality: Right;   • LUMBAR DISC SURGERY       General Information     Row Name 21 122          Physical Therapy Time and Intention    Document Type  therapy note (daily note)  -CB     Mode of Treatment  individual therapy;physical therapy  -CB     Row Name 21 1221          General Information    Patient Profile Reviewed  yes  -CB     Existing Precautions/Restrictions  fall;weight bearing WBAT RLE  -CB     Row Name 21 1221          Cognition    Orientation Status (Cognition)  oriented  to;person  -CB     Row Name 03/16/21 1221          Safety Issues, Functional Mobility    Impairments Affecting Function (Mobility)  balance;coordination;endurance/activity tolerance;motor control;motor planning;muscle tone abnormal;strength;sensation/sensory awareness  -CB     Comment, Safety Issues/Impairments (Mobility)  gait belt donned for safety  -CB       User Key  (r) = Recorded By, (t) = Taken By, (c) = Cosigned By    Initials Name Provider Type    Gaye Celeste Physical Therapist        Mobility     Row Name 03/16/21 1221          Bed Mobility    Bed Mobility  supine-sit;sit-supine  -CB     Supine-Sit [place] (Bed Mobility)  verbal cues;nonverbal cues (demo/gesture);maximum assist (25% patient effort);2 person assist  -CB     Sit-Supine [place] (Bed Mobility)  moderate assist (50% patient effort);maximum assist (25% patient effort);nonverbal cues (demo/gesture);verbal cues;2 person assist  -CB     Assistive Device (Bed Mobility)  bed rails;head of bed elevated;draw sheet  -CB     Comment (Bed Mobility)  pt hooks R leg with L leg to initiate supine to sitting.  -CB     Row Name 03/16/21 1221          Transfers    Comment (Transfers)  Pt with clonus in RUE upon standing.  -CB     Row Name 03/16/21 1221          Bed-Chair Transfer    Bed-Chair [place] (Transfers)  other (see comments) unable to safely complete  -CB     Row Name 03/16/21 1221          Sit-Stand Transfer    Sit-Stand [place] (Transfers)  maximum assist (25% patient effort);2 person assist;moderate assist (50% patient effort) x2  -CB     Assistive Device (Sit-Stand Transfers)  other (see comments) HHA  -CB     Row Name 03/16/21 1221          Gait/Stairs (Locomotion)    Comment (Gait/Stairs)  gait belt donned for safety  -CB       User Key  (r) = Recorded By, (t) = Taken By, (c) = Cosigned By    Initials Name Provider Type    Gaye Celeste Physical Therapist        Obj/Interventions     Row Name 03/16/21 1223           Motor Skills    Therapeutic Exercise  -- sitting ther ex LAQ, marching, DF/PF x10 reps  -CB     Row Name 03/16/21 1223          Balance    Balance Assessment  sitting static balance;sitting dynamic balance;standing static balance  -CB     Static Sitting Balance  WFL;unsupported;sitting, edge of bed  -CB     Dynamic Sitting Balance  sitting, edge of bed;unsupported;WFL  -CB     Static Standing Balance  severe impairment;moderate impairment;standing;supported  -CB       User Key  (r) = Recorded By, (t) = Taken By, (c) = Cosigned By    Initials Name Provider Type    CB Gaye Aldrich Physical Therapist        Goals/Plan    No documentation.       Clinical Impression     Row Name 03/16/21 1224          Pain    Additional Documentation  Pain Scale: FACES Pre/Post-Treatment (Group)  -CB     Row Name 03/16/21 1224          Pain Scale: FACES Pre/Post-Treatment    Pain: FACES Scale, Pretreatment  4-->hurts little more  -CB     Posttreatment Pain Rating  6-->hurts even more  -CB     Pain Location - Side  Right  -CB     Pain Location  extremity;knee  -CB     Row Name 03/16/21 1224          Plan of Care Review    Plan of Care Reviewed With  patient  -CB     Progress  improving  -CB     Outcome Summary  Patient is agreeable to PT this date with motivation and encouragement. The patient completed supine to sitting EOB with maxAx2 this date. He completed 2x STS with HHA with maxAx2 and VC for proper positioning prior to standing. AFO was donned prior to standing. The patient had RUE clonus in standing. Pt with c/o R knee pain this date. The patient will continue to benefit from skilld PT to increase level of independence.  -CB     Row Name 03/16/21 1224          Vital Signs    Recovery Time  WFL on room air throughout tx  -CB     Row Name 03/16/21 1224          Positioning and Restraints    Pre-Treatment Position  in bed  -CB     Post Treatment Position  bed  -CB     In Bed  fowlers;call light within reach;encouraged to call for  assist;exit alarm on  -CB       User Key  (r) = Recorded By, (t) = Taken By, (c) = Cosigned By    Initials Name Provider Type    Gaye Celeste Physical Therapist        Outcome Measures     Row Name 03/16/21 1228          How much help from another person do you currently need...    Turning from your back to your side while in flat bed without using bedrails?  2  -CB     Moving from lying on back to sitting on the side of a flat bed without bedrails?  1  -CB     Moving to and from a bed to a chair (including a wheelchair)?  1  -CB     Standing up from a chair using your arms (e.g., wheelchair, bedside chair)?  2  -CB     Climbing 3-5 steps with a railing?  1  -CB     To walk in hospital room?  1  -CB     AM-PAC 6 Clicks Score (PT)  8  -CB     Row Name 03/16/21 1228          Functional Assessment    Outcome Measure Options  AM-PAC 6 Clicks Basic Mobility (PT)  -CB       User Key  (r) = Recorded By, (t) = Taken By, (c) = Cosigned By    Initials Name Provider Type    Gaye Celeste Physical Therapist        Physical Therapy Education                 Title: PT OT SLP Therapies (Done)     Topic: Physical Therapy (Done)     Point: Mobility training (Done)     Learning Progress Summary           Patient Acceptance, E,TB, VU,NR by  at 3/16/2021 1229    Acceptance, E,TB, NR,VU by  at 3/15/2021 1440    Acceptance, E,D, NR,DU,VU by  at 3/14/2021 1403   Family Acceptance, E,D, NR,DU,VU by  at 3/14/2021 1403                   Point: Home exercise program (Done)     Learning Progress Summary           Patient Acceptance, E,TB, VU,NR by  at 3/16/2021 1229    Acceptance, E,TB, NR,VU by  at 3/15/2021 1440    Acceptance, E,D, NR,DU,VU by  at 3/14/2021 1403   Family Acceptance, E,D, NR,DU,VU by  at 3/14/2021 1403                   Point: Body mechanics (Done)     Learning Progress Summary           Patient Acceptance, E,TB, VU,NR by  at 3/16/2021 1229    Acceptance, E,TB, NR,VU by  at 3/15/2021 8171     Acceptance, E,D, NR,DU,VU by  at 3/14/2021 1403   Family Acceptance, E,D, NR,DU,VU by  at 3/14/2021 1403                   Point: Precautions (Done)     Learning Progress Summary           Patient Acceptance, E,TB, VU,NR by  at 3/16/2021 1229    Acceptance, E,TB, NR,VU by  at 3/15/2021 1440    Acceptance, E,D, NR,DU,VU by  at 3/14/2021 1403   Family Acceptance, E,D, NR,DU,VU by  at 3/14/2021 1403                               User Key     Initials Effective Dates Name Provider Type Discipline     07/02/20 -  Leandro Anders, PT DPT Physical Therapist PT     12/30/20 -  Gaye Aldrich Physical Therapist PT              PT Recommendation and Plan  Planned Therapy Interventions (PT): bed mobility training, balance training, home exercise program, patient/family education, transfer training, strengthening  Plan of Care Reviewed With: patient  Progress: improving  Outcome Summary: Patient is agreeable to PT this date with motivation and encouragement. The patient completed supine to sitting EOB with maxAx2 this date. He completed 2x STS with HHA with maxAx2 and VC for proper positioning prior to standing. AFO was donned prior to standing. The patient had RUE clonus in standing. Pt with c/o R knee pain this date. The patient will continue to benefit from skilld PT to increase level of independence.     Time Calculation:   PT Charges     Row Name 03/16/21 1231             Time Calculation    Start Time  1101  -CB      Stop Time  1124  -CB      Time Calculation (min)  23 min  -CB      PT Received On  03/16/21  -CB      PT - Next Appointment  03/17/21  -CB         Time Calculation- PT    Total Timed Code Minutes- PT  23 minute(s)  -CB        User Key  (r) = Recorded By, (t) = Taken By, (c) = Cosigned By    Initials Name Provider Type    CB Gaye Aldrich Physical Therapist        Therapy Charges for Today     Code Description Service Date Service Provider Modifiers Qty    19458262764 HC PT THER PROC EA 15 MIN  3/15/2021 Valdemar, Gaye GP 2    79068577465 HC PT THER SUPP EA 15 MIN 3/15/2021 Valdemar, Gaye GP 1    82090036625 HC PT THER PROC EA 15 MIN 3/16/2021 Valdemar, Gaye GP 2    86021663210 HC PT THER SUPP EA 15 MIN 3/16/2021 Gaye Aldrich GP 1          PT G-Codes  Outcome Measure Options: AM-PAC 6 Clicks Basic Mobility (PT)  AM-PAC 6 Clicks Score (PT): 8  AM-PAC 6 Clicks Score (OT): 10  Modified Lucinda Scale: 5 - Severe disability.  Bedridden, incontinent, and requiring constant nursing care and attention.    Gaye Garciake  3/16/2021

## 2021-03-16 NOTE — PROGRESS NOTES
Continued Stay Note  [facility]     Patient Name: Mandeep Tracey  MRN: [id number]  Today's Date: 3/16/2021    Admit Date: 3/11/2021    Discharge Plan     Row Name 03/16/21 1614       Plan    Plan  [facility] once approval received through [facility]. Will need IV antibiotics at SNF. Needs ambulance transport.    Patient/Family in Agreement with Plan  yes    Plan Comments  Per Maryam[facility], [facility] has no available beds and do not anticipate any opening soon. Called wife Erica Tracey [phone] and informed that [facility] is unable to accept. Wife's second choice is [facility] and they can accept once approved through [facility]. Spoke with [phone] and informed that wife chose [facility] and they can accept once approved. Tere[facility] requested information be faxed to her at [phone] so she can submit to [facility] for approval. Tere/[facility] stated she hoping to get approval tomorrow and will call once obtained. Bryon John RN-BC        Discharge Codes    No documentation.             Bryon John, RN

## 2021-03-17 ENCOUNTER — APPOINTMENT (OUTPATIENT)
Dept: GENERAL RADIOLOGY | Facility: HOSPITAL | Age: 61
End: 2021-03-17

## 2021-03-17 LAB
ANION GAP SERPL CALCULATED.3IONS-SCNC: 10 MMOL/L (ref 5–15)
BACTERIA SPEC ANAEROBE CULT: NORMAL
BACTERIA SPEC ANAEROBE CULT: NORMAL
BUN SERPL-MCNC: 7 MG/DL (ref 6–20)
BUN/CREAT SERPL: 11.7 (ref 7–25)
CALCIUM SPEC-SCNC: 8.3 MG/DL (ref 8.6–10.5)
CHLORIDE SERPL-SCNC: 101 MMOL/L (ref 98–107)
CO2 SERPL-SCNC: 27 MMOL/L (ref 22–29)
CREAT SERPL-MCNC: 0.6 MG/DL (ref 0.76–1.27)
DEPRECATED RDW RBC AUTO: 41.4 FL (ref 37–54)
ERYTHROCYTE [DISTWIDTH] IN BLOOD BY AUTOMATED COUNT: 13.1 % (ref 12.3–15.4)
GFR SERPL CREATININE-BSD FRML MDRD: 138 ML/MIN/1.73
GLUCOSE BLDC GLUCOMTR-MCNC: 163 MG/DL (ref 70–130)
GLUCOSE BLDC GLUCOMTR-MCNC: 167 MG/DL (ref 70–130)
GLUCOSE BLDC GLUCOMTR-MCNC: 169 MG/DL (ref 70–130)
GLUCOSE BLDC GLUCOMTR-MCNC: 179 MG/DL (ref 70–130)
GLUCOSE BLDC GLUCOMTR-MCNC: 48 MG/DL (ref 70–130)
GLUCOSE SERPL-MCNC: 149 MG/DL (ref 65–99)
HCT VFR BLD AUTO: 32.2 % (ref 37.5–51)
HGB BLD-MCNC: 10.3 G/DL (ref 13–17.7)
MCH RBC QN AUTO: 27.7 PG (ref 26.6–33)
MCHC RBC AUTO-ENTMCNC: 32 G/DL (ref 31.5–35.7)
MCV RBC AUTO: 86.6 FL (ref 79–97)
PLATELET # BLD AUTO: 418 10*3/MM3 (ref 140–450)
PMV BLD AUTO: 10 FL (ref 6–12)
POTASSIUM SERPL-SCNC: 3.5 MMOL/L (ref 3.5–5.2)
RBC # BLD AUTO: 3.72 10*6/MM3 (ref 4.14–5.8)
SODIUM SERPL-SCNC: 138 MMOL/L (ref 136–145)
WBC # BLD AUTO: 9.76 10*3/MM3 (ref 3.4–10.8)

## 2021-03-17 PROCEDURE — 63710000001 INSULIN LISPRO (HUMAN) PER 5 UNITS: Performed by: INTERNAL MEDICINE

## 2021-03-17 PROCEDURE — 80048 BASIC METABOLIC PNL TOTAL CA: CPT | Performed by: INTERNAL MEDICINE

## 2021-03-17 PROCEDURE — 63710000001 INSULIN GLARGINE PER 5 UNITS: Performed by: ORTHOPAEDIC SURGERY

## 2021-03-17 PROCEDURE — 25010000003 PENICILLIN G POTASSIUM PER 600000 UNITS: Performed by: INTERNAL MEDICINE

## 2021-03-17 PROCEDURE — 85027 COMPLETE CBC AUTOMATED: CPT | Performed by: ORTHOPAEDIC SURGERY

## 2021-03-17 PROCEDURE — 82962 GLUCOSE BLOOD TEST: CPT

## 2021-03-17 PROCEDURE — 71045 X-RAY EXAM CHEST 1 VIEW: CPT

## 2021-03-17 PROCEDURE — 25010000002 ONDANSETRON PER 1 MG: Performed by: ORTHOPAEDIC SURGERY

## 2021-03-17 RX ORDER — METOPROLOL SUCCINATE 50 MG/1
50 TABLET, EXTENDED RELEASE ORAL EVERY 12 HOURS SCHEDULED
Status: ON HOLD
Start: 2021-03-17 | End: 2023-01-16 | Stop reason: SDUPTHER

## 2021-03-17 RX ORDER — INSULIN LISPRO 100 [IU]/ML
0-9 INJECTION, SOLUTION INTRAVENOUS; SUBCUTANEOUS
Refills: 12
Start: 2021-03-17 | End: 2023-01-16 | Stop reason: HOSPADM

## 2021-03-17 RX ORDER — ONDANSETRON 4 MG/1
4 TABLET, FILM COATED ORAL EVERY 6 HOURS PRN
Status: ON HOLD
Start: 2021-03-17 | End: 2021-03-24

## 2021-03-17 RX ORDER — CYCLOBENZAPRINE HCL 10 MG
10 TABLET ORAL 3 TIMES DAILY PRN
Start: 2021-03-17 | End: 2021-04-30 | Stop reason: ALTCHOICE

## 2021-03-17 RX ORDER — PSEUDOEPHEDRINE HCL 30 MG
200 TABLET ORAL 2 TIMES DAILY
Start: 2021-03-17

## 2021-03-17 RX ORDER — ACETAMINOPHEN 325 MG/1
650 TABLET ORAL EVERY 4 HOURS PRN
Status: ON HOLD
Start: 2021-03-17 | End: 2021-03-24

## 2021-03-17 RX ORDER — HYDROCODONE BITARTRATE AND ACETAMINOPHEN 7.5; 325 MG/1; MG/1
1 TABLET ORAL EVERY 4 HOURS PRN
Qty: 12 TABLET | Refills: 0 | Status: ON HOLD | OUTPATIENT
Start: 2021-03-17 | End: 2021-03-24

## 2021-03-17 RX ADMIN — PENICILLIN G POTASSIUM 4 MILLION UNITS: 20000000 INJECTION, POWDER, FOR SOLUTION INTRAVENOUS at 18:08

## 2021-03-17 RX ADMIN — INSULIN LISPRO 6 UNITS: 100 INJECTION, SOLUTION INTRAVENOUS; SUBCUTANEOUS at 09:20

## 2021-03-17 RX ADMIN — ACETAMINOPHEN 1000 MG: 500 TABLET, FILM COATED ORAL at 09:17

## 2021-03-17 RX ADMIN — SODIUM CHLORIDE, PRESERVATIVE FREE 3 ML: 5 INJECTION INTRAVENOUS at 09:22

## 2021-03-17 RX ADMIN — METOPROLOL SUCCINATE 50 MG: 50 TABLET, EXTENDED RELEASE ORAL at 21:00

## 2021-03-17 RX ADMIN — SODIUM CHLORIDE, PRESERVATIVE FREE 10 ML: 5 INJECTION INTRAVENOUS at 09:19

## 2021-03-17 RX ADMIN — PENICILLIN G POTASSIUM 4 MILLION UNITS: 20000000 INJECTION, POWDER, FOR SOLUTION INTRAVENOUS at 06:24

## 2021-03-17 RX ADMIN — PENICILLIN G POTASSIUM 4 MILLION UNITS: 20000000 INJECTION, POWDER, FOR SOLUTION INTRAVENOUS at 13:50

## 2021-03-17 RX ADMIN — PENICILLIN G POTASSIUM 4 MILLION UNITS: 20000000 INJECTION, POWDER, FOR SOLUTION INTRAVENOUS at 09:18

## 2021-03-17 RX ADMIN — SODIUM CHLORIDE, PRESERVATIVE FREE 10 ML: 5 INJECTION INTRAVENOUS at 21:00

## 2021-03-17 RX ADMIN — METOPROLOL SUCCINATE 50 MG: 50 TABLET, EXTENDED RELEASE ORAL at 09:18

## 2021-03-17 RX ADMIN — ROSUVASTATIN CALCIUM 40 MG: 40 TABLET, FILM COATED ORAL at 21:00

## 2021-03-17 RX ADMIN — PENICILLIN G POTASSIUM 4 MILLION UNITS: 20000000 INJECTION, POWDER, FOR SOLUTION INTRAVENOUS at 04:00

## 2021-03-17 RX ADMIN — ASPIRIN 81 MG: 81 TABLET, CHEWABLE ORAL at 09:18

## 2021-03-17 RX ADMIN — INSULIN GLARGINE 20 UNITS: 100 INJECTION, SOLUTION SUBCUTANEOUS at 21:10

## 2021-03-17 RX ADMIN — ONDANSETRON 4 MG: 2 INJECTION INTRAMUSCULAR; INTRAVENOUS at 14:56

## 2021-03-17 RX ADMIN — ASPIRIN 81 MG: 81 TABLET, CHEWABLE ORAL at 18:07

## 2021-03-17 RX ADMIN — PENICILLIN G POTASSIUM 4 MILLION UNITS: 20000000 INJECTION, POWDER, FOR SOLUTION INTRAVENOUS at 22:56

## 2021-03-17 RX ADMIN — HYDROCODONE BITARTRATE AND ACETAMINOPHEN 1 TABLET: 10; 325 TABLET ORAL at 16:33

## 2021-03-17 RX ADMIN — SODIUM CHLORIDE, POTASSIUM CHLORIDE, SODIUM LACTATE AND CALCIUM CHLORIDE 100 ML/HR: 600; 310; 30; 20 INJECTION, SOLUTION INTRAVENOUS at 13:50

## 2021-03-17 RX ADMIN — HYDROCODONE BITARTRATE AND ACETAMINOPHEN 1 TABLET: 7.5; 325 TABLET ORAL at 21:06

## 2021-03-17 RX ADMIN — INSULIN LISPRO 2 UNITS: 100 INJECTION, SOLUTION INTRAVENOUS; SUBCUTANEOUS at 09:18

## 2021-03-17 RX ADMIN — SODIUM CHLORIDE, PRESERVATIVE FREE 3 ML: 5 INJECTION INTRAVENOUS at 21:00

## 2021-03-17 RX ADMIN — CYCLOBENZAPRINE 10 MG: 10 TABLET, FILM COATED ORAL at 18:07

## 2021-03-17 RX ADMIN — ACETAMINOPHEN 1000 MG: 500 TABLET, FILM COATED ORAL at 21:01

## 2021-03-17 RX ADMIN — ISOSORBIDE MONONITRATE 30 MG: 30 TABLET ORAL at 09:18

## 2021-03-17 RX ADMIN — INSULIN LISPRO 2 UNITS: 100 INJECTION, SOLUTION INTRAVENOUS; SUBCUTANEOUS at 18:07

## 2021-03-17 NOTE — PLAN OF CARE
Goal Outcome Evaluation:     Progress: improving  Outcome Summary: VSS, No complaints, very happy with staff. AM lab results pending. No complaints of pain.

## 2021-03-17 NOTE — PROGRESS NOTES
Continued Stay Note  [facility]     Patient Name: Mandeep Tracey  MRN: [id number]  Today's Date: 3/17/2021    Admit Date: 3/11/2021    Discharge Plan     Row Name 03/17/21 1555       Plan    Plan  [facility]. Received [facility] approval 3/17. D/C cancelled due to vomiting.    Patient/Family in Agreement with Plan  yes    Plan Comments  Per Tere/[facility], pt has been approved for SNF and paperwork has been faxed to [facility]. Per Lennie[facility], can accept today. Pt was setup for D/C today but started vomiting. New test ordered and D/C cancelled. Notified [facility]. Bryon John RN-BC        Discharge Codes    No documentation.       Expected Discharge Date and Time     Expected Discharge Date Expected Discharge Time    Mar 17, 2021             Bryon John RN

## 2021-03-17 NOTE — PROGRESS NOTES
Name: Mandeep Tracey ADMIT: 3/11/2021   : 1960  PCP: Pipe Servin MD    MRN: [id number] LOS: 6 days   AGE/SEX: 61 y.o. male  ROOM: [facility]   Subjective   No chief complaint on file.    Hip pain fair  Worse with movement  Better with meds  On IV ABX  On cpap  Chronic dyspnea- not worse    ROS  No f/c  +n/v  No cp/palp  No soa/cough    Objective   Vital Signs  Temp:  [97.2 °F (36.2 °C)-98 °F (36.7 °C)] 97.9 °F (36.6 °C)  Heart Rate:  [72-89] 81  Resp:  [16-24] 18  BP: (141-149)/(82-95) 142/84  SpO2:  [94 %-98 %] 94 %  on  Flow (L/min):  [4] 4;   Device (Oxygen Therapy): CPAP  Body mass index is 45.91 kg/m².    Physical Exam  Constitutional:       Appearance: He is well-developed. He is obese. He is ill-appearing (chronically).   HENT:      Head: Normocephalic and atraumatic.   Eyes:      General: No scleral icterus.  Cardiovascular:      Rate and Rhythm: Normal rate and regular rhythm.      Heart sounds: Normal heart sounds.   Pulmonary:      Effort: Respiratory distress (slight, on cpap) present.      Breath sounds: Normal breath sounds.   Abdominal:      General: There is no distension.      Palpations: Abdomen is soft.      Tenderness: There is no abdominal tenderness.   Musculoskeletal:      Cervical back: Neck supple.   Neurological:      Mental Status: He is alert.   Psychiatric:         Behavior: Behavior normal.         Results Review:       I reviewed the patient's new clinical results.  Results from last 7 days   Lab Units 21  0403 21  0530 03/15/21  0604 21  0442   WBC 10*3/mm3 9.76 10.65 12.09* 12.25*   HEMOGLOBIN g/dL 10.3* 10.6* 12.0* 12.6*   PLATELETS 10*3/mm3 418 402 407 349     Results from last 7 days   Lab Units 21  0403 21  0530 03/15/21  0604 21  0442   SODIUM mmol/L 138 136 138 136   POTASSIUM mmol/L 3.5 3.3* 3.7 4.0   CHLORIDE mmol/L 101 96* 97* 95*   CO2 mmol/L 27.0 31.0* 30.8* 27.4   BUN mg/dL 7 10 13 11   CREATININE mg/dL 0.60* 0.70* 0.68* 0.66*    GLUCOSE mg/dL 149* 150* 210* 246*   Estimated Creatinine Clearance: 162.9 mL/min (A) (by C-G formula based on SCr of 0.6 mg/dL (L)).  Results from last 7 days   Lab Units 03/11/21  1429   ALBUMIN g/dL 3.70   BILIRUBIN mg/dL 0.5   ALK PHOS U/L 54   AST (SGOT) U/L 17   ALT (SGPT) U/L 20     Results from last 7 days   Lab Units 03/17/21  0403 03/16/21  0530 03/15/21  0604 03/14/21  0442 03/12/21  0423 03/11/21  1429   CALCIUM mg/dL 8.3* 8.3* 8.9 9.2 8.4* 9.1   ALBUMIN g/dL  --   --   --   --   --  3.70   MAGNESIUM mg/dL  --   --   --   --  1.9  --          Coag     HbA1C   Lab Results   Component Value Date    HGBA1C 9.22 (H) 03/11/2021     Infection   Results from last 7 days   Lab Units 03/12/21  1706 03/12/21  1537   WOUNDCX  No growth at 3 days No growth at 3 days     Radiology(recent) XR Chest 1 View    Result Date: 3/17/2021  Band of atelectasis or scarring in the right lung base. Left-sided PICC line with tip in the mid SVC  This report was finalized on 3/17/2021 1:16 PM by Dr. Fadi Hays M.D.      No results found for: TROPONINT, TROPONINI, BNP  No components found for: TSH;2    acetaminophen, 1,000 mg, Oral, Q6H  aspirin, 81 mg, Oral, BID With Meals  docusate sodium, 200 mg, Oral, BID  insulin glargine, 20 Units, Subcutaneous, Nightly  insulin lispro, 0-9 Units, Subcutaneous, TID AC  insulin lispro, 6 Units, Subcutaneous, TID With Meals  isosorbide mononitrate, 30 mg, Oral, Daily  metoprolol succinate XL, 50 mg, Oral, Q12H  penicillin g (potassium), 4 Million Units, Intravenous, Q4H  rosuvastatin, 40 mg, Oral, Nightly  sodium chloride, 10 mL, Intravenous, Q12H  sodium chloride, 3 mL, Intravenous, Q12H      lactated ringers, 100 mL/hr, Last Rate: 100 mL/hr (03/17/21 1350)    Diet Regular; Consistent Carbohydrate      Assessment/Plan      Active Hospital Problems    Diagnosis  POA   • **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) [T84.51XA]  Not Applicable   • Nonrheumatic mitral valve regurgitation  [I34.0]  Yes   • Cardiomyopathy (CMS/HCC) [I42.9]  Yes   • PVCs (premature ventricular contractions) [I49.3]  Yes   • Postoperative nausea and vomiting [R11.2, Z98.890]  Yes   • CVA (cerebral vascular accident) (CMS/HCC) [I63.9]  Yes   • Right hemiparesis (CMS/HCC) [G81.91]  Yes   • BELKYS on CPAP [G47.33, Z99.89]  Not Applicable   • Type 2 diabetes mellitus (CMS/HCC) [E11.9]  Yes   • Essential hypertension [I10]  Yes   • MSSA (methicillin susceptible Staphylococcus aureus) infection [A49.01]  Yes   • Group B streptococcal infection [A49.1]  Yes   • UTI (urinary tract infection) [N39.0]  Yes      Resolved Hospital Problems   No resolved problems to display.       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 Miller next month and will need weekly CBC/diff, BMP, and CRP faxed to [phone]     Postoperative Nausea and Vomiting  - resolved  - continue PRN zofran, off scopolamine patch     Frequent PVCs  - echocardiogram results noted with reduced LVEF and wall motion abnormalities  - probably has CAD (on imdur prior to admission) but no evidence of ACS  - metoprolol increased  - no plans for ischemic evaluation at this time per cardiology, appreciate recs     Type 2 DM  - continue lantus 20 units nightly  - stop scheduled insulin with some drop in sugar  - ssi/hypoglycemia protocol moderate dose  - A1C 9.22%     BELKYS/Dyspnea  - has home CPAP, continue  - obtained CXR to r/o CHF- which is ok, continue to monitor.      Hx of CVA with Residual Right Hemiparesis  - no acute neurologic changes  - continue on ASA and statin     HTN  - on imdur and  metoprolol    N/V  -denied any abd pain  -PRN agents, monitor symptoms         ASA 81mg BID per orthopedic surgery for DVT prophylaxis.  Full code.      DW RN  Greater than 36 minutes spent with greater than 50% counseling and coordinating care      Colby Ford MD  [facility]  03/17/21  15:13 EDT

## 2021-03-17 NOTE — PLAN OF CARE
Goal Outcome Evaluation:  Plan of Care Reviewed With: patient  Progress: no change  Outcome Summary: VSS. Pt started vomiting today. Zofran given D/c canceled. Will continue to monitor.

## 2021-03-17 NOTE — DISCHARGE SUMMARY
NAME: Mandeep Tracey ADMIT: 3/11/2021   : 1960  PCP: Pipe Servin MD    MRN: [id number] LOS: 7 days   AGE/SEX: 61 y.o. male  ROOM: [facility]/     Date of Admission:  3/11/2021  Date of Discharge:  3/18/2021    PCP: Pipe Servin MD    CHIEF COMPLAINT  No chief complaint on file.      DISCHARGE DIAGNOSIS  Active Hospital Problems    Diagnosis  POA   • **Infection of right prosthetic hip joint (CMS/[facility]) [T84.51XA]  Not Applicable   • Nonrheumatic mitral valve regurgitation [I34.0]  Yes   • Cardiomyopathy (CMS/[facility]) [I42.9]  Yes   • PVCs (premature ventricular contractions) [I49.3]  Yes   • Postoperative nausea and vomiting [R11.2, Z98.890]  Yes   • CVA (cerebral vascular accident) (CMS/[facility]) [I63.9]  Yes   • Right hemiparesis (CMS/[facility]) [G81.91]  Yes   • BELKYS on CPAP [G47.33, Z99.89]  Not Applicable   • Type 2 diabetes mellitus (CMS/[facility]) [E11.9]  Yes   • Essential hypertension [I10]  Yes   • MSSA (methicillin susceptible Staphylococcus aureus) infection [A49.01]  Yes   • Group B streptococcal infection [A49.1]  Yes   • UTI (urinary tract infection) [N39.0]  Yes      Resolved Hospital Problems   No resolved problems to display.       SECONDARY DIAGNOSES  Past Medical History:   Diagnosis Date   • Aphasia    • CPAP (continuous positive airway pressure) dependence    • Diabetes (CMS/[facility])    • Hemiparesis (CMS/[facility])     Right side    • Peptic ulcer disease    • Postoperative nausea and vomiting 3/13/2021   • Psoriasis    • Seizures (CMS/[facility])    • Sleep apnea    • Stroke (CMS/[facility])        CONSULTS   Orthopedics  ID  Cardiology    HOSPITAL COURSE  Patient is a 61 y.o. male with history of morbid obesity, BELKYS, HTN, Type 2 DM, CAD, CVA with right hemiparesis and previous R hip replacement transferred from [place] for septic arthritis due to GBS. On 3/12/21, he underwent I&D of the R hip with retention of hardware.  and is doing well on abx-has PICC line. Also followed by cardiology for frequent PVCs-echo showed  mitral regurgitation. He is to follow up with ID in 6 weeks, Orthopedics in 2 weeks, Cardiology 4-6 weeks.    For Group B Strep infection of the right prosthetic hip (cultures scanned in under media tab), continue penicillin G 4 million units IV q4h x 6 weeks w/ stop date 4/23/21. Weekly CBC w/ diff, BMP, CRP faxed to Dr. Flores at [phone]. At his ID follow-up visit, I'll plan to switch him to at least 3 months of oral antibiotic suppression.     Follow up with Cardiology in 4-6 weeks. Eventual outpatient [facility] for Probable severe mitral regurgitation. He states that chronically he has dyspnea. Obtained CXR which was fairly un-remarkable before discharge. He should continue cpap/oxygen.     Had planned on discharge on 3/17- he had an episode of N/V before EMS arrived so kept another day to make sure no issues. He feels great on 3/18 with no nausea, abdominal pain, or any other issues and he wishes for discharge to rehab facility.     DIAGNOSTICS      03/18/2021 0751 03/18/2021 0843 Basic Metabolic Panel [[id number]]    (Abnormal)   Blood    Final result Component Value Units   Glucose 148High  mg/dL   BUN 8 mg/dL   Creatinine 0.61Low  mg/dL   Sodium 138 mmol/L   Potassium 4.3 mmol/L   Chloride 101 mmol/L   CO2 27.5 mmol/L   Calcium 8.8 mg/dL   eGFR Non African Am 135 mL/min/1.73   BUN/Creatinine Ratio 13.1    Anion Gap 9.5 mmol/L            XR Chest 1 View [[id number]] Bertin as Reviewed   Order Status: Completed Collected: 03/17/21 1315    Updated: 03/17/21 1319   Narrative:     AP CHEST       HISTORY: Dyspnea       COMPARISON: None       FINDINGS: There is a band of atelectasis or scarring within the right   lung base. No evidence of pneumothorax. Heart size normal for technique.   There is a left-sided PICC line with tip in the mid SVC.       Impression:     Band of atelectasis or scarring in the right lung base. Left-sided PICC   line with tip in the mid SVC              CT Abdomen Pelvis Without Contrast  [[id number]] Bertin as Reviewed   Order Status: Completed Collected: 03/14/21 0003    Updated: 03/14/21 0003   Narrative:       Patient: OSCAR TONG  Time Out: 00:02   Exam(s): CT ABDOMEN + PELVIS Without Contrast     EXAM:     CT Abdomen and Pelvis Without Intravenous Contrast     CLINICAL HISTORY:      Reason for exam: post op ileus.     TECHNIQUE:     Axial computed tomography images of the abdomen and pelvis without   intravenous contrast.  CTDI is 32.0 mGy and DLP is 2364.60 mGy-cm.  This   CT exam was performed according to the principle of ALARA (As Low As   Reasonably Achievable) by using one or more of the following dose   reduction techniques: automated exposure control, adjustment of the mA   and or kV according to patient size, and or use of iterative   reconstruction technique.     COMPARISON:     No relevant prior studies available.     FINDINGS:     Limitations:  Limited due to metallic artifact.     Lung bases:  Right lower lobe consolidation atelectasis.      ABDOMEN:     Liver:  Unremarkable.     Gallbladder and bile ducts:  Cholecystectomy.     Pancreas:  Unremarkable.     Spleen:  Unremarkable.     Adrenals:  Unremarkable.     Kidneys and ureters:  No hydronephrosis or ureteral calculus.     Nonspecific bilateral perinephric stranding, cannot exclude   infection inflammation.  Left renal cysts.  Possible right   nephrolithiasis.     Stomach and bowel:  No mechanical bowel obstruction.  Colonic   diverticulosis.  No diverticulitis.      PELVIS:     Appendix:  No findings to suggest acute appendicitis.     Bladder:  Distended bladder.     Reproductive:  Unremarkable as visualized.      ABDOMEN and PELVIS:     Intraperitoneal space:  No free air.     Bones joints:  Right hip arthroplasty with associated postop changes.     Soft tissues:  Right thigh hip soft tissue edema and small air likely   postop.     Vasculature:  Atherosclerosis.     Lymph nodes:  Unremarkable.     IMPRESSION:       1.  No  mechanical bowel obstruction.  Colonic diverticulosis.  No   diverticulitis.   2.  No hydronephrosis or ureteral calculus.  Nonspecific bilateral   perinephric stranding, cannot exclude infection inflammation.   3.  Right lower lobe consolidation atelectasis.    Impression:         Electronically signed by Kike Kelly M.D. on 03-14-21 at 0002   XR Hip 1 View Without Pelvis Right (Surgery Only) [[id number]] Bertin as Reviewed   Order Status: Completed Collected: 03/12/21 1834    Updated: 03/12/21 1837   Narrative:     RIGHT HIP X-RAYS       CLINICAL HISTORY: Postop arthroplasty       2 somewhat underpenetrated views were obtained and demonstrate a right   total hip arthroplasty that appears in satisfactory position.       This report was finalized on 3/12/2021 6:34 PM by Dr. Tao Malin M.D.       XR Femur 2 View Right [[id number]] Bertin as Reviewed   Order Status: Completed Collected: 03/11/21 1525    Updated: 03/11/21 1532   Narrative:     XR FEMUR 2 VW RIGHT-, XR PELVIS 1 OR 2 VW-       INDICATIONS: Pain       TECHNIQUE: 7 images including the pelvis and right femur       COMPARISON: None available       FINDINGS:       The lateral view of the distal femur is technically limited by   overpenetration; appearance of erosion of the patella and proximal tibia   on this image is presumably technical in origin, repeat image could be   obtained as indicated. The bones otherwise appear intact. Proximal right   femoral hemiarthroplasty hardware appears unremarkable. Arterial   calcifications are conspicuous. Follow-up/further evaluation can be   obtained as indications persist.       Impression:         As described.              Collected Updated Procedure Result Status    03/17/2021 0403 03/17/2021 0445 CBC (No Diff) [[id number]]   (Abnormal)   Blood    Final result Component Value Units   WBC 9.76 10*3/mm3   RBC 3.72Low  10*6/mm3   Hemoglobin 10.3Low  g/dL   Hematocrit 32.2Low  %   MCV 86.6 fL   MCH 27.7 pg   MCHC 32.0  g/dL   RDW 13.1 %   RDW-SD 41.4 fl   MPV 10.0 fL   Platelets 418 10*3/mm3           03/17/2021 0403 03/17/2021 0517 Basic Metabolic Panel [[id number]]    (Abnormal)   Blood    Final result Component Value Units   Glucose 149High  mg/dL   BUN 7 mg/dL   Creatinine 0.60Low  mg/dL   Sodium 138 mmol/L   Potassium 3.5 mmol/L   Chloride 101 mmol/L   CO2 27.0 mmol/L   Calcium 8.3Low  mg/dL   eGFR Non African Am 138 mL/min/1.73   BUN/Creatinine Ratio 11.7    Anion Gap 10.0 mmol/L            PHYSICAL EXAM  Objective    Alert  Obese  Chronically ill  Lungs clear, decreased bs at bases    CONDITION ON DISCHARGE  Stable.      DISCHARGE DISPOSITION   Skilled Nursing Facility (DC - External)      DISCHARGE MEDICATIONS       Your medication list      START taking these medications      Instructions Last Dose Given Next Dose Due   cyclobenzaprine 10 MG tablet  Commonly known as: FLEXERIL      Take 1 tablet by mouth 3 (Three) Times a Day As Needed for Muscle Spasms.       docusate sodium 100 MG capsule      Take 2 capsules by mouth 2 (Two) Times a Day.       HYDROcodone-acetaminophen 7.5-325 MG per tablet  Commonly known as: NORCO      Take 1 tablet by mouth Every 4 (Four) Hours As Needed for Moderate Pain .       insulin lispro 100 UNIT/ML injection  Commonly known as: ADMELOG      Inject 0-9 Units under the skin into the appropriate area as directed 3 (Three) Times a Day Before Meals.       ondansetron 4 MG tablet  Commonly known as: ZOFRAN      Take 1 tablet by mouth Every 6 (Six) Hours As Needed for Nausea or Vomiting.       penicillin G potassium 4 Million Units in sodium chloride 0.9 % 100 mL IVPB      Infuse 4 Million Units into a venous catheter Every 4 (Four) Hours for 226 doses. Indications: Bone and/or Joint Infection          CHANGE how you take these medications      Instructions Last Dose Given Next Dose Due   acetaminophen 325 MG tablet  Commonly known as: TYLENOL  What changed: Another medication with the same name was  added. Make sure you understand how and when to take each.      Take 650 mg by mouth Every 4 (Four) Hours As Needed for Mild Pain .       acetaminophen 325 MG tablet  Commonly known as: TYLENOL  What changed: You were already taking a medication with the same name, and this prescription was added. Make sure you understand how and when to take each.      Take 2 tablets by mouth Every 4 (Four) Hours As Needed for Mild Pain .       metoprolol succinate XL 50 MG 24 hr tablet  Commonly known as: TOPROL-XL  What changed:   · medication strength  · how much to take  · when to take this      Take 1 tablet by mouth Every 12 (Twelve) Hours.          CONTINUE taking these medications      Instructions Last Dose Given Next Dose Due   aspirin 81 MG chewable tablet      Chew 81 mg Daily.       clotrimazole-betamethasone 1-0.05 % cream  Commonly known as: LOTRISONE      Apply  topically to the appropriate area as directed 2 (Two) Times a Day As Needed. For psoriasis       hydrOXYzine 10 MG tablet  Commonly known as: ATARAX      Take 10 mg by mouth Every 4 (Four) Hours As Needed for Itching.       insulin glargine 100 UNIT/ML injection  Commonly known as: LANSTEPH BROWNEGLEE      Inject 20 Units under the skin into the appropriate area as directed Daily.       isosorbide mononitrate 30 MG 24 hr tablet  Commonly known as: IMDUR      Take 30 mg by mouth Daily.       rosuvastatin 20 MG tablet  Commonly known as: CRESTOR      Take 40 mg by mouth Daily.       sennosides-docusate 8.6-50 MG per tablet  Commonly known as: PERICOLACE      Take 1 tablet by mouth Daily As Needed for Constipation.          STOP taking these medications    gabapentin 600 MG tablet  Commonly known as: NEURONTIN        lisinopril 20 MG tablet  Commonly known as: PRINIVIL,ZESTRIL        meclizine 25 MG chewable tablet chewable tablet        metFORMIN 1000 MG tablet  Commonly known as: GLUCOPHAGE              Where to Get Your Medications      You can get these  medications from any pharmacy    Bring a paper prescription for each of these medications  · HYDROcodone-acetaminophen 7.5-325 MG per tablet     Information about where to get these medications is not yet available    Ask your nurse or doctor about these medications  · acetaminophen 325 MG tablet  · cyclobenzaprine 10 MG tablet  · docusate sodium 100 MG capsule  · insulin lispro 100 UNIT/ML injection  · metoprolol succinate XL 50 MG 24 hr tablet  · ondansetron 4 MG tablet  · penicillin G potassium 4 Million Units in sodium chloride 0.9 % 100 mL IVPB          Future Appointments   Date Time Provider Department Center   4/23/2021  1:10 PM Raymundo Miller MD [facility] ID [facility] None   4/30/2021  1:20 PM Erasmo Rodas MD [facility] None      Contact information for follow-up providers     Tao Andrea MD Follow up in 2 week(s).    Specialty: Orthopedic Surgery  Contact information:  [address]  [facility] [address]  [phone]             Pipe Servin MD .    Specialties: Internal Medicine, Emergency Medicine  Contact information:  [address] [facility] [address]  [facility] [id number] [phone]                   Contact information for after-discharge care     Destination     [facility] .    Service: Skilled Nursing  Contact information:  [address]  [facility] [address] [phone]                             TEST  RESULTS PENDING AT DISCHARGE  Pending Labs     Order Current Status    Fungus Culture - Wound, Hip, Right Preliminary result    Fungus Culture - Wound, Hip, Right Preliminary result             Colby Ford MD  [facility]  03/18/21  09:32 EDT      Time: greater than 32 minutes on discharge  It was a pleasure taking care of this patient while in the hospital.

## 2021-03-18 VITALS
HEIGHT: 65 IN | RESPIRATION RATE: 18 BRPM | SYSTOLIC BLOOD PRESSURE: 151 MMHG | WEIGHT: 279.9 LBS | OXYGEN SATURATION: 95 % | DIASTOLIC BLOOD PRESSURE: 96 MMHG | TEMPERATURE: 97.7 F | HEART RATE: 84 BPM | BODY MASS INDEX: 46.63 KG/M2

## 2021-03-18 LAB
ANION GAP SERPL CALCULATED.3IONS-SCNC: 9.5 MMOL/L (ref 5–15)
BUN SERPL-MCNC: 8 MG/DL (ref 6–20)
BUN/CREAT SERPL: 13.1 (ref 7–25)
CALCIUM SPEC-SCNC: 8.8 MG/DL (ref 8.6–10.5)
CHLORIDE SERPL-SCNC: 101 MMOL/L (ref 98–107)
CO2 SERPL-SCNC: 27.5 MMOL/L (ref 22–29)
CREAT SERPL-MCNC: 0.61 MG/DL (ref 0.76–1.27)
GFR SERPL CREATININE-BSD FRML MDRD: 135 ML/MIN/1.73
GLUCOSE BLDC GLUCOMTR-MCNC: 158 MG/DL (ref 70–130)
GLUCOSE BLDC GLUCOMTR-MCNC: 185 MG/DL (ref 70–130)
GLUCOSE SERPL-MCNC: 148 MG/DL (ref 65–99)
POTASSIUM SERPL-SCNC: 4.3 MMOL/L (ref 3.5–5.2)
SODIUM SERPL-SCNC: 138 MMOL/L (ref 136–145)

## 2021-03-18 PROCEDURE — 63710000001 INSULIN LISPRO (HUMAN) PER 5 UNITS: Performed by: INTERNAL MEDICINE

## 2021-03-18 PROCEDURE — 25010000003 PENICILLIN G POTASSIUM PER 600000 UNITS: Performed by: INTERNAL MEDICINE

## 2021-03-18 PROCEDURE — 82962 GLUCOSE BLOOD TEST: CPT

## 2021-03-18 PROCEDURE — 80048 BASIC METABOLIC PNL TOTAL CA: CPT | Performed by: INTERNAL MEDICINE

## 2021-03-18 RX ORDER — MECLIZINE HCL 25MG 25 MG/1
25 TABLET, CHEWABLE ORAL 3 TIMES DAILY PRN
Status: ON HOLD
Start: 2021-03-18 | End: 2021-03-24

## 2021-03-18 RX ADMIN — PENICILLIN G POTASSIUM 4 MILLION UNITS: 20000000 INJECTION, POWDER, FOR SOLUTION INTRAVENOUS at 06:57

## 2021-03-18 RX ADMIN — PENICILLIN G POTASSIUM 4 MILLION UNITS: 20000000 INJECTION, POWDER, FOR SOLUTION INTRAVENOUS at 01:30

## 2021-03-18 RX ADMIN — HYDROCODONE BITARTRATE AND ACETAMINOPHEN 1 TABLET: 10; 325 TABLET ORAL at 00:52

## 2021-03-18 RX ADMIN — HYDROCODONE BITARTRATE AND ACETAMINOPHEN 1 TABLET: 10; 325 TABLET ORAL at 04:38

## 2021-03-18 RX ADMIN — INSULIN LISPRO 2 UNITS: 100 INJECTION, SOLUTION INTRAVENOUS; SUBCUTANEOUS at 07:56

## 2021-03-18 RX ADMIN — PENICILLIN G POTASSIUM 4 MILLION UNITS: 20000000 INJECTION, POWDER, FOR SOLUTION INTRAVENOUS at 09:18

## 2021-03-18 RX ADMIN — METOPROLOL SUCCINATE 50 MG: 50 TABLET, EXTENDED RELEASE ORAL at 09:17

## 2021-03-18 RX ADMIN — INSULIN LISPRO 2 UNITS: 100 INJECTION, SOLUTION INTRAVENOUS; SUBCUTANEOUS at 11:31

## 2021-03-18 RX ADMIN — ISOSORBIDE MONONITRATE 30 MG: 30 TABLET ORAL at 09:17

## 2021-03-18 RX ADMIN — HYDROCODONE BITARTRATE AND ACETAMINOPHEN 1 TABLET: 10; 325 TABLET ORAL at 11:31

## 2021-03-18 RX ADMIN — ASPIRIN 81 MG: 81 TABLET, CHEWABLE ORAL at 09:17

## 2021-03-18 RX ADMIN — SODIUM CHLORIDE, PRESERVATIVE FREE 10 ML: 5 INJECTION INTRAVENOUS at 09:36

## 2021-03-18 NOTE — PROGRESS NOTES
Case Management Discharge Note      Final Note: [facility]. [facility]   [facility] setup for 1200 today for transport. Notified wife Erica Tracey [phone] and packet given to nurse. Bryon John RN-BC    Provided Post Acute Provider List?: N/A  N/A Provider List Comment: The patient’s wife was not provide with a HH/SNF list nor a print out of the HH/nursing home compare list from Medicare.[facility] as the patient’s [email or web address] [place].  Provided Post Acute Provider Quality & Resource List?: N/A  N/A Quality & Resource List Comment: The patient’s wife was not provide with a HH/SNF list nor a print out of the HH/nursing home compare list from Medicare.[facility] as the patient’s wife requested [email or web address] in [place].    Selected Continued Care - Admitted Since 3/11/2021     Destination Coordination complete    Service Provider Selected Services Address Phone Fax Patient [facility]    [facility] [facility] [address] [phone] --          Durable Medical Equipment    No services have been selected for the patient.              Dialysis/Infusion    No services have been selected for the patient.              Home Medical Care    No services have been selected for the patient.              Therapy    No services have been selected for the patient.              Community Resources    No services have been selected for the patient.                  Transportation Services  Ambulance: [facility]    Final Discharge Disposition Code: 03 - skilled nursing facility (SNF)

## 2021-03-18 NOTE — PLAN OF CARE
Goal Outcome Evaluation:  Plan of Care Reviewed With: patient  Progress: no change  Outcome Summary: pt awake the greatest portion of this shift, taking po pan medications, given a bed bath, pt with psorasis to left elbow, left forarm. pt with area's that appear to be dry, red and itchy to the pannus, inner groin, rt arm pit and face. Areas cleansed and topical silicone ointment applied.

## 2021-03-24 ENCOUNTER — HOSPITAL ENCOUNTER (OUTPATIENT)
Facility: HOSPITAL | Age: 61
Setting detail: SURGERY ADMIT
End: 2021-03-24
Attending: ORTHOPAEDIC SURGERY | Admitting: ORTHOPAEDIC SURGERY

## 2021-03-24 ENCOUNTER — APPOINTMENT (OUTPATIENT)
Dept: GENERAL RADIOLOGY | Facility: HOSPITAL | Age: 61
End: 2021-03-24

## 2021-03-24 ENCOUNTER — TELEPHONE (OUTPATIENT)
Dept: INFECTIOUS DISEASES | Facility: CLINIC | Age: 61
End: 2021-03-24

## 2021-03-24 ENCOUNTER — HOSPITAL ENCOUNTER (INPATIENT)
Facility: HOSPITAL | Age: 61
LOS: 8 days | Discharge: NURSING, REHAB OR LONG-TERM CARE | End: 2021-04-01
Attending: INTERNAL MEDICINE | Admitting: INTERNAL MEDICINE

## 2021-03-24 DIAGNOSIS — M00.9 PYOGENIC ARTHRITIS OF RIGHT HIP, DUE TO UNSPECIFIED ORGANISM (HCC): Primary | ICD-10-CM

## 2021-03-24 LAB
ALBUMIN SERPL-MCNC: 4 G/DL (ref 3.5–5.2)
ALBUMIN/GLOB SERPL: 1.2 G/DL
ALP SERPL-CCNC: 62 U/L (ref 39–117)
ALT SERPL W P-5'-P-CCNC: 13 U/L (ref 1–41)
ANION GAP SERPL CALCULATED.3IONS-SCNC: 11.2 MMOL/L (ref 5–15)
AST SERPL-CCNC: 12 U/L (ref 1–40)
BASOPHILS # BLD AUTO: 0.03 10*3/MM3 (ref 0–0.2)
BASOPHILS NFR BLD AUTO: 0.2 % (ref 0–1.5)
BILIRUB SERPL-MCNC: 0.3 MG/DL (ref 0–1.2)
BUN SERPL-MCNC: 13 MG/DL (ref 6–20)
BUN/CREAT SERPL: 17.3 (ref 7–25)
CALCIUM SPEC-SCNC: 9.3 MG/DL (ref 8.6–10.5)
CHLORIDE SERPL-SCNC: 98 MMOL/L (ref 98–107)
CO2 SERPL-SCNC: 28.8 MMOL/L (ref 22–29)
CREAT SERPL-MCNC: 0.75 MG/DL (ref 0.76–1.27)
CRP SERPL-MCNC: 5.23 MG/DL (ref 0–0.5)
D-LACTATE SERPL-SCNC: 1.3 MMOL/L (ref 0.5–2)
DEPRECATED RDW RBC AUTO: 43 FL (ref 37–54)
EOSINOPHIL # BLD AUTO: 0.15 10*3/MM3 (ref 0–0.4)
EOSINOPHIL NFR BLD AUTO: 1.2 % (ref 0.3–6.2)
ERYTHROCYTE [DISTWIDTH] IN BLOOD BY AUTOMATED COUNT: 13.4 % (ref 12.3–15.4)
ERYTHROCYTE [SEDIMENTATION RATE] IN BLOOD: 85 MM/HR (ref 0–20)
GFR SERPL CREATININE-BSD FRML MDRD: 107 ML/MIN/1.73
GLOBULIN UR ELPH-MCNC: 3.4 GM/DL
GLUCOSE BLDC GLUCOMTR-MCNC: 148 MG/DL (ref 70–130)
GLUCOSE BLDC GLUCOMTR-MCNC: 260 MG/DL (ref 70–130)
GLUCOSE SERPL-MCNC: 131 MG/DL (ref 65–99)
HCT VFR BLD AUTO: 36.9 % (ref 37.5–51)
HGB BLD-MCNC: 11.8 G/DL (ref 13–17.7)
IMM GRANULOCYTES # BLD AUTO: 0.05 10*3/MM3 (ref 0–0.05)
IMM GRANULOCYTES NFR BLD AUTO: 0.4 % (ref 0–0.5)
LYMPHOCYTES # BLD AUTO: 1.99 10*3/MM3 (ref 0.7–3.1)
LYMPHOCYTES NFR BLD AUTO: 15.3 % (ref 19.6–45.3)
MCH RBC QN AUTO: 28.2 PG (ref 26.6–33)
MCHC RBC AUTO-ENTMCNC: 32 G/DL (ref 31.5–35.7)
MCV RBC AUTO: 88.1 FL (ref 79–97)
MONOCYTES # BLD AUTO: 0.87 10*3/MM3 (ref 0.1–0.9)
MONOCYTES NFR BLD AUTO: 6.7 % (ref 5–12)
NEUTROPHILS NFR BLD AUTO: 76.2 % (ref 42.7–76)
NEUTROPHILS NFR BLD AUTO: 9.93 10*3/MM3 (ref 1.7–7)
NRBC BLD AUTO-RTO: 0 /100 WBC (ref 0–0.2)
PLATELET # BLD AUTO: 510 10*3/MM3 (ref 140–450)
PMV BLD AUTO: 10 FL (ref 6–12)
POTASSIUM SERPL-SCNC: 4.6 MMOL/L (ref 3.5–5.2)
PROCALCITONIN SERPL-MCNC: 0.11 NG/ML (ref 0–0.25)
PROT SERPL-MCNC: 7.4 G/DL (ref 6–8.5)
QT INTERVAL: 392 MS
RBC # BLD AUTO: 4.19 10*6/MM3 (ref 4.14–5.8)
SARS-COV-2 ORF1AB RESP QL NAA+PROBE: NOT DETECTED
SODIUM SERPL-SCNC: 138 MMOL/L (ref 136–145)
TROPONIN T SERPL-MCNC: 0.02 NG/ML (ref 0–0.03)
WBC # BLD AUTO: 13.02 10*3/MM3 (ref 3.4–10.8)

## 2021-03-24 PROCEDURE — 82962 GLUCOSE BLOOD TEST: CPT

## 2021-03-24 PROCEDURE — 25010000002 CEFEPIME PER 500 MG: Performed by: NURSE PRACTITIONER

## 2021-03-24 PROCEDURE — 83605 ASSAY OF LACTIC ACID: CPT | Performed by: NURSE PRACTITIONER

## 2021-03-24 PROCEDURE — U0004 COV-19 TEST NON-CDC HGH THRU: HCPCS | Performed by: ORTHOPAEDIC SURGERY

## 2021-03-24 PROCEDURE — 85025 COMPLETE CBC W/AUTO DIFF WBC: CPT | Performed by: NURSE PRACTITIONER

## 2021-03-24 PROCEDURE — 63710000001 INSULIN GLARGINE PER 5 UNITS: Performed by: NURSE PRACTITIONER

## 2021-03-24 PROCEDURE — 63710000001 INSULIN LISPRO (HUMAN) PER 5 UNITS: Performed by: NURSE PRACTITIONER

## 2021-03-24 PROCEDURE — 84145 PROCALCITONIN (PCT): CPT | Performed by: NURSE PRACTITIONER

## 2021-03-24 PROCEDURE — 93005 ELECTROCARDIOGRAM TRACING: CPT | Performed by: NURSE PRACTITIONER

## 2021-03-24 PROCEDURE — 80053 COMPREHEN METABOLIC PANEL: CPT | Performed by: NURSE PRACTITIONER

## 2021-03-24 PROCEDURE — 93010 ELECTROCARDIOGRAM REPORT: CPT | Performed by: INTERNAL MEDICINE

## 2021-03-24 PROCEDURE — 25010000002 VANCOMYCIN 10 G RECONSTITUTED SOLUTION: Performed by: NURSE PRACTITIONER

## 2021-03-24 PROCEDURE — 84484 ASSAY OF TROPONIN QUANT: CPT | Performed by: NURSE PRACTITIONER

## 2021-03-24 PROCEDURE — 87040 BLOOD CULTURE FOR BACTERIA: CPT | Performed by: NURSE PRACTITIONER

## 2021-03-24 PROCEDURE — 86140 C-REACTIVE PROTEIN: CPT | Performed by: NURSE PRACTITIONER

## 2021-03-24 PROCEDURE — 85652 RBC SED RATE AUTOMATED: CPT | Performed by: NURSE PRACTITIONER

## 2021-03-24 RX ORDER — METOPROLOL SUCCINATE 50 MG/1
50 TABLET, EXTENDED RELEASE ORAL DAILY
Status: DISCONTINUED | OUTPATIENT
Start: 2021-03-25 | End: 2021-04-01 | Stop reason: HOSPADM

## 2021-03-24 RX ORDER — ACETAMINOPHEN 650 MG/1
650 SUPPOSITORY RECTAL EVERY 4 HOURS PRN
Status: DISCONTINUED | OUTPATIENT
Start: 2021-03-24 | End: 2021-04-01 | Stop reason: HOSPADM

## 2021-03-24 RX ORDER — LIDOCAINE 50 MG/G
1 PATCH TOPICAL EVERY 24 HOURS
Status: DISCONTINUED | OUTPATIENT
Start: 2021-03-24 | End: 2021-04-01 | Stop reason: HOSPADM

## 2021-03-24 RX ORDER — ACETAMINOPHEN 325 MG/1
650 TABLET ORAL EVERY 4 HOURS PRN
Status: DISCONTINUED | OUTPATIENT
Start: 2021-03-24 | End: 2021-04-01 | Stop reason: HOSPADM

## 2021-03-24 RX ORDER — MORPHINE SULFATE/0.9% NACL/PF 1 MG/ML
1 SYRINGE (ML) INJECTION EVERY 6 HOURS PRN
COMMUNITY
End: 2021-04-01 | Stop reason: HOSPADM

## 2021-03-24 RX ORDER — DOCUSATE SODIUM 100 MG/1
200 CAPSULE, LIQUID FILLED ORAL 2 TIMES DAILY
Status: DISCONTINUED | OUTPATIENT
Start: 2021-03-24 | End: 2021-04-01 | Stop reason: HOSPADM

## 2021-03-24 RX ORDER — SODIUM CHLORIDE 0.9 % (FLUSH) 0.9 %
10 SYRINGE (ML) INJECTION EVERY 12 HOURS SCHEDULED
Status: DISCONTINUED | OUTPATIENT
Start: 2021-03-24 | End: 2021-04-01 | Stop reason: HOSPADM

## 2021-03-24 RX ORDER — INSULIN GLARGINE 100 [IU]/ML
15 INJECTION, SOLUTION SUBCUTANEOUS NIGHTLY
Status: DISCONTINUED | OUTPATIENT
Start: 2021-03-24 | End: 2021-03-30

## 2021-03-24 RX ORDER — DEXTROSE MONOHYDRATE 25 G/50ML
25 INJECTION, SOLUTION INTRAVENOUS
Status: DISCONTINUED | OUTPATIENT
Start: 2021-03-24 | End: 2021-04-01 | Stop reason: HOSPADM

## 2021-03-24 RX ORDER — LISINOPRIL 10 MG/1
10 TABLET ORAL DAILY
COMMUNITY
End: 2021-04-01 | Stop reason: HOSPADM

## 2021-03-24 RX ORDER — NICOTINE POLACRILEX 4 MG
15 LOZENGE BUCCAL
Status: DISCONTINUED | OUTPATIENT
Start: 2021-03-24 | End: 2021-04-01 | Stop reason: HOSPADM

## 2021-03-24 RX ORDER — GABAPENTIN 300 MG/1
300 CAPSULE ORAL 3 TIMES DAILY
Status: ON HOLD | COMMUNITY
End: 2021-03-31 | Stop reason: SDUPTHER

## 2021-03-24 RX ORDER — ISOSORBIDE MONONITRATE 30 MG/1
30 TABLET, EXTENDED RELEASE ORAL DAILY
Status: DISCONTINUED | OUTPATIENT
Start: 2021-03-25 | End: 2021-04-01 | Stop reason: HOSPADM

## 2021-03-24 RX ORDER — INSULIN LISPRO 100 [IU]/ML
4 INJECTION, SOLUTION INTRAVENOUS; SUBCUTANEOUS
Status: DISCONTINUED | OUTPATIENT
Start: 2021-03-24 | End: 2021-03-30

## 2021-03-24 RX ORDER — LIDOCAINE 50 MG/G
1 PATCH TOPICAL EVERY 24 HOURS
COMMUNITY
End: 2023-01-16 | Stop reason: HOSPADM

## 2021-03-24 RX ORDER — ASPIRIN 81 MG/1
81 TABLET, CHEWABLE ORAL DAILY
Status: DISCONTINUED | OUTPATIENT
Start: 2021-03-25 | End: 2021-04-01 | Stop reason: HOSPADM

## 2021-03-24 RX ORDER — GABAPENTIN 300 MG/1
300 CAPSULE ORAL 3 TIMES DAILY
Status: DISCONTINUED | OUTPATIENT
Start: 2021-03-24 | End: 2021-04-01 | Stop reason: HOSPADM

## 2021-03-24 RX ORDER — ROSUVASTATIN CALCIUM 40 MG/1
40 TABLET, COATED ORAL DAILY
Status: DISCONTINUED | OUTPATIENT
Start: 2021-03-24 | End: 2021-04-01 | Stop reason: HOSPADM

## 2021-03-24 RX ORDER — HYDROXYZINE HYDROCHLORIDE 25 MG/1
25 TABLET, FILM COATED ORAL EVERY 8 HOURS PRN
Status: DISCONTINUED | OUTPATIENT
Start: 2021-03-24 | End: 2021-04-01 | Stop reason: HOSPADM

## 2021-03-24 RX ORDER — SERTRALINE HYDROCHLORIDE 100 MG/1
100 TABLET, FILM COATED ORAL DAILY
Status: DISCONTINUED | OUTPATIENT
Start: 2021-03-24 | End: 2021-04-01 | Stop reason: HOSPADM

## 2021-03-24 RX ORDER — SODIUM CHLORIDE 0.9 % (FLUSH) 0.9 %
10 SYRINGE (ML) INJECTION AS NEEDED
Status: DISCONTINUED | OUTPATIENT
Start: 2021-03-24 | End: 2021-04-01 | Stop reason: HOSPADM

## 2021-03-24 RX ORDER — OXYCODONE HYDROCHLORIDE 5 MG/1
10 TABLET ORAL EVERY 4 HOURS PRN
COMMUNITY
End: 2021-04-01 | Stop reason: HOSPADM

## 2021-03-24 RX ORDER — CYCLOBENZAPRINE HCL 10 MG
5 TABLET ORAL 3 TIMES DAILY PRN
Status: DISCONTINUED | OUTPATIENT
Start: 2021-03-24 | End: 2021-04-01 | Stop reason: HOSPADM

## 2021-03-24 RX ORDER — INSULIN LISPRO 100 [IU]/ML
0-9 INJECTION, SOLUTION INTRAVENOUS; SUBCUTANEOUS
Status: DISCONTINUED | OUTPATIENT
Start: 2021-03-24 | End: 2021-04-01 | Stop reason: HOSPADM

## 2021-03-24 RX ORDER — OXYCODONE HYDROCHLORIDE 5 MG/1
10 TABLET ORAL EVERY 4 HOURS PRN
Status: DISCONTINUED | OUTPATIENT
Start: 2021-03-24 | End: 2021-03-29

## 2021-03-24 RX ORDER — SERTRALINE HYDROCHLORIDE 100 MG/1
100 TABLET, FILM COATED ORAL DAILY
COMMUNITY

## 2021-03-24 RX ORDER — ONDANSETRON 2 MG/ML
4 INJECTION INTRAMUSCULAR; INTRAVENOUS EVERY 6 HOURS PRN
Status: DISCONTINUED | OUTPATIENT
Start: 2021-03-24 | End: 2021-04-01 | Stop reason: HOSPADM

## 2021-03-24 RX ORDER — ACETAMINOPHEN 160 MG/5ML
650 SOLUTION ORAL EVERY 4 HOURS PRN
Status: DISCONTINUED | OUTPATIENT
Start: 2021-03-24 | End: 2021-04-01 | Stop reason: HOSPADM

## 2021-03-24 RX ADMIN — OXYCODONE 10 MG: 5 TABLET ORAL at 23:19

## 2021-03-24 RX ADMIN — INSULIN LISPRO 4 UNITS: 100 INJECTION, SOLUTION INTRAVENOUS; SUBCUTANEOUS at 18:31

## 2021-03-24 RX ADMIN — HYDROCORTISONE: 25 CREAM TOPICAL at 20:26

## 2021-03-24 RX ADMIN — OXYCODONE 10 MG: 5 TABLET ORAL at 18:31

## 2021-03-24 RX ADMIN — INSULIN GLARGINE 15 UNITS: 100 INJECTION, SOLUTION SUBCUTANEOUS at 20:27

## 2021-03-24 RX ADMIN — CEFEPIME HYDROCHLORIDE 2 G: 2 INJECTION, POWDER, FOR SOLUTION INTRAVENOUS at 18:30

## 2021-03-24 RX ADMIN — VANCOMYCIN HYDROCHLORIDE 2500 MG: 10 INJECTION, POWDER, LYOPHILIZED, FOR SOLUTION INTRAVENOUS at 20:26

## 2021-03-24 RX ADMIN — SODIUM CHLORIDE, PRESERVATIVE FREE 10 ML: 5 INJECTION INTRAVENOUS at 23:20

## 2021-03-24 RX ADMIN — DOCUSATE SODIUM 200 MG: 100 CAPSULE, LIQUID FILLED ORAL at 20:25

## 2021-03-24 RX ADMIN — GABAPENTIN 300 MG: 300 CAPSULE ORAL at 20:26

## 2021-03-24 RX ADMIN — SODIUM CHLORIDE, PRESERVATIVE FREE 10 ML: 5 INJECTION INTRAVENOUS at 23:37

## 2021-03-24 NOTE — TELEPHONE ENCOUNTER
"FYI: Phone with Dr. Villalobos at [facility]. [facility] to try and get patients weekly labs and [facility]. [facility] and she states \"he is not doing very well with his hip\". They are sending him back to [facility] possibly today for re-eval and she is going to call Sunil Lindsay to let them know as well. ОЛЕГ, RN  "

## 2021-03-25 ENCOUNTER — APPOINTMENT (OUTPATIENT)
Dept: CT IMAGING | Facility: HOSPITAL | Age: 61
End: 2021-03-25

## 2021-03-25 LAB
ANION GAP SERPL CALCULATED.3IONS-SCNC: 10.9 MMOL/L (ref 5–15)
APPEARANCE FLD: ABNORMAL
BASOPHILS # BLD AUTO: 0.04 10*3/MM3 (ref 0–0.2)
BASOPHILS NFR BLD AUTO: 0.4 % (ref 0–1.5)
BUN SERPL-MCNC: 10 MG/DL (ref 6–20)
BUN/CREAT SERPL: 14.1 (ref 7–25)
CALCIUM SPEC-SCNC: 8.7 MG/DL (ref 8.6–10.5)
CHLORIDE SERPL-SCNC: 99 MMOL/L (ref 98–107)
CO2 SERPL-SCNC: 26.1 MMOL/L (ref 22–29)
COLOR FLD: ABNORMAL
CREAT SERPL-MCNC: 0.71 MG/DL (ref 0.76–1.27)
CRYSTALS FLD MICRO: NORMAL
DEPRECATED RDW RBC AUTO: 42.6 FL (ref 37–54)
EOSINOPHIL # BLD AUTO: 0.26 10*3/MM3 (ref 0–0.4)
EOSINOPHIL NFR BLD AUTO: 2.4 % (ref 0.3–6.2)
EOSINOPHIL NFR FLD MANUAL: 2 %
ERYTHROCYTE [DISTWIDTH] IN BLOOD BY AUTOMATED COUNT: 13.2 % (ref 12.3–15.4)
GFR SERPL CREATININE-BSD FRML MDRD: 114 ML/MIN/1.73
GLUCOSE BLDC GLUCOMTR-MCNC: 162 MG/DL (ref 70–130)
GLUCOSE BLDC GLUCOMTR-MCNC: 184 MG/DL (ref 70–130)
GLUCOSE BLDC GLUCOMTR-MCNC: 186 MG/DL (ref 70–130)
GLUCOSE BLDC GLUCOMTR-MCNC: 220 MG/DL (ref 70–130)
GLUCOSE SERPL-MCNC: 147 MG/DL (ref 65–99)
HCT VFR BLD AUTO: 35.7 % (ref 37.5–51)
HGB BLD-MCNC: 11.4 G/DL (ref 13–17.7)
IMM GRANULOCYTES # BLD AUTO: 0.04 10*3/MM3 (ref 0–0.05)
IMM GRANULOCYTES NFR BLD AUTO: 0.4 % (ref 0–0.5)
LYMPHOCYTES # BLD AUTO: 2.16 10*3/MM3 (ref 0.7–3.1)
LYMPHOCYTES NFR BLD AUTO: 20 % (ref 19.6–45.3)
LYMPHOCYTES NFR FLD MANUAL: 2 %
MCH RBC QN AUTO: 28 PG (ref 26.6–33)
MCHC RBC AUTO-ENTMCNC: 31.9 G/DL (ref 31.5–35.7)
MCV RBC AUTO: 87.7 FL (ref 79–97)
METHOD: ABNORMAL
MONOCYTES # BLD AUTO: 0.95 10*3/MM3 (ref 0.1–0.9)
MONOCYTES NFR BLD AUTO: 8.8 % (ref 5–12)
MONOCYTES NFR FLD: 2 %
NEUTROPHILS NFR BLD AUTO: 68 % (ref 42.7–76)
NEUTROPHILS NFR BLD AUTO: 7.34 10*3/MM3 (ref 1.7–7)
NEUTROPHILS NFR FLD MANUAL: 94 %
NRBC BLD AUTO-RTO: 0 /100 WBC (ref 0–0.2)
NUC CELL # FLD: 56 /MM3
PLATELET # BLD AUTO: 523 10*3/MM3 (ref 140–450)
PMV BLD AUTO: 10.1 FL (ref 6–12)
POTASSIUM SERPL-SCNC: 4.1 MMOL/L (ref 3.5–5.2)
RBC # BLD AUTO: 4.07 10*6/MM3 (ref 4.14–5.8)
RBC # FLD AUTO: ABNORMAL /MM3
SODIUM SERPL-SCNC: 136 MMOL/L (ref 136–145)
WBC # BLD AUTO: 10.79 10*3/MM3 (ref 3.4–10.8)

## 2021-03-25 PROCEDURE — 87075 CULTR BACTERIA EXCEPT BLOOD: CPT | Performed by: PHYSICIAN ASSISTANT

## 2021-03-25 PROCEDURE — 25010000002 IOPAMIDOL 61 % SOLUTION: Performed by: INTERNAL MEDICINE

## 2021-03-25 PROCEDURE — 80048 BASIC METABOLIC PNL TOTAL CA: CPT | Performed by: NURSE PRACTITIONER

## 2021-03-25 PROCEDURE — 89060 EXAM SYNOVIAL FLUID CRYSTALS: CPT | Performed by: PHYSICIAN ASSISTANT

## 2021-03-25 PROCEDURE — 89051 BODY FLUID CELL COUNT: CPT | Performed by: PHYSICIAN ASSISTANT

## 2021-03-25 PROCEDURE — 73701 CT LOWER EXTREMITY W/DYE: CPT

## 2021-03-25 PROCEDURE — 82962 GLUCOSE BLOOD TEST: CPT

## 2021-03-25 PROCEDURE — 25010000002 VANCOMYCIN 10 G RECONSTITUTED SOLUTION: Performed by: NURSE PRACTITIONER

## 2021-03-25 PROCEDURE — 99222 1ST HOSP IP/OBS MODERATE 55: CPT | Performed by: INTERNAL MEDICINE

## 2021-03-25 PROCEDURE — 87070 CULTURE OTHR SPECIMN AEROBIC: CPT | Performed by: PHYSICIAN ASSISTANT

## 2021-03-25 PROCEDURE — 25010000003 LIDOCAINE 1 % SOLUTION: Performed by: RADIOLOGY

## 2021-03-25 PROCEDURE — 87205 SMEAR GRAM STAIN: CPT | Performed by: PHYSICIAN ASSISTANT

## 2021-03-25 PROCEDURE — 0S993ZZ DRAINAGE OF RIGHT HIP JOINT, PERCUTANEOUS APPROACH: ICD-10-PCS | Performed by: RADIOLOGY

## 2021-03-25 PROCEDURE — 85025 COMPLETE CBC W/AUTO DIFF WBC: CPT | Performed by: NURSE PRACTITIONER

## 2021-03-25 PROCEDURE — 99221 1ST HOSP IP/OBS SF/LOW 40: CPT | Performed by: INTERNAL MEDICINE

## 2021-03-25 PROCEDURE — 63710000001 INSULIN LISPRO (HUMAN) PER 5 UNITS: Performed by: NURSE PRACTITIONER

## 2021-03-25 PROCEDURE — 25010000002 CEFEPIME PER 500 MG: Performed by: NURSE PRACTITIONER

## 2021-03-25 PROCEDURE — 63710000001 INSULIN GLARGINE PER 5 UNITS: Performed by: NURSE PRACTITIONER

## 2021-03-25 RX ORDER — LIDOCAINE HYDROCHLORIDE 10 MG/ML
20 INJECTION, SOLUTION INFILTRATION; PERINEURAL ONCE
Status: COMPLETED | OUTPATIENT
Start: 2021-03-25 | End: 2021-03-25

## 2021-03-25 RX ORDER — CEFAZOLIN SODIUM IN 0.9 % NACL 3 G/100 ML
3 INTRAVENOUS SOLUTION, PIGGYBACK (ML) INTRAVENOUS ONCE
Status: CANCELLED | OUTPATIENT
Start: 2021-03-26 | End: 2021-03-25

## 2021-03-25 RX ADMIN — SODIUM CHLORIDE, PRESERVATIVE FREE 10 ML: 5 INJECTION INTRAVENOUS at 09:26

## 2021-03-25 RX ADMIN — VANCOMYCIN HYDROCHLORIDE 1500 MG: 10 INJECTION, POWDER, LYOPHILIZED, FOR SOLUTION INTRAVENOUS at 04:47

## 2021-03-25 RX ADMIN — ROSUVASTATIN CALCIUM 40 MG: 40 TABLET, FILM COATED ORAL at 09:25

## 2021-03-25 RX ADMIN — IOPAMIDOL 85 ML: 612 INJECTION, SOLUTION INTRAVENOUS at 10:45

## 2021-03-25 RX ADMIN — ISOSORBIDE MONONITRATE 30 MG: 30 TABLET ORAL at 09:25

## 2021-03-25 RX ADMIN — INSULIN GLARGINE 15 UNITS: 100 INJECTION, SOLUTION SUBCUTANEOUS at 20:01

## 2021-03-25 RX ADMIN — HYDROCORTISONE: 25 CREAM TOPICAL at 20:00

## 2021-03-25 RX ADMIN — INSULIN LISPRO 4 UNITS: 100 INJECTION, SOLUTION INTRAVENOUS; SUBCUTANEOUS at 18:06

## 2021-03-25 RX ADMIN — INSULIN LISPRO 4 UNITS: 100 INJECTION, SOLUTION INTRAVENOUS; SUBCUTANEOUS at 12:22

## 2021-03-25 RX ADMIN — DOCUSATE SODIUM 200 MG: 100 CAPSULE, LIQUID FILLED ORAL at 20:00

## 2021-03-25 RX ADMIN — LIDOCAINE 1 PATCH: 50 PATCH TOPICAL at 20:01

## 2021-03-25 RX ADMIN — INSULIN LISPRO 2 UNITS: 100 INJECTION, SOLUTION INTRAVENOUS; SUBCUTANEOUS at 09:25

## 2021-03-25 RX ADMIN — METOPROLOL SUCCINATE 50 MG: 50 TABLET, EXTENDED RELEASE ORAL at 09:25

## 2021-03-25 RX ADMIN — ASPIRIN 81 MG: 81 TABLET, CHEWABLE ORAL at 09:25

## 2021-03-25 RX ADMIN — GABAPENTIN 300 MG: 300 CAPSULE ORAL at 20:00

## 2021-03-25 RX ADMIN — GABAPENTIN 300 MG: 300 CAPSULE ORAL at 10:14

## 2021-03-25 RX ADMIN — CEFEPIME HYDROCHLORIDE 2 G: 2 INJECTION, POWDER, FOR SOLUTION INTRAVENOUS at 00:25

## 2021-03-25 RX ADMIN — GABAPENTIN 300 MG: 300 CAPSULE ORAL at 18:05

## 2021-03-25 RX ADMIN — OXYCODONE 10 MG: 5 TABLET ORAL at 09:24

## 2021-03-25 RX ADMIN — CYCLOBENZAPRINE 5 MG: 10 TABLET, FILM COATED ORAL at 20:00

## 2021-03-25 RX ADMIN — HYDROCORTISONE: 25 CREAM TOPICAL at 09:27

## 2021-03-25 RX ADMIN — DOCUSATE SODIUM 200 MG: 100 CAPSULE, LIQUID FILLED ORAL at 09:24

## 2021-03-25 RX ADMIN — OXYCODONE 10 MG: 5 TABLET ORAL at 20:00

## 2021-03-25 RX ADMIN — SERTRALINE 100 MG: 100 TABLET, FILM COATED ORAL at 09:25

## 2021-03-25 RX ADMIN — INSULIN LISPRO 4 UNITS: 100 INJECTION, SOLUTION INTRAVENOUS; SUBCUTANEOUS at 09:26

## 2021-03-25 RX ADMIN — CYCLOBENZAPRINE 5 MG: 10 TABLET, FILM COATED ORAL at 05:14

## 2021-03-25 RX ADMIN — VANCOMYCIN HYDROCHLORIDE 1500 MG: 10 INJECTION, POWDER, LYOPHILIZED, FOR SOLUTION INTRAVENOUS at 20:00

## 2021-03-25 RX ADMIN — OXYCODONE 10 MG: 5 TABLET ORAL at 05:14

## 2021-03-25 RX ADMIN — INSULIN LISPRO 2 UNITS: 100 INJECTION, SOLUTION INTRAVENOUS; SUBCUTANEOUS at 12:23

## 2021-03-25 RX ADMIN — LIDOCAINE HYDROCHLORIDE 20 ML: 10 INJECTION, SOLUTION INFILTRATION; PERINEURAL at 19:10

## 2021-03-26 LAB
ANION GAP SERPL CALCULATED.3IONS-SCNC: 9.5 MMOL/L (ref 5–15)
BASOPHILS # BLD AUTO: 0.03 10*3/MM3 (ref 0–0.2)
BASOPHILS NFR BLD AUTO: 0.3 % (ref 0–1.5)
BUN SERPL-MCNC: 10 MG/DL (ref 6–20)
BUN/CREAT SERPL: 13.3 (ref 7–25)
CALCIUM SPEC-SCNC: 9.3 MG/DL (ref 8.6–10.5)
CHLORIDE SERPL-SCNC: 97 MMOL/L (ref 98–107)
CO2 SERPL-SCNC: 29.5 MMOL/L (ref 22–29)
CREAT SERPL-MCNC: 0.75 MG/DL (ref 0.76–1.27)
CRP SERPL-MCNC: 3 MG/DL (ref 0–0.5)
DEPRECATED RDW RBC AUTO: 41.7 FL (ref 37–54)
EOSINOPHIL # BLD AUTO: 0.31 10*3/MM3 (ref 0–0.4)
EOSINOPHIL NFR BLD AUTO: 3.3 % (ref 0.3–6.2)
ERYTHROCYTE [DISTWIDTH] IN BLOOD BY AUTOMATED COUNT: 13.3 % (ref 12.3–15.4)
GFR SERPL CREATININE-BSD FRML MDRD: 107 ML/MIN/1.73
GLUCOSE BLDC GLUCOMTR-MCNC: 154 MG/DL (ref 70–130)
GLUCOSE BLDC GLUCOMTR-MCNC: 202 MG/DL (ref 70–130)
GLUCOSE BLDC GLUCOMTR-MCNC: 206 MG/DL (ref 70–130)
GLUCOSE BLDC GLUCOMTR-MCNC: 249 MG/DL (ref 70–130)
GLUCOSE SERPL-MCNC: 156 MG/DL (ref 65–99)
HCT VFR BLD AUTO: 33.8 % (ref 37.5–51)
HGB BLD-MCNC: 10.8 G/DL (ref 13–17.7)
IMM GRANULOCYTES # BLD AUTO: 0.06 10*3/MM3 (ref 0–0.05)
IMM GRANULOCYTES NFR BLD AUTO: 0.6 % (ref 0–0.5)
LYMPHOCYTES # BLD AUTO: 1.59 10*3/MM3 (ref 0.7–3.1)
LYMPHOCYTES NFR BLD AUTO: 16.9 % (ref 19.6–45.3)
MCH RBC QN AUTO: 28 PG (ref 26.6–33)
MCHC RBC AUTO-ENTMCNC: 32 G/DL (ref 31.5–35.7)
MCV RBC AUTO: 87.6 FL (ref 79–97)
MONOCYTES # BLD AUTO: 0.72 10*3/MM3 (ref 0.1–0.9)
MONOCYTES NFR BLD AUTO: 7.7 % (ref 5–12)
NEUTROPHILS NFR BLD AUTO: 6.7 10*3/MM3 (ref 1.7–7)
NEUTROPHILS NFR BLD AUTO: 71.2 % (ref 42.7–76)
NRBC BLD AUTO-RTO: 0.1 /100 WBC (ref 0–0.2)
PLATELET # BLD AUTO: 515 10*3/MM3 (ref 140–450)
PMV BLD AUTO: 9.9 FL (ref 6–12)
POTASSIUM SERPL-SCNC: 4.4 MMOL/L (ref 3.5–5.2)
RBC # BLD AUTO: 3.86 10*6/MM3 (ref 4.14–5.8)
SODIUM SERPL-SCNC: 136 MMOL/L (ref 136–145)
VANCOMYCIN TROUGH SERPL-MCNC: 9 MCG/ML (ref 5–20)
WBC # BLD AUTO: 9.41 10*3/MM3 (ref 3.4–10.8)

## 2021-03-26 PROCEDURE — 85025 COMPLETE CBC W/AUTO DIFF WBC: CPT | Performed by: NURSE PRACTITIONER

## 2021-03-26 PROCEDURE — 80048 BASIC METABOLIC PNL TOTAL CA: CPT | Performed by: NURSE PRACTITIONER

## 2021-03-26 PROCEDURE — 99232 SBSQ HOSP IP/OBS MODERATE 35: CPT | Performed by: INTERNAL MEDICINE

## 2021-03-26 PROCEDURE — 86140 C-REACTIVE PROTEIN: CPT | Performed by: PHYSICIAN ASSISTANT

## 2021-03-26 PROCEDURE — 97110 THERAPEUTIC EXERCISES: CPT

## 2021-03-26 PROCEDURE — 0S993ZZ DRAINAGE OF RIGHT HIP JOINT, PERCUTANEOUS APPROACH: ICD-10-PCS | Performed by: ORTHOPAEDIC SURGERY

## 2021-03-26 PROCEDURE — 97162 PT EVAL MOD COMPLEX 30 MIN: CPT

## 2021-03-26 PROCEDURE — 80202 ASSAY OF VANCOMYCIN: CPT | Performed by: NURSE PRACTITIONER

## 2021-03-26 PROCEDURE — 63710000001 INSULIN GLARGINE PER 5 UNITS: Performed by: NURSE PRACTITIONER

## 2021-03-26 PROCEDURE — 25010000002 VANCOMYCIN 10 G RECONSTITUTED SOLUTION: Performed by: NURSE PRACTITIONER

## 2021-03-26 PROCEDURE — 63710000001 INSULIN LISPRO (HUMAN) PER 5 UNITS: Performed by: NURSE PRACTITIONER

## 2021-03-26 PROCEDURE — 82962 GLUCOSE BLOOD TEST: CPT

## 2021-03-26 RX ADMIN — METOPROLOL SUCCINATE 50 MG: 50 TABLET, EXTENDED RELEASE ORAL at 08:25

## 2021-03-26 RX ADMIN — GABAPENTIN 300 MG: 300 CAPSULE ORAL at 08:25

## 2021-03-26 RX ADMIN — INSULIN LISPRO 4 UNITS: 100 INJECTION, SOLUTION INTRAVENOUS; SUBCUTANEOUS at 11:17

## 2021-03-26 RX ADMIN — ROSUVASTATIN CALCIUM 40 MG: 40 TABLET, FILM COATED ORAL at 08:25

## 2021-03-26 RX ADMIN — INSULIN LISPRO 4 UNITS: 100 INJECTION, SOLUTION INTRAVENOUS; SUBCUTANEOUS at 16:58

## 2021-03-26 RX ADMIN — INSULIN GLARGINE 15 UNITS: 100 INJECTION, SOLUTION SUBCUTANEOUS at 22:08

## 2021-03-26 RX ADMIN — DOCUSATE SODIUM 200 MG: 100 CAPSULE, LIQUID FILLED ORAL at 20:13

## 2021-03-26 RX ADMIN — HYDROCORTISONE: 25 CREAM TOPICAL at 08:26

## 2021-03-26 RX ADMIN — OXYCODONE 10 MG: 5 TABLET ORAL at 22:02

## 2021-03-26 RX ADMIN — SODIUM CHLORIDE, PRESERVATIVE FREE 10 ML: 5 INJECTION INTRAVENOUS at 20:13

## 2021-03-26 RX ADMIN — LIDOCAINE 1 PATCH: 50 PATCH TOPICAL at 20:13

## 2021-03-26 RX ADMIN — VANCOMYCIN HYDROCHLORIDE 1500 MG: 10 INJECTION, POWDER, LYOPHILIZED, FOR SOLUTION INTRAVENOUS at 08:29

## 2021-03-26 RX ADMIN — DOCUSATE SODIUM 200 MG: 100 CAPSULE, LIQUID FILLED ORAL at 08:25

## 2021-03-26 RX ADMIN — HYDROCORTISONE: 25 CREAM TOPICAL at 20:14

## 2021-03-26 RX ADMIN — INSULIN LISPRO 4 UNITS: 100 INJECTION, SOLUTION INTRAVENOUS; SUBCUTANEOUS at 08:25

## 2021-03-26 RX ADMIN — ISOSORBIDE MONONITRATE 30 MG: 30 TABLET ORAL at 08:25

## 2021-03-26 RX ADMIN — VANCOMYCIN HYDROCHLORIDE 1500 MG: 10 INJECTION, POWDER, LYOPHILIZED, FOR SOLUTION INTRAVENOUS at 20:13

## 2021-03-26 RX ADMIN — SERTRALINE 100 MG: 100 TABLET, FILM COATED ORAL at 08:25

## 2021-03-26 RX ADMIN — GABAPENTIN 300 MG: 300 CAPSULE ORAL at 16:58

## 2021-03-26 RX ADMIN — GABAPENTIN 300 MG: 300 CAPSULE ORAL at 20:13

## 2021-03-26 RX ADMIN — SODIUM CHLORIDE, PRESERVATIVE FREE 10 ML: 5 INJECTION INTRAVENOUS at 08:26

## 2021-03-26 RX ADMIN — OXYCODONE 10 MG: 5 TABLET ORAL at 11:12

## 2021-03-26 RX ADMIN — OXYCODONE 10 MG: 5 TABLET ORAL at 17:48

## 2021-03-26 RX ADMIN — INSULIN LISPRO 2 UNITS: 100 INJECTION, SOLUTION INTRAVENOUS; SUBCUTANEOUS at 08:26

## 2021-03-26 RX ADMIN — ASPIRIN 81 MG: 81 TABLET, CHEWABLE ORAL at 08:25

## 2021-03-27 LAB
ANION GAP SERPL CALCULATED.3IONS-SCNC: 9.6 MMOL/L (ref 5–15)
BASOPHILS # BLD AUTO: 0.01 10*3/MM3 (ref 0–0.2)
BASOPHILS NFR BLD AUTO: 0.1 % (ref 0–1.5)
BUN SERPL-MCNC: 10 MG/DL (ref 6–20)
BUN/CREAT SERPL: 12.3 (ref 7–25)
CALCIUM SPEC-SCNC: 8.9 MG/DL (ref 8.6–10.5)
CHLORIDE SERPL-SCNC: 97 MMOL/L (ref 98–107)
CO2 SERPL-SCNC: 28.4 MMOL/L (ref 22–29)
CREAT SERPL-MCNC: 0.81 MG/DL (ref 0.76–1.27)
DEPRECATED RDW RBC AUTO: 39.9 FL (ref 37–54)
EOSINOPHIL # BLD AUTO: 0.26 10*3/MM3 (ref 0–0.4)
EOSINOPHIL NFR BLD AUTO: 2.7 % (ref 0.3–6.2)
ERYTHROCYTE [DISTWIDTH] IN BLOOD BY AUTOMATED COUNT: 13 % (ref 12.3–15.4)
GFR SERPL CREATININE-BSD FRML MDRD: 98 ML/MIN/1.73
GLUCOSE BLDC GLUCOMTR-MCNC: 171 MG/DL (ref 70–130)
GLUCOSE BLDC GLUCOMTR-MCNC: 175 MG/DL (ref 70–130)
GLUCOSE BLDC GLUCOMTR-MCNC: 225 MG/DL (ref 70–130)
GLUCOSE BLDC GLUCOMTR-MCNC: 246 MG/DL (ref 70–130)
GLUCOSE SERPL-MCNC: 179 MG/DL (ref 65–99)
HCT VFR BLD AUTO: 31.5 % (ref 37.5–51)
HGB BLD-MCNC: 10.5 G/DL (ref 13–17.7)
IMM GRANULOCYTES # BLD AUTO: 0.04 10*3/MM3 (ref 0–0.05)
IMM GRANULOCYTES NFR BLD AUTO: 0.4 % (ref 0–0.5)
LYMPHOCYTES # BLD AUTO: 1.74 10*3/MM3 (ref 0.7–3.1)
LYMPHOCYTES NFR BLD AUTO: 18 % (ref 19.6–45.3)
MCH RBC QN AUTO: 28.5 PG (ref 26.6–33)
MCHC RBC AUTO-ENTMCNC: 33.3 G/DL (ref 31.5–35.7)
MCV RBC AUTO: 85.4 FL (ref 79–97)
MONOCYTES # BLD AUTO: 0.75 10*3/MM3 (ref 0.1–0.9)
MONOCYTES NFR BLD AUTO: 7.7 % (ref 5–12)
NEUTROPHILS NFR BLD AUTO: 6.89 10*3/MM3 (ref 1.7–7)
NEUTROPHILS NFR BLD AUTO: 71.1 % (ref 42.7–76)
NRBC BLD AUTO-RTO: 0 /100 WBC (ref 0–0.2)
PLATELET # BLD AUTO: 454 10*3/MM3 (ref 140–450)
PMV BLD AUTO: 9.6 FL (ref 6–12)
POTASSIUM SERPL-SCNC: 4.3 MMOL/L (ref 3.5–5.2)
RBC # BLD AUTO: 3.69 10*6/MM3 (ref 4.14–5.8)
SODIUM SERPL-SCNC: 135 MMOL/L (ref 136–145)
WBC # BLD AUTO: 9.69 10*3/MM3 (ref 3.4–10.8)

## 2021-03-27 PROCEDURE — 63710000001 INSULIN GLARGINE PER 5 UNITS: Performed by: NURSE PRACTITIONER

## 2021-03-27 PROCEDURE — 25010000002 VANCOMYCIN 10 G RECONSTITUTED SOLUTION: Performed by: NURSE PRACTITIONER

## 2021-03-27 PROCEDURE — 25010000002 ONDANSETRON PER 1 MG: Performed by: NURSE PRACTITIONER

## 2021-03-27 PROCEDURE — 82962 GLUCOSE BLOOD TEST: CPT

## 2021-03-27 PROCEDURE — 85025 COMPLETE CBC W/AUTO DIFF WBC: CPT | Performed by: NURSE PRACTITIONER

## 2021-03-27 PROCEDURE — 63710000001 INSULIN LISPRO (HUMAN) PER 5 UNITS: Performed by: NURSE PRACTITIONER

## 2021-03-27 PROCEDURE — 99232 SBSQ HOSP IP/OBS MODERATE 35: CPT | Performed by: INTERNAL MEDICINE

## 2021-03-27 PROCEDURE — 80048 BASIC METABOLIC PNL TOTAL CA: CPT | Performed by: NURSE PRACTITIONER

## 2021-03-27 RX ORDER — ONDANSETRON 2 MG/ML
4 INJECTION INTRAMUSCULAR; INTRAVENOUS EVERY 6 HOURS PRN
Status: DISCONTINUED | OUTPATIENT
Start: 2021-03-27 | End: 2021-03-27 | Stop reason: SDUPTHER

## 2021-03-27 RX ADMIN — OXYCODONE 10 MG: 5 TABLET ORAL at 15:06

## 2021-03-27 RX ADMIN — DOCUSATE SODIUM 200 MG: 100 CAPSULE, LIQUID FILLED ORAL at 09:18

## 2021-03-27 RX ADMIN — CYCLOBENZAPRINE 5 MG: 10 TABLET, FILM COATED ORAL at 18:16

## 2021-03-27 RX ADMIN — INSULIN LISPRO 2 UNITS: 100 INJECTION, SOLUTION INTRAVENOUS; SUBCUTANEOUS at 09:18

## 2021-03-27 RX ADMIN — ASPIRIN 81 MG: 81 TABLET, CHEWABLE ORAL at 09:22

## 2021-03-27 RX ADMIN — GABAPENTIN 300 MG: 300 CAPSULE ORAL at 09:19

## 2021-03-27 RX ADMIN — VANCOMYCIN HYDROCHLORIDE 1500 MG: 10 INJECTION, POWDER, LYOPHILIZED, FOR SOLUTION INTRAVENOUS at 09:32

## 2021-03-27 RX ADMIN — ROSUVASTATIN CALCIUM 40 MG: 40 TABLET, FILM COATED ORAL at 09:19

## 2021-03-27 RX ADMIN — ROSUVASTATIN CALCIUM 40 MG: 40 TABLET, FILM COATED ORAL at 09:20

## 2021-03-27 RX ADMIN — METOPROLOL SUCCINATE 50 MG: 50 TABLET, EXTENDED RELEASE ORAL at 09:19

## 2021-03-27 RX ADMIN — ISOSORBIDE MONONITRATE 30 MG: 30 TABLET ORAL at 09:19

## 2021-03-27 RX ADMIN — INSULIN GLARGINE 15 UNITS: 100 INJECTION, SOLUTION SUBCUTANEOUS at 23:06

## 2021-03-27 RX ADMIN — LIDOCAINE 1 PATCH: 50 PATCH TOPICAL at 18:10

## 2021-03-27 RX ADMIN — HYDROCORTISONE: 25 CREAM TOPICAL at 09:13

## 2021-03-27 RX ADMIN — GABAPENTIN 300 MG: 300 CAPSULE ORAL at 15:06

## 2021-03-27 RX ADMIN — SERTRALINE 100 MG: 100 TABLET, FILM COATED ORAL at 09:19

## 2021-03-27 RX ADMIN — SODIUM CHLORIDE, PRESERVATIVE FREE 10 ML: 5 INJECTION INTRAVENOUS at 21:55

## 2021-03-27 RX ADMIN — INSULIN LISPRO 2 UNITS: 100 INJECTION, SOLUTION INTRAVENOUS; SUBCUTANEOUS at 16:31

## 2021-03-27 RX ADMIN — INSULIN LISPRO 4 UNITS: 100 INJECTION, SOLUTION INTRAVENOUS; SUBCUTANEOUS at 16:29

## 2021-03-27 RX ADMIN — INSULIN LISPRO 4 UNITS: 100 INJECTION, SOLUTION INTRAVENOUS; SUBCUTANEOUS at 09:21

## 2021-03-27 RX ADMIN — DOCUSATE SODIUM 200 MG: 100 CAPSULE, LIQUID FILLED ORAL at 21:55

## 2021-03-27 RX ADMIN — INSULIN LISPRO 4 UNITS: 100 INJECTION, SOLUTION INTRAVENOUS; SUBCUTANEOUS at 11:59

## 2021-03-27 RX ADMIN — ONDANSETRON 4 MG: 2 INJECTION INTRAMUSCULAR; INTRAVENOUS at 13:05

## 2021-03-27 RX ADMIN — GABAPENTIN 300 MG: 300 CAPSULE ORAL at 21:55

## 2021-03-27 RX ADMIN — VANCOMYCIN HYDROCHLORIDE 1500 MG: 10 INJECTION, POWDER, LYOPHILIZED, FOR SOLUTION INTRAVENOUS at 19:46

## 2021-03-27 RX ADMIN — SODIUM CHLORIDE, PRESERVATIVE FREE 10 ML: 5 INJECTION INTRAVENOUS at 09:19

## 2021-03-27 RX ADMIN — OXYCODONE 10 MG: 5 TABLET ORAL at 06:29

## 2021-03-27 RX ADMIN — HYDROCORTISONE: 25 CREAM TOPICAL at 21:55

## 2021-03-28 LAB
ANION GAP SERPL CALCULATED.3IONS-SCNC: 9.2 MMOL/L (ref 5–15)
BUN SERPL-MCNC: 8 MG/DL (ref 6–20)
BUN/CREAT SERPL: 12.3 (ref 7–25)
CALCIUM SPEC-SCNC: 9.2 MG/DL (ref 8.6–10.5)
CHLORIDE SERPL-SCNC: 99 MMOL/L (ref 98–107)
CO2 SERPL-SCNC: 26.8 MMOL/L (ref 22–29)
CREAT SERPL-MCNC: 0.65 MG/DL (ref 0.76–1.27)
DEPRECATED RDW RBC AUTO: 41.8 FL (ref 37–54)
ERYTHROCYTE [DISTWIDTH] IN BLOOD BY AUTOMATED COUNT: 13.4 % (ref 12.3–15.4)
GFR SERPL CREATININE-BSD FRML MDRD: 126 ML/MIN/1.73
GLUCOSE BLDC GLUCOMTR-MCNC: 177 MG/DL (ref 70–130)
GLUCOSE BLDC GLUCOMTR-MCNC: 198 MG/DL (ref 70–130)
GLUCOSE BLDC GLUCOMTR-MCNC: 203 MG/DL (ref 70–130)
GLUCOSE BLDC GLUCOMTR-MCNC: 256 MG/DL (ref 70–130)
GLUCOSE SERPL-MCNC: 153 MG/DL (ref 65–99)
HCT VFR BLD AUTO: 32.9 % (ref 37.5–51)
HGB BLD-MCNC: 10.4 G/DL (ref 13–17.7)
MCH RBC QN AUTO: 27.3 PG (ref 26.6–33)
MCHC RBC AUTO-ENTMCNC: 31.6 G/DL (ref 31.5–35.7)
MCV RBC AUTO: 86.4 FL (ref 79–97)
PLATELET # BLD AUTO: 478 10*3/MM3 (ref 140–450)
PMV BLD AUTO: 9.7 FL (ref 6–12)
POTASSIUM SERPL-SCNC: 4.4 MMOL/L (ref 3.5–5.2)
RBC # BLD AUTO: 3.81 10*6/MM3 (ref 4.14–5.8)
SODIUM SERPL-SCNC: 135 MMOL/L (ref 136–145)
WBC # BLD AUTO: 10.26 10*3/MM3 (ref 3.4–10.8)

## 2021-03-28 PROCEDURE — 63710000001 INSULIN LISPRO (HUMAN) PER 5 UNITS: Performed by: NURSE PRACTITIONER

## 2021-03-28 PROCEDURE — 97110 THERAPEUTIC EXERCISES: CPT

## 2021-03-28 PROCEDURE — 82962 GLUCOSE BLOOD TEST: CPT

## 2021-03-28 PROCEDURE — 63710000001 INSULIN GLARGINE PER 5 UNITS: Performed by: NURSE PRACTITIONER

## 2021-03-28 PROCEDURE — 85027 COMPLETE CBC AUTOMATED: CPT | Performed by: INTERNAL MEDICINE

## 2021-03-28 PROCEDURE — 80048 BASIC METABOLIC PNL TOTAL CA: CPT | Performed by: NURSE PRACTITIONER

## 2021-03-28 PROCEDURE — 25010000002 VANCOMYCIN 10 G RECONSTITUTED SOLUTION: Performed by: NURSE PRACTITIONER

## 2021-03-28 RX ORDER — POLYETHYLENE GLYCOL 3350 17 G/17G
17 POWDER, FOR SOLUTION ORAL 2 TIMES DAILY
Status: DISCONTINUED | OUTPATIENT
Start: 2021-03-28 | End: 2021-04-01 | Stop reason: HOSPADM

## 2021-03-28 RX ORDER — BISACODYL 10 MG
10 SUPPOSITORY, RECTAL RECTAL DAILY PRN
Status: DISCONTINUED | OUTPATIENT
Start: 2021-03-28 | End: 2021-04-01 | Stop reason: HOSPADM

## 2021-03-28 RX ADMIN — DOCUSATE SODIUM 200 MG: 100 CAPSULE, LIQUID FILLED ORAL at 21:45

## 2021-03-28 RX ADMIN — CYCLOBENZAPRINE 5 MG: 10 TABLET, FILM COATED ORAL at 22:53

## 2021-03-28 RX ADMIN — INSULIN LISPRO 4 UNITS: 100 INJECTION, SOLUTION INTRAVENOUS; SUBCUTANEOUS at 08:50

## 2021-03-28 RX ADMIN — GABAPENTIN 300 MG: 300 CAPSULE ORAL at 08:48

## 2021-03-28 RX ADMIN — INSULIN LISPRO 4 UNITS: 100 INJECTION, SOLUTION INTRAVENOUS; SUBCUTANEOUS at 17:44

## 2021-03-28 RX ADMIN — INSULIN LISPRO 2 UNITS: 100 INJECTION, SOLUTION INTRAVENOUS; SUBCUTANEOUS at 12:03

## 2021-03-28 RX ADMIN — INSULIN GLARGINE 15 UNITS: 100 INJECTION, SOLUTION SUBCUTANEOUS at 21:42

## 2021-03-28 RX ADMIN — GABAPENTIN 300 MG: 300 CAPSULE ORAL at 21:39

## 2021-03-28 RX ADMIN — LIDOCAINE 1 PATCH: 50 PATCH TOPICAL at 21:42

## 2021-03-28 RX ADMIN — VANCOMYCIN HYDROCHLORIDE 1500 MG: 10 INJECTION, POWDER, LYOPHILIZED, FOR SOLUTION INTRAVENOUS at 21:40

## 2021-03-28 RX ADMIN — CYCLOBENZAPRINE 5 MG: 10 TABLET, FILM COATED ORAL at 08:49

## 2021-03-28 RX ADMIN — SODIUM CHLORIDE, PRESERVATIVE FREE 10 ML: 5 INJECTION INTRAVENOUS at 21:43

## 2021-03-28 RX ADMIN — METOPROLOL SUCCINATE 50 MG: 50 TABLET, EXTENDED RELEASE ORAL at 08:48

## 2021-03-28 RX ADMIN — CYCLOBENZAPRINE 5 MG: 10 TABLET, FILM COATED ORAL at 17:45

## 2021-03-28 RX ADMIN — VANCOMYCIN HYDROCHLORIDE 1500 MG: 10 INJECTION, POWDER, LYOPHILIZED, FOR SOLUTION INTRAVENOUS at 08:48

## 2021-03-28 RX ADMIN — INSULIN LISPRO 2 UNITS: 100 INJECTION, SOLUTION INTRAVENOUS; SUBCUTANEOUS at 08:49

## 2021-03-28 RX ADMIN — ISOSORBIDE MONONITRATE 30 MG: 30 TABLET ORAL at 08:48

## 2021-03-28 RX ADMIN — ROSUVASTATIN CALCIUM 40 MG: 40 TABLET, FILM COATED ORAL at 08:48

## 2021-03-28 RX ADMIN — ASPIRIN 81 MG: 81 TABLET, CHEWABLE ORAL at 08:48

## 2021-03-28 RX ADMIN — HYDROCORTISONE 1 APPLICATION: 25 CREAM TOPICAL at 21:39

## 2021-03-28 RX ADMIN — INSULIN LISPRO 4 UNITS: 100 INJECTION, SOLUTION INTRAVENOUS; SUBCUTANEOUS at 12:03

## 2021-03-28 RX ADMIN — SODIUM CHLORIDE, PRESERVATIVE FREE 10 ML: 5 INJECTION INTRAVENOUS at 08:48

## 2021-03-28 RX ADMIN — SERTRALINE 100 MG: 100 TABLET, FILM COATED ORAL at 08:48

## 2021-03-28 RX ADMIN — HYDROCORTISONE 1 APPLICATION: 25 CREAM TOPICAL at 08:49

## 2021-03-28 RX ADMIN — DOCUSATE SODIUM 200 MG: 100 CAPSULE, LIQUID FILLED ORAL at 08:48

## 2021-03-28 RX ADMIN — INSULIN LISPRO 4 UNITS: 100 INJECTION, SOLUTION INTRAVENOUS; SUBCUTANEOUS at 17:45

## 2021-03-28 RX ADMIN — POLYETHYLENE GLYCOL 3350 17 G: 17 POWDER, FOR SOLUTION ORAL at 21:39

## 2021-03-28 RX ADMIN — GABAPENTIN 300 MG: 300 CAPSULE ORAL at 17:45

## 2021-03-29 LAB
ANION GAP SERPL CALCULATED.3IONS-SCNC: 10.2 MMOL/L (ref 5–15)
BACTERIA SPEC AEROBE CULT: NORMAL
BACTERIA SPEC AEROBE CULT: NORMAL
BASOPHILS # BLD AUTO: 0.02 10*3/MM3 (ref 0–0.2)
BASOPHILS NFR BLD AUTO: 0.2 % (ref 0–1.5)
BUN SERPL-MCNC: 8 MG/DL (ref 6–20)
BUN/CREAT SERPL: 11.6 (ref 7–25)
CALCIUM SPEC-SCNC: 9.6 MG/DL (ref 8.6–10.5)
CHLORIDE SERPL-SCNC: 98 MMOL/L (ref 98–107)
CO2 SERPL-SCNC: 27.8 MMOL/L (ref 22–29)
CREAT SERPL-MCNC: 0.69 MG/DL (ref 0.76–1.27)
CRP SERPL-MCNC: 2.83 MG/DL (ref 0–0.5)
DEPRECATED RDW RBC AUTO: 44 FL (ref 37–54)
EOSINOPHIL # BLD AUTO: 0.23 10*3/MM3 (ref 0–0.4)
EOSINOPHIL NFR BLD AUTO: 2.2 % (ref 0.3–6.2)
ERYTHROCYTE [DISTWIDTH] IN BLOOD BY AUTOMATED COUNT: 13.6 % (ref 12.3–15.4)
GFR SERPL CREATININE-BSD FRML MDRD: 117 ML/MIN/1.73
GLUCOSE BLDC GLUCOMTR-MCNC: 148 MG/DL (ref 70–130)
GLUCOSE BLDC GLUCOMTR-MCNC: 187 MG/DL (ref 70–130)
GLUCOSE BLDC GLUCOMTR-MCNC: 223 MG/DL (ref 70–130)
GLUCOSE BLDC GLUCOMTR-MCNC: 233 MG/DL (ref 70–130)
GLUCOSE SERPL-MCNC: 166 MG/DL (ref 65–99)
HCT VFR BLD AUTO: 35.5 % (ref 37.5–51)
HGB BLD-MCNC: 11.1 G/DL (ref 13–17.7)
IMM GRANULOCYTES # BLD AUTO: 0.05 10*3/MM3 (ref 0–0.05)
IMM GRANULOCYTES NFR BLD AUTO: 0.5 % (ref 0–0.5)
LYMPHOCYTES # BLD AUTO: 1.95 10*3/MM3 (ref 0.7–3.1)
LYMPHOCYTES NFR BLD AUTO: 18.9 % (ref 19.6–45.3)
MCH RBC QN AUTO: 27.8 PG (ref 26.6–33)
MCHC RBC AUTO-ENTMCNC: 31.3 G/DL (ref 31.5–35.7)
MCV RBC AUTO: 89 FL (ref 79–97)
MONOCYTES # BLD AUTO: 0.72 10*3/MM3 (ref 0.1–0.9)
MONOCYTES NFR BLD AUTO: 7 % (ref 5–12)
NEUTROPHILS NFR BLD AUTO: 7.37 10*3/MM3 (ref 1.7–7)
NEUTROPHILS NFR BLD AUTO: 71.2 % (ref 42.7–76)
NRBC BLD AUTO-RTO: 0 /100 WBC (ref 0–0.2)
PLATELET # BLD AUTO: 441 10*3/MM3 (ref 140–450)
PMV BLD AUTO: 10 FL (ref 6–12)
POTASSIUM SERPL-SCNC: 4.3 MMOL/L (ref 3.5–5.2)
RBC # BLD AUTO: 3.99 10*6/MM3 (ref 4.14–5.8)
SODIUM SERPL-SCNC: 136 MMOL/L (ref 136–145)
VANCOMYCIN TROUGH SERPL-MCNC: 9.5 MCG/ML (ref 5–20)
WBC # BLD AUTO: 10.34 10*3/MM3 (ref 3.4–10.8)

## 2021-03-29 PROCEDURE — 97110 THERAPEUTIC EXERCISES: CPT

## 2021-03-29 PROCEDURE — 63710000001 INSULIN GLARGINE PER 5 UNITS: Performed by: NURSE PRACTITIONER

## 2021-03-29 PROCEDURE — 80202 ASSAY OF VANCOMYCIN: CPT | Performed by: NURSE PRACTITIONER

## 2021-03-29 PROCEDURE — 99232 SBSQ HOSP IP/OBS MODERATE 35: CPT | Performed by: INTERNAL MEDICINE

## 2021-03-29 PROCEDURE — 25010000002 VANCOMYCIN 10 G RECONSTITUTED SOLUTION: Performed by: INTERNAL MEDICINE

## 2021-03-29 PROCEDURE — 80048 BASIC METABOLIC PNL TOTAL CA: CPT | Performed by: NURSE PRACTITIONER

## 2021-03-29 PROCEDURE — 86140 C-REACTIVE PROTEIN: CPT | Performed by: INTERNAL MEDICINE

## 2021-03-29 PROCEDURE — 63710000001 INSULIN LISPRO (HUMAN) PER 5 UNITS: Performed by: NURSE PRACTITIONER

## 2021-03-29 PROCEDURE — 85025 COMPLETE CBC W/AUTO DIFF WBC: CPT | Performed by: INTERNAL MEDICINE

## 2021-03-29 PROCEDURE — 82962 GLUCOSE BLOOD TEST: CPT

## 2021-03-29 RX ORDER — MORPHINE SULFATE 15 MG/1
15 TABLET ORAL EVERY 4 HOURS PRN
Status: DISCONTINUED | OUTPATIENT
Start: 2021-03-29 | End: 2021-04-01 | Stop reason: HOSPADM

## 2021-03-29 RX ADMIN — INSULIN LISPRO 2 UNITS: 100 INJECTION, SOLUTION INTRAVENOUS; SUBCUTANEOUS at 09:09

## 2021-03-29 RX ADMIN — GABAPENTIN 300 MG: 300 CAPSULE ORAL at 17:14

## 2021-03-29 RX ADMIN — DOCUSATE SODIUM 200 MG: 100 CAPSULE, LIQUID FILLED ORAL at 09:15

## 2021-03-29 RX ADMIN — INSULIN LISPRO 4 UNITS: 100 INJECTION, SOLUTION INTRAVENOUS; SUBCUTANEOUS at 12:53

## 2021-03-29 RX ADMIN — VANCOMYCIN HYDROCHLORIDE 1750 MG: 10 INJECTION, POWDER, LYOPHILIZED, FOR SOLUTION INTRAVENOUS at 12:52

## 2021-03-29 RX ADMIN — GABAPENTIN 300 MG: 300 CAPSULE ORAL at 09:11

## 2021-03-29 RX ADMIN — INSULIN LISPRO 4 UNITS: 100 INJECTION, SOLUTION INTRAVENOUS; SUBCUTANEOUS at 09:14

## 2021-03-29 RX ADMIN — ASPIRIN 81 MG: 81 TABLET, CHEWABLE ORAL at 09:12

## 2021-03-29 RX ADMIN — ROSUVASTATIN CALCIUM 40 MG: 40 TABLET, FILM COATED ORAL at 09:12

## 2021-03-29 RX ADMIN — CYCLOBENZAPRINE 5 MG: 10 TABLET, FILM COATED ORAL at 21:21

## 2021-03-29 RX ADMIN — POLYETHYLENE GLYCOL 3350 17 G: 17 POWDER, FOR SOLUTION ORAL at 20:28

## 2021-03-29 RX ADMIN — GABAPENTIN 300 MG: 300 CAPSULE ORAL at 20:28

## 2021-03-29 RX ADMIN — MORPHINE SULFATE 15 MG: 15 TABLET ORAL at 12:51

## 2021-03-29 RX ADMIN — INSULIN LISPRO 4 UNITS: 100 INJECTION, SOLUTION INTRAVENOUS; SUBCUTANEOUS at 12:51

## 2021-03-29 RX ADMIN — METOPROLOL SUCCINATE 50 MG: 50 TABLET, EXTENDED RELEASE ORAL at 09:12

## 2021-03-29 RX ADMIN — HYDROCORTISONE: 25 CREAM TOPICAL at 09:13

## 2021-03-29 RX ADMIN — INSULIN LISPRO 4 UNITS: 100 INJECTION, SOLUTION INTRAVENOUS; SUBCUTANEOUS at 17:13

## 2021-03-29 RX ADMIN — POLYETHYLENE GLYCOL 3350 17 G: 17 POWDER, FOR SOLUTION ORAL at 09:12

## 2021-03-29 RX ADMIN — HYDROCORTISONE: 25 CREAM TOPICAL at 20:31

## 2021-03-29 RX ADMIN — DOCUSATE SODIUM 200 MG: 100 CAPSULE, LIQUID FILLED ORAL at 20:28

## 2021-03-29 RX ADMIN — MORPHINE SULFATE 15 MG: 15 TABLET ORAL at 21:21

## 2021-03-29 RX ADMIN — SERTRALINE 100 MG: 100 TABLET, FILM COATED ORAL at 09:12

## 2021-03-29 RX ADMIN — INSULIN GLARGINE 15 UNITS: 100 INJECTION, SOLUTION SUBCUTANEOUS at 20:28

## 2021-03-29 RX ADMIN — CYCLOBENZAPRINE 5 MG: 10 TABLET, FILM COATED ORAL at 09:11

## 2021-03-29 RX ADMIN — ISOSORBIDE MONONITRATE 30 MG: 30 TABLET ORAL at 09:12

## 2021-03-29 RX ADMIN — SODIUM CHLORIDE, PRESERVATIVE FREE 10 ML: 5 INJECTION INTRAVENOUS at 09:13

## 2021-03-30 LAB
ANION GAP SERPL CALCULATED.3IONS-SCNC: 10.5 MMOL/L (ref 5–15)
BACTERIA FLD CULT: NORMAL
BACTERIA SPEC ANAEROBE CULT: NORMAL
BUN SERPL-MCNC: 9 MG/DL (ref 6–20)
BUN/CREAT SERPL: 13.6 (ref 7–25)
CALCIUM SPEC-SCNC: 9 MG/DL (ref 8.6–10.5)
CHLORIDE SERPL-SCNC: 98 MMOL/L (ref 98–107)
CO2 SERPL-SCNC: 26.5 MMOL/L (ref 22–29)
CREAT SERPL-MCNC: 0.66 MG/DL (ref 0.76–1.27)
GFR SERPL CREATININE-BSD FRML MDRD: 124 ML/MIN/1.73
GLUCOSE BLDC GLUCOMTR-MCNC: 154 MG/DL (ref 70–130)
GLUCOSE BLDC GLUCOMTR-MCNC: 186 MG/DL (ref 70–130)
GLUCOSE BLDC GLUCOMTR-MCNC: 241 MG/DL (ref 70–130)
GLUCOSE BLDC GLUCOMTR-MCNC: 268 MG/DL (ref 70–130)
GLUCOSE SERPL-MCNC: 164 MG/DL (ref 65–99)
GRAM STN SPEC: NORMAL
GRAM STN SPEC: NORMAL
POTASSIUM SERPL-SCNC: 4.3 MMOL/L (ref 3.5–5.2)
SODIUM SERPL-SCNC: 135 MMOL/L (ref 136–145)

## 2021-03-30 PROCEDURE — 63710000001 INSULIN GLARGINE PER 5 UNITS: Performed by: INTERNAL MEDICINE

## 2021-03-30 PROCEDURE — 63710000001 INSULIN LISPRO (HUMAN) PER 5 UNITS: Performed by: INTERNAL MEDICINE

## 2021-03-30 PROCEDURE — 97116 GAIT TRAINING THERAPY: CPT

## 2021-03-30 PROCEDURE — 63710000001 INSULIN LISPRO (HUMAN) PER 5 UNITS: Performed by: NURSE PRACTITIONER

## 2021-03-30 PROCEDURE — 80048 BASIC METABOLIC PNL TOTAL CA: CPT | Performed by: NURSE PRACTITIONER

## 2021-03-30 PROCEDURE — 97110 THERAPEUTIC EXERCISES: CPT

## 2021-03-30 PROCEDURE — 25010000002 VANCOMYCIN 10 G RECONSTITUTED SOLUTION: Performed by: INTERNAL MEDICINE

## 2021-03-30 PROCEDURE — 82962 GLUCOSE BLOOD TEST: CPT

## 2021-03-30 PROCEDURE — 25010000002 ALTEPLASE PER 1 MG: Performed by: INTERNAL MEDICINE

## 2021-03-30 RX ORDER — CLOTRIMAZOLE AND BETAMETHASONE DIPROPIONATE 10; .64 MG/G; MG/G
CREAM TOPICAL EVERY 12 HOURS SCHEDULED
Status: DISCONTINUED | OUTPATIENT
Start: 2021-03-30 | End: 2021-04-01 | Stop reason: HOSPADM

## 2021-03-30 RX ORDER — INSULIN LISPRO 100 [IU]/ML
6 INJECTION, SOLUTION INTRAVENOUS; SUBCUTANEOUS
Status: DISCONTINUED | OUTPATIENT
Start: 2021-03-30 | End: 2021-04-01 | Stop reason: HOSPADM

## 2021-03-30 RX ORDER — INSULIN GLARGINE 100 [IU]/ML
20 INJECTION, SOLUTION SUBCUTANEOUS NIGHTLY
Status: DISCONTINUED | OUTPATIENT
Start: 2021-03-30 | End: 2021-04-01 | Stop reason: HOSPADM

## 2021-03-30 RX ADMIN — INSULIN LISPRO 4 UNITS: 100 INJECTION, SOLUTION INTRAVENOUS; SUBCUTANEOUS at 12:15

## 2021-03-30 RX ADMIN — INSULIN LISPRO 2 UNITS: 100 INJECTION, SOLUTION INTRAVENOUS; SUBCUTANEOUS at 17:35

## 2021-03-30 RX ADMIN — INSULIN LISPRO 2 UNITS: 100 INJECTION, SOLUTION INTRAVENOUS; SUBCUTANEOUS at 08:16

## 2021-03-30 RX ADMIN — VANCOMYCIN HYDROCHLORIDE 1750 MG: 10 INJECTION, POWDER, LYOPHILIZED, FOR SOLUTION INTRAVENOUS at 12:14

## 2021-03-30 RX ADMIN — ALTEPLASE: KIT at 20:53

## 2021-03-30 RX ADMIN — HYDROCORTISONE: 25 CREAM TOPICAL at 08:17

## 2021-03-30 RX ADMIN — DOCUSATE SODIUM 200 MG: 100 CAPSULE, LIQUID FILLED ORAL at 21:54

## 2021-03-30 RX ADMIN — DOCUSATE SODIUM 200 MG: 100 CAPSULE, LIQUID FILLED ORAL at 08:16

## 2021-03-30 RX ADMIN — CLOTRIMAZOLE AND BETAMETHASONE DIPROPIONATE: 10; .5 CREAM TOPICAL at 22:48

## 2021-03-30 RX ADMIN — INSULIN LISPRO 6 UNITS: 100 INJECTION, SOLUTION INTRAVENOUS; SUBCUTANEOUS at 17:35

## 2021-03-30 RX ADMIN — MORPHINE SULFATE 15 MG: 15 TABLET ORAL at 19:55

## 2021-03-30 RX ADMIN — INSULIN LISPRO 6 UNITS: 100 INJECTION, SOLUTION INTRAVENOUS; SUBCUTANEOUS at 12:14

## 2021-03-30 RX ADMIN — MORPHINE SULFATE 15 MG: 15 TABLET ORAL at 06:06

## 2021-03-30 RX ADMIN — INSULIN GLARGINE 20 UNITS: 100 INJECTION, SOLUTION SUBCUTANEOUS at 22:02

## 2021-03-30 RX ADMIN — MORPHINE SULFATE 15 MG: 15 TABLET ORAL at 01:13

## 2021-03-30 RX ADMIN — ISOSORBIDE MONONITRATE 30 MG: 30 TABLET ORAL at 08:16

## 2021-03-30 RX ADMIN — LIDOCAINE 1 PATCH: 50 PATCH TOPICAL at 17:36

## 2021-03-30 RX ADMIN — VANCOMYCIN HYDROCHLORIDE 1750 MG: 10 INJECTION, POWDER, LYOPHILIZED, FOR SOLUTION INTRAVENOUS at 01:13

## 2021-03-30 RX ADMIN — GABAPENTIN 300 MG: 300 CAPSULE ORAL at 21:54

## 2021-03-30 RX ADMIN — SODIUM CHLORIDE, PRESERVATIVE FREE 10 ML: 5 INJECTION INTRAVENOUS at 08:16

## 2021-03-30 RX ADMIN — MORPHINE SULFATE 15 MG: 15 TABLET ORAL at 15:36

## 2021-03-30 RX ADMIN — HYDROCORTISONE: 25 CREAM TOPICAL at 22:00

## 2021-03-30 RX ADMIN — ROSUVASTATIN CALCIUM 40 MG: 40 TABLET, FILM COATED ORAL at 08:16

## 2021-03-30 RX ADMIN — METOPROLOL SUCCINATE 50 MG: 50 TABLET, EXTENDED RELEASE ORAL at 08:16

## 2021-03-30 RX ADMIN — INSULIN LISPRO 4 UNITS: 100 INJECTION, SOLUTION INTRAVENOUS; SUBCUTANEOUS at 08:16

## 2021-03-30 RX ADMIN — GABAPENTIN 300 MG: 300 CAPSULE ORAL at 15:36

## 2021-03-30 RX ADMIN — POLYETHYLENE GLYCOL 3350 17 G: 17 POWDER, FOR SOLUTION ORAL at 21:54

## 2021-03-30 RX ADMIN — ASPIRIN 81 MG: 81 TABLET, CHEWABLE ORAL at 08:16

## 2021-03-30 RX ADMIN — GABAPENTIN 300 MG: 300 CAPSULE ORAL at 08:16

## 2021-03-30 RX ADMIN — MORPHINE SULFATE 15 MG: 15 TABLET ORAL at 10:44

## 2021-03-30 RX ADMIN — POLYETHYLENE GLYCOL 3350 17 G: 17 POWDER, FOR SOLUTION ORAL at 08:15

## 2021-03-30 RX ADMIN — SERTRALINE 100 MG: 100 TABLET, FILM COATED ORAL at 08:16

## 2021-03-31 LAB
ANION GAP SERPL CALCULATED.3IONS-SCNC: 9.9 MMOL/L (ref 5–15)
BUN SERPL-MCNC: 9 MG/DL (ref 6–20)
BUN/CREAT SERPL: 14.1 (ref 7–25)
CALCIUM SPEC-SCNC: 8.9 MG/DL (ref 8.6–10.5)
CHLORIDE SERPL-SCNC: 98 MMOL/L (ref 98–107)
CO2 SERPL-SCNC: 27.1 MMOL/L (ref 22–29)
CREAT SERPL-MCNC: 0.64 MG/DL (ref 0.76–1.27)
GFR SERPL CREATININE-BSD FRML MDRD: 128 ML/MIN/1.73
GLUCOSE BLDC GLUCOMTR-MCNC: 156 MG/DL (ref 70–130)
GLUCOSE BLDC GLUCOMTR-MCNC: 186 MG/DL (ref 70–130)
GLUCOSE BLDC GLUCOMTR-MCNC: 204 MG/DL (ref 70–130)
GLUCOSE SERPL-MCNC: 167 MG/DL (ref 65–99)
POTASSIUM SERPL-SCNC: 4.5 MMOL/L (ref 3.5–5.2)
SODIUM SERPL-SCNC: 135 MMOL/L (ref 136–145)

## 2021-03-31 PROCEDURE — 63710000001 INSULIN LISPRO (HUMAN) PER 5 UNITS: Performed by: NURSE PRACTITIONER

## 2021-03-31 PROCEDURE — 63710000001 INSULIN GLARGINE PER 5 UNITS: Performed by: INTERNAL MEDICINE

## 2021-03-31 PROCEDURE — 82962 GLUCOSE BLOOD TEST: CPT

## 2021-03-31 PROCEDURE — 80048 BASIC METABOLIC PNL TOTAL CA: CPT | Performed by: NURSE PRACTITIONER

## 2021-03-31 PROCEDURE — 63710000001 INSULIN LISPRO (HUMAN) PER 5 UNITS: Performed by: INTERNAL MEDICINE

## 2021-03-31 PROCEDURE — 25010000002 VANCOMYCIN 10 G RECONSTITUTED SOLUTION: Performed by: INTERNAL MEDICINE

## 2021-03-31 RX ORDER — ACETAMINOPHEN 325 MG/1
650 TABLET ORAL EVERY 4 HOURS PRN
Start: 2021-03-31

## 2021-03-31 RX ORDER — INSULIN GLARGINE 100 [IU]/ML
20 INJECTION, SOLUTION SUBCUTANEOUS NIGHTLY
Refills: 12
Start: 2021-03-31 | End: 2023-01-16 | Stop reason: HOSPADM

## 2021-03-31 RX ORDER — GABAPENTIN 300 MG/1
300 CAPSULE ORAL 3 TIMES DAILY
Qty: 9 CAPSULE | Refills: 0 | Status: ON HOLD | OUTPATIENT
Start: 2021-03-31 | End: 2023-01-16 | Stop reason: SDUPTHER

## 2021-03-31 RX ORDER — POLYETHYLENE GLYCOL 3350 17 G/17G
17 POWDER, FOR SOLUTION ORAL 2 TIMES DAILY
Start: 2021-03-31

## 2021-03-31 RX ORDER — BISACODYL 10 MG
10 SUPPOSITORY, RECTAL RECTAL DAILY PRN
Start: 2021-03-31

## 2021-03-31 RX ORDER — MORPHINE SULFATE 15 MG/1
15 TABLET ORAL EVERY 4 HOURS PRN
Qty: 18 TABLET | Refills: 0 | Status: SHIPPED | OUTPATIENT
Start: 2021-03-31 | End: 2023-01-16 | Stop reason: HOSPADM

## 2021-03-31 RX ADMIN — INSULIN LISPRO 6 UNITS: 100 INJECTION, SOLUTION INTRAVENOUS; SUBCUTANEOUS at 12:31

## 2021-03-31 RX ADMIN — MORPHINE SULFATE 15 MG: 15 TABLET ORAL at 12:30

## 2021-03-31 RX ADMIN — ROSUVASTATIN CALCIUM 40 MG: 40 TABLET, FILM COATED ORAL at 09:44

## 2021-03-31 RX ADMIN — SODIUM CHLORIDE, PRESERVATIVE FREE 10 ML: 5 INJECTION INTRAVENOUS at 09:44

## 2021-03-31 RX ADMIN — MORPHINE SULFATE 15 MG: 15 TABLET ORAL at 21:38

## 2021-03-31 RX ADMIN — METOPROLOL SUCCINATE 50 MG: 50 TABLET, EXTENDED RELEASE ORAL at 09:44

## 2021-03-31 RX ADMIN — INSULIN LISPRO 2 UNITS: 100 INJECTION, SOLUTION INTRAVENOUS; SUBCUTANEOUS at 17:22

## 2021-03-31 RX ADMIN — LIDOCAINE 1 PATCH: 50 PATCH TOPICAL at 21:37

## 2021-03-31 RX ADMIN — MORPHINE SULFATE 15 MG: 15 TABLET ORAL at 00:03

## 2021-03-31 RX ADMIN — INSULIN GLARGINE 20 UNITS: 100 INJECTION, SOLUTION SUBCUTANEOUS at 21:36

## 2021-03-31 RX ADMIN — DOCUSATE SODIUM 200 MG: 100 CAPSULE, LIQUID FILLED ORAL at 09:44

## 2021-03-31 RX ADMIN — HYDROCORTISONE: 25 CREAM TOPICAL at 09:48

## 2021-03-31 RX ADMIN — VANCOMYCIN HYDROCHLORIDE 1750 MG: 10 INJECTION, POWDER, LYOPHILIZED, FOR SOLUTION INTRAVENOUS at 12:31

## 2021-03-31 RX ADMIN — MORPHINE SULFATE 15 MG: 15 TABLET ORAL at 17:22

## 2021-03-31 RX ADMIN — GABAPENTIN 300 MG: 300 CAPSULE ORAL at 21:37

## 2021-03-31 RX ADMIN — ASPIRIN 81 MG: 81 TABLET, CHEWABLE ORAL at 09:44

## 2021-03-31 RX ADMIN — BISACODYL 10 MG: 10 SUPPOSITORY RECTAL at 15:28

## 2021-03-31 RX ADMIN — INSULIN LISPRO 6 UNITS: 100 INJECTION, SOLUTION INTRAVENOUS; SUBCUTANEOUS at 17:22

## 2021-03-31 RX ADMIN — POLYETHYLENE GLYCOL 3350 17 G: 17 POWDER, FOR SOLUTION ORAL at 09:44

## 2021-03-31 RX ADMIN — POLYETHYLENE GLYCOL 3350 17 G: 17 POWDER, FOR SOLUTION ORAL at 21:37

## 2021-03-31 RX ADMIN — INSULIN LISPRO 2 UNITS: 100 INJECTION, SOLUTION INTRAVENOUS; SUBCUTANEOUS at 07:48

## 2021-03-31 RX ADMIN — INSULIN LISPRO 6 UNITS: 100 INJECTION, SOLUTION INTRAVENOUS; SUBCUTANEOUS at 07:48

## 2021-03-31 RX ADMIN — DOCUSATE SODIUM 200 MG: 100 CAPSULE, LIQUID FILLED ORAL at 21:37

## 2021-03-31 RX ADMIN — CYCLOBENZAPRINE 5 MG: 10 TABLET, FILM COATED ORAL at 21:37

## 2021-03-31 RX ADMIN — GABAPENTIN 300 MG: 300 CAPSULE ORAL at 09:44

## 2021-03-31 RX ADMIN — CLOTRIMAZOLE AND BETAMETHASONE DIPROPIONATE: 10; .5 CREAM TOPICAL at 09:50

## 2021-03-31 RX ADMIN — INSULIN LISPRO 2 UNITS: 100 INJECTION, SOLUTION INTRAVENOUS; SUBCUTANEOUS at 12:30

## 2021-03-31 RX ADMIN — VANCOMYCIN HYDROCHLORIDE 1750 MG: 10 INJECTION, POWDER, LYOPHILIZED, FOR SOLUTION INTRAVENOUS at 00:36

## 2021-03-31 RX ADMIN — MORPHINE SULFATE 15 MG: 15 TABLET ORAL at 04:06

## 2021-03-31 RX ADMIN — SERTRALINE 100 MG: 100 TABLET, FILM COATED ORAL at 09:44

## 2021-03-31 RX ADMIN — MORPHINE SULFATE 15 MG: 15 TABLET ORAL at 07:54

## 2021-03-31 RX ADMIN — GABAPENTIN 300 MG: 300 CAPSULE ORAL at 17:22

## 2021-03-31 RX ADMIN — ISOSORBIDE MONONITRATE 30 MG: 30 TABLET ORAL at 09:44

## 2021-04-01 VITALS
HEART RATE: 77 BPM | BODY MASS INDEX: 41.27 KG/M2 | RESPIRATION RATE: 16 BRPM | TEMPERATURE: 96.8 F | WEIGHT: 304.7 LBS | DIASTOLIC BLOOD PRESSURE: 80 MMHG | HEIGHT: 72 IN | OXYGEN SATURATION: 97 % | SYSTOLIC BLOOD PRESSURE: 120 MMHG

## 2021-04-01 LAB
ANION GAP SERPL CALCULATED.3IONS-SCNC: 9.6 MMOL/L (ref 5–15)
BUN SERPL-MCNC: 12 MG/DL (ref 6–20)
BUN/CREAT SERPL: 14.8 (ref 7–25)
CALCIUM SPEC-SCNC: 9.2 MG/DL (ref 8.6–10.5)
CHLORIDE SERPL-SCNC: 99 MMOL/L (ref 98–107)
CO2 SERPL-SCNC: 28.4 MMOL/L (ref 22–29)
CREAT SERPL-MCNC: 0.81 MG/DL (ref 0.76–1.27)
GFR SERPL CREATININE-BSD FRML MDRD: 98 ML/MIN/1.73
GLUCOSE BLDC GLUCOMTR-MCNC: 187 MG/DL (ref 70–130)
GLUCOSE SERPL-MCNC: 204 MG/DL (ref 65–99)
POTASSIUM SERPL-SCNC: 4.8 MMOL/L (ref 3.5–5.2)
SODIUM SERPL-SCNC: 137 MMOL/L (ref 136–145)

## 2021-04-01 PROCEDURE — 25010000002 VANCOMYCIN 10 G RECONSTITUTED SOLUTION: Performed by: INTERNAL MEDICINE

## 2021-04-01 PROCEDURE — 82962 GLUCOSE BLOOD TEST: CPT

## 2021-04-01 PROCEDURE — 63710000001 INSULIN LISPRO (HUMAN) PER 5 UNITS: Performed by: INTERNAL MEDICINE

## 2021-04-01 PROCEDURE — 63710000001 INSULIN LISPRO (HUMAN) PER 5 UNITS: Performed by: NURSE PRACTITIONER

## 2021-04-01 PROCEDURE — 80048 BASIC METABOLIC PNL TOTAL CA: CPT | Performed by: NURSE PRACTITIONER

## 2021-04-01 RX ADMIN — SODIUM CHLORIDE, PRESERVATIVE FREE 10 ML: 5 INJECTION INTRAVENOUS at 07:59

## 2021-04-01 RX ADMIN — ISOSORBIDE MONONITRATE 30 MG: 30 TABLET ORAL at 07:54

## 2021-04-01 RX ADMIN — CLOTRIMAZOLE AND BETAMETHASONE DIPROPIONATE: 10; .5 CREAM TOPICAL at 08:00

## 2021-04-01 RX ADMIN — GABAPENTIN 300 MG: 300 CAPSULE ORAL at 07:55

## 2021-04-01 RX ADMIN — CYCLOBENZAPRINE 5 MG: 10 TABLET, FILM COATED ORAL at 08:12

## 2021-04-01 RX ADMIN — VANCOMYCIN HYDROCHLORIDE 1750 MG: 10 INJECTION, POWDER, LYOPHILIZED, FOR SOLUTION INTRAVENOUS at 02:24

## 2021-04-01 RX ADMIN — METOPROLOL SUCCINATE 50 MG: 50 TABLET, EXTENDED RELEASE ORAL at 07:53

## 2021-04-01 RX ADMIN — MORPHINE SULFATE 15 MG: 15 TABLET ORAL at 08:12

## 2021-04-01 RX ADMIN — MORPHINE SULFATE 15 MG: 15 TABLET ORAL at 02:25

## 2021-04-01 RX ADMIN — ASPIRIN 81 MG: 81 TABLET, CHEWABLE ORAL at 07:52

## 2021-04-01 RX ADMIN — HYDROCORTISONE: 25 CREAM TOPICAL at 08:05

## 2021-04-01 RX ADMIN — SERTRALINE 100 MG: 100 TABLET, FILM COATED ORAL at 07:53

## 2021-04-01 RX ADMIN — INSULIN LISPRO 2 UNITS: 100 INJECTION, SOLUTION INTRAVENOUS; SUBCUTANEOUS at 07:52

## 2021-04-01 RX ADMIN — ROSUVASTATIN CALCIUM 40 MG: 40 TABLET, FILM COATED ORAL at 07:54

## 2021-04-01 RX ADMIN — DOCUSATE SODIUM 200 MG: 100 CAPSULE, LIQUID FILLED ORAL at 07:54

## 2021-04-01 RX ADMIN — INSULIN LISPRO 6 UNITS: 100 INJECTION, SOLUTION INTRAVENOUS; SUBCUTANEOUS at 07:52

## 2021-04-01 NOTE — PAYOR COMM NOTE
"DISCHARGED    REF #[id number]        Mandeep Tong (61 y.o. Male)     Date of Birth Social Security Number Address Home Phone MRN    1960  [address] [facility] [facility] [address] [phone] [id number]    [place] Marital Status          Jehovah's witness        Admission Date Admission Type Admitting Provider Attending Provider Department, Room/Bed    3/24/21 Urgent Kali Mckeon MD  [address], P881/1    Discharge Date Discharge Disposition Discharge Destination        4/1/2021 Skilled Nursing Facility (DC - External)              Attending Provider: (none)   Allergies: Fentanyl, Ciprofloxacin, Quinolones    Isolation: None   Infection: None   Code Status: No CPR    Ht: 182.9 cm (72\")   Wt: 138 kg (304 lb 11.2 oz)    Admission Cmt: None   Principal Problem: Pyogenic arthritis of right hip (CMS/HCC) [M00.9]                 Active Insurance as of 3/24/2021     Primary Coverage     Payor Plan Insurance Group Employer/Plan Group    ANTHEM MEDICARE REPLACEMENT ANTHEM MEDICARE ADVANTAGE [id number]     Payor Plan Address Payor Plan Phone Number Payor Plan Fax Number Effective Dates    [address] [phone]  1/1/2019 - None Entered    [facility] [address]       Subscriber Name Subscriber Birth Date Member ID       MANDEEP TONG 1960 [id number]           Secondary Coverage     Payor Plan Insurance Group Employer/Plan Group    [facility] DEPT [id number]      Payor Plan Address Payor Plan Phone Number Payor Plan Fax Number Effective Dates    [facility] OFFICE OF COMMUNITY CARE [phone]  1/1/2021 - None Entered    [address] [address]       [facility] [address]       Subscriber Name Subscriber Birth Date Member ID       MANDEEP TONG 1960 [id number]                 Emergency Contacts      (Rel.) Home Phone Work Phone Mobile Phone    TONGJOSETTE (Spouse) -- -- [phone]    Mandy Tong (Daughter) -- -- [phone]            Discharge Order (From admission, " onward)     Start     Ordered    03/31/21 1151  Discharge patient  Once     Expected Discharge Date: 03/31/21    Expected Discharge Time: Afternoon    Discharge Disposition: Skilled Nursing Facility (DC - External)    Physician of Record for Attribution - Please select from Treatment Team: NURYS ORTIZ [[id number]]    Review needed by CMO to determine Physician of Record: No       Question Answer Comment   Physician of Record for Attribution - Please select from Treatment Team NURYS ORTIZ    Review needed by CMO to determine Physician of Record No        03/31/21 1150

## 2021-04-01 NOTE — PLAN OF CARE
Goal Outcome Evaluation:  Plan of Care Reviewed With: patient  Progress: no change  Outcome Summary: VSS during shift. Pain controlled with oral pain medication. Voiding per urinal. IV abx given as ordered.

## 2021-04-01 NOTE — PLAN OF CARE
Goal Outcome Evaluation:        Outcome Summary: PTdischarged , left on stretcher, report called to SNF, left on stable condition

## 2021-04-05 ENCOUNTER — TELEPHONE (OUTPATIENT)
Dept: INFECTIOUS DISEASES | Facility: CLINIC | Age: 61
End: 2021-04-05

## 2021-04-05 NOTE — TELEPHONE ENCOUNTER
Informed Mrs. Tracey to please let us know of any further changes from and infectious standpoint.  She will contact ortho/surgeon regarding pain and spasms.

## 2021-04-05 NOTE — TELEPHONE ENCOUNTER
Pts wife states that the patient was released from [facility] on 04/01/2021 and moved to [facility].  According to Mrs. Tracey, the patient did not receive IV Vanc until 04/03/21; she wanted to make Dr. Miller aware of this laps in IV abx and get his thoughts/recommendations.   Mrs. Tracey states that the patient began complaining of increased pain and spasms on 04/03(however, the patient has not been taking his pain medication due to complications with constipation).  I inquired of Mrs. Tracey if patient has been having fever, chills or purulent drainage at the site.  Mrs. Tracey denies that the patient is having fever/chills, but does not know about wound drainage as she has not been around during dressing changes.  I informed Mrs. Tracey that I would make Dr. Miller aware of laps in abx treatment, but that she should also contact patient orthopedists/surgeon regarding pain and spasms.

## 2021-04-09 LAB
FUNGUS WND CULT: NORMAL
FUNGUS WND CULT: NORMAL

## 2021-04-30 ENCOUNTER — OFFICE VISIT (OUTPATIENT)
Dept: CARDIOLOGY | Facility: CLINIC | Age: 61
End: 2021-04-30

## 2021-04-30 VITALS
OXYGEN SATURATION: 98 % | HEART RATE: 80 BPM | BODY MASS INDEX: 41.32 KG/M2 | HEIGHT: 72 IN | DIASTOLIC BLOOD PRESSURE: 84 MMHG | SYSTOLIC BLOOD PRESSURE: 126 MMHG

## 2021-04-30 DIAGNOSIS — I10 ESSENTIAL HYPERTENSION: ICD-10-CM

## 2021-04-30 DIAGNOSIS — I49.3 PVCS (PREMATURE VENTRICULAR CONTRACTIONS): Primary | ICD-10-CM

## 2021-04-30 DIAGNOSIS — I42.9 CARDIOMYOPATHY, UNSPECIFIED TYPE (HCC): ICD-10-CM

## 2021-04-30 PROCEDURE — 99214 OFFICE O/P EST MOD 30 MIN: CPT | Performed by: INTERNAL MEDICINE

## 2021-04-30 NOTE — PROGRESS NOTES
"      CARDIOLOGY    Erasmo Rodas MD    ENCOUNTER DATE:  04/30/2021    Mandeep Tracey / 61 y.o. / male        CHIEF COMPLAINT / REASON FOR OFFICE VISIT     Cardiomyopathy  Hypertension  CVA  HISTORY OF PRESENT ILLNESS       HPI  Mandeep Tracey is a 61 y.o. male who presents today for reevaluation.  Patient has a history of cardiomyopathy PVCs and mitral regurgitation who has an infected right hip.  He currently resides in [place] in a skilled nursing facility/rehab center.  Patient has a stroke with right hemiparesis and aphasia.  He currently denies any types of chest pain shortness of breath palpitations lightheadedness swelling or fatigue.  His wife did come with him today as well as a caregiver from the [facility].  They confirm he is doing well.  He tried to communicate the best he could.      The following portions of the patient's history were reviewed and updated as appropriate: allergies, current medications, past family history, past medical history, past social history, past surgical history and problem list.      VITAL SIGNS     Visit Vitals  /84 (BP Location: Left arm)   Pulse 80   Ht 182.9 cm (72\")   SpO2 98%   BMI 41.32 kg/m²         Wt Readings from Last 3 Encounters:   03/30/21 (!) 138 kg (304 lb 11.2 oz)   03/18/21 127 kg (279 lb 14.4 oz)     Body mass index is 41.32 kg/m².      REVIEW OF SYSTEMS   Review of Systems   All other systems reviewed and are negative.          PHYSICAL EXAMINATION     Cardiovascular:      PMI at left midclavicular line. Normal rate. Regular rhythm. Normal S1. Normal S2.      Murmurs: There is no murmur.      No gallop. No click. No rub.   Pulses:     Intact distal pulses.   Edema:     Peripheral edema absent.           REVIEWED DATA     Procedures    Cardiac Procedures:  1.           ASSESSMENT & PLAN      Diagnosis Plan   1. PVCs (premature ventricular contractions)     2. Essential hypertension     3. Cardiomyopathy, unspecified type (CMS/HCC)   "         SUMMARY/DISCUSSION  1. PVCs stable  2. Hypertension blood pressures good  3. Cardiomyopathy last ejection fraction was 30%.  Patient remains on a good medical regimen.  This point I would continue the same.  4. Moderate to severe mitral regurgitation.  No lower extremity edema no signs of shortness of breath.  5. 5.  Continue the same we will see him back in 6 months sooner if he has issues.        MEDICATIONS         Discharge Medications          Accurate as of April 30, 2021  2:56 PM. If you have any questions, ask your nurse or doctor.            Changes to Medications      Instructions Start Date   insulin lispro 100 UNIT/ML injection  Commonly known as: ADMELOG  What changed: when to take this   0-9 Units, Subcutaneous, 3 Times Daily Before Meals      metoprolol succinate XL 50 MG 24 hr tablet  Commonly known as: TOPROL-XL  What changed: when to take this   50 mg, Oral, Every 12 Hours Scheduled         Continue These Medications      Instructions Start Date   acetaminophen 325 MG tablet  Commonly known as: TYLENOL   650 mg, Oral, Every 4 Hours PRN      aspirin 81 MG chewable tablet   81 mg, Oral, Daily      bisacodyl 10 MG suppository  Commonly known as: DULCOLAX   10 mg, Rectal, Daily PRN      clotrimazole-betamethasone 1-0.05 % cream  Commonly known as: LOTRISONE   Topical, 4 Times Daily, For psoriasis to inguinal region      docusate sodium 100 MG capsule   200 mg, Oral, 2 Times Daily      enoxaparin 40 MG/0.4ML solution syringe  Commonly known as: LOVENOX   Subcutaneous, Every 24 Hours Scheduled      gabapentin 300 MG capsule  Commonly known as: NEURONTIN   300 mg, Oral, 3 Times Daily      hydrocortisone 2.5 % cream   Topical, 2 Times Daily      insulin aspart 100 UNIT/ML injection  Commonly known as: novoLOG   6 Units, Subcutaneous, 3 Times Daily Before Meals      insulin glargine 100 UNIT/ML injection  Commonly known as: LANTUS, SEMGLEE   20 Units, Subcutaneous, Nightly      isosorbide  mononitrate 30 MG 24 hr tablet  Commonly known as: IMDUR   30 mg, Oral, Daily      lidocaine 5 %  Commonly known as: LIDODERM   1 patch, Transdermal, Every 24 Hours, For right knee, Remove & Discard patch within 12 hours or as directed by MD       Morphine 15 MG tablet  Commonly known as: MSIR   15 mg, Oral, Every 4 Hours PRN      PHARMACY TO DOSE VANCOMYCIN   Does not apply, Continuous PRN      polyethylene glycol 17 g packet  Commonly known as: MIRALAX   17 g, Oral, 2 Times Daily      rosuvastatin 20 MG tablet  Commonly known as: CRESTOR   40 mg, Oral, Daily      sertraline 100 MG tablet  Commonly known as: ZOLOFT   100 mg, Oral, Daily      vancomycin   1,750 mg, Intravenous, Every 12 Hours                 **Dragon Disclaimer:   Much of this encounter note is an electronic transcription/translation of spoken language to printed text. The electronic translation of spoken language may permit erroneous, or at times, nonsensical words or phrases to be inadvertently transcribed. Although I have reviewed the note for such errors, some may still exist.

## 2021-05-04 ENCOUNTER — OFFICE VISIT (OUTPATIENT)
Dept: INFECTIOUS DISEASES | Facility: CLINIC | Age: 61
End: 2021-05-04

## 2021-05-04 VITALS
SYSTOLIC BLOOD PRESSURE: 157 MMHG | HEIGHT: 72 IN | BODY MASS INDEX: 41.32 KG/M2 | HEART RATE: 92 BPM | DIASTOLIC BLOOD PRESSURE: 73 MMHG | TEMPERATURE: 96.9 F

## 2021-05-04 DIAGNOSIS — E66.9 DIABETES MELLITUS TYPE 2 IN OBESE (HCC): ICD-10-CM

## 2021-05-04 DIAGNOSIS — A49.1 GROUP B STREPTOCOCCAL INFECTION: Primary | ICD-10-CM

## 2021-05-04 DIAGNOSIS — Z96.649 INFECTION OF PROSTHETIC HIP JOINT, SUBSEQUENT ENCOUNTER: ICD-10-CM

## 2021-05-04 DIAGNOSIS — Z79.2 LONG TERM (CURRENT) USE OF ANTIBIOTICS: ICD-10-CM

## 2021-05-04 DIAGNOSIS — T84.59XD INFECTION OF PROSTHETIC HIP JOINT, SUBSEQUENT ENCOUNTER: ICD-10-CM

## 2021-05-04 DIAGNOSIS — E11.69 DIABETES MELLITUS TYPE 2 IN OBESE (HCC): ICD-10-CM

## 2021-05-04 PROCEDURE — 99214 OFFICE O/P EST MOD 30 MIN: CPT | Performed by: INTERNAL MEDICINE

## 2021-05-04 RX ORDER — TRIAMCINOLONE ACETONIDE 1 MG/G
CREAM TOPICAL 2 TIMES DAILY
COMMUNITY
End: 2023-01-16 | Stop reason: HOSPADM

## 2021-05-04 RX ORDER — AMOXICILLIN 500 MG/1
500 CAPSULE ORAL EVERY 8 HOURS
Qty: 90 CAPSULE | Refills: 2 | Status: SHIPPED | OUTPATIENT
Start: 2021-05-04 | End: 2021-06-03

## 2021-05-04 RX ORDER — BACLOFEN 10 MG/1
10 TABLET ORAL EVERY 8 HOURS
COMMUNITY
End: 2023-01-16 | Stop reason: HOSPADM

## 2021-05-04 RX ORDER — HYDROXYZINE HYDROCHLORIDE 25 MG/1
25 TABLET, FILM COATED ORAL 3 TIMES DAILY PRN
COMMUNITY

## 2021-05-04 NOTE — PROGRESS NOTES
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     He has a history of R hip replacement in June 2020 with Dr Garrido in [place]. I first met him on 3/11/21 during an admission for R hip PJI due to Group B Strep. During that stay, on 3/12/21 he underwent I&D of the R hip with retention of hardware. For Group B Strep R hip PJI, I recommended that he be treated with a 6 week course of penicillin G 4 million units IV q4h w/ stop date 4/23/21.     However, [facility] here due to pain at the hip site and swelling at the lower portion of his incision. He had been started on vancomycin and cefepime. A CT of the hip showed a sinus tract and complex fluid collection near the R hip prosthesis. He was seen by orthopedics who recommended an aspiration of the fluid. The fluid only had 56 nucleated cells. The culture was negative. His hip pain improved and WBC and CRP were normal and improving, respectively, when I last saw him on 3/29/21. I recommend that he complete a 6-week course of vancomycin with stop date 5/4/21. This would not only treat his known Group B Strep but also any possible resistant gram positive organisms.    He says the hip is doing well. Minimal pain. Incision is healed w/o drainage. No fevers. No issues w/ his PICC line but he is tired of having it in place. He is ready to leave rehab for home.      Review of Systems: no rashes or diarrhea      Past Medical History:   Diagnosis Date   • Aphasia    • Cardiomyopathy (CMS/HCC)    • CPAP (continuous positive airway pressure) dependence    • Diabetes (CMS/HCC)    • Hemiparesis (CMS/HCC)     Right side    • Morbid obesity (CMS/HCC)    •  Nonrheumatic mitral valve regurgitation    • BELKYS on CPAP    • Peptic ulcer disease    • Postoperative nausea and vomiting 3/13/2021   • Psoriasis    • Pyogenic arthritis of right hip (CMS/HCC)    • Seizures (CMS/HCC)    • Sleep apnea    • Stroke (CMS/HCC) 2004   • Ventricular ectopy     asymptomatic     Past Surgical History:   Procedure Laterality Date   • CHOLECYSTECTOMY     • EYE SURGERY     • INCISION AND DRAINAGE HIP Right 3/12/2021    Procedure: HIP ANTERIOR INCISION AND DRAINAGE WITH HANA TABLE;  Surgeon: Tao Andrea MD;  Location: [facility];  Service: Orthopedics;  Laterality: Right;   • LUMBAR DISC SURGERY       Social History:  Lives w/ his wife  Retired      Family History:  No 1st degree relatives w/ Group B hip infections     Antibiotic allergies and intolerances:    1. Quinolones - hives    Medications:   Current Outpatient Medications:   •  acetaminophen (TYLENOL) 325 MG tablet, Take 2 tablets by mouth Every 4 (Four) Hours As Needed for Mild Pain  or Headache., Disp: , Rfl:   •  aspirin 81 MG chewable tablet, Chew 81 mg Daily., Disp: , Rfl:   •  bisacodyl (DULCOLAX) 10 MG suppository, Insert 1 suppository into the rectum Daily As Needed for Constipation., Disp: , Rfl:   •  clotrimazole-betamethasone (LOTRISONE) 1-0.05 % cream, Apply  topically to the appropriate area as directed 4 (Four) Times a Day. For psoriasis to inguinal region, Disp: , Rfl:   •  docusate sodium 100 MG capsule, Take 2 capsules by mouth 2 (Two) Times a Day., Disp: , Rfl:   •  enoxaparin (LOVENOX) 40 MG/0.4ML solution syringe, Inject  under the skin into the appropriate area as directed Daily., Disp: , Rfl:   •  gabapentin (NEURONTIN) 300 MG capsule, Take 1 capsule by mouth 3 (Three) Times a Day., Disp: 9 capsule, Rfl: 0  •  hydrocortisone 2.5 % cream, Apply  topically to the appropriate area as directed 2 (Two) Times a Day., Disp: , Rfl:   •  insulin aspart (novoLOG) 100 UNIT/ML injection, Inject 6 Units  "under the skin into the appropriate area as directed 3 (Three) Times a Day Before Meals., Disp: , Rfl: 12  •  insulin glargine (LANTUS, SEMGLEE) 100 UNIT/ML injection, Inject 20 Units under the skin into the appropriate area as directed Every Night., Disp: , Rfl: 12  •  insulin lispro (ADMELOG) 100 UNIT/ML injection, Inject 0-9 Units under the skin into the appropriate area as directed 3 (Three) Times a Day Before Meals. (Patient taking differently: Inject 0-9 Units under the skin into the appropriate area as directed 4 (Four) Times a Day Before Meals & at Bedtime.), Disp: , Rfl: 12  •  isosorbide mononitrate (IMDUR) 30 MG 24 hr tablet, Take 30 mg by mouth Daily., Disp: , Rfl:   •  lidocaine (LIDODERM) 5 %, Place 1 patch on the skin as directed by provider Daily. For right knee, Remove & Discard patch within 12 hours or as directed by MD, Disp: , Rfl:   •  metoprolol succinate XL (TOPROL-XL) 50 MG 24 hr tablet, Take 1 tablet by mouth Every 12 (Twelve) Hours. (Patient taking differently: Take 50 mg by mouth Daily.), Disp: , Rfl:   •  Morphine (MSIR) 15 MG tablet, Take 1 tablet by mouth Every 4 (Four) Hours As Needed for Moderate Pain  or Severe Pain ., Disp: 18 tablet, Rfl: 0  •  polyethylene glycol (polyethylene glycol) 17 g packet, Take 17 g by mouth 2 (Two) Times a Day., Disp: , Rfl:   •  rosuvastatin (CRESTOR) 20 MG tablet, Take 40 mg by mouth Daily., Disp: , Rfl:   •  sertraline (ZOLOFT) 100 MG tablet, Take 100 mg by mouth Daily., Disp: , Rfl:   •  vancomycin 1750 mg/500 mL 0.9% NS IVPB ([facility]), Infuse 500 mL into a venous catheter Every 12 (Twelve) Hours for 68 doses. Indications: Bone and/or Joint Infection, Disp: 29426 mL, Rfl: 1  •  Vancomycin HCl-Dextrose (PHARMACY TO DOSE VANCOMYCIN), Continuous As Needed (bone and joint infection) for up to 34 days. Indications: Bone and/or Joint Infection, Disp: , Rfl:       OBJECTIVE:  /73   Pulse 92   Temp 96.9 °F (36.1 °C)   Ht 182.9 cm (72.01\")   BMI 41.32 " "kg/m²     General: awake, alert, very nice, difficult to understand speech due to prior history of stroke  Eyes: no scleral icterus  Cardiovascular: NR  Respiratory: normal work of breathing; no wheezing  GI: Abdomen is obese, soft, non-tender  Musculoskeletal: R hip incision healed; no TTP  Vasc: PICC in LUE w/o erythema    DIAGNOSTICS:   Labs 5/3/21:  WBC 10.6  Hct 33  Plt 322  Crt 0.7  VTr 21.4  CRP 15.7 mg/L = 1.57 mg/dL (last value in the hospital was 2.8; value had been as high as 27 in early March)    Microbiology:  3/9 OSH BCx: coag-negative Staph in 1/2 sets  3/9 OSH UCx: >100k MSSA  3/10 OSH R Hip Synovial Cx: Group B Strep  3/11 COVID; negative  3/12 R Hip OR Cx: negative  3/22 [facility] BCx: negative per my records review  3/24 BCx: negative  3/25 R Hip Fluid Cx: negative    Radiology (prior):  CT RLE: \"There is right hip arthroplasty hardware with peripherally enhancing collection of fluid and air extending along what is presumably an anterior approach incision site extending from the skin to the level of the hardware at the femoral neck. No other periarticular fluid collection is present. There is no CT evidence of osteomyelitis.\"      ASSESSMENT/PLAN:  1. 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 Complex fluid collection near right hip prosthesis  3. History of stroke and hemiparesis  4. Morbid obesity BMI 42  5. Uncontrolled DM2 - A1c 9.2%  6. Long term use of antibiotics    He's had a good clinical response to I&D and then fluid aspiration. His incision is healed. His CRP has trended down. I am going to stop vancomycin and remove his PICC line today. I am going to transition him to at least a 3-month course of amoxicillin 500 mg PO q8h for suppression of the Group B Strep given retention of hardware. RTC 3 months for labs. I'll probably plan to extend the amoxicillin to 12 months if tolerating it well. He has follow-up with Dr Andrea next week 5/14/21 which he plans to keep. " I'll fax him a copy of this note.     I discussed this plan w/ his wife via speaker phone and sent orders to his facility.

## 2021-05-10 NOTE — H&P
History and Physical      Patient Name: Mandeep Tracey   Patient ID: [id number]   Sex: Male   YOB: 1960    Primary Care Provider: Singh Hayes MD   Referring Provider: Singh Hayes MD    Visit Date: July 1, 2020    Provider: Dwaine Garrido MD   Location: [facility]   Location Address: [address] [place] [address]  [id number]   Location Phone: [phone]          Chief Complaint  · Right Hip Incision Check  · left index trigger finger      History Of Present Illness  Mandeep Tracey is a 60 year old /White male who presents today to [facility].      The patient presents today for follow-up of the right hip polar hemiarthroplasty performed on 6/13/20. His wife presents with him today. Patient has history of stroke. He reports the hip is somewhat painful but tolerable. Patient reports he hyperextended the finger several weeks ago and this has continued to be painful.       Past Medical History  Arthritis; Diabetes; Hypertension; Limb Swelling; Psoriasis; Seasonal allergies; Stroke         Past Surgical History  Back; Colonoscopy; Discectomy; EAR Surgery; Gallbladder; Joint Surgery         Medication List  acitretin oral; aspirin oral; atorvastatin oral; baclofen oral; calcipotriene topical; clobetasol topical; clotrimazole topical; desonide topical; ibuprofen oral; ketoconazole 200 mg oral tablet; meloxicam oral; metformin oral; morphine oral 15mg         Allergy List  NO KNOWN DRUG ALLERGIES         Social History  Alcohol Use (Current some day); lives with spouse; .; Recreational Drug Use (Never); Retired.; Tobacco (Former)         Review of Systems  · Constitutional  o Denies  o : fever, chills, weight loss  · Cardiovascular  o Denies  o : chest pain, shortness of breath  · Gastrointestinal  o Denies  o : liver disease, heartburn, nausea, blood in stools  · Genitourinary  o Denies  o : painful urination, blood in urine  · Integument  o Denies  o : rash,  itching  · Neurologic  o Denies  o : headache, weakness, loss of consciousness  · Musculoskeletal  o Denies  o : painful, swollen joints  · Psychiatric  o Denies  o : drug/alcohol addiction, anxiety, depression      Vitals  Date Time BP Position Site L\R Cuff Size HR RR TEMP (F) WT  HT  BMI kg/m2 BSA m2 O2 Sat HC       07/01/2020 01:58 PM         260lbs 0oz 6'   35.26 2.45           Physical Examination  · Constitutional  o Appearance  o : well developed, well-nourished, no obvious deformities present  · Head and Face  o Head  o :   § Inspection  § : normocephalic  o Face  o :   § Inspection  § : no facial lesions  · Eyes  o Conjunctivae  o : conjunctivae normal  o Sclerae  o : sclerae white  · Ears, Nose, Mouth and Throat  o Ears  o :   § External Ears  § : appearance within normal limits  § Hearing  § : intact  o Nose  o :   § External Nose  § : appearance normal  · Neck  o Inspection/Palpation  o : normal appearance  o Range of Motion  o : full range of motion  · Respiratory  o Respiratory Effort  o : breathing unlabored  o Inspection of Chest  o : normal appearance  o Auscultation of Lungs  o : no audible wheezing or rales  · Cardiovascular  o Heart  o : regular rate  · Gastrointestinal  o Abdominal Examination  o : soft and non-tender  · Skin and Subcutaneous Tissue  o General Inspection  o : intact, no rashes  · Psychiatric  o General  o : Alert and oriented x3  o Judgement and Insight  o : judgment and insight intact  o Mood and Affect  o : mood normal, affect appropriate  · Left Hand  o Inspection  o : Triggering of the index finger of the left index finger, tender to palpation of the A1 pulley, sensation intact, Negative Tinel's at the wrist. Negative Magana's. Negative Finkelstein's. Limited flexion and extension.   · Right Hip  o Inspection  o : Staples removed today, well-healing incision, no current drainage, dry dressing was placed over the incision, no pain with hip ER and IR, sensation grossly  intact  · Injection Note/Aspiration Note  o Site  o : left trigger finger   o Procedure  o : Procedure: After educating the patient, patient gave consent for procedure. After using Chloraprep, the joint space was injected. The patient tolerated the procedure well.   o Medication  o : 80 mg of DepoMedrol with 1cc of 1% Lidocaine          Assessment  · Aftercare;following joint replacement, bipolar hemiarthroplasty     V54.81/Z47.1  · Hip fracture     820.8/S72.009A  · Trigger finger, left, index     727.03/M65.30      Plan  · Orders  o Depo-Medrol injection 80mg () - 727.03/M65.30 - 07/06/2020   Lot [id number] Exp 06 2021 [employer] Administered by KEYANNA Garrido MD  o Inj Tendon Sheath Or Ligament ([id number]) - 727.03/M65.30 - 07/06/2020   Lot [id number] 07 01 2021 [employer] Administered by KEYANNA Garrido MD  · Medications  o Medications have been Reconciled  o Transition of Care or Provider Policy  · Instructions  o Reviewed the patient's Past Medical, Social, and Family history as well as the ROS at today's visit, no changes.  o Call or return if worsening symptoms.  o The above service was scribed by Madelin Ospina on my behalf and I attest to the accuracy of the note. sonam  o He will be placed on an antibiotic today. We discussed dressing changes with the patient and his wife, they expressed understanding. If needed they will follow-up sooner for wound care, otherwise follow-up in 4 weeks with x-rays of the hip. Patient proceeded with a trigger finger injection today, tolerated this well.             Electronically Signed by: Madelin Ospina-, Other -Author on July 7, 2020 08:11:26 AM  Electronically Co-signed by: Hilda Hawthorne MD -Reviewer on July 7, 2020 08:49:21 AM

## 2021-05-10 NOTE — H&P
History and Physical      Patient Name: Mandeep Tracey   Patient ID: [id number]   Sex: Male   YOB: 1960    Primary Care Provider: Singh Hayes MD   Referring Provider: Singh Hayes MD    Visit Date: November 25, 2020    Provider: Dwaine Garrido MD   Location: [facility]   Location Address: [address] [place] [address]  [id number]   Location Phone: [phone]          Chief Complaint  · Bilateral Knee Pain      History Of Present Illness  Mandeep Tracey is a 60 year old /White male who presents today to [facility].      Patient presents today for an evaluation of bilateral knee pain. Patient presents today using a wheelchair for ambulation assistance but does use a cane. Patient states that it was recommended by the NP to inject lubricant gel or get knee replacement.       Past Medical History  Arthritis; Diabetes; Hypertension; Limb Swelling; Psoriasis; Seasonal allergies; Stroke         Past Surgical History  Back; Colonoscopy; Discectomy; EAR Surgery; Gallbladder; Joint Surgery         Medication List  acitretin oral; aspirin oral; atorvastatin oral; baclofen oral; calcipotriene topical; clobetasol topical; clotrimazole topical; desonide topical; ibuprofen oral; Keflex 500 mg oral capsule; ketoconazole 200 mg oral tablet; meloxicam oral; metformin oral; morphine oral 15mg         Allergy List  NO KNOWN DRUG ALLERGIES       Allergies Reconciled  Family Medical History  Stroke; Heart Disease; Cancer, Unspecified; Diabetes, unspecified type; Family history of Arthritis         Social History  Alcohol Use (Current some day); lives with spouse; .; Recreational Drug Use (Never); Retired.; Tobacco (Former)         Review of Systems  · Constitutional  o Denies  o : fever, chills, weight loss  · Cardiovascular  o Denies  o : chest pain, shortness of breath  · Gastrointestinal  o Denies  o : liver disease, heartburn, nausea, blood in  stools  · Genitourinary  o Denies  o : painful urination, blood in urine  · Integument  o Denies  o : rash, itching  · Neurologic  o Denies  o : headache, weakness, loss of consciousness  · Musculoskeletal  o Denies  o : painful, swollen joints  · Psychiatric  o Denies  o : drug/alcohol addiction, anxiety, depression      Vitals  Date Time BP Position Site L\R Cuff Size HR RR TEMP (F) WT  HT  BMI kg/m2 BSA m2 O2 Sat FR L/min FiO2        11/25/2020 11:01 AM         260lbs 0oz 6'   35.26 2.45             Physical Examination  · Constitutional  o Appearance  o : well developed, well-nourished, no obvious deformities present  · Head and Face  o Head  o :   § Inspection  § : normocephalic  o Face  o :   § Inspection  § : no facial lesions  · Eyes  o Conjunctivae  o : conjunctivae normal  o Sclerae  o : sclerae white  · Ears, Nose, Mouth and Throat  o Ears  o :   § External Ears  § : appearance within normal limits  § Hearing  § : intact  o Nose  o :   § External Nose  § : appearance normal  · Neck  o Inspection/Palpation  o : normal appearance  o Range of Motion  o : full range of motion  · Respiratory  o Respiratory Effort  o : breathing unlabored  o Inspection of Chest  o : normal appearance  o Auscultation of Lungs  o : no audible wheezing or rales  · Cardiovascular  o Heart  o : regular rate  · Gastrointestinal  o Abdominal Examination  o : soft and non-tender  · Skin and Subcutaneous Tissue  o General Inspection  o : intact, no rashes  · Psychiatric  o General  o : Alert and oriented x3  o Judgement and Insight  o : judgment and insight intact  o Mood and Affect  o : mood normal, affect appropriate  · Extremities  o Extremities  o : BILATERAL KNEES: Sensation grossly intact. Neurovascular intact. Skin intact. No swelling in right knee but swelling in left knee, no skin discoloration or atrophy. Dorsal Pedal Pulse 2+, posterior tibialis pulse 2+. Tender medial and lateral joint line. Non-weightbearing. No patellar  tendon tenderness, no pain of MCL, no pain at LCL. Full flexion and extension with assistance. Negative Lachman. Negative Apley's. Negative McMurrays. Positive crepitus.   · In Office Procedures  o View  o : LAT/SUNRISE/STANDING   o Site  o : bilateral, knee  o Indication  o : Bilateral Knee Pain  o Study  o : X-rays ordered, taken in the office, and reviewed today.  o Xray  o : Evidence of tricompartmental osteoarthritis of the left knee. Mild to moderate degenerative changes of the right knee.           Assessment  · Primary osteoarthritis of right knee     715.16/M17.11  · Primary osteoarthritis of left knee     715.16/M17.12  · Pain in both knees, unspecified chronicity       Pain in right knee     719.46/M25.561  Pain in left knee     719.46/M25.562      Plan  · Orders  o Knee (Left) [facility] Preferred View (46376-LW) - 719.46/M25.562 - 11/25/2020  o Knee (Right) [facility] Preferred View (72648-ZT) - 719.46/M25.561 - 11/25/2020  · Medications  o Medications have been Reconciled  o Transition of Care or Provider Policy  · Instructions  o Dr. Garrido saw and examined the patient and agrees with plan.   o X-rays reviewed by Dr. Garrido.  o Reviewed the patient's Past Medical, Social, and Family history as well as the ROS at today's visit, no changes.  o Call or return if worsening symptoms.  o Follow Up PRN.  o This note was transcribed by Georgia Theodore. sonam  o Discussed diagnosis and treatment options with the patient. Patient opted to get lubricant gel approved.            Electronically Signed by: Georgia Theodore-, Other -Author on December 3, 2020 08:22:32 AM  Electronically Co-signed by: Dwaine Garrido MD -Reviewer on December 3, 2020 09:32:52 PM

## 2021-05-13 NOTE — PROGRESS NOTES
Progress Note      Patient Name: Mandeep Tracey   Patient ID: [id number]   Sex: Male   YOB: 1960    Primary Care Provider: Singh Hayes MD   Referring Provider: Singh Hayes MD    Visit Date: July 22, 2020    Provider: Rehana Yu PA-C   Location: [facility]   Location Address: [address] [place] [address]  [id number]   Location Phone: [phone]          Chief Complaint  · Right hip pain      History Of Present Illness  Mandeep Tracey is a 60 year old /White male who presents today to [facility].      Patient is status post right hip polar hemiarthroplasty performed on 6/13/20. Patient has history of stroke. Patient states minimal pain in right hip. Patient states moderate pain in right knee on the medial and lateral sides. Patient states home health with wound is coming to the home. Patient's spouse states changing dressing daily. Patient is here in a wheelchair.           Past Medical History  Arthritis; Diabetes; Hypertension; Limb Swelling; Psoriasis; Seasonal allergies; Stroke         Past Surgical History  Back; Colonoscopy; Discectomy; EAR Surgery; Gallbladder; Joint Surgery         Medication List  acitretin oral; aspirin oral; atorvastatin oral; baclofen oral; calcipotriene topical; clobetasol topical; clotrimazole topical; desonide topical; ibuprofen oral; ketoconazole 200 mg oral tablet; meloxicam oral; metformin oral; morphine oral 15mg         Allergy List  NO KNOWN DRUG ALLERGIES         Family Medical History  Stroke; Heart Disease; Cancer, Unspecified; Diabetes, unspecified type; Family history of Arthritis         Social History  Alcohol Use (Current some day); lives with spouse; .; Recreational Drug Use (Never); Retired.; Tobacco (Former)         Review of Systems  · Constitutional  o Denies  o : fever, chills, weight loss  · Cardiovascular  o Denies  o : chest pain, shortness of breath  · Gastrointestinal  o Denies  o : liver disease,  heartburn, nausea, blood in stools  · Genitourinary  o Denies  o : painful urination, blood in urine  · Integument  o Denies  o : rash, itching  · Neurologic  o Denies  o : headache, weakness, loss of consciousness  · Musculoskeletal  o Denies  o : painful, swollen joints  · Psychiatric  o Denies  o : drug/alcohol addiction, anxiety, depression      Vitals  Date Time BP Position Site L\R Cuff Size HR RR TEMP (F) WT  HT  BMI kg/m2 BSA m2 O2 Sat        07/22/2020 03:08 PM      100 - R   270lbs 0oz 6'   36.62 2.49 96 %          Physical Examination  · Constitutional  o Appearance  o : well developed, well-nourished, no obvious deformities present  · Head and Face  o Head  o :   § Inspection  § : normocephalic  o Face  o :   § Inspection  § : no facial lesions  · Eyes  o Conjunctivae  o : conjunctivae normal  o Sclerae  o : sclerae white  · Ears, Nose, Mouth and Throat  o Ears  o :   § External Ears  § : appearance within normal limits  § Hearing  § : intact  o Nose  o :   § External Nose  § : appearance normal  · Neck  o Inspection/Palpation  o : normal appearance  o Range of Motion  o : full range of motion  · Respiratory  o Respiratory Effort  o : breathing unlabored  o Inspection of Chest  o : normal appearance  o Auscultation of Lungs  o : no audible wheezing or rales  · Cardiovascular  o Heart  o : regular rate  · Gastrointestinal  o Abdominal Examination  o : soft and non-tender  · Skin and Subcutaneous Tissue  o General Inspection  o : intact, no rashes  · Psychiatric  o General  o : Alert and oriented x3  o Judgement and Insight  o : judgment and insight intact  o Mood and Affect  o : mood normal, affect appropriate  · In Office Procedures  o View  o : AP/LATERAL  o Site  o : right, hip  o Indication  o : Right hip pain  o Study  o : X-rays ordered, taken in the office, and reviewed today.  o Xray  o : stable implant  o Comparative Data  o : Comparative Data found and reviewed today   · Right  Knee-Street  o Inspection  o : no swelling, no ecchymosis, no atrophy, neutral alignment  o Palpation  o : tenderness at medial joint line, tenderness at lateral joint line, no patellar tendon tenderness, no pain of MCL, no pain at LCL  o ROM  o : full extension, full flexion  o Strength  o : full extension, full flexion  o Neurovascular  o : Full sensation, Dorsal Pedal Pulse 2+, posteriror tibialis pulse 2+  · Right Hip-Street  o Inspection  o : incision healing  o Palpation  o : no tenderness at hip and pelvic muscles  o ROM  o : normal extension (30), normal abduction (45-50), normal adduction, normal internal rotation, normal external rotation  o Special Tests  o : no pain with flexion, no pain with extension, no pain with rotation  o Neurologic Testing  o : Neurovascular and sensation intact          Assessment  · Aftercare;following joint replacement     V54.81/Z47.1  · Right Pain: Hip     719.45/M25.559  · Knee pain     719.46/M25.569      Plan  · Orders  o Hip (Right) 2 or more views (includes AP Pelvis) [facility] Preferred View. (17430) - 719.45/M25.559 - 07/22/2020  · Medications  o Medications have been Reconciled  o Transition of Care or Provider Policy  · Instructions  o Reviewed the patient's Past Medical, Social, and Family history as well as the ROS at today's visit, no changes.  o Call or return if worsening symptoms.  o X-ray ordered, taken and reviewed at this visit.  o Follow Up in 4 weeks.   o Electronically Identified Patient Education Materials Provided Electronically     Continue wound care and home health  Follow up 1 month check incision             Electronically Signed by: Rehana Yu PA-C -Author on July 22, 2020 03:45:04 PM

## 2021-05-13 NOTE — PROGRESS NOTES
Progress Note      Patient Name: Mandeep Tracey   Patient ID: [id number]   Sex: Male   YOB: 1960    Primary Care Provider: Singh Hayes MD   Referring Provider: Singh Hayes MD    Visit Date: August 3, 2020    Provider: Rehana Yu PA-C   Location: [facility]   Location Address: [address] [place] [address]  [id number]   Location Phone: [phone]          Chief Complaint  · Right knee pain      History Of Present Illness  Mandeep Tracey is a 60 year old /White male who presents today to [facility].      Patient is status post right hip polar hemiarthroplasty performed on 6/13/20. Patient has history of stroke. Patient states minimal pain in right hip. Patient states moderate pain in right knee on the medial and lateral sides. Patient states home health with wound is coming to the home. Patient's spouse states changing dressing daily. Patient is here in a wheelchair.                Past Medical History  Arthritis; Diabetes; Hypertension; Limb Swelling; Psoriasis; Seasonal allergies; Stroke         Past Surgical History  Back; Colonoscopy; Discectomy; EAR Surgery; Gallbladder; Joint Surgery         Medication List  acitretin oral; aspirin oral; atorvastatin oral; baclofen oral; calcipotriene topical; clobetasol topical; clotrimazole topical; desonide topical; ibuprofen oral; ketoconazole 200 mg oral tablet; meloxicam oral; metformin oral; morphine oral 15mg         Allergy List  NO KNOWN DRUG ALLERGIES         Family Medical History  Stroke; Heart Disease; Cancer, Unspecified; Diabetes, unspecified type; Family history of Arthritis         Social History  Alcohol Use (Current some day); lives with spouse; .; Recreational Drug Use (Never); Retired.; Tobacco (Former)         Review of Systems  · Constitutional  o Denies  o : fever, chills, weight loss  · Cardiovascular  o Denies  o : chest pain, shortness of breath  · Gastrointestinal  o Denies  o : liver  disease, heartburn, nausea, blood in stools  · Genitourinary  o Denies  o : painful urination, blood in urine  · Integument  o Denies  o : rash, itching  · Neurologic  o Denies  o : headache, weakness, loss of consciousness  · Musculoskeletal  o Denies  o : painful, swollen joints  · Psychiatric  o Denies  o : drug/alcohol addiction, anxiety, depression      Vitals  Date Time BP Position Site L\R Cuff Size HR RR TEMP (F) WT  HT  BMI kg/m2 BSA m2 O2 Sat        08/03/2020 09:03 AM         270lbs 0oz 6'   36.62 2.49           Physical Examination  · Constitutional  o Appearance  o : well developed, well-nourished, no obvious deformities present  · Head and Face  o Head  o :   § Inspection  § : normocephalic  o Face  o :   § Inspection  § : no facial lesions  · Eyes  o Conjunctivae  o : conjunctivae normal  o Sclerae  o : sclerae white  · Ears, Nose, Mouth and Throat  o Ears  o :   § External Ears  § : appearance within normal limits  § Hearing  § : intact  o Nose  o :   § External Nose  § : appearance normal  · Neck  o Inspection/Palpation  o : normal appearance  o Range of Motion  o : full range of motion  · Respiratory  o Respiratory Effort  o : breathing unlabored  o Inspection of Chest  o : normal appearance  o Auscultation of Lungs  o : no audible wheezing or rales  · Cardiovascular  o Heart  o : regular rate  · Gastrointestinal  o Abdominal Examination  o : soft and non-tender  · Skin and Subcutaneous Tissue  o General Inspection  o : intact, no rashes  · Psychiatric  o General  o : Alert and oriented x3  o Judgement and Insight  o : judgment and insight intact  o Mood and Affect  o : mood normal, affect appropriate  · Injection Note/Aspiration Note  o Site  o : right knee  o Procedure  o : Procedure: After educating the patient, patient gave consent for procedure. After using Chloraprep, the joint space was injected. The patient tolerated the procedure well.  o Medication  o : 80 mg of DepoMedrol with 9cc of  1% Lidocaine  · Right Knee-Street  o Inspection  o : no swelling, no ecchymosis, no atrophy, neutral alignment  o Palpation  o : tenderness at medial joint line, tenderness at lateral joint line, no patellar tendon tenderness, no pain of MCL, no pain at LCL  o ROM  o : full extension, full flexion  o Strength  o : weak extension, weak flexion   o Special Tests  o : negative varus stress, negaitve valgus stress  o Neurovascular  o : Full sensation, Dorsal Pedal Pulse 2+, posteriror tibialis pulse 2+  · Right Hip-Street  o Inspection  o : incision healing mild erythema from bandage   o Palpation  o : mild tenderness at hip and pelvic muscles  o ROM  o : normal extension (30), normal abduction (45-50), normal adduction, normal internal rotation, normal external rotation  o Special Tests  o : no pain with flexion, no pain with extension, no pain with rotation  o Neurologic Testing  o : Neurovascular and sensation intact          Assessment  · Primary osteoarthritis of right knee     715.16/M17.11  · Aftercare;following joint replacement     V54.81/Z47.1  · Right knee pain, unspecified chronicity     719.46/M25.561      Plan  · Orders  o Depo-Medrol injection 80mg () - - 08/03/2020   Lot [id number] Exp 06 2021 [employer] Administered by REHANA LAKHANI PA-C  o Knee Intra-articular Injection without US Guidance [facility] ([id number]) - - 08/03/2020   Lot [id number] Exp 07 01 2021 [employer] Administered by REHANA LAKHANI PA-C  · Medications  o Medications have been Reconciled  o Transition of Care or Provider Policy  · Instructions  o Reviewed the patient's Past Medical, Social, and Family history as well as the ROS at today's visit, no changes.  o Call or return if worsening symptoms.  o Electronically Identified Patient Education Materials Provided Electronically     Prescribed Keflex 500mg one po TID x 10 days.  Follow up 8/24/20             Electronically Signed by: Rehana Lakhani PA-C -Author on August 3, 2020  09:55:33 AM  Electronically Co-signed by: Dwaine Garrido MD -Reviewer on August 3, 2020 05:28:33 PM

## 2021-05-13 NOTE — PROGRESS NOTES
Progress Note      Patient Name: Mandeep Tracey   Patient ID: [id number]   Sex: Male   YOB: 1960    Primary Care Provider: Singh Hayes MD   Referring Provider: Singh Hayes MD    Visit Date: August 24, 2020    Provider: Rehana Yu PA-C   Location: [facility]   Location Address: [address] [place] [address]  [id number]   Location Phone: [phone]          Chief Complaint  · Right hip pain      History Of Present Illness  Mandeep Tracey is a 60 year old /White male who presents today to [facility].      Patient is status post right hip bipolar hemiarthroplasty performed on 6/13/20. Patient has history of stroke. Patient states minimal pain in right hip. Patient states moderate pain in right knee on the medial and lateral sides. Patient states steroid injection provided relief for 1 day. Patient states home health with wound is coming to the home. Patient is here in a wheelchair.                Past Medical History  Arthritis; Diabetes; Hypertension; Limb Swelling; Psoriasis; Seasonal allergies; Stroke         Past Surgical History  Back; Colonoscopy; Discectomy; EAR Surgery; Gallbladder; Joint Surgery         Medication List  acitretin oral; aspirin oral; atorvastatin oral; baclofen oral; calcipotriene topical; clobetasol topical; clotrimazole topical; desonide topical; ibuprofen oral; Keflex 500 mg oral capsule; ketoconazole 200 mg oral tablet; meloxicam oral; metformin oral; morphine oral 15mg         Allergy List  NO KNOWN DRUG ALLERGIES         Family Medical History  Stroke; Heart Disease; Cancer, Unspecified; Diabetes, unspecified type; Family history of Arthritis         Social History  Alcohol Use (Current some day); lives with spouse; .; Recreational Drug Use (Never); Retired.; Tobacco (Former)         Review of Systems  · Constitutional  o Denies  o : fever, chills, weight loss  · Cardiovascular  o Denies  o : chest pain, shortness of  breath  · Gastrointestinal  o Denies  o : liver disease, heartburn, nausea, blood in stools  · Genitourinary  o Denies  o : painful urination, blood in urine  · Integument  o Denies  o : rash, itching  · Neurologic  o Denies  o : headache, weakness, loss of consciousness  · Musculoskeletal  o Denies  o : painful, swollen joints  · Psychiatric  o Denies  o : drug/alcohol addiction, anxiety, depression      Vitals  Date Time BP Position Site L\R Cuff Size HR RR TEMP (F) WT  HT  BMI kg/m2 BSA m2 O2 Sat HC       08/24/2020 01:40 PM         260lbs 0oz 6'   35.26 2.45           Physical Examination  · Constitutional  o Appearance  o : well developed, well-nourished, no obvious deformities present  · Head and Face  o Head  o :   § Inspection  § : normocephalic  o Face  o :   § Inspection  § : no facial lesions  · Eyes  o Conjunctivae  o : conjunctivae normal  o Sclerae  o : sclerae white  · Ears, Nose, Mouth and Throat  o Ears  o :   § External Ears  § : appearance within normal limits  § Hearing  § : intact  o Nose  o :   § External Nose  § : appearance normal  · Neck  o Inspection/Palpation  o : normal appearance  o Range of Motion  o : full range of motion  · Respiratory  o Respiratory Effort  o : breathing unlabored  o Inspection of Chest  o : normal appearance  o Auscultation of Lungs  o : no audible wheezing or rales  · Cardiovascular  o Heart  o : regular rate  · Gastrointestinal  o Abdominal Examination  o : soft and non-tender  · Skin and Subcutaneous Tissue  o General Inspection  o : intact, no rashes  · Psychiatric  o General  o : Alert and oriented x3  o Judgement and Insight  o : judgment and insight intact  o Mood and Affect  o : mood normal, affect appropriate  · Right Knee-Street  o Inspection  o : no swelling, no ecchymosis, no atrophy, neutral alignment  o Palpation  o : tenderness at medial joint line, no lateral joint line tenderness, no patellar tendon tenderness, no pain of MCL, no pain at  LCL  o ROM  o : full extension, full flexion  o Strength  o : weak extension, weak flexion   o Neurovascular  o : Full sensation, Dorsal Pedal Pulse 2+, posteriror tibialis pulse 2+  · Right Hip-Street  o Inspection  o : well-healed scar   o Palpation  o : no tenderness at hip and pelvic muscles  o ROM  o : normal extension (30), normal abduction (45-50), normal adduction, normal internal rotation, normal external rotation  o Special Tests  o : no pain with flexion, no pain with extension, no pain with rotation  o Neurologic Testing  o : Neurovascular and sensation intact          Assessment  · Primary osteoarthritis of right knee     715.16/M17.11  · Aftercare;following joint replacement     V54.81/Z47.1  · Pain: Hip     719.45/M25.559      Plan  · Medications  o Medications have been Reconciled  o Transition of Care or Provider Policy  · Instructions  o Reviewed the patient's Past Medical, Social, and Family history as well as the ROS at today's visit, no changes.  o Call or return if worsening symptoms.  o Electronically Identified Patient Education Materials Provided Electronically     [employer] approval  at follow up appointment please get standing Right knee x rays if possible             Electronically Signed by: Rehana Yu PA-C -Author on August 24, 2020 02:05:21 PM

## 2021-05-14 VITALS — WEIGHT: 260 LBS | BODY MASS INDEX: 35.21 KG/M2 | HEIGHT: 72 IN

## 2021-05-14 VITALS — OXYGEN SATURATION: 97 % | HEART RATE: 103 BPM | HEIGHT: 72 IN

## 2021-05-14 VITALS — WEIGHT: 260 LBS | HEIGHT: 72 IN | BODY MASS INDEX: 35.21 KG/M2

## 2021-05-14 VITALS — HEART RATE: 88 BPM | HEIGHT: 72 IN | OXYGEN SATURATION: 97 %

## 2021-05-14 NOTE — PROGRESS NOTES
Progress Note      Patient Name: Mandeep Tracey   Patient ID: [id number]   Sex: Male   YOB: 1960    Primary Care Provider: Singh Hayes MD   Referring Provider: Singh Hayes MD    Visit Date: January 13, 2021    Provider: Rehana Yu PA-C   Location: [facility]   Location Address: [address] [place] [address]  [id number]   Location Phone: [phone]          Chief Complaint  · left knee pain      History Of Present Illness  Mandeep Tracey is a 60 year old /White male who presents today to [facility].      Patient is following up for bilateral knee pain, bilateral knee osteoarthritis. Patient states Synvisc injection in right knee is providing some relief of pain. Patient states minimal pain in left knee. Patient is using a motorized wheelchair.       Past Medical History  Arthritis; Chest pain; Diabetes; Hyperlipemia; Hypertension; Limb Swelling; Psoriasis; Seasonal allergies; Stroke         Past Surgical History  Artificial Joints/Limbs; Back; Colonoscopy; Discectomy; EAR Surgery; Gallbladder; Joint Surgery         Medication List  acitretin oral; aspirin oral; atorvastatin oral; baclofen oral; calcipotriene topical; clobetasol topical; clotrimazole topical; desonide topical; ibuprofen oral; Keflex 500 mg oral capsule; ketoconazole 200 mg oral tablet; meloxicam oral; metformin oral; morphine oral 15mg         Allergy List  NO KNOWN DRUG ALLERGIES         Family Medical History  Stroke; Heart Disease; Cancer, Unspecified; Diabetes, unspecified type; Family history of Arthritis         Social History  Alcohol Use (Current some day); Claustophobic (Unknown); lives with children; lives with spouse; .; Recreational Drug Use (Never); Retired.; Tobacco (Former)         Review of Systems  · Constitutional  o Denies  o : fever, chills, weight loss  · Cardiovascular  o Denies  o : chest pain, shortness of breath  · Gastrointestinal  o Denies  o : liver  disease, heartburn, nausea, blood in stools  · Genitourinary  o Denies  o : painful urination, blood in urine  · Integument  o Denies  o : rash, itching  · Neurologic  o Denies  o : headache, weakness, loss of consciousness  · Musculoskeletal  o Denies  o : painful, swollen joints  · Psychiatric  o Denies  o : drug/alcohol addiction, anxiety, depression      Vitals  Date Time BP Position Site L\R Cuff Size HR RR TEMP (F) WT  HT  BMI kg/m2 BSA m2 O2 Sat FR L/min FiO2        01/13/2021 01:30 PM      103 - R    6'     97 %            Physical Examination  · Constitutional  o Appearance  o : well developed, well-nourished, no obvious deformities present  · Head and Face  o Head  o :   § Inspection  § : normocephalic  o Face  o :   § Inspection  § : no facial lesions  · Eyes  o Conjunctivae  o : conjunctivae normal  o Sclerae  o : sclerae white  · Ears, Nose, Mouth and Throat  o Ears  o :   § External Ears  § : appearance within normal limits  § Hearing  § : intact  o Nose  o :   § External Nose  § : appearance normal  · Neck  o Inspection/Palpation  o : normal appearance  o Range of Motion  o : full range of motion  · Respiratory  o Respiratory Effort  o : breathing unlabored  o Inspection of Chest  o : normal appearance  o Auscultation of Lungs  o : no audible wheezing or rales  · Cardiovascular  o Heart  o : regular rate  · Gastrointestinal  o Abdominal Examination  o : soft and non-tender  · Skin and Subcutaneous Tissue  o General Inspection  o : intact, no rashes  · Psychiatric  o General  o : Alert and oriented x3  o Judgement and Insight  o : judgment and insight intact  o Mood and Affect  o : mood normal, affect appropriate  · Right Knee-Street  o Inspection  o : no swelling, no ecchymosis, no atrophy, neutral alignment  o Palpation  o : no medial joint line tenderness, no lateral joint line tenderness, no patellar tendon tenderness, no pain of MCL, no pain at LCL  o ROM  o : full extension, full  flexion  o Strength  o : full extension, full flexion  o Neurovascular  o : Full sensation, Dorsal Pedal Pulse 2+, posteriror tibialis pulse 2+  · Left Knee-Street  o Inspection  o : no swelling, no ecchymosis, no atrophy, neutral alignment  o Palpation  o : no medial joint line tenderness, no lateral joint line tenderness, no patellar tendon tenderness, no pain of MCL, no pain at LCL  o ROM  o : full extension, full flexion  o Strength  o : full extension, full flexion  o Neurovascular  o : Full sensation, Dorsal Pedal Pulse 2+, posteriror tibialis pulse 2+          Assessment  · Primary osteoarthritis of right knee     715.16/M17.11  · Primary osteoarthritis of left knee     715.16/M17.12  · Pain: Knee     719.46/M25.569      Plan  · Medications  o Medications have been Reconciled  o Transition of Care or Provider Policy  · Instructions  o Reviewed the patient's Past Medical, Social, and Family history as well as the ROS at today's visit, no changes.  o Call or return if worsening symptoms.  o Follow Up PRN.  o Electronically Identified Patient Education Materials Provided Electronically            Electronically Signed by: Rehana Yu PA-C -Author on January 13, 2021 01:42:52 PM

## 2021-05-14 NOTE — PROGRESS NOTES
Progress Note      Patient Name: Mandeep Tracey   Patient ID: [id number]   Sex: Male   YOB: 1960    Primary Care Provider: Singh Hayes MD   Referring Provider: Singh Hayes MD    Visit Date: December 30, 2020    Provider: Rehana Yu PA-C   Location: [facility]   Location Address: [address] [place] [address]  [id number]   Location Phone: [phone]          Chief Complaint  · Right knee pain       History Of Present Illness  Mandeep Tracey is a 60 year old /White male who presents today to [facility].      Patient is following up for right knee pain, right knee osteoarthritis. Patient states pain on the medial and lateral sides of the knee. Patient has had a stroke and is here in a wheelchair.       Past Medical History  Arthritis; Chest pain; Diabetes; Hyperlipemia; Hypertension; Limb Swelling; Psoriasis; Seasonal allergies; Stroke         Past Surgical History  Artificial Joints/Limbs; Back; Colonoscopy; Discectomy; EAR Surgery; Gallbladder; Joint Surgery         Medication List  acitretin oral; aspirin oral; atorvastatin oral; baclofen oral; calcipotriene topical; clobetasol topical; clotrimazole topical; desonide topical; ibuprofen oral; Keflex 500 mg oral capsule; ketoconazole 200 mg oral tablet; meloxicam oral; metformin oral; morphine oral 15mg         Allergy List  NO KNOWN DRUG ALLERGIES         Family Medical History  Stroke; Heart Disease; Cancer, Unspecified; Diabetes, unspecified type; Family history of Arthritis         Social History  Alcohol Use (Current some day); Claustophobic (Unknown); lives with children; lives with spouse; .; Recreational Drug Use (Never); Retired.; Tobacco (Former)         Review of Systems  · Constitutional  o Denies  o : fever, chills, weight loss  · Cardiovascular  o Denies  o : chest pain, shortness of breath  · Gastrointestinal  o Denies  o : liver disease, heartburn, nausea, blood in  stools  · Genitourinary  o Denies  o : painful urination, blood in urine  · Integument  o Denies  o : rash, itching  · Neurologic  o Denies  o : headache, weakness, loss of consciousness  · Musculoskeletal  o Denies  o : painful, swollen joints  · Psychiatric  o Denies  o : drug/alcohol addiction, anxiety, depression      Vitals  Date Time BP Position Site L\R Cuff Size HR RR TEMP (F) WT  HT  BMI kg/m2 BSA m2 O2 Sat FR L/min FiO2        12/30/2020 10:18 AM      88 - R    6'     97 %            Physical Examination  · Constitutional  o Appearance  o : well developed, well-nourished, no obvious deformities present  · Head and Face  o Head  o :   § Inspection  § : normocephalic  o Face  o :   § Inspection  § : no facial lesions  · Eyes  o Conjunctivae  o : conjunctivae normal  o Sclerae  o : sclerae white  · Ears, Nose, Mouth and Throat  o Ears  o :   § External Ears  § : appearance within normal limits  § Hearing  § : intact  o Nose  o :   § External Nose  § : appearance normal  · Neck  o Inspection/Palpation  o : normal appearance  o Range of Motion  o : full range of motion  · Respiratory  o Respiratory Effort  o : breathing unlabored  o Inspection of Chest  o : normal appearance  o Auscultation of Lungs  o : no audible wheezing or rales  · Cardiovascular  o Heart  o : regular rate  · Gastrointestinal  o Abdominal Examination  o : soft and non-tender  · Skin and Subcutaneous Tissue  o General Inspection  o : intact, no rashes  · Psychiatric  o General  o : Alert and oriented x3  o Judgement and Insight  o : judgment and insight intact  o Mood and Affect  o : mood normal, affect appropriate  · Injection Note/Aspiration Note  o Site  o : right knee  o Procedure  o : Procedure: After educating the patient, patient gave consent for procedure. After using Chloraprep, the joint space was injected. The patient tolerated the procedure well.  o Medication  o : Synvisc One 48 mg  · Right Knee-Street  o Inspection  o : no  swelling, no ecchymosis, no atrophy, neutral alignment  o Palpation  o : tenderness at medial joint line, tenderness at lateral joint line, no patellar tendon tenderness, no pain of MCL, no pain at LCL  o ROM  o : full extension, full flexion  o Neurovascular  o : Full sensation, Dorsal Pedal Pulse 2+, posteriror tibialis pulse 2+          Assessment  · Primary osteoarthritis of right knee     715.16/M17.11      Plan  · Orders  o Hyaluronan or derivative, Synvisc or Synvisc-One, for intra-freddy () - 715.16/M17.11 - 12/30/2020   Lot [id number] Exp 02 2023 Genyz[employer] Administered by Radha Yu PA-C   o Knee Intra-articular Injection without US Guidance [facility] ([id number]) - 715.16/M17.11 - 12/30/2020   Administered by Radha Yu PA-C   · Medications  o Medications have been Reconciled  o Transition of Care or Provider Policy  · Instructions  o Reviewed the patient's Past Medical, Social, and Family history as well as the ROS at today's visit, no changes.  o Call or return if worsening symptoms.  o Follow Up PRN.  o Electronically Identified Patient Education Materials Provided Electronically            Electronically Signed by: Rehana Yu PA-C -Author on December 30, 2020 10:23:45 AM

## 2021-05-15 VITALS — WEIGHT: 260 LBS | HEIGHT: 72 IN | BODY MASS INDEX: 35.21 KG/M2

## 2021-05-15 VITALS — OXYGEN SATURATION: 96 % | WEIGHT: 270 LBS | BODY MASS INDEX: 36.57 KG/M2 | HEART RATE: 100 BPM | HEIGHT: 72 IN

## 2021-05-15 VITALS — HEIGHT: 72 IN | WEIGHT: 270 LBS | BODY MASS INDEX: 36.57 KG/M2

## 2021-05-28 ENCOUNTER — HOSPITAL ENCOUNTER (OUTPATIENT)
Dept: LAB | Facility: HOSPITAL | Age: 61
Discharge: HOME | End: 2021-05-28
Attending: ORTHOPAEDIC SURGERY

## 2021-05-28 VITALS
HEART RATE: 99 BPM | SYSTOLIC BLOOD PRESSURE: 141 MMHG | DIASTOLIC BLOOD PRESSURE: 70 MMHG | TEMPERATURE: 98 F | BODY MASS INDEX: 36.57 KG/M2 | RESPIRATION RATE: 16 BRPM | HEIGHT: 72 IN | WEIGHT: 270 LBS | OXYGEN SATURATION: 95 %

## 2021-05-28 VITALS
SYSTOLIC BLOOD PRESSURE: 140 MMHG | HEART RATE: 97 BPM | BODY MASS INDEX: 36.57 KG/M2 | RESPIRATION RATE: 16 BRPM | HEIGHT: 72 IN | OXYGEN SATURATION: 95 % | DIASTOLIC BLOOD PRESSURE: 73 MMHG | TEMPERATURE: 98 F | WEIGHT: 270 LBS

## 2021-05-28 LAB
BASOPHILS # BLD AUTO: 0.03 10*3/UL (ref 0–0.2)
BASOPHILS NFR BLD AUTO: 0.2 % (ref 0–3)
CONV ABS IMM GRAN: 0.06 10*3/UL (ref 0–0.2)
CONV IMMATURE GRAN: 0.5 % (ref 0–1.8)
CRP SERPL-MCNC: 4.3 MG/L (ref 0–5)
DEPRECATED RDW RBC AUTO: 45.6 FL (ref 35.1–43.9)
EOSINOPHIL # BLD AUTO: 0.4 10*3/UL (ref 0–0.7)
EOSINOPHIL # BLD AUTO: 3.1 % (ref 0–7)
ERYTHROCYTE [DISTWIDTH] IN BLOOD BY AUTOMATED COUNT: 15.2 % (ref 11.6–14.4)
ERYTHROCYTE [SEDIMENTATION RATE] IN BLOOD: 16 MM/H (ref 0–20)
HCT VFR BLD AUTO: 42.1 % (ref 42–52)
HGB BLD-MCNC: 12.8 G/DL (ref 14–18)
LYMPHOCYTES # BLD AUTO: 2.37 10*3/UL (ref 1–5)
LYMPHOCYTES NFR BLD AUTO: 18.3 % (ref 20–45)
MCH RBC QN AUTO: 25.2 PG (ref 27–31)
MCHC RBC AUTO-ENTMCNC: 30.4 G/DL (ref 33–37)
MCV RBC AUTO: 83 FL (ref 80–96)
MONOCYTES # BLD AUTO: 0.73 10*3/UL (ref 0.2–1.2)
MONOCYTES NFR BLD AUTO: 5.6 % (ref 3–10)
NEUTROPHILS # BLD AUTO: 9.37 10*3/UL (ref 2–8)
NEUTROPHILS NFR BLD AUTO: 72.3 % (ref 30–85)
NRBC CBCN: 0 % (ref 0–0.7)
PLATELET # BLD AUTO: 346 10*3/UL (ref 130–400)
PMV BLD AUTO: 11.3 FL (ref 9.4–12.4)
RBC # BLD AUTO: 5.07 10*6/UL (ref 4.7–6.1)
WBC # BLD AUTO: 12.96 10*3/UL (ref 4.8–10.8)
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
## 2021-08-02 ENCOUNTER — OFFICE VISIT (OUTPATIENT)
Dept: INFECTIOUS DISEASES | Facility: CLINIC | Age: 61
End: 2021-08-02

## 2021-08-02 ENCOUNTER — LAB (OUTPATIENT)
Dept: LAB | Facility: HOSPITAL | Age: 61
End: 2021-08-02

## 2021-08-02 VITALS
BODY MASS INDEX: 41.32 KG/M2 | SYSTOLIC BLOOD PRESSURE: 148 MMHG | HEIGHT: 72 IN | TEMPERATURE: 97.3 F | DIASTOLIC BLOOD PRESSURE: 85 MMHG | HEART RATE: 88 BPM

## 2021-08-02 DIAGNOSIS — A49.1 GROUP B STREPTOCOCCAL INFECTION: Primary | ICD-10-CM

## 2021-08-02 DIAGNOSIS — Z96.649 INFECTION OF PROSTHETIC HIP JOINT, SUBSEQUENT ENCOUNTER: ICD-10-CM

## 2021-08-02 DIAGNOSIS — T84.59XD INFECTION OF PROSTHETIC HIP JOINT, SUBSEQUENT ENCOUNTER: ICD-10-CM

## 2021-08-02 DIAGNOSIS — Z79.2 LONG TERM (CURRENT) USE OF ANTIBIOTICS: ICD-10-CM

## 2021-08-02 LAB
ANION GAP SERPL CALCULATED.3IONS-SCNC: 12.4 MMOL/L (ref 5–15)
BASOPHILS # BLD AUTO: 0.01 10*3/MM3 (ref 0–0.2)
BASOPHILS NFR BLD AUTO: 0.1 % (ref 0–1.5)
BUN SERPL-MCNC: 10 MG/DL (ref 6–20)
BUN/CREAT SERPL: 14.7 (ref 7–25)
CALCIUM SPEC-SCNC: 9.4 MG/DL (ref 8.6–10.5)
CHLORIDE SERPL-SCNC: 98 MMOL/L (ref 98–107)
CO2 SERPL-SCNC: 23.6 MMOL/L (ref 22–29)
CREAT SERPL-MCNC: 0.68 MG/DL (ref 0.76–1.27)
CRP SERPL-MCNC: <0.3 MG/DL (ref 0–0.5)
DEPRECATED RDW RBC AUTO: 44.9 FL (ref 37–54)
EOSINOPHIL # BLD AUTO: 0.26 10*3/MM3 (ref 0–0.4)
EOSINOPHIL NFR BLD AUTO: 2.9 % (ref 0.3–6.2)
ERYTHROCYTE [DISTWIDTH] IN BLOOD BY AUTOMATED COUNT: 15.2 % (ref 12.3–15.4)
GFR SERPL CREATININE-BSD FRML MDRD: 119 ML/MIN/1.73
GLUCOSE SERPL-MCNC: 360 MG/DL (ref 65–99)
HCT VFR BLD AUTO: 42.5 % (ref 37.5–51)
HGB BLD-MCNC: 13.6 G/DL (ref 13–17.7)
IMM GRANULOCYTES # BLD AUTO: 0.04 10*3/MM3 (ref 0–0.05)
IMM GRANULOCYTES NFR BLD AUTO: 0.5 % (ref 0–0.5)
LYMPHOCYTES # BLD AUTO: 1.6 10*3/MM3 (ref 0.7–3.1)
LYMPHOCYTES NFR BLD AUTO: 18 % (ref 19.6–45.3)
MCH RBC QN AUTO: 26.1 PG (ref 26.6–33)
MCHC RBC AUTO-ENTMCNC: 32 G/DL (ref 31.5–35.7)
MCV RBC AUTO: 81.4 FL (ref 79–97)
MONOCYTES # BLD AUTO: 0.52 10*3/MM3 (ref 0.1–0.9)
MONOCYTES NFR BLD AUTO: 5.9 % (ref 5–12)
NEUTROPHILS NFR BLD AUTO: 6.45 10*3/MM3 (ref 1.7–7)
NEUTROPHILS NFR BLD AUTO: 72.6 % (ref 42.7–76)
NRBC BLD AUTO-RTO: 0 /100 WBC (ref 0–0.2)
PLATELET # BLD AUTO: 229 10*3/MM3 (ref 140–450)
PMV BLD AUTO: 12.5 FL (ref 6–12)
POTASSIUM SERPL-SCNC: 4.5 MMOL/L (ref 3.5–5.2)
RBC # BLD AUTO: 5.22 10*6/MM3 (ref 4.14–5.8)
SODIUM SERPL-SCNC: 134 MMOL/L (ref 136–145)
WBC # BLD AUTO: 8.88 10*3/MM3 (ref 3.4–10.8)

## 2021-08-02 PROCEDURE — 86140 C-REACTIVE PROTEIN: CPT | Performed by: INTERNAL MEDICINE

## 2021-08-02 PROCEDURE — 85025 COMPLETE CBC W/AUTO DIFF WBC: CPT | Performed by: INTERNAL MEDICINE

## 2021-08-02 PROCEDURE — 80048 BASIC METABOLIC PNL TOTAL CA: CPT | Performed by: INTERNAL MEDICINE

## 2021-08-02 PROCEDURE — 36415 COLL VENOUS BLD VENIPUNCTURE: CPT | Performed by: INTERNAL MEDICINE

## 2021-08-02 PROCEDURE — 99214 OFFICE O/P EST MOD 30 MIN: CPT | Performed by: INTERNAL MEDICINE

## 2021-08-02 RX ORDER — AMOXICILLIN 500 MG/1
500 CAPSULE ORAL EVERY 8 HOURS
Qty: 270 CAPSULE | Refills: 2 | Status: SHIPPED | OUTPATIENT
Start: 2021-08-02 | End: 2021-10-31

## 2021-08-02 NOTE — PROGRESS NOTES
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 He has been on suppressive amoxicillin 500 mg PO q8h for the past 3 months. He has tolerated the amoxicillin well without rash or diarrhea.     He says his incision is healed. No erythema or drainage. He does have thigh pain but it is not severe.    He saw Dr Andrea back in June and received a good report.      Review of Systems: no n/v/d    Past Medical History:   Diagnosis Date   • Aphasia    • Cardiomyopathy (CMS/HCC)    • CPAP (continuous positive airway pressure) dependence    • Diabetes (CMS/HCC)    • Hemiparesis (CMS/HCC)     Right side    • Morbid obesity (CMS/HCC)    • Nonrheumatic mitral valve regurgitation    • BELKYS on CPAP    • Peptic ulcer disease    • Postoperative nausea and vomiting 3/13/2021   • Psoriasis    • Pyogenic arthritis of right hip (CMS/HCC)    • Seizures (CMS/HCC)    • Sleep apnea    • Stroke (CMS/HCC) 2004   • Ventricular ectopy     asymptomatic     Past Surgical History:   Procedure Laterality Date   • CHOLECYSTECTOMY     • EYE SURGERY     • INCISION AND DRAINAGE HIP Right 3/12/2021    Procedure: HIP ANTERIOR INCISION AND DRAINAGE WITH HANA TABLE;  Surgeon: Tao Andrea MD;  Location: [facility];  Service: Orthopedics;  Laterality: Right;   • LUMBAR DISC SURGERY     • US GUIDED FINE NEEDLE ASPIRATION  3/10/2021     Social History:  Lives w/ his wife  Retired      Family History:  No 1st degree relatives w/ Group B hip infections     Antibiotic allergies and intolerances:    1. Quinolones - hives    Medications:   Current Outpatient Medications:   •  acetaminophen (TYLENOL) 325 MG tablet, Take 2 tablets by mouth Every 4 (Four) Hours As Needed for Mild Pain  or Headache., Disp: , Rfl:   •  aspirin 81 MG chewable tablet, Chew 81 mg Daily., Disp: , Rfl:   •  baclofen (LIORESAL)  10 MG tablet, Take 10 mg by mouth Every 8 (Eight) Hours., Disp: , Rfl:   •  bisacodyl (DULCOLAX) 10 MG suppository, Insert 1 suppository into the rectum Daily As Needed for Constipation., Disp: , Rfl:   •  clotrimazole-betamethasone (LOTRISONE) 1-0.05 % cream, Apply  topically to the appropriate area as directed 4 (Four) Times a Day. For psoriasis to inguinal region, Disp: , Rfl:   •  docusate sodium 100 MG capsule, Take 2 capsules by mouth 2 (Two) Times a Day., Disp: , Rfl:   •  enoxaparin (LOVENOX) 40 MG/0.4ML solution syringe, Inject  under the skin into the appropriate area as directed Daily., Disp: , Rfl:   •  gabapentin (NEURONTIN) 300 MG capsule, Take 1 capsule by mouth 3 (Three) Times a Day., Disp: 9 capsule, Rfl: 0  •  hydrocortisone 2.5 % cream, Apply  topically to the appropriate area as directed 2 (Two) Times a Day., Disp: , Rfl:   •  hydrOXYzine (ATARAX) 25 MG tablet, Take 25 mg by mouth 3 (Three) Times a Day As Needed for Itching., Disp: , Rfl:   •  insulin aspart (novoLOG) 100 UNIT/ML injection, Inject 6 Units under the skin into the appropriate area as directed 3 (Three) Times a Day Before Meals., Disp: , Rfl: 12  •  insulin glargine (LANTUS, SEMGLEE) 100 UNIT/ML injection, Inject 20 Units under the skin into the appropriate area as directed Every Night., Disp: , Rfl: 12  •  insulin lispro (ADMELOG) 100 UNIT/ML injection, Inject 0-9 Units under the skin into the appropriate area as directed 3 (Three) Times a Day Before Meals. (Patient taking differently: Inject 0-9 Units under the skin into the appropriate area as directed 4 (Four) Times a Day Before Meals & at Bedtime.), Disp: , Rfl: 12  •  isosorbide mononitrate (IMDUR) 30 MG 24 hr tablet, Take 30 mg by mouth Daily., Disp: , Rfl:   •  lidocaine (LIDODERM) 5 %, Place 1 patch on the skin as directed by provider Daily. For right knee, Remove & Discard patch within 12 hours or as directed by MD, Disp: , Rfl:   •  metFORMIN (GLUCOPHAGE) 1000 MG tablet,  "Take 1,000 mg by mouth 2 (Two) Times a Day With Meals., Disp: , Rfl:   •  metoprolol succinate XL (TOPROL-XL) 50 MG 24 hr tablet, Take 1 tablet by mouth Every 12 (Twelve) Hours. (Patient taking differently: Take 50 mg by mouth Daily.), Disp: , Rfl:   •  Morphine (MSIR) 15 MG tablet, Take 1 tablet by mouth Every 4 (Four) Hours As Needed for Moderate Pain  or Severe Pain ., Disp: 18 tablet, Rfl: 0  •  polyethylene glycol (polyethylene glycol) 17 g packet, Take 17 g by mouth 2 (Two) Times a Day., Disp: , Rfl:   •  rosuvastatin (CRESTOR) 20 MG tablet, Take 40 mg by mouth Daily., Disp: , Rfl:   •  sertraline (ZOLOFT) 100 MG tablet, Take 100 mg by mouth Daily., Disp: , Rfl:   •  triamcinolone (KENALOG) 0.1 % cream, Apply  topically to the appropriate area as directed 2 (Two) Times a Day., Disp: , Rfl:       OBJECTIVE:  /85   Pulse 88   Temp 97.3 °F (36.3 °C)   Ht 182.9 cm (72.01\")   BMI 41.32 kg/m²     General: awake, alert, very nice, in wheelchair  Eyes: no scleral icterus  ENT: wearing mask  Cardiovascular: NR  Respiratory: normal work of breathing on RA  GI: Abdomen is obese, soft, non-tender  Musculoskeletal: R hip incision healed; no drainage  Skin: no rashes; mild moisture in the pannus       DIAGNOSTICS:  CBC, ESR, CRP reviewed today from 5/28/21  Lab Results   Component Value Date    WBC 12.96 (H) 05/28/2021    HGB 12.8 (L) 05/28/2021    HCT 42.1 05/28/2021    MCV 83.0 05/28/2021     05/28/2021     Lab Results   Component Value Date    CRP 4.30 05/28/2021     Lab Results   Component Value Date    SEDRATE 16 05/28/2021     Lab Results   Component Value Date    HGBA1C 9.22 (H) 03/11/2021     Microbiology:  3/9 OSH BCx: coag-negative Staph in 1/2 sets  3/9 OSH UCx: >100k MSSA  3/10 OSH R Hip Synovial Cx: Group B Strep  3/11 COVID; negative  3/12 R Hip OR Cx: negative  3/22 [facility] BCx: negative per my records review  3/24 BCx: negative  3/25 R Hip Fluid Cx: negative    ASSESSMENT/PLAN:  1. Right " prosthetic hip joint infection due to Group B Strep  2. Complex fluid collection near right hip prosthesis  3. History of stroke and hemiparesis  4. Morbid obesity BMI 42  5. Uncontrolled DM2 - A1c 9.2%  6. Long term use of antibiotics    His hip incision is doing well. He has ongoing pain but no fevers, chills, sweats, or drainage. he has follow-up with Dr Andrea. Continue amoxicillin 500 mg PO q8h through ~5/4/22 to complete a 1 year course of suppression. I will schedule him to see me in follow-up at that time. Check CBC, BMP, and CRP today.     CC: Dr Tao Andrea

## 2021-08-03 ENCOUNTER — TELEPHONE (OUTPATIENT)
Dept: INFECTIOUS DISEASES | Facility: CLINIC | Age: 61
End: 2021-08-03

## 2021-08-03 NOTE — TELEPHONE ENCOUNTER
Phone with Mrs. Tracey. Informed labs were good except Glucose was elevated. She states he was not fasting when blood was drawn for that. I have faxed a copy of all labs to Dr. Andrea office and they may contact him now to see if he has any further instructions for him. ОЛЕГ RN

## 2021-08-03 NOTE — TELEPHONE ENCOUNTER
----- Message from Raymundo Miller MD sent at 8/3/2021 12:49 PM EDT -----  Please let pt's wife know that his labs look good from my standpoint. His WBC is normal. His inflammatory marker (CRP) is very low. He does have a high blood sugar. Please let her know that we will send a copy of these labs to Dr Andrea (ortho). Would you mind (or ask Taryn) to do that for us?

## 2022-05-02 ENCOUNTER — OFFICE VISIT (OUTPATIENT)
Dept: INFECTIOUS DISEASES | Facility: CLINIC | Age: 62
End: 2022-05-02

## 2022-05-02 VITALS
TEMPERATURE: 97.3 F | HEART RATE: 98 BPM | BODY MASS INDEX: 41.32 KG/M2 | DIASTOLIC BLOOD PRESSURE: 71 MMHG | SYSTOLIC BLOOD PRESSURE: 115 MMHG | HEIGHT: 72 IN

## 2022-05-02 DIAGNOSIS — B37.2 CANDIDAL INTERTRIGO: ICD-10-CM

## 2022-05-02 DIAGNOSIS — Z79.2 LONG TERM (CURRENT) USE OF ANTIBIOTICS: ICD-10-CM

## 2022-05-02 DIAGNOSIS — A49.1 GROUP B STREPTOCOCCAL INFECTION: Primary | ICD-10-CM

## 2022-05-02 DIAGNOSIS — T84.59XD INFECTION OF PROSTHETIC HIP JOINT, SUBSEQUENT ENCOUNTER: ICD-10-CM

## 2022-05-02 DIAGNOSIS — E11.69 DIABETES MELLITUS TYPE 2 IN OBESE: ICD-10-CM

## 2022-05-02 DIAGNOSIS — Z96.649 INFECTION OF PROSTHETIC HIP JOINT, SUBSEQUENT ENCOUNTER: ICD-10-CM

## 2022-05-02 DIAGNOSIS — E66.9 DIABETES MELLITUS TYPE 2 IN OBESE: ICD-10-CM

## 2022-05-02 PROCEDURE — 99213 OFFICE O/P EST LOW 20 MIN: CPT | Performed by: INTERNAL MEDICINE

## 2022-05-02 RX ORDER — MECLIZINE HCL 25MG 25 MG/1
25 TABLET, CHEWABLE ORAL 3 TIMES DAILY PRN
COMMUNITY
End: 2023-01-16 | Stop reason: HOSPADM

## 2022-05-02 RX ORDER — CLOPIDOGREL BISULFATE 75 MG/1
75 TABLET ORAL DAILY
COMMUNITY

## 2022-05-02 RX ORDER — AMOXICILLIN 500 MG/1
CAPSULE ORAL
COMMUNITY
Start: 2022-02-20 | End: 2022-05-02

## 2022-05-02 RX ORDER — BUMETANIDE 2 MG/1
2 TABLET ORAL DAILY
COMMUNITY

## 2022-05-02 RX ORDER — TROSPIUM CHLORIDE 20 MG/1
20 TABLET, FILM COATED ORAL 2 TIMES DAILY
COMMUNITY

## 2022-05-02 NOTE — PROGRESS NOTES
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 He has been on suppressive amoxicillin 500 mg PO q8h since May 2021. I last saw him 8/2/21. His wife says his R hip is doing well and Mr Tracey confirms the same. He tolerates amoxicillin w/o rash or diarrhea. He sees Dr Andrea (ortho) relatively soon but doesn't remember the exact appt date.     He reports needing a stent in his heart and his RLE since I last saw him.     He has a fungal infection in the R groin area that is improving with a topical antifungal powder (Nystatin) he was given last month at [facility].     Review of Systems: no n/v/d    Past Medical History:   Diagnosis Date   • Aphasia    • Cardiomyopathy (CMS/HCC)    • CPAP (continuous positive airway pressure) dependence    • Diabetes (CMS/HCC)    • Hemiparesis (CMS/HCC)     Right side    • Morbid obesity (CMS/HCC)    • Nonrheumatic mitral valve regurgitation    • BELKYS on CPAP    • Peptic ulcer disease    • Postoperative nausea and vomiting 3/13/2021   • Psoriasis    • Pyogenic arthritis of right hip (CMS/HCC)    • Seizures (CMS/HCC)    • Sleep apnea    • Stroke (CMS/HCC) 2004   • Ventricular ectopy     asymptomatic     Past Surgical History:   Procedure Laterality Date   • CHOLECYSTECTOMY     • EYE SURGERY     • INCISION AND DRAINAGE HIP Right 3/12/2021    Procedure: HIP ANTERIOR INCISION AND DRAINAGE WITH HANA TABLE;  Surgeon: Tao Andrea MD;  Location: [facility];  Service: Orthopedics;  Laterality: Right;   • LUMBAR DISC SURGERY     • US GUIDED FINE NEEDLE ASPIRATION  3/10/2021     Social History:  Lives w/ his wife  Retired      Family History:  No 1st degree relatives w/ Group B hip infections     Antibiotic allergies and intolerances:    1. Quinolones - hives    Medications:   Current Outpatient Medications:   •  acetaminophen  (TYLENOL) 325 MG tablet, Take 2 tablets by mouth Every 4 (Four) Hours As Needed for Mild Pain  or Headache., Disp: , Rfl:   •  aspirin 81 MG chewable tablet, Chew 81 mg Daily., Disp: , Rfl:   •  baclofen (LIORESAL) 10 MG tablet, Take 10 mg by mouth Every 8 (Eight) Hours., Disp: , Rfl:   •  bisacodyl (DULCOLAX) 10 MG suppository, Insert 1 suppository into the rectum Daily As Needed for Constipation., Disp: , Rfl:   •  clotrimazole-betamethasone (LOTRISONE) 1-0.05 % cream, Apply  topically to the appropriate area as directed 4 (Four) Times a Day. For psoriasis to inguinal region, Disp: , Rfl:   •  docusate sodium 100 MG capsule, Take 2 capsules by mouth 2 (Two) Times a Day., Disp: , Rfl:   •  enoxaparin (LOVENOX) 40 MG/0.4ML solution syringe, Inject  under the skin into the appropriate area as directed Daily., Disp: , Rfl:   •  gabapentin (NEURONTIN) 300 MG capsule, Take 1 capsule by mouth 3 (Three) Times a Day., Disp: 9 capsule, Rfl: 0  •  hydrocortisone 2.5 % cream, Apply  topically to the appropriate area as directed 2 (Two) Times a Day., Disp: , Rfl:   •  hydrOXYzine (ATARAX) 25 MG tablet, Take 25 mg by mouth 3 (Three) Times a Day As Needed for Itching., Disp: , Rfl:   •  insulin aspart (novoLOG) 100 UNIT/ML injection, Inject 6 Units under the skin into the appropriate area as directed 3 (Three) Times a Day Before Meals., Disp: , Rfl: 12  •  insulin glargine (LANTUS, SEMGLEE) 100 UNIT/ML injection, Inject 20 Units under the skin into the appropriate area as directed Every Night., Disp: , Rfl: 12  •  insulin lispro (ADMELOG) 100 UNIT/ML injection, Inject 0-9 Units under the skin into the appropriate area as directed 3 (Three) Times a Day Before Meals. (Patient taking differently: Inject 0-9 Units under the skin into the appropriate area as directed 4 (Four) Times a Day Before Meals & at Bedtime.), Disp: , Rfl: 12  •  isosorbide mononitrate (IMDUR) 30 MG 24 hr tablet, Take 30 mg by mouth Daily., Disp: , Rfl:   •   "lidocaine (LIDODERM) 5 %, Place 1 patch on the skin as directed by provider Daily. For right knee, Remove & Discard patch within 12 hours or as directed by MD, Disp: , Rfl:   •  metFORMIN (GLUCOPHAGE) 1000 MG tablet, Take 1,000 mg by mouth 2 (Two) Times a Day With Meals., Disp: , Rfl:   •  metoprolol succinate XL (TOPROL-XL) 50 MG 24 hr tablet, Take 1 tablet by mouth Every 12 (Twelve) Hours. (Patient taking differently: Take 50 mg by mouth Daily.), Disp: , Rfl:   •  Morphine (MSIR) 15 MG tablet, Take 1 tablet by mouth Every 4 (Four) Hours As Needed for Moderate Pain  or Severe Pain ., Disp: 18 tablet, Rfl: 0  •  polyethylene glycol (polyethylene glycol) 17 g packet, Take 17 g by mouth 2 (Two) Times a Day., Disp: , Rfl:   •  rosuvastatin (CRESTOR) 20 MG tablet, Take 40 mg by mouth Daily., Disp: , Rfl:   •  sertraline (ZOLOFT) 100 MG tablet, Take 100 mg by mouth Daily., Disp: , Rfl:   •  triamcinolone (KENALOG) 0.1 % cream, Apply  topically to the appropriate area as directed 2 (Two) Times a Day., Disp: , Rfl:       OBJECTIVE:  /71 (BP Location: Left arm, Patient Position: Sitting, Cuff Size: Large Adult)   Pulse 98   Temp 97.3 °F (36.3 °C) (Temporal)   Ht 182.9 cm (72.01\")   BMI 41.32 kg/m²     General: awake, alert, very nice, in wheelchair  Eyes: no scleral icterus  ENT: wearing mask  Cardiovascular: NR  Respiratory: normal work of breathing on room air  GI: Abdomen is obese, soft, non-tender  Musculoskeletal: R hip incision healed; no drainage; looks great  Skin: Candida intertrigo R groin    DIAGNOSTICS:  CBC, BMP, and CRP reviewed today  Lab Results   Component Value Date    WBC 8.88 08/02/2021    HGB 13.6 08/02/2021    HCT 42.5 08/02/2021    MCV 81.4 08/02/2021     08/02/2021     Lab Results   Component Value Date    GLUCOSE 360 (H) 08/02/2021    CALCIUM 9.4 08/02/2021     (L) 08/02/2021    K 4.5 08/02/2021    CO2 23.6 08/02/2021    CL 98 08/02/2021    BUN 10 08/02/2021    CREATININE 0.68 " (L) 08/02/2021    EGFRIFNONA 119 08/02/2021    BCR 14.7 08/02/2021    ANIONGAP 12.4 08/02/2021     Lab Results   Component Value Date    CRP <0.30 08/02/2021     Lab Results   Component Value Date    HGBA1C 9.22 (H) 03/11/2021     Microbiology:  3/9/21 OSH BCx: coag-negative Staph in 1/2 sets  3/9 OSH UCx: >100k MSSA  3/10 OSH R Hip Synovial Cx: Group B Strep  3/11 COVID; negative  3/12 R Hip OR Cx: negative  3/22 [facility] BCx: negative per my records review  3/24 BCx: negative  3/25/21 R Hip Fluid Cx: negative    ASSESSMENT/PLAN:  1. 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 Candida intertrigo - new problem  3. History of stroke and hemiparesis  4. Morbid obesity BMI 41  5. Uncontrolled DM2 - A1c 9.2%  6. Long term use of antibiotics    At this point he has undergone I&D with liner exchange, 6+ weeks of IV antibiotics, and now one year of chronic suppression with a good clinical result. Stop amoxicillin. We discussed signs and symptoms of recurrent infection, and he will call me if those occur. Continue topical Nystatin powder for Candida intertrigo.     RTC as needed.

## 2022-08-07 ENCOUNTER — APPOINTMENT (OUTPATIENT)
Dept: GENERAL RADIOLOGY | Facility: HOSPITAL | Age: 62
End: 2022-08-07

## 2022-08-07 ENCOUNTER — HOSPITAL ENCOUNTER (EMERGENCY)
Facility: HOSPITAL | Age: 62
Discharge: ANOTHER HOSPITAL | End: 2022-08-08
Attending: EMERGENCY MEDICINE | Admitting: EMERGENCY MEDICINE

## 2022-08-07 DIAGNOSIS — T82.7XXA INFECTION OF PACEMAKER POCKET, INITIAL ENCOUNTER: ICD-10-CM

## 2022-08-07 DIAGNOSIS — A41.9 SEPSIS WITHOUT ACUTE ORGAN DYSFUNCTION, DUE TO UNSPECIFIED ORGANISM: Primary | ICD-10-CM

## 2022-08-07 DIAGNOSIS — R50.9 ACUTE FEBRILE ILLNESS: ICD-10-CM

## 2022-08-07 LAB
ALBUMIN SERPL-MCNC: 4.6 G/DL (ref 3.5–5.2)
ALBUMIN/GLOB SERPL: 1.5 G/DL
ALP SERPL-CCNC: 49 U/L (ref 39–117)
ALT SERPL W P-5'-P-CCNC: 19 U/L (ref 1–41)
ANION GAP SERPL CALCULATED.3IONS-SCNC: 11.7 MMOL/L (ref 5–15)
AST SERPL-CCNC: 18 U/L (ref 1–40)
BASOPHILS # BLD AUTO: 0.02 10*3/MM3 (ref 0–0.2)
BASOPHILS NFR BLD AUTO: 0.2 % (ref 0–1.5)
BILIRUB SERPL-MCNC: 0.5 MG/DL (ref 0–1.2)
BUN SERPL-MCNC: 13 MG/DL (ref 8–23)
BUN/CREAT SERPL: 13.3 (ref 7–25)
CALCIUM SPEC-SCNC: 9.8 MG/DL (ref 8.6–10.5)
CHLORIDE SERPL-SCNC: 97 MMOL/L (ref 98–107)
CO2 SERPL-SCNC: 32.3 MMOL/L (ref 22–29)
CREAT SERPL-MCNC: 0.98 MG/DL (ref 0.76–1.27)
D-LACTATE SERPL-SCNC: 2.3 MMOL/L (ref 0.5–2)
DEPRECATED RDW RBC AUTO: 47.5 FL (ref 37–54)
EGFRCR SERPLBLD CKD-EPI 2021: 88.3 ML/MIN/1.73
EOSINOPHIL # BLD AUTO: 0.28 10*3/MM3 (ref 0–0.4)
EOSINOPHIL NFR BLD AUTO: 2.2 % (ref 0.3–6.2)
ERYTHROCYTE [DISTWIDTH] IN BLOOD BY AUTOMATED COUNT: 15 % (ref 12.3–15.4)
GLOBULIN UR ELPH-MCNC: 3.1 GM/DL
GLUCOSE SERPL-MCNC: 164 MG/DL (ref 65–99)
HCT VFR BLD AUTO: 39.4 % (ref 37.5–51)
HGB BLD-MCNC: 13.1 G/DL (ref 13–17.7)
HOLD SPECIMEN: NORMAL
HOLD SPECIMEN: NORMAL
IMM GRANULOCYTES # BLD AUTO: 0.05 10*3/MM3 (ref 0–0.05)
IMM GRANULOCYTES NFR BLD AUTO: 0.4 % (ref 0–0.5)
LYMPHOCYTES # BLD AUTO: 0.97 10*3/MM3 (ref 0.7–3.1)
LYMPHOCYTES NFR BLD AUTO: 7.5 % (ref 19.6–45.3)
MCH RBC QN AUTO: 28.9 PG (ref 26.6–33)
MCHC RBC AUTO-ENTMCNC: 33.2 G/DL (ref 31.5–35.7)
MCV RBC AUTO: 87 FL (ref 79–97)
MONOCYTES # BLD AUTO: 0.83 10*3/MM3 (ref 0.1–0.9)
MONOCYTES NFR BLD AUTO: 6.4 % (ref 5–12)
NEUTROPHILS NFR BLD AUTO: 10.86 10*3/MM3 (ref 1.7–7)
NEUTROPHILS NFR BLD AUTO: 83.3 % (ref 42.7–76)
NRBC BLD AUTO-RTO: 0 /100 WBC (ref 0–0.2)
PLATELET # BLD AUTO: 225 10*3/MM3 (ref 140–450)
PMV BLD AUTO: 10.5 FL (ref 6–12)
POTASSIUM SERPL-SCNC: 4.3 MMOL/L (ref 3.5–5.2)
PROT SERPL-MCNC: 7.7 G/DL (ref 6–8.5)
RBC # BLD AUTO: 4.53 10*6/MM3 (ref 4.14–5.8)
SODIUM SERPL-SCNC: 141 MMOL/L (ref 136–145)
WBC NRBC COR # BLD: 13.01 10*3/MM3 (ref 3.4–10.8)
WHOLE BLOOD HOLD COAG: NORMAL
WHOLE BLOOD HOLD SPECIMEN: NORMAL

## 2022-08-07 PROCEDURE — 85025 COMPLETE CBC W/AUTO DIFF WBC: CPT | Performed by: EMERGENCY MEDICINE

## 2022-08-07 PROCEDURE — 80053 COMPREHEN METABOLIC PANEL: CPT | Performed by: EMERGENCY MEDICINE

## 2022-08-07 PROCEDURE — 96365 THER/PROPH/DIAG IV INF INIT: CPT

## 2022-08-07 PROCEDURE — 36415 COLL VENOUS BLD VENIPUNCTURE: CPT | Performed by: EMERGENCY MEDICINE

## 2022-08-07 PROCEDURE — 36415 COLL VENOUS BLD VENIPUNCTURE: CPT

## 2022-08-07 PROCEDURE — 83605 ASSAY OF LACTIC ACID: CPT | Performed by: EMERGENCY MEDICINE

## 2022-08-07 PROCEDURE — 96367 TX/PROPH/DG ADDL SEQ IV INF: CPT

## 2022-08-07 PROCEDURE — 99284 EMERGENCY DEPT VISIT MOD MDM: CPT

## 2022-08-07 PROCEDURE — 96375 TX/PRO/DX INJ NEW DRUG ADDON: CPT

## 2022-08-07 RX ORDER — SODIUM CHLORIDE 0.9 % (FLUSH) 0.9 %
10 SYRINGE (ML) INJECTION AS NEEDED
Status: DISCONTINUED | OUTPATIENT
Start: 2022-08-07 | End: 2022-08-08 | Stop reason: HOSPADM

## 2022-08-08 ENCOUNTER — APPOINTMENT (OUTPATIENT)
Dept: GENERAL RADIOLOGY | Facility: HOSPITAL | Age: 62
End: 2022-08-08

## 2022-08-08 VITALS
HEIGHT: 72 IN | WEIGHT: 283.51 LBS | RESPIRATION RATE: 20 BRPM | TEMPERATURE: 100.2 F | HEART RATE: 110 BPM | BODY MASS INDEX: 38.4 KG/M2 | DIASTOLIC BLOOD PRESSURE: 49 MMHG | SYSTOLIC BLOOD PRESSURE: 95 MMHG | OXYGEN SATURATION: 94 %

## 2022-08-08 LAB — SARS-COV-2 RNA PNL SPEC NAA+PROBE: NOT DETECTED

## 2022-08-08 PROCEDURE — 25010000002 MORPHINE PER 10 MG: Performed by: EMERGENCY MEDICINE

## 2022-08-08 PROCEDURE — 87147 CULTURE TYPE IMMUNOLOGIC: CPT | Performed by: EMERGENCY MEDICINE

## 2022-08-08 PROCEDURE — 71045 X-RAY EXAM CHEST 1 VIEW: CPT

## 2022-08-08 PROCEDURE — 88312 SPECIAL STAINS GROUP 1: CPT | Performed by: EMERGENCY MEDICINE

## 2022-08-08 PROCEDURE — U0004 COV-19 TEST NON-CDC HGH THRU: HCPCS | Performed by: EMERGENCY MEDICINE

## 2022-08-08 PROCEDURE — 96367 TX/PROPH/DG ADDL SEQ IV INF: CPT

## 2022-08-08 PROCEDURE — 96375 TX/PRO/DX INJ NEW DRUG ADDON: CPT

## 2022-08-08 PROCEDURE — 87040 BLOOD CULTURE FOR BACTERIA: CPT | Performed by: EMERGENCY MEDICINE

## 2022-08-08 PROCEDURE — C9803 HOPD COVID-19 SPEC COLLECT: HCPCS | Performed by: EMERGENCY MEDICINE

## 2022-08-08 PROCEDURE — 87186 SC STD MICRODIL/AGAR DIL: CPT | Performed by: EMERGENCY MEDICINE

## 2022-08-08 PROCEDURE — U0005 INFEC AGEN DETEC AMPLI PROBE: HCPCS | Performed by: EMERGENCY MEDICINE

## 2022-08-08 PROCEDURE — 96365 THER/PROPH/DIAG IV INF INIT: CPT

## 2022-08-08 PROCEDURE — 25010000002 ONDANSETRON PER 1 MG: Performed by: EMERGENCY MEDICINE

## 2022-08-08 PROCEDURE — 25010000002 CEFEPIME PER 500 MG: Performed by: EMERGENCY MEDICINE

## 2022-08-08 PROCEDURE — 25010000002 VANCOMYCIN 5 G RECONSTITUTED SOLUTION: Performed by: EMERGENCY MEDICINE

## 2022-08-08 RX ORDER — ACETAMINOPHEN 500 MG
1000 TABLET ORAL ONCE
Status: COMPLETED | OUTPATIENT
Start: 2022-08-08 | End: 2022-08-08

## 2022-08-08 RX ORDER — CEFEPIME 1 G/50ML
2 INJECTION, SOLUTION INTRAVENOUS ONCE
Status: COMPLETED | OUTPATIENT
Start: 2022-08-08 | End: 2022-08-08

## 2022-08-08 RX ORDER — ONDANSETRON 2 MG/ML
4 INJECTION INTRAMUSCULAR; INTRAVENOUS ONCE
Status: COMPLETED | OUTPATIENT
Start: 2022-08-08 | End: 2022-08-08

## 2022-08-08 RX ORDER — ACETAMINOPHEN 325 MG/1
975 TABLET ORAL ONCE
Status: DISCONTINUED | OUTPATIENT
Start: 2022-08-08 | End: 2022-08-08 | Stop reason: HOSPADM

## 2022-08-08 RX ORDER — SODIUM CHLORIDE 0.9 % (FLUSH) 0.9 %
10 SYRINGE (ML) INJECTION AS NEEDED
Status: DISCONTINUED | OUTPATIENT
Start: 2022-08-08 | End: 2022-08-08 | Stop reason: HOSPADM

## 2022-08-08 RX ADMIN — ONDANSETRON 4 MG: 2 INJECTION INTRAMUSCULAR; INTRAVENOUS at 00:48

## 2022-08-08 RX ADMIN — ACETAMINOPHEN 1000 MG: 500 TABLET ORAL at 00:24

## 2022-08-08 RX ADMIN — SODIUM CHLORIDE 1000 ML: 9 INJECTION, SOLUTION INTRAVENOUS at 00:48

## 2022-08-08 RX ADMIN — VANCOMYCIN HYDROCHLORIDE 2500 MG: 5 INJECTION, POWDER, LYOPHILIZED, FOR SOLUTION INTRAVENOUS at 01:20

## 2022-08-08 RX ADMIN — CEFEPIME 2 G: 1 INJECTION, SOLUTION INTRAVENOUS at 00:48

## 2022-08-08 RX ADMIN — MORPHINE SULFATE 4 MG: 4 INJECTION INTRAVENOUS at 00:48

## 2022-08-08 NOTE — ED PROVIDER NOTES
Time: 11:55 PM EDT  Arrived by: private car  Chief Complaint: Post-op pain, Chills  History provided by: Patient  History is limited by: Not limited     History of Present Illness:  Patient is a 62 y.o. year old male with a hx of A-Fib and recent pacemaker placement who presents to the emergency department with post op pain s/p pacemaker and AICD placement 5 days ago. PT has been experiencing pain at the the incision site of [facility].Since placement. Pt has had fever, body aches, and chills that have been ongoing with no relief after taking Tylenol and Ibuprofen. Pt denies having any nasal congestion, SOB, or sore throat.      Similar Symptoms Previously: No  Recently seen: Yes, [facility]      Patient Care Team  Primary Care Provider: Pipe Servin MD    Past Medical History:     Allergies   Allergen Reactions   • Fentanyl Mental Status Change   • Ciprofloxacin Unknown - Low Severity   • Quinolones Hives     Past Medical History:   Diagnosis Date   • Aphasia    • Cardiomyopathy (HCC)    • CPAP (continuous positive airway pressure) dependence    • Diabetes (HCC)    • Hemiparesis (HCC)     Right side    • Morbid obesity (HCC)    • Nonrheumatic mitral valve regurgitation    • BELKYS on CPAP    • Peptic ulcer disease    • Postoperative nausea and vomiting 3/13/2021   • Psoriasis    • Pyogenic arthritis of right hip (HCC)    • Seizures (HCC)    • Sleep apnea    • Stroke (HCC) 2004   • Ventricular ectopy     asymptomatic     Past Surgical History:   Procedure Laterality Date   • CHOLECYSTECTOMY     • EYE SURGERY     • INCISION AND DRAINAGE HIP Right 3/12/2021    Procedure: HIP ANTERIOR INCISION AND DRAINAGE WITH HANA TABLE;  Surgeon: Tao Andrea MD;  Location: [facility];  Service: Orthopedics;  Laterality: Right;   • LUMBAR DISC SURGERY     • US GUIDED FINE NEEDLE ASPIRATION  3/10/2021     History reviewed. No pertinent family history.    Home  Medications:  Prior to Admission medications    Medication Sig Start Date End Date Taking? Authorizing Provider   acetaminophen (TYLENOL) 325 MG tablet Take 2 tablets by mouth Every 4 (Four) Hours As Needed for Mild Pain  or Headache. 3/31/21   Angel Luis Dejesus MD   aspirin 81 MG chewable tablet Chew 81 mg Daily.    Rachel Perales MD   ATORVASTATIN CALCIUM PO     Rachel Perales MD   baclofen (LIORESAL) 10 MG tablet Take 10 mg by mouth Every 8 (Eight) Hours.    Rachel Perales MD   bisacodyl (DULCOLAX) 10 MG suppository Insert 1 suppository into the rectum Daily As Needed for Constipation. 3/31/21   Angel Luis Dejesus MD   bumetanide (BUMEX) 2 MG tablet Take 2 mg by mouth Daily.    Rachel Perales MD   clopidogrel (PLAVIX) 75 MG tablet Take 75 mg by mouth Daily.    Rachel Perales MD   clotrimazole-betamethasone (LOTRISONE) 1-0.05 % cream Apply  topically to the appropriate area as directed 4 (Four) Times a Day. For psoriasis to inguinal region    Rachel Perales MD   docusate sodium 100 MG capsule Take 2 capsules by mouth 2 (Two) Times a Day. 3/17/21   Colby Ford MD   empagliflozin (JARDIANCE) 25 MG tablet tablet Take  by mouth Daily.    Rachel Perales MD   enoxaparin (LOVENOX) 40 MG/0.4ML solution syringe Inject  under the skin into the appropriate area as directed Daily.    Rachel Perales MD   gabapentin (NEURONTIN) 300 MG capsule Take 1 capsule by mouth 3 (Three) Times a Day.  Patient taking differently: Take 600 mg by mouth 3 (Three) Times a Day. 3/31/21   Angel Luis Dejesus MD   hydrocortisone 2.5 % cream Apply  topically to the appropriate area as directed 2 (Two) Times a Day.    Rachel Perales MD   hydrOXYzine (ATARAX) 25 MG tablet Take 25 mg by mouth 3 (Three) Times a Day As Needed for Itching.    Rachel Perales MD   insulin aspart (novoLOG) 100 UNIT/ML injection Inject 6 Units under the skin into the appropriate area as directed 3  (Three) Times a Day Before Meals. 3/31/21   Angel Luis Dejesus MD   insulin glargine (LANTUS, SEMGLEE) 100 UNIT/ML injection Inject 20 Units under the skin into the appropriate area as directed Every Night. 3/31/21   Angel Luis Dejesus MD   insulin lispro (ADMELOG) 100 UNIT/ML injection Inject 0-9 Units under the skin into the appropriate area as directed 3 (Three) Times a Day Before Meals.  Patient taking differently: Inject 0-9 Units under the skin into the appropriate area as directed 4 (Four) Times a Day Before Meals & at Bedtime. 3/17/21   Colby Ford MD   isosorbide mononitrate (IMDUR) 30 MG 24 hr tablet Take 30 mg by mouth Daily.    Rachel Perales MD   lidocaine (LIDODERM) 5 % Place 1 patch on the skin as directed by provider Daily. For right knee, Remove & Discard patch within 12 hours or as directed by MD    Rachel Perales MD   meclizine 25 MG chewable tablet chewable tablet Chew 25 mg 3 (Three) Times a Day As Needed.    Rachel Perales MD   MELOXICAM PO     Rachel Perales MD   metFORMIN (GLUCOPHAGE) 1000 MG tablet Take 1,000 mg by mouth 2 (Two) Times a Day With Meals.    Rachel Perales MD   metoprolol succinate XL (TOPROL-XL) 50 MG 24 hr tablet Take 1 tablet by mouth Every 12 (Twelve) Hours.  Patient taking differently: Take 50 mg by mouth Daily. 3/17/21   Colby Ford MD   Morphine (MSIR) 15 MG tablet Take 1 tablet by mouth Every 4 (Four) Hours As Needed for Moderate Pain  or Severe Pain . 3/31/21   Angel Luis Dejesus MD   nystatin (MYCOSTATIN) 100,000 unit/mL suspension Swish and swallow 500,000 Units 4 (Four) Times a Day.    Rachel Perales MD   polyethylene glycol (polyethylene glycol) 17 g packet Take 17 g by mouth 2 (Two) Times a Day. 3/31/21   Angel Luis Dejesus MD   rosuvastatin (CRESTOR) 20 MG tablet Take 40 mg by mouth Daily.    Rachel Perales MD   sacubitril-valsartan (ENTRESTO) 49-51 MG tablet Take 1 tablet by mouth 2 (Two) Times a  "Day.    Rachel Perales MD   sertraline (ZOLOFT) 100 MG tablet Take 100 mg by mouth Daily.    Rachel Perales MD   triamcinolone (KENALOG) 0.1 % cream Apply  topically to the appropriate area as directed 2 (Two) Times a Day.    Rachel Perales MD   trospium (SANCTURA) 20 MG tablet Take 20 mg by mouth 2 (Two) Times a Day.    Rachel Perales MD        Social History:   Social History     Tobacco Use   • Smoking status: Former Smoker   • Smokeless tobacco: Never Used   • Tobacco comment: quit 22 years ago    Substance Use Topics   • Alcohol use: Never     Recent travel: not applicable     Review of Systems:  Review of Systems   Constitutional: Positive for chills and fever.   HENT: Negative for congestion, ear pain and sore throat.    Eyes: Negative for pain.   Respiratory: Negative for cough, chest tightness and shortness of breath.    Cardiovascular: Positive for chest pain (Pain at incision site in L chest wall ).   Gastrointestinal: Negative for abdominal pain, diarrhea, nausea and vomiting.   Genitourinary: Negative for flank pain and hematuria.   Musculoskeletal: Positive for myalgias. Negative for joint swelling.   Skin: Negative for pallor.   Neurological: Negative for seizures and headaches.   All other systems reviewed and are negative.       Physical Exam:  BP 96/52   Pulse 120   Temp 99.6 °F (37.6 °C) (Oral)   Resp 20   Ht 182.9 cm (72\")   Wt 129 kg (283 lb 8.2 oz)   SpO2 92%   BMI 38.45 kg/m²     Physical Exam  Vitals and nursing note reviewed.   Constitutional:       General: He is in acute distress (Moderate distress).      Appearance: Normal appearance. He is not toxic-appearing.   HENT:      Head: Normocephalic and atraumatic.      Mouth/Throat:      Mouth: Mucous membranes are moist.   Eyes:      General: No scleral icterus.  Cardiovascular:      Rate and Rhythm: Normal rate. Rhythm irregularly irregular.      Pulses: Normal pulses.      Heart sounds: Normal heart " sounds.   Pulmonary:      Effort: Pulmonary effort is normal. No respiratory distress.      Breath sounds: Normal breath sounds.   Chest:      Comments: Erythema to L chest wall at incision site.  Abdominal:      General: Abdomen is flat.      Palpations: Abdomen is soft.      Tenderness: There is no abdominal tenderness.   Musculoskeletal:         General: Normal range of motion.      Cervical back: Normal range of motion and neck supple.   Skin:     General: Skin is warm and dry.   Neurological:      Mental Status: He is alert and oriented to person, place, and time. Mental status is at baseline.     Chest wall exam notable for severe tenderness and some moderate swelling and mild erythema and warmth localized over the pacemaker pocket, but no drainage or dehiscence from the incision site was noted.          Medications in the Emergency Department:  Medications   sodium chloride 0.9 % flush 10 mL (has no administration in time range)   sodium chloride 0.9 % flush 10 mL (has no administration in time range)   acetaminophen (TYLENOL) tablet 975 mg (975 mg Oral Not Given 8/8/22 0201)   acetaminophen (TYLENOL) tablet 1,000 mg (1,000 mg Oral Given 8/8/22 0024)   sodium chloride 0.9 % bolus 1,000 mL (1,000 mL Intravenous New Bag 8/8/22 0048)   vancomycin 2500 mg/500 mL 0.9% NS IVPB ([facility]) (2,500 mg Intravenous New Bag 8/8/22 0120)   cefepime (MAXIPIME) IVPB 2 g (premix) in D5 (0 g Intravenous Stopped 8/8/22 0120)   morphine injection 4 mg (4 mg Intravenous Given 8/8/22 0048)   ondansetron (ZOFRAN) injection 4 mg (4 mg Intravenous Given 8/8/22 0048)        Labs  Lab Results (last 24 hours)     Procedure Component Value Units Date/Time    CBC & Differential [[id number]]  (Abnormal) Collected: 08/07/22 2308    Specimen: Blood Updated: 08/07/22 2316    Narrative:      The following orders were created for panel order CBC & Differential.  Procedure                               Abnormality         Status                      ---------                               -----------         ------                     CBC Auto Differential[[id number]]        Abnormal            Final result                 Please view results for these tests on the individual orders.    Comprehensive Metabolic Panel [[id number]]  (Abnormal) Collected: 08/07/22 2308    Specimen: Blood Updated: 08/07/22 2335     Glucose 164 mg/dL      BUN 13 mg/dL      Creatinine 0.98 mg/dL      Sodium 141 mmol/L      Potassium 4.3 mmol/L      Chloride 97 mmol/L      CO2 32.3 mmol/L      Calcium 9.8 mg/dL      Total Protein 7.7 g/dL      Albumin 4.60 g/dL      ALT (SGPT) 19 U/L      AST (SGOT) 18 U/L      Alkaline Phosphatase 49 U/L      Total Bilirubin 0.5 mg/dL      Globulin 3.1 gm/dL      A/G Ratio 1.5 g/dL      BUN/Creatinine Ratio 13.3     Anion Gap 11.7 mmol/L      eGFR 88.3 mL/min/1.73      Comment: National Kidney Foundation and American Society of Nephrology (ASN) Task Force recommended calculation based on the Chronic Kidney Disease Epidemiology Collaboration (CKD-EPI) equation refit without adjustment for race.       Narrative:      GFR Normal >60  Chronic Kidney Disease <60  Kidney Failure <15      Lactic Acid, Plasma [[id number]]  (Abnormal) Collected: 08/07/22 2308    Specimen: Blood Updated: 08/07/22 2351     Lactate 2.3 mmol/L     CBC Auto Differential [[id number]]  (Abnormal) Collected: 08/07/22 2308    Specimen: Blood Updated: 08/07/22 2316     WBC 13.01 10*3/mm3      RBC 4.53 10*6/mm3      Hemoglobin 13.1 g/dL      Hematocrit 39.4 %      MCV 87.0 fL      MCH 28.9 pg      MCHC 33.2 g/dL      RDW 15.0 %      RDW-SD 47.5 fl      MPV 10.5 fL      Platelets 225 10*3/mm3      Neutrophil % 83.3 %      Lymphocyte % 7.5 %      Monocyte % 6.4 %      Eosinophil % 2.2 %      Basophil % 0.2 %      Immature Grans % 0.4 %      Neutrophils, Absolute 10.86 10*3/mm3      Lymphocytes, Absolute 0.97 10*3/mm3      Monocytes, Absolute 0.83 10*3/mm3      Eosinophils, Absolute 0.28  10*3/mm3      Basophils, Absolute 0.02 10*3/mm3      Immature Grans, Absolute 0.05 10*3/mm3      nRBC 0.0 /100 WBC     Blood Culture - Blood, Arm, Left [[id number]] Collected: 08/08/22 0032    Specimen: Blood from Arm, Left Updated: 08/08/22 0039    Blood Culture - Blood, Arm, Left [[id number]] Collected: 08/08/22 0032    Specimen: Blood from Arm, Left Updated: 08/08/22 0038    COVID PRE-OP / PRE-PROCEDURE SCREENING ORDER (NO ISOLATION) - Swab, Nasopharynx [[id number]] Collected: 08/08/22 0115    Specimen: Swab from Nasopharynx Updated: 08/08/22 0121    Narrative:      The following orders were created for panel order COVID PRE-OP / PRE-PROCEDURE SCREENING ORDER (NO ISOLATION) - Swab, Nasopharynx.  Procedure                               Abnormality         Status                     ---------                               -----------         ------                     COVID-19,APTIMA PANTHER(...[[id number]]                      In process                   Please view results for these tests on the individual orders.    COVID-19,APTIMA PANTHER(ZELDA),[facility], NP/OP SWAB IN UTM/VTM/SALINE TRANSPORT MEDIA,24 HR TAT - Swab, Nasopharynx [[id number]] Collected: 08/08/22 0115    Specimen: Swab from Nasopharynx Updated: 08/08/22 0121           Imaging:  XR Chest 1 View    Result Date: 8/8/2022  PROCEDURE: XR CHEST 1 VW  COMPARISON: 3/9/2021.  INDICATIONS: FEVER, LETHARGIC, RECENT PACE MAKER PLACEMENT.  FINDINGS:  A single AP (or PA) upright portable chest radiograph was performed.  Borderline cardiac enlargement is seen.  No acute infiltrate is appreciated.  No pleural effusion or pneumothorax is identified.  Mild subsegmental atelectasis is suggested bilaterally, especially in the right lung base.  There is asymmetric elevation of right diaphragm, likely chronic in nature.  There is a left-sided cardiac implantable electronic device (CIED) in place, new since the prior exam.  Otherwise, no significant interval change is  seen since the prior study.        No acute infiltrate is appreciated.  No pneumothorax is seen.      COMMENT:  Part of this note is an electronic transcription of spoken language to printed text. The electronic translation/transcription may permit erroneous, or at times, nonsensical (or even sensical) words or phrases to be inadvertently transcribed or omitted; this  has reviewed the note for such errors (as well as additional errors); however, some may still exist.  RIYA DOTY JR, MD       Electronically Signed and Approved By: RIYA DOTY JR, MD on 8/08/2022 at 0:51                Procedures:  Procedures    Progress  ED Course as of 08/08/22 0309   Mon Aug 08, 2022   [facility]: 33.2 [VS]      ED Course User Index  [VS] Rod Moore MD                            Medical Decision Making:  MDM  Number of Diagnoses or Management Options     Amount and/or Complexity of Data Reviewed  Clinical lab tests: reviewed  Tests in the radiology section of CPT®: reviewed  Decide to obtain previous medical records or to obtain history from someone other than the patient: yes    Critical Care  Total time providing critical care: 30-74 minutes (I spent greater than 35 minutes in critical care for this patient, excluding any time spent on any billable procedures.    I reviewed the blood work and vital signs including pulse oximetry, cardiac output, chest x-ray and EKG.    I treated sepsis with IV fluids and IV broad-spectrum antibiotics and sent off blood cultures and lactic acid here.    I reassessed the patient at the bedside and he is having shaking chills currently, and I have [facility] in this patient's medical care for hospital transfer.)               In my differential diagnosis for this patient's fever I considered pneumonia, urinary tract infection, intra-abdominal process, skin or soft tissue infection, viral illness including COVID-19 or flu  virus, bacteremia.          This patient is a pleasant 62-year-old male recently seen at [facility] cardiology 5 days ago for pacemaker/AICD placement, now presents with signs of sepsis including fevers to 101 Fahrenheit and shaking chills, elevated white blood cell count of 13, and elevated lactic acid of 2.3.    The only source of infection on my exam is that he is severely tender and has swelling erythema and warmth over the pacemaker pocket, and I consider he probably has a pocket infection.    I gave him IV fluids and broad-spectrum IV antibiotics and some medicine for pain relief.    I started out with only 1 L of IV fluids because the patient has history of CHF.    I have consulted with the physician at the [facility] who will accept him in transfer at this time.          Final diagnoses:   Sepsis without acute organ dysfunction, due to unspecified organism (HCC)   Acute febrile illness   Infection of pacemaker pocket, initial encounter ([facility])        Disposition:  ED Disposition     ED Disposition   Transfer to Another Facility     Condition   --    Comment   To the [facility]             This medical record created using voice recognition software.             Mickey Hale Jr.  08/08/22 0021       Rod Moore MD  08/08/22 030

## 2022-08-10 LAB
BACTERIA SPEC AEROBE CULT: ABNORMAL
BACTERIA SPEC AEROBE CULT: ABNORMAL
GRAM STN SPEC: ABNORMAL
ISOLATED FROM: ABNORMAL
ISOLATED FROM: ABNORMAL

## 2022-08-22 ENCOUNTER — LAB REQUISITION (OUTPATIENT)
Dept: LAB | Facility: HOSPITAL | Age: 62
End: 2022-08-22

## 2022-08-22 DIAGNOSIS — Z79.899 OTHER LONG TERM (CURRENT) DRUG THERAPY: ICD-10-CM

## 2022-08-22 DIAGNOSIS — I25.10 ATHEROSCLEROTIC HEART DISEASE OF NATIVE CORONARY ARTERY WITHOUT ANGINA PECTORIS: ICD-10-CM

## 2022-08-22 LAB
ALBUMIN SERPL-MCNC: 4.4 G/DL (ref 3.5–5.2)
ALBUMIN/GLOB SERPL: 1.4 G/DL
ALP SERPL-CCNC: 56 U/L (ref 39–117)
ALT SERPL W P-5'-P-CCNC: 29 U/L (ref 1–41)
ANION GAP SERPL CALCULATED.3IONS-SCNC: 15 MMOL/L (ref 5–15)
AST SERPL-CCNC: 20 U/L (ref 1–40)
BASOPHILS # BLD AUTO: 0.02 10*3/MM3 (ref 0–0.2)
BASOPHILS NFR BLD AUTO: 0.2 % (ref 0–1.5)
BILIRUB SERPL-MCNC: 0.3 MG/DL (ref 0–1.2)
BUN SERPL-MCNC: 14 MG/DL (ref 8–23)
BUN/CREAT SERPL: 19.2 (ref 7–25)
CALCIUM SPEC-SCNC: 10.3 MG/DL (ref 8.6–10.5)
CHLORIDE SERPL-SCNC: 100 MMOL/L (ref 98–107)
CK SERPL-CCNC: 92 U/L (ref 20–200)
CO2 SERPL-SCNC: 26 MMOL/L (ref 22–29)
CREAT SERPL-MCNC: 0.73 MG/DL (ref 0.76–1.27)
CRP SERPL-MCNC: 0.63 MG/DL (ref 0–0.5)
DEPRECATED RDW RBC AUTO: 50.9 FL (ref 37–54)
EGFRCR SERPLBLD CKD-EPI 2021: 104.2 ML/MIN/1.73
EOSINOPHIL # BLD AUTO: 0.23 10*3/MM3 (ref 0–0.4)
EOSINOPHIL NFR BLD AUTO: 2.6 % (ref 0.3–6.2)
ERYTHROCYTE [DISTWIDTH] IN BLOOD BY AUTOMATED COUNT: 15.6 % (ref 12.3–15.4)
ERYTHROCYTE [SEDIMENTATION RATE] IN BLOOD: 31 MM/HR (ref 0–20)
GLOBULIN UR ELPH-MCNC: 3.2 GM/DL
GLUCOSE SERPL-MCNC: 197 MG/DL (ref 65–99)
HCT VFR BLD AUTO: 40.6 % (ref 37.5–51)
HGB BLD-MCNC: 12.9 G/DL (ref 13–17.7)
IMM GRANULOCYTES # BLD AUTO: 0.03 10*3/MM3 (ref 0–0.05)
IMM GRANULOCYTES NFR BLD AUTO: 0.3 % (ref 0–0.5)
LYMPHOCYTES # BLD AUTO: 1.66 10*3/MM3 (ref 0.7–3.1)
LYMPHOCYTES NFR BLD AUTO: 18.4 % (ref 19.6–45.3)
MCH RBC QN AUTO: 28.4 PG (ref 26.6–33)
MCHC RBC AUTO-ENTMCNC: 31.8 G/DL (ref 31.5–35.7)
MCV RBC AUTO: 89.2 FL (ref 79–97)
MONOCYTES # BLD AUTO: 0.51 10*3/MM3 (ref 0.1–0.9)
MONOCYTES NFR BLD AUTO: 5.7 % (ref 5–12)
NEUTROPHILS NFR BLD AUTO: 6.56 10*3/MM3 (ref 1.7–7)
NEUTROPHILS NFR BLD AUTO: 72.8 % (ref 42.7–76)
NRBC BLD AUTO-RTO: 0 /100 WBC (ref 0–0.2)
PLATELET # BLD AUTO: 321 10*3/MM3 (ref 140–450)
PMV BLD AUTO: 10.4 FL (ref 6–12)
POTASSIUM SERPL-SCNC: 4 MMOL/L (ref 3.5–5.2)
PROT SERPL-MCNC: 7.6 G/DL (ref 6–8.5)
RBC # BLD AUTO: 4.55 10*6/MM3 (ref 4.14–5.8)
SODIUM SERPL-SCNC: 141 MMOL/L (ref 136–145)
WBC NRBC COR # BLD: 9.01 10*3/MM3 (ref 3.4–10.8)

## 2022-08-22 PROCEDURE — 85652 RBC SED RATE AUTOMATED: CPT | Performed by: NURSE PRACTITIONER

## 2022-08-22 PROCEDURE — 85025 COMPLETE CBC W/AUTO DIFF WBC: CPT | Performed by: NURSE PRACTITIONER

## 2022-08-22 PROCEDURE — 86140 C-REACTIVE PROTEIN: CPT | Performed by: NURSE PRACTITIONER

## 2022-08-22 PROCEDURE — 82550 ASSAY OF CK (CPK): CPT | Performed by: NURSE PRACTITIONER

## 2022-08-22 PROCEDURE — 80053 COMPREHEN METABOLIC PANEL: CPT | Performed by: NURSE PRACTITIONER

## 2022-08-30 ENCOUNTER — LAB REQUISITION (OUTPATIENT)
Dept: LAB | Facility: HOSPITAL | Age: 62
End: 2022-08-30

## 2022-08-30 DIAGNOSIS — I25.10 ATHEROSCLEROTIC HEART DISEASE OF NATIVE CORONARY ARTERY WITHOUT ANGINA PECTORIS: ICD-10-CM

## 2022-08-30 DIAGNOSIS — E11.9 TYPE 2 DIABETES MELLITUS WITHOUT COMPLICATIONS: ICD-10-CM

## 2022-08-30 DIAGNOSIS — R11.2 NAUSEA WITH VOMITING, UNSPECIFIED: ICD-10-CM

## 2022-08-30 DIAGNOSIS — Z79.899 OTHER LONG TERM (CURRENT) DRUG THERAPY: ICD-10-CM

## 2022-08-30 DIAGNOSIS — I50.9 HEART FAILURE, UNSPECIFIED: ICD-10-CM

## 2022-08-30 LAB
ALBUMIN SERPL-MCNC: 3.7 G/DL (ref 3.5–5.2)
ALBUMIN/GLOB SERPL: 1.2 G/DL
ALP SERPL-CCNC: 57 U/L (ref 39–117)
ALT SERPL W P-5'-P-CCNC: 25 U/L (ref 1–41)
ANION GAP SERPL CALCULATED.3IONS-SCNC: 12.4 MMOL/L (ref 5–15)
AST SERPL-CCNC: 16 U/L (ref 1–40)
BASOPHILS # BLD AUTO: 0.02 10*3/MM3 (ref 0–0.2)
BASOPHILS NFR BLD AUTO: 0.3 % (ref 0–1.5)
BILIRUB SERPL-MCNC: 0.3 MG/DL (ref 0–1.2)
BUN SERPL-MCNC: 10 MG/DL (ref 8–23)
BUN/CREAT SERPL: 14.9 (ref 7–25)
CALCIUM SPEC-SCNC: 9.3 MG/DL (ref 8.6–10.5)
CHLORIDE SERPL-SCNC: 98 MMOL/L (ref 98–107)
CK SERPL-CCNC: 60 U/L (ref 20–200)
CO2 SERPL-SCNC: 27.6 MMOL/L (ref 22–29)
CREAT SERPL-MCNC: 0.67 MG/DL (ref 0.76–1.27)
CRP SERPL-MCNC: 9.84 MG/DL (ref 0–0.5)
DEPRECATED RDW RBC AUTO: 48.3 FL (ref 37–54)
EGFRCR SERPLBLD CKD-EPI 2021: 106.9 ML/MIN/1.73
EOSINOPHIL # BLD AUTO: 0.21 10*3/MM3 (ref 0–0.4)
EOSINOPHIL NFR BLD AUTO: 2.7 % (ref 0.3–6.2)
ERYTHROCYTE [DISTWIDTH] IN BLOOD BY AUTOMATED COUNT: 14.9 % (ref 12.3–15.4)
ERYTHROCYTE [SEDIMENTATION RATE] IN BLOOD: 85 MM/HR (ref 0–20)
GLOBULIN UR ELPH-MCNC: 3.2 GM/DL
GLUCOSE SERPL-MCNC: 167 MG/DL (ref 65–99)
HCT VFR BLD AUTO: 36.7 % (ref 37.5–51)
HGB BLD-MCNC: 11.6 G/DL (ref 13–17.7)
IMM GRANULOCYTES # BLD AUTO: 0.02 10*3/MM3 (ref 0–0.05)
IMM GRANULOCYTES NFR BLD AUTO: 0.3 % (ref 0–0.5)
LYMPHOCYTES # BLD AUTO: 1.1 10*3/MM3 (ref 0.7–3.1)
LYMPHOCYTES NFR BLD AUTO: 14.2 % (ref 19.6–45.3)
MCH RBC QN AUTO: 27.9 PG (ref 26.6–33)
MCHC RBC AUTO-ENTMCNC: 31.6 G/DL (ref 31.5–35.7)
MCV RBC AUTO: 88.2 FL (ref 79–97)
MONOCYTES # BLD AUTO: 0.76 10*3/MM3 (ref 0.1–0.9)
MONOCYTES NFR BLD AUTO: 9.8 % (ref 5–12)
NEUTROPHILS NFR BLD AUTO: 5.62 10*3/MM3 (ref 1.7–7)
NEUTROPHILS NFR BLD AUTO: 72.7 % (ref 42.7–76)
NRBC BLD AUTO-RTO: 0 /100 WBC (ref 0–0.2)
PLATELET # BLD AUTO: 255 10*3/MM3 (ref 140–450)
PMV BLD AUTO: 10.5 FL (ref 6–12)
POTASSIUM SERPL-SCNC: 3.6 MMOL/L (ref 3.5–5.2)
PROT SERPL-MCNC: 6.9 G/DL (ref 6–8.5)
RBC # BLD AUTO: 4.16 10*6/MM3 (ref 4.14–5.8)
SODIUM SERPL-SCNC: 138 MMOL/L (ref 136–145)
WBC NRBC COR # BLD: 7.73 10*3/MM3 (ref 3.4–10.8)

## 2022-08-30 PROCEDURE — 82550 ASSAY OF CK (CPK): CPT | Performed by: NURSE PRACTITIONER

## 2022-08-30 PROCEDURE — 86140 C-REACTIVE PROTEIN: CPT | Performed by: NURSE PRACTITIONER

## 2022-08-30 PROCEDURE — 85652 RBC SED RATE AUTOMATED: CPT | Performed by: NURSE PRACTITIONER

## 2022-08-30 PROCEDURE — 85025 COMPLETE CBC W/AUTO DIFF WBC: CPT | Performed by: NURSE PRACTITIONER

## 2022-08-30 PROCEDURE — 80053 COMPREHEN METABOLIC PANEL: CPT | Performed by: NURSE PRACTITIONER

## 2022-09-06 ENCOUNTER — LAB REQUISITION (OUTPATIENT)
Dept: LAB | Facility: HOSPITAL | Age: 62
End: 2022-09-06

## 2022-09-06 DIAGNOSIS — I50.9 HEART FAILURE, UNSPECIFIED: ICD-10-CM

## 2022-09-06 DIAGNOSIS — E11.9 TYPE 2 DIABETES MELLITUS WITHOUT COMPLICATIONS: ICD-10-CM

## 2022-09-06 DIAGNOSIS — I63.9 CEREBRAL INFARCTION, UNSPECIFIED: ICD-10-CM

## 2022-09-06 DIAGNOSIS — Z79.899 OTHER LONG TERM (CURRENT) DRUG THERAPY: ICD-10-CM

## 2022-09-06 DIAGNOSIS — I25.10 ATHEROSCLEROTIC HEART DISEASE OF NATIVE CORONARY ARTERY WITHOUT ANGINA PECTORIS: ICD-10-CM

## 2022-09-06 LAB
ALBUMIN SERPL-MCNC: 3.9 G/DL (ref 3.5–5.2)
ALBUMIN/GLOB SERPL: 1.2 G/DL
ALP SERPL-CCNC: 51 U/L (ref 39–117)
ALT SERPL W P-5'-P-CCNC: 17 U/L (ref 1–41)
ANION GAP SERPL CALCULATED.3IONS-SCNC: 13.6 MMOL/L (ref 5–15)
AST SERPL-CCNC: 14 U/L (ref 1–40)
BASOPHILS # BLD AUTO: 0.01 10*3/MM3 (ref 0–0.2)
BASOPHILS NFR BLD AUTO: 0.1 % (ref 0–1.5)
BILIRUB SERPL-MCNC: 0.2 MG/DL (ref 0–1.2)
BUN SERPL-MCNC: 9 MG/DL (ref 8–23)
BUN/CREAT SERPL: 11.7 (ref 7–25)
CALCIUM SPEC-SCNC: 8.7 MG/DL (ref 8.6–10.5)
CHLORIDE SERPL-SCNC: 99 MMOL/L (ref 98–107)
CK SERPL-CCNC: 83 U/L (ref 20–200)
CO2 SERPL-SCNC: 25.4 MMOL/L (ref 22–29)
CREAT SERPL-MCNC: 0.77 MG/DL (ref 0.76–1.27)
CRP SERPL-MCNC: 0.87 MG/DL (ref 0–0.5)
DEPRECATED RDW RBC AUTO: 51.3 FL (ref 37–54)
EGFRCR SERPLBLD CKD-EPI 2021: 102.5 ML/MIN/1.73
EOSINOPHIL # BLD AUTO: 0.27 10*3/MM3 (ref 0–0.4)
EOSINOPHIL NFR BLD AUTO: 2.6 % (ref 0.3–6.2)
ERYTHROCYTE [DISTWIDTH] IN BLOOD BY AUTOMATED COUNT: 15.7 % (ref 12.3–15.4)
ERYTHROCYTE [SEDIMENTATION RATE] IN BLOOD: 52 MM/HR (ref 0–20)
GLOBULIN UR ELPH-MCNC: 3.2 GM/DL
GLUCOSE SERPL-MCNC: 214 MG/DL (ref 65–99)
HCT VFR BLD AUTO: 38 % (ref 37.5–51)
HGB BLD-MCNC: 11.8 G/DL (ref 13–17.7)
IMM GRANULOCYTES # BLD AUTO: 0.12 10*3/MM3 (ref 0–0.05)
IMM GRANULOCYTES NFR BLD AUTO: 1.2 % (ref 0–0.5)
LYMPHOCYTES # BLD AUTO: 1.49 10*3/MM3 (ref 0.7–3.1)
LYMPHOCYTES NFR BLD AUTO: 14.5 % (ref 19.6–45.3)
MCH RBC QN AUTO: 28.1 PG (ref 26.6–33)
MCHC RBC AUTO-ENTMCNC: 31.1 G/DL (ref 31.5–35.7)
MCV RBC AUTO: 90.5 FL (ref 79–97)
MONOCYTES # BLD AUTO: 0.43 10*3/MM3 (ref 0.1–0.9)
MONOCYTES NFR BLD AUTO: 4.2 % (ref 5–12)
NEUTROPHILS NFR BLD AUTO: 7.98 10*3/MM3 (ref 1.7–7)
NEUTROPHILS NFR BLD AUTO: 77.4 % (ref 42.7–76)
NRBC BLD AUTO-RTO: 0 /100 WBC (ref 0–0.2)
PLATELET # BLD AUTO: 328 10*3/MM3 (ref 140–450)
PMV BLD AUTO: 9.8 FL (ref 6–12)
POTASSIUM SERPL-SCNC: 3.9 MMOL/L (ref 3.5–5.2)
PROT SERPL-MCNC: 7.1 G/DL (ref 6–8.5)
RBC # BLD AUTO: 4.2 10*6/MM3 (ref 4.14–5.8)
SODIUM SERPL-SCNC: 138 MMOL/L (ref 136–145)
WBC NRBC COR # BLD: 10.3 10*3/MM3 (ref 3.4–10.8)

## 2022-09-06 PROCEDURE — 85025 COMPLETE CBC W/AUTO DIFF WBC: CPT | Performed by: NURSE PRACTITIONER

## 2022-09-06 PROCEDURE — 80053 COMPREHEN METABOLIC PANEL: CPT | Performed by: NURSE PRACTITIONER

## 2022-09-06 PROCEDURE — 85652 RBC SED RATE AUTOMATED: CPT | Performed by: NURSE PRACTITIONER

## 2022-09-06 PROCEDURE — 86140 C-REACTIVE PROTEIN: CPT | Performed by: NURSE PRACTITIONER

## 2022-09-06 PROCEDURE — 82550 ASSAY OF CK (CPK): CPT | Performed by: NURSE PRACTITIONER

## 2022-10-18 ENCOUNTER — LAB REQUISITION (OUTPATIENT)
Dept: LAB | Facility: HOSPITAL | Age: 62
End: 2022-10-18

## 2022-10-18 DIAGNOSIS — E11.9 TYPE 2 DIABETES MELLITUS WITHOUT COMPLICATIONS: ICD-10-CM

## 2022-10-18 DIAGNOSIS — Z79.899 OTHER LONG TERM (CURRENT) DRUG THERAPY: ICD-10-CM

## 2022-10-18 DIAGNOSIS — R53.83 OTHER FATIGUE: ICD-10-CM

## 2022-10-18 LAB
ALBUMIN SERPL-MCNC: 4.6 G/DL (ref 3.5–5.2)
ALBUMIN/GLOB SERPL: 1.4 G/DL
ALP SERPL-CCNC: 55 U/L (ref 39–117)
ALT SERPL W P-5'-P-CCNC: 24 U/L (ref 1–41)
ANION GAP SERPL CALCULATED.3IONS-SCNC: 14.6 MMOL/L (ref 5–15)
AST SERPL-CCNC: 21 U/L (ref 1–40)
BASOPHILS # BLD AUTO: 0.02 10*3/MM3 (ref 0–0.2)
BASOPHILS NFR BLD AUTO: 0.2 % (ref 0–1.5)
BILIRUB SERPL-MCNC: 0.3 MG/DL (ref 0–1.2)
BUN SERPL-MCNC: 8 MG/DL (ref 8–23)
BUN/CREAT SERPL: 11.4 (ref 7–25)
CALCIUM SPEC-SCNC: 10 MG/DL (ref 8.6–10.5)
CHLORIDE SERPL-SCNC: 97 MMOL/L (ref 98–107)
CK SERPL-CCNC: 97 U/L (ref 20–200)
CO2 SERPL-SCNC: 27.4 MMOL/L (ref 22–29)
CREAT SERPL-MCNC: 0.7 MG/DL (ref 0.76–1.27)
CRP SERPL-MCNC: 0.37 MG/DL (ref 0–0.5)
DEPRECATED RDW RBC AUTO: 47.7 FL (ref 37–54)
EGFRCR SERPLBLD CKD-EPI 2021: 105.5 ML/MIN/1.73
EOSINOPHIL # BLD AUTO: 0.27 10*3/MM3 (ref 0–0.4)
EOSINOPHIL NFR BLD AUTO: 3.2 % (ref 0.3–6.2)
ERYTHROCYTE [DISTWIDTH] IN BLOOD BY AUTOMATED COUNT: 15.1 % (ref 12.3–15.4)
ERYTHROCYTE [SEDIMENTATION RATE] IN BLOOD: 44 MM/HR (ref 0–20)
GLOBULIN UR ELPH-MCNC: 3.4 GM/DL
GLUCOSE SERPL-MCNC: 277 MG/DL (ref 65–99)
HCT VFR BLD AUTO: 39.2 % (ref 37.5–51)
HGB BLD-MCNC: 12.3 G/DL (ref 13–17.7)
IMM GRANULOCYTES # BLD AUTO: 0.02 10*3/MM3 (ref 0–0.05)
IMM GRANULOCYTES NFR BLD AUTO: 0.2 % (ref 0–0.5)
LYMPHOCYTES # BLD AUTO: 1.24 10*3/MM3 (ref 0.7–3.1)
LYMPHOCYTES NFR BLD AUTO: 14.9 % (ref 19.6–45.3)
MCH RBC QN AUTO: 27 PG (ref 26.6–33)
MCHC RBC AUTO-ENTMCNC: 31.4 G/DL (ref 31.5–35.7)
MCV RBC AUTO: 86 FL (ref 79–97)
MONOCYTES # BLD AUTO: 0.36 10*3/MM3 (ref 0.1–0.9)
MONOCYTES NFR BLD AUTO: 4.3 % (ref 5–12)
NEUTROPHILS NFR BLD AUTO: 6.42 10*3/MM3 (ref 1.7–7)
NEUTROPHILS NFR BLD AUTO: 77.2 % (ref 42.7–76)
NRBC BLD AUTO-RTO: 0 /100 WBC (ref 0–0.2)
PLATELET # BLD AUTO: 338 10*3/MM3 (ref 140–450)
PMV BLD AUTO: 10.2 FL (ref 6–12)
POTASSIUM SERPL-SCNC: 4.2 MMOL/L (ref 3.5–5.2)
PROT SERPL-MCNC: 8 G/DL (ref 6–8.5)
RBC # BLD AUTO: 4.56 10*6/MM3 (ref 4.14–5.8)
SODIUM SERPL-SCNC: 139 MMOL/L (ref 136–145)
WBC NRBC COR # BLD: 8.33 10*3/MM3 (ref 3.4–10.8)

## 2022-10-18 PROCEDURE — 85025 COMPLETE CBC W/AUTO DIFF WBC: CPT | Performed by: NURSE PRACTITIONER

## 2022-10-18 PROCEDURE — 80053 COMPREHEN METABOLIC PANEL: CPT | Performed by: NURSE PRACTITIONER

## 2022-10-18 PROCEDURE — 85652 RBC SED RATE AUTOMATED: CPT | Performed by: NURSE PRACTITIONER

## 2022-10-18 PROCEDURE — 86140 C-REACTIVE PROTEIN: CPT | Performed by: NURSE PRACTITIONER

## 2022-10-18 PROCEDURE — 82550 ASSAY OF CK (CPK): CPT | Performed by: NURSE PRACTITIONER

## 2022-10-24 ENCOUNTER — LAB REQUISITION (OUTPATIENT)
Dept: LAB | Facility: HOSPITAL | Age: 62
End: 2022-10-24

## 2022-10-24 DIAGNOSIS — Z79.899 OTHER LONG TERM (CURRENT) DRUG THERAPY: ICD-10-CM

## 2022-10-24 DIAGNOSIS — A49.02 METHICILLIN RESISTANT STAPHYLOCOCCUS AUREUS INFECTION, UNSPECIFIED SITE: ICD-10-CM

## 2022-10-24 DIAGNOSIS — N39.0 URINARY TRACT INFECTION, SITE NOT SPECIFIED: ICD-10-CM

## 2022-10-24 DIAGNOSIS — I25.10 ATHEROSCLEROTIC HEART DISEASE OF NATIVE CORONARY ARTERY WITHOUT ANGINA PECTORIS: ICD-10-CM

## 2022-10-24 DIAGNOSIS — E11.9 TYPE 2 DIABETES MELLITUS WITHOUT COMPLICATIONS: ICD-10-CM

## 2022-10-24 LAB
ALBUMIN SERPL-MCNC: 4.1 G/DL (ref 3.5–5.2)
ALBUMIN/GLOB SERPL: 1.3 G/DL
ALP SERPL-CCNC: 46 U/L (ref 39–117)
ALT SERPL W P-5'-P-CCNC: 18 U/L (ref 1–41)
ANION GAP SERPL CALCULATED.3IONS-SCNC: 11.3 MMOL/L (ref 5–15)
AST SERPL-CCNC: 22 U/L (ref 1–40)
BASOPHILS # BLD AUTO: 0.02 10*3/MM3 (ref 0–0.2)
BASOPHILS NFR BLD AUTO: 0.3 % (ref 0–1.5)
BILIRUB SERPL-MCNC: 0.3 MG/DL (ref 0–1.2)
BUN SERPL-MCNC: 14 MG/DL (ref 8–23)
BUN/CREAT SERPL: 20.9 (ref 7–25)
CALCIUM SPEC-SCNC: 9.5 MG/DL (ref 8.6–10.5)
CHLORIDE SERPL-SCNC: 99 MMOL/L (ref 98–107)
CK SERPL-CCNC: 101 U/L (ref 20–200)
CO2 SERPL-SCNC: 27.7 MMOL/L (ref 22–29)
CREAT SERPL-MCNC: 0.67 MG/DL (ref 0.76–1.27)
CRP SERPL-MCNC: 0.85 MG/DL (ref 0–0.5)
DEPRECATED RDW RBC AUTO: 49.1 FL (ref 37–54)
EGFRCR SERPLBLD CKD-EPI 2021: 106.9 ML/MIN/1.73
EOSINOPHIL # BLD AUTO: 0.36 10*3/MM3 (ref 0–0.4)
EOSINOPHIL NFR BLD AUTO: 4.9 % (ref 0.3–6.2)
ERYTHROCYTE [DISTWIDTH] IN BLOOD BY AUTOMATED COUNT: 15.3 % (ref 12.3–15.4)
ERYTHROCYTE [SEDIMENTATION RATE] IN BLOOD: 38 MM/HR (ref 0–20)
GLOBULIN UR ELPH-MCNC: 3.1 GM/DL
GLUCOSE SERPL-MCNC: 194 MG/DL (ref 65–99)
HCT VFR BLD AUTO: 38 % (ref 37.5–51)
HGB BLD-MCNC: 11.8 G/DL (ref 13–17.7)
IMM GRANULOCYTES # BLD AUTO: 0.02 10*3/MM3 (ref 0–0.05)
IMM GRANULOCYTES NFR BLD AUTO: 0.3 % (ref 0–0.5)
LYMPHOCYTES # BLD AUTO: 1.31 10*3/MM3 (ref 0.7–3.1)
LYMPHOCYTES NFR BLD AUTO: 18 % (ref 19.6–45.3)
MCH RBC QN AUTO: 27.2 PG (ref 26.6–33)
MCHC RBC AUTO-ENTMCNC: 31.1 G/DL (ref 31.5–35.7)
MCV RBC AUTO: 87.6 FL (ref 79–97)
MONOCYTES # BLD AUTO: 0.57 10*3/MM3 (ref 0.1–0.9)
MONOCYTES NFR BLD AUTO: 7.8 % (ref 5–12)
NEUTROPHILS NFR BLD AUTO: 5.01 10*3/MM3 (ref 1.7–7)
NEUTROPHILS NFR BLD AUTO: 68.7 % (ref 42.7–76)
NRBC BLD AUTO-RTO: 0 /100 WBC (ref 0–0.2)
PLATELET # BLD AUTO: 251 10*3/MM3 (ref 140–450)
PMV BLD AUTO: 10.3 FL (ref 6–12)
POTASSIUM SERPL-SCNC: 4.5 MMOL/L (ref 3.5–5.2)
PROT SERPL-MCNC: 7.2 G/DL (ref 6–8.5)
RBC # BLD AUTO: 4.34 10*6/MM3 (ref 4.14–5.8)
SODIUM SERPL-SCNC: 138 MMOL/L (ref 136–145)
WBC NRBC COR # BLD: 7.29 10*3/MM3 (ref 3.4–10.8)

## 2022-10-24 PROCEDURE — 85025 COMPLETE CBC W/AUTO DIFF WBC: CPT | Performed by: NURSE PRACTITIONER

## 2022-10-24 PROCEDURE — 85652 RBC SED RATE AUTOMATED: CPT | Performed by: NURSE PRACTITIONER

## 2022-10-24 PROCEDURE — 80053 COMPREHEN METABOLIC PANEL: CPT | Performed by: NURSE PRACTITIONER

## 2022-10-24 PROCEDURE — 86140 C-REACTIVE PROTEIN: CPT | Performed by: NURSE PRACTITIONER

## 2022-10-24 PROCEDURE — 82550 ASSAY OF CK (CPK): CPT | Performed by: NURSE PRACTITIONER

## 2022-10-31 ENCOUNTER — LAB REQUISITION (OUTPATIENT)
Dept: LAB | Facility: HOSPITAL | Age: 62
End: 2022-10-31

## 2022-10-31 DIAGNOSIS — I25.10 ATHEROSCLEROTIC HEART DISEASE OF NATIVE CORONARY ARTERY WITHOUT ANGINA PECTORIS: ICD-10-CM

## 2022-10-31 DIAGNOSIS — Z79.899 OTHER LONG TERM (CURRENT) DRUG THERAPY: ICD-10-CM

## 2022-10-31 DIAGNOSIS — I50.9 HEART FAILURE, UNSPECIFIED: ICD-10-CM

## 2022-10-31 DIAGNOSIS — N39.0 URINARY TRACT INFECTION, SITE NOT SPECIFIED: ICD-10-CM

## 2022-10-31 LAB
ALBUMIN SERPL-MCNC: 4.6 G/DL (ref 3.5–5.2)
ALBUMIN/GLOB SERPL: 1.2 G/DL
ALP SERPL-CCNC: 54 U/L (ref 39–117)
ALT SERPL W P-5'-P-CCNC: 25 U/L (ref 1–41)
ANION GAP SERPL CALCULATED.3IONS-SCNC: 15.5 MMOL/L (ref 5–15)
AST SERPL-CCNC: 19 U/L (ref 1–40)
BASOPHILS # BLD AUTO: 0.03 10*3/MM3 (ref 0–0.2)
BASOPHILS NFR BLD AUTO: 0.3 % (ref 0–1.5)
BILIRUB SERPL-MCNC: 0.3 MG/DL (ref 0–1.2)
BUN SERPL-MCNC: 11 MG/DL (ref 8–23)
BUN/CREAT SERPL: 16.4 (ref 7–25)
CALCIUM SPEC-SCNC: 10 MG/DL (ref 8.6–10.5)
CHLORIDE SERPL-SCNC: 98 MMOL/L (ref 98–107)
CK SERPL-CCNC: 143 U/L (ref 20–200)
CO2 SERPL-SCNC: 23.5 MMOL/L (ref 22–29)
CREAT SERPL-MCNC: 0.67 MG/DL (ref 0.76–1.27)
CRP SERPL-MCNC: 0.85 MG/DL (ref 0–0.5)
DEPRECATED RDW RBC AUTO: 47.8 FL (ref 37–54)
EGFRCR SERPLBLD CKD-EPI 2021: 106.9 ML/MIN/1.73
EOSINOPHIL # BLD AUTO: 0.3 10*3/MM3 (ref 0–0.4)
EOSINOPHIL NFR BLD AUTO: 3.1 % (ref 0.3–6.2)
ERYTHROCYTE [DISTWIDTH] IN BLOOD BY AUTOMATED COUNT: 15.4 % (ref 12.3–15.4)
ERYTHROCYTE [SEDIMENTATION RATE] IN BLOOD: 48 MM/HR (ref 0–20)
GLOBULIN UR ELPH-MCNC: 3.7 GM/DL
GLUCOSE SERPL-MCNC: 172 MG/DL (ref 65–99)
HCT VFR BLD AUTO: 41.7 % (ref 37.5–51)
HGB BLD-MCNC: 13.2 G/DL (ref 13–17.7)
IMM GRANULOCYTES # BLD AUTO: 0.03 10*3/MM3 (ref 0–0.05)
IMM GRANULOCYTES NFR BLD AUTO: 0.3 % (ref 0–0.5)
LYMPHOCYTES # BLD AUTO: 1.6 10*3/MM3 (ref 0.7–3.1)
LYMPHOCYTES NFR BLD AUTO: 16.5 % (ref 19.6–45.3)
MCH RBC QN AUTO: 26.9 PG (ref 26.6–33)
MCHC RBC AUTO-ENTMCNC: 31.7 G/DL (ref 31.5–35.7)
MCV RBC AUTO: 84.9 FL (ref 79–97)
MONOCYTES # BLD AUTO: 0.72 10*3/MM3 (ref 0.1–0.9)
MONOCYTES NFR BLD AUTO: 7.4 % (ref 5–12)
NEUTROPHILS NFR BLD AUTO: 7.02 10*3/MM3 (ref 1.7–7)
NEUTROPHILS NFR BLD AUTO: 72.4 % (ref 42.7–76)
NRBC BLD AUTO-RTO: 0 /100 WBC (ref 0–0.2)
PLATELET # BLD AUTO: 296 10*3/MM3 (ref 140–450)
PMV BLD AUTO: 10.6 FL (ref 6–12)
POTASSIUM SERPL-SCNC: 4.4 MMOL/L (ref 3.5–5.2)
PROT SERPL-MCNC: 8.3 G/DL (ref 6–8.5)
RBC # BLD AUTO: 4.91 10*6/MM3 (ref 4.14–5.8)
SODIUM SERPL-SCNC: 137 MMOL/L (ref 136–145)
WBC NRBC COR # BLD: 9.7 10*3/MM3 (ref 3.4–10.8)

## 2022-10-31 PROCEDURE — 82550 ASSAY OF CK (CPK): CPT | Performed by: NURSE PRACTITIONER

## 2022-10-31 PROCEDURE — 85025 COMPLETE CBC W/AUTO DIFF WBC: CPT | Performed by: NURSE PRACTITIONER

## 2022-10-31 PROCEDURE — 86140 C-REACTIVE PROTEIN: CPT | Performed by: NURSE PRACTITIONER

## 2022-10-31 PROCEDURE — 85652 RBC SED RATE AUTOMATED: CPT | Performed by: NURSE PRACTITIONER

## 2022-10-31 PROCEDURE — 80053 COMPREHEN METABOLIC PANEL: CPT | Performed by: NURSE PRACTITIONER

## 2023-01-07 ENCOUNTER — HOSPITAL ENCOUNTER (INPATIENT)
Facility: HOSPITAL | Age: 63
LOS: 9 days | Discharge: NURSING, REHAB OR LONG-TERM CARE | DRG: 467 | End: 2023-01-16
Attending: INTERNAL MEDICINE | Admitting: HOSPITALIST
Payer: OTHER GOVERNMENT

## 2023-01-07 DIAGNOSIS — M00.9 PYOGENIC ARTHRITIS OF RIGHT HIP, DUE TO UNSPECIFIED ORGANISM: ICD-10-CM

## 2023-01-07 DIAGNOSIS — T84.51XA INFECTION AND INFLAMMATORY REACTION DUE TO INTERNAL RIGHT HIP PROSTHESIS, INITIAL ENCOUNTER: Primary | ICD-10-CM

## 2023-01-07 DIAGNOSIS — Z09 FOLLOW-UP EXAM: ICD-10-CM

## 2023-01-08 ENCOUNTER — APPOINTMENT (OUTPATIENT)
Dept: OTHER | Facility: HOSPITAL | Age: 63
DRG: 467 | End: 2023-01-08
Payer: OTHER GOVERNMENT

## 2023-01-08 LAB
ALBUMIN SERPL-MCNC: 3.8 G/DL (ref 3.5–5.2)
ALBUMIN/GLOB SERPL: 1.4 G/DL
ALP SERPL-CCNC: 48 U/L (ref 39–117)
ALT SERPL W P-5'-P-CCNC: 10 U/L (ref 1–41)
ANION GAP SERPL CALCULATED.3IONS-SCNC: 11.9 MMOL/L (ref 5–15)
AST SERPL-CCNC: 8 U/L (ref 1–40)
BASOPHILS # BLD AUTO: 0.02 10*3/MM3 (ref 0–0.2)
BASOPHILS NFR BLD AUTO: 0.2 % (ref 0–1.5)
BILIRUB SERPL-MCNC: <0.2 MG/DL (ref 0–1.2)
BUN SERPL-MCNC: 10 MG/DL (ref 8–23)
BUN/CREAT SERPL: 13.7 (ref 7–25)
CALCIUM SPEC-SCNC: 9.2 MG/DL (ref 8.6–10.5)
CHLORIDE SERPL-SCNC: 106 MMOL/L (ref 98–107)
CO2 SERPL-SCNC: 23.1 MMOL/L (ref 22–29)
CREAT SERPL-MCNC: 0.73 MG/DL (ref 0.76–1.27)
CRP SERPL-MCNC: 2.1 MG/DL (ref 0–0.5)
D-LACTATE SERPL-SCNC: 1.3 MMOL/L (ref 0.5–2)
DEPRECATED RDW RBC AUTO: 48.2 FL (ref 37–54)
EGFRCR SERPLBLD CKD-EPI 2021: 104.2 ML/MIN/1.73
EOSINOPHIL # BLD AUTO: 0.29 10*3/MM3 (ref 0–0.4)
EOSINOPHIL NFR BLD AUTO: 3.5 % (ref 0.3–6.2)
ERYTHROCYTE [DISTWIDTH] IN BLOOD BY AUTOMATED COUNT: 15.3 % (ref 12.3–15.4)
ERYTHROCYTE [SEDIMENTATION RATE] IN BLOOD: 66 MM/HR (ref 0–20)
GLOBULIN UR ELPH-MCNC: 2.7 GM/DL
GLUCOSE BLDC GLUCOMTR-MCNC: 128 MG/DL (ref 70–130)
GLUCOSE BLDC GLUCOMTR-MCNC: 135 MG/DL (ref 70–130)
GLUCOSE BLDC GLUCOMTR-MCNC: 171 MG/DL (ref 70–130)
GLUCOSE BLDC GLUCOMTR-MCNC: 223 MG/DL (ref 70–130)
GLUCOSE SERPL-MCNC: 177 MG/DL (ref 65–99)
HBA1C MFR BLD: 7.8 % (ref 4.8–5.6)
HCT VFR BLD AUTO: 36.3 % (ref 37.5–51)
HGB BLD-MCNC: 11.2 G/DL (ref 13–17.7)
IMM GRANULOCYTES # BLD AUTO: 0.07 10*3/MM3 (ref 0–0.05)
IMM GRANULOCYTES NFR BLD AUTO: 0.8 % (ref 0–0.5)
INR PPP: 1.16 (ref 0.9–1.1)
LYMPHOCYTES # BLD AUTO: 1.37 10*3/MM3 (ref 0.7–3.1)
LYMPHOCYTES NFR BLD AUTO: 16.4 % (ref 19.6–45.3)
MCH RBC QN AUTO: 26.4 PG (ref 26.6–33)
MCHC RBC AUTO-ENTMCNC: 30.9 G/DL (ref 31.5–35.7)
MCV RBC AUTO: 85.6 FL (ref 79–97)
MONOCYTES # BLD AUTO: 0.56 10*3/MM3 (ref 0.1–0.9)
MONOCYTES NFR BLD AUTO: 6.7 % (ref 5–12)
NEUTROPHILS NFR BLD AUTO: 6.04 10*3/MM3 (ref 1.7–7)
NEUTROPHILS NFR BLD AUTO: 72.4 % (ref 42.7–76)
NRBC BLD AUTO-RTO: 0 /100 WBC (ref 0–0.2)
PLATELET # BLD AUTO: 352 10*3/MM3 (ref 140–450)
PMV BLD AUTO: 9.9 FL (ref 6–12)
POTASSIUM SERPL-SCNC: 4.1 MMOL/L (ref 3.5–5.2)
PROT SERPL-MCNC: 6.5 G/DL (ref 6–8.5)
PROTHROMBIN TIME: 15 SECONDS (ref 11.7–14.2)
RBC # BLD AUTO: 4.24 10*6/MM3 (ref 4.14–5.8)
SODIUM SERPL-SCNC: 141 MMOL/L (ref 136–145)
TROPONIN T SERPL-MCNC: <0.01 NG/ML (ref 0–0.03)
WBC NRBC COR # BLD: 8.35 10*3/MM3 (ref 3.4–10.8)

## 2023-01-08 PROCEDURE — 85025 COMPLETE CBC W/AUTO DIFF WBC: CPT | Performed by: NURSE PRACTITIONER

## 2023-01-08 PROCEDURE — 99221 1ST HOSP IP/OBS SF/LOW 40: CPT | Performed by: INTERNAL MEDICINE

## 2023-01-08 PROCEDURE — 83036 HEMOGLOBIN GLYCOSYLATED A1C: CPT | Performed by: NURSE PRACTITIONER

## 2023-01-08 PROCEDURE — 99255 IP/OBS CONSLTJ NEW/EST HI 80: CPT | Performed by: INTERNAL MEDICINE

## 2023-01-08 PROCEDURE — 25010000002 MORPHINE PER 10 MG: Performed by: NURSE PRACTITIONER

## 2023-01-08 PROCEDURE — 25010000002 CEFEPIME PER 500 MG: Performed by: NURSE PRACTITIONER

## 2023-01-08 PROCEDURE — 83605 ASSAY OF LACTIC ACID: CPT | Performed by: NURSE PRACTITIONER

## 2023-01-08 PROCEDURE — 63710000001 INSULIN LISPRO (HUMAN) PER 5 UNITS: Performed by: NURSE PRACTITIONER

## 2023-01-08 PROCEDURE — 84484 ASSAY OF TROPONIN QUANT: CPT | Performed by: NURSE PRACTITIONER

## 2023-01-08 PROCEDURE — 85652 RBC SED RATE AUTOMATED: CPT | Performed by: NURSE PRACTITIONER

## 2023-01-08 PROCEDURE — 25010000002 VANCOMYCIN 10 G RECONSTITUTED SOLUTION: Performed by: NURSE PRACTITIONER

## 2023-01-08 PROCEDURE — 80053 COMPREHEN METABOLIC PANEL: CPT | Performed by: NURSE PRACTITIONER

## 2023-01-08 PROCEDURE — 85610 PROTHROMBIN TIME: CPT | Performed by: NURSE PRACTITIONER

## 2023-01-08 PROCEDURE — 86140 C-REACTIVE PROTEIN: CPT | Performed by: NURSE PRACTITIONER

## 2023-01-08 PROCEDURE — 82962 GLUCOSE BLOOD TEST: CPT

## 2023-01-08 RX ORDER — OXYBUTYNIN CHLORIDE 10 MG/1
10 TABLET, EXTENDED RELEASE ORAL DAILY
Status: DISCONTINUED | OUTPATIENT
Start: 2023-01-08 | End: 2023-01-16 | Stop reason: HOSPADM

## 2023-01-08 RX ORDER — SODIUM CHLORIDE 9 MG/ML
100 INJECTION, SOLUTION INTRAVENOUS CONTINUOUS
Status: DISCONTINUED | OUTPATIENT
Start: 2023-01-08 | End: 2023-01-08

## 2023-01-08 RX ORDER — METOPROLOL SUCCINATE 50 MG/1
50 TABLET, EXTENDED RELEASE ORAL DAILY
Status: DISCONTINUED | OUTPATIENT
Start: 2023-01-08 | End: 2023-01-16 | Stop reason: HOSPADM

## 2023-01-08 RX ORDER — NYSTATIN 100000 [USP'U]/G
POWDER TOPICAL EVERY 12 HOURS SCHEDULED
Status: COMPLETED | OUTPATIENT
Start: 2023-01-08 | End: 2023-01-14

## 2023-01-08 RX ORDER — ROSUVASTATIN CALCIUM 40 MG/1
40 TABLET, COATED ORAL DAILY
Status: DISCONTINUED | OUTPATIENT
Start: 2023-01-08 | End: 2023-01-16 | Stop reason: HOSPADM

## 2023-01-08 RX ORDER — ONDANSETRON 2 MG/ML
4 INJECTION INTRAMUSCULAR; INTRAVENOUS EVERY 6 HOURS PRN
Status: DISCONTINUED | OUTPATIENT
Start: 2023-01-08 | End: 2023-01-16 | Stop reason: HOSPADM

## 2023-01-08 RX ORDER — HYDROXYZINE HYDROCHLORIDE 25 MG/1
25 TABLET, FILM COATED ORAL 3 TIMES DAILY PRN
Status: DISCONTINUED | OUTPATIENT
Start: 2023-01-08 | End: 2023-01-16 | Stop reason: HOSPADM

## 2023-01-08 RX ORDER — ACETAMINOPHEN 325 MG/1
650 TABLET ORAL EVERY 4 HOURS PRN
Status: DISCONTINUED | OUTPATIENT
Start: 2023-01-08 | End: 2023-01-16 | Stop reason: HOSPADM

## 2023-01-08 RX ORDER — SERTRALINE HYDROCHLORIDE 100 MG/1
100 TABLET, FILM COATED ORAL DAILY
Status: DISCONTINUED | OUTPATIENT
Start: 2023-01-08 | End: 2023-01-16 | Stop reason: HOSPADM

## 2023-01-08 RX ORDER — NALOXONE HCL 0.4 MG/ML
0.4 VIAL (ML) INJECTION
Status: DISCONTINUED | OUTPATIENT
Start: 2023-01-08 | End: 2023-01-10

## 2023-01-08 RX ORDER — MORPHINE SULFATE 4 MG/ML
2 INJECTION, SOLUTION INTRAMUSCULAR; INTRAVENOUS EVERY 4 HOURS PRN
Status: DISCONTINUED | OUTPATIENT
Start: 2023-01-08 | End: 2023-01-10

## 2023-01-08 RX ORDER — GABAPENTIN 300 MG/1
600 CAPSULE ORAL 3 TIMES DAILY
Status: DISCONTINUED | OUTPATIENT
Start: 2023-01-08 | End: 2023-01-16 | Stop reason: HOSPADM

## 2023-01-08 RX ORDER — ACETAMINOPHEN 160 MG/5ML
650 SOLUTION ORAL EVERY 4 HOURS PRN
Status: DISCONTINUED | OUTPATIENT
Start: 2023-01-08 | End: 2023-01-16 | Stop reason: HOSPADM

## 2023-01-08 RX ORDER — BUMETANIDE 2 MG/1
2 TABLET ORAL DAILY
Status: DISCONTINUED | OUTPATIENT
Start: 2023-01-08 | End: 2023-01-16 | Stop reason: HOSPADM

## 2023-01-08 RX ORDER — ASPIRIN 81 MG/1
81 TABLET ORAL DAILY
Status: DISCONTINUED | OUTPATIENT
Start: 2023-01-08 | End: 2023-01-16 | Stop reason: HOSPADM

## 2023-01-08 RX ORDER — SODIUM CHLORIDE 0.9 % (FLUSH) 0.9 %
10 SYRINGE (ML) INJECTION EVERY 12 HOURS SCHEDULED
Status: DISCONTINUED | OUTPATIENT
Start: 2023-01-08 | End: 2023-01-16 | Stop reason: HOSPADM

## 2023-01-08 RX ORDER — BISACODYL 10 MG
10 SUPPOSITORY, RECTAL RECTAL DAILY PRN
Status: DISCONTINUED | OUTPATIENT
Start: 2023-01-08 | End: 2023-01-16 | Stop reason: HOSPADM

## 2023-01-08 RX ORDER — DEXTROSE MONOHYDRATE 25 G/50ML
25 INJECTION, SOLUTION INTRAVENOUS
Status: DISCONTINUED | OUTPATIENT
Start: 2023-01-08 | End: 2023-01-16 | Stop reason: HOSPADM

## 2023-01-08 RX ORDER — SODIUM CHLORIDE 9 MG/ML
40 INJECTION, SOLUTION INTRAVENOUS AS NEEDED
Status: DISCONTINUED | OUTPATIENT
Start: 2023-01-08 | End: 2023-01-16 | Stop reason: HOSPADM

## 2023-01-08 RX ORDER — ACETAMINOPHEN 650 MG/1
650 SUPPOSITORY RECTAL EVERY 4 HOURS PRN
Status: DISCONTINUED | OUTPATIENT
Start: 2023-01-08 | End: 2023-01-16 | Stop reason: HOSPADM

## 2023-01-08 RX ORDER — DOCUSATE SODIUM 100 MG/1
200 CAPSULE, LIQUID FILLED ORAL 2 TIMES DAILY
Status: DISCONTINUED | OUTPATIENT
Start: 2023-01-08 | End: 2023-01-16 | Stop reason: HOSPADM

## 2023-01-08 RX ORDER — CYCLOBENZAPRINE HCL 10 MG
5 TABLET ORAL 4 TIMES DAILY PRN
Status: DISCONTINUED | OUTPATIENT
Start: 2023-01-08 | End: 2023-01-10

## 2023-01-08 RX ORDER — INSULIN LISPRO 100 [IU]/ML
0-7 INJECTION, SOLUTION INTRAVENOUS; SUBCUTANEOUS
Status: DISCONTINUED | OUTPATIENT
Start: 2023-01-08 | End: 2023-01-16 | Stop reason: HOSPADM

## 2023-01-08 RX ORDER — POLYETHYLENE GLYCOL 3350 17 G/17G
17 POWDER, FOR SOLUTION ORAL 2 TIMES DAILY
Status: DISCONTINUED | OUTPATIENT
Start: 2023-01-08 | End: 2023-01-16 | Stop reason: HOSPADM

## 2023-01-08 RX ORDER — ISOSORBIDE MONONITRATE 30 MG/1
30 TABLET, EXTENDED RELEASE ORAL DAILY
Status: DISCONTINUED | OUTPATIENT
Start: 2023-01-08 | End: 2023-01-16 | Stop reason: HOSPADM

## 2023-01-08 RX ORDER — SODIUM CHLORIDE 0.9 % (FLUSH) 0.9 %
10 SYRINGE (ML) INJECTION AS NEEDED
Status: DISCONTINUED | OUTPATIENT
Start: 2023-01-08 | End: 2023-01-16 | Stop reason: HOSPADM

## 2023-01-08 RX ORDER — NICOTINE POLACRILEX 4 MG
15 LOZENGE BUCCAL
Status: DISCONTINUED | OUTPATIENT
Start: 2023-01-08 | End: 2023-01-16 | Stop reason: HOSPADM

## 2023-01-08 RX ADMIN — ROSUVASTATIN CALCIUM 40 MG: 40 TABLET, FILM COATED ORAL at 16:07

## 2023-01-08 RX ADMIN — SACUBITRIL AND VALSARTAN 1 TABLET: 24; 26 TABLET, FILM COATED ORAL at 22:02

## 2023-01-08 RX ADMIN — MORPHINE SULFATE 2 MG: 4 INJECTION, SOLUTION INTRAMUSCULAR; INTRAVENOUS at 10:58

## 2023-01-08 RX ADMIN — INSULIN GLARGINE-YFGN 15 UNITS: 100 INJECTION, SOLUTION SUBCUTANEOUS at 22:02

## 2023-01-08 RX ADMIN — SERTRALINE 100 MG: 100 TABLET, FILM COATED ORAL at 16:07

## 2023-01-08 RX ADMIN — CYCLOBENZAPRINE 5 MG: 10 TABLET, FILM COATED ORAL at 22:18

## 2023-01-08 RX ADMIN — ASPIRIN 81 MG: 81 TABLET, COATED ORAL at 16:07

## 2023-01-08 RX ADMIN — INSULIN LISPRO 3 UNITS: 100 INJECTION, SOLUTION INTRAVENOUS; SUBCUTANEOUS at 18:27

## 2023-01-08 RX ADMIN — SODIUM CHLORIDE 100 ML/HR: 9 INJECTION, SOLUTION INTRAVENOUS at 01:42

## 2023-01-08 RX ADMIN — GABAPENTIN 600 MG: 300 CAPSULE ORAL at 10:58

## 2023-01-08 RX ADMIN — NYSTATIN: 100000 POWDER TOPICAL at 22:03

## 2023-01-08 RX ADMIN — Medication 10 ML: at 01:42

## 2023-01-08 RX ADMIN — INSULIN LISPRO 2 UNITS: 100 INJECTION, SOLUTION INTRAVENOUS; SUBCUTANEOUS at 06:34

## 2023-01-08 RX ADMIN — GABAPENTIN 600 MG: 300 CAPSULE ORAL at 16:08

## 2023-01-08 RX ADMIN — MORPHINE SULFATE 2 MG: 4 INJECTION, SOLUTION INTRAMUSCULAR; INTRAVENOUS at 01:42

## 2023-01-08 RX ADMIN — OXYBUTYNIN CHLORIDE 10 MG: 10 TABLET, EXTENDED RELEASE ORAL at 16:07

## 2023-01-08 RX ADMIN — VANCOMYCIN HYDROCHLORIDE 1500 MG: 10 INJECTION, POWDER, LYOPHILIZED, FOR SOLUTION INTRAVENOUS at 02:53

## 2023-01-08 RX ADMIN — SACUBITRIL AND VALSARTAN 1 TABLET: 24; 26 TABLET, FILM COATED ORAL at 16:07

## 2023-01-08 RX ADMIN — POLYETHYLENE GLYCOL 3350 17 G: 17 POWDER, FOR SOLUTION ORAL at 16:07

## 2023-01-08 RX ADMIN — Medication 10 ML: at 22:02

## 2023-01-08 RX ADMIN — INSULIN GLARGINE-YFGN 15 UNITS: 100 INJECTION, SOLUTION SUBCUTANEOUS at 01:50

## 2023-01-08 RX ADMIN — NYSTATIN: 100000 POWDER TOPICAL at 16:06

## 2023-01-08 RX ADMIN — GABAPENTIN 600 MG: 300 CAPSULE ORAL at 22:02

## 2023-01-08 RX ADMIN — VANCOMYCIN HYDROCHLORIDE 1500 MG: 10 INJECTION, POWDER, LYOPHILIZED, FOR SOLUTION INTRAVENOUS at 16:07

## 2023-01-08 RX ADMIN — CYCLOBENZAPRINE 5 MG: 10 TABLET, FILM COATED ORAL at 14:04

## 2023-01-08 RX ADMIN — CEFEPIME HYDROCHLORIDE 2 G: 2 INJECTION, POWDER, FOR SOLUTION INTRAVENOUS at 05:20

## 2023-01-08 RX ADMIN — ISOSORBIDE MONONITRATE 30 MG: 30 TABLET, EXTENDED RELEASE ORAL at 10:58

## 2023-01-08 RX ADMIN — METOPROLOL SUCCINATE 50 MG: 50 TABLET, FILM COATED, EXTENDED RELEASE ORAL at 10:58

## 2023-01-08 RX ADMIN — MORPHINE SULFATE 2 MG: 4 INJECTION, SOLUTION INTRAMUSCULAR; INTRAVENOUS at 05:49

## 2023-01-08 RX ADMIN — BUMETANIDE 2 MG: 2 TABLET ORAL at 16:07

## 2023-01-08 RX ADMIN — MORPHINE SULFATE 2 MG: 4 INJECTION, SOLUTION INTRAMUSCULAR; INTRAVENOUS at 16:07

## 2023-01-08 RX ADMIN — MORPHINE SULFATE 2 MG: 4 INJECTION, SOLUTION INTRAMUSCULAR; INTRAVENOUS at 22:02

## 2023-01-08 NOTE — CONSULTS
Referring Provider: ESTRELLA Manning    Reason for Consultation: MRSA left hip infection    History of present illness:  Mandeep Tracey is a 62 y.o. who I am asked to evaluate and give opinion for MRSA left hip infection. 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 He was treated with IV penicillin (later changed to vancomycin for a hip fluid collection that was aspirated but had a negative culture) then on suppressive amoxicillin through 5/2022. His hardware was retained.     His wife tells me that over the past few months he's been diagnosed with what sounds like a pacemaker infection due to MRSA. Pacemaker was removed.     [facility] due to new onset R hip pain. There was no erythema or drainage. He had an aspiration done there on 1/5/23. The specimen clotted so there is no cell count. There is a diff w/  87% PMNs. The culture was NGTD as of 1/6/23.  He was being treated with vanco, cefepime, and Flagyl. He transferred here last night for orthopedic evaluation.  I will try to obtain an updated culture result from the [facility].     Past Medical History:   Diagnosis Date   • Aphasia    • Cardiomyopathy (HCC)    • CPAP (continuous positive airway pressure) dependence    • Diabetes (HCC)    • Hemiparesis (HCC)     Right side    • Morbid obesity (HCC)    • Nonrheumatic mitral valve regurgitation    • BELKYS on CPAP    • Peptic ulcer disease    • Postoperative nausea and vomiting 3/13/2021   • Psoriasis    • Pyogenic arthritis of right hip (HCC)    • Seizures ([facility])    • Sleep apnea    • Stroke ([facility]) 2004   • Ventricular ectopy     asymptomatic       Past Surgical History:   Procedure Laterality Date   • CHOLECYSTECTOMY     • EYE SURGERY     • INCISION AND DRAINAGE HIP Right 3/12/2021    Procedure: HIP ANTERIOR INCISION AND DRAINAGE WITH HANA  TABLE;  Surgeon: Tao Andrea MD;  Location: [facility] OR;  Service: Orthopedics;  Laterality: Right;   • LUMBAR DISC SURGERY     • US GUIDED FINE NEEDLE ASPIRATION  3/10/2021       Social History:      Antibiotic allergies and intolerances:    Cipro     Medications:    Current Facility-Administered Medications:   •  acetaminophen (TYLENOL) tablet 650 mg, 650 mg, Oral, Q4H PRN **OR** acetaminophen (TYLENOL) 160 MG/5ML solution 650 mg, 650 mg, Oral, Q4H PRN **OR** acetaminophen (TYLENOL) suppository 650 mg, 650 mg, Rectal, Q4H PRN, Breanne Monroy, APRN  •  bisacodyl (DULCOLAX) suppository 10 mg, 10 mg, Rectal, Daily PRN, Breanne Monroy APRN  •  bumetanide (BUMEX) tablet 2 mg, 2 mg, Oral, Daily, Breanne Monroy, APRN  •  cefepime 2 gm IVPB in 100 ml NS (VTB), 2 g, Intravenous, Q8H, Breanne Monroy, APRN  •  dextrose (D50W) (25 g/50 mL) IV injection 25 g, 25 g, Intravenous, Q15 Min PRN, Breanne Monroy APRN  •  dextrose (GLUTOSE) oral gel 15 g, 15 g, Oral, Q15 Min PRN, Breanne Monroy, APRN  •  docusate sodium (COLACE) capsule 200 mg, 200 mg, Oral, BID, Breanne Monroy, APRN  •  gabapentin (NEURONTIN) capsule 600 mg, 600 mg, Oral, TID, Breanne Monroy, APRN  •  glucagon (human recombinant) (GLUCAGEN DIAGNOSTIC) injection 1 mg, 1 mg, Intramuscular, Q15 Min PRN, Breanne Monroy, APRN  •  hydrOXYzine (ATARAX) tablet 25 mg, 25 mg, Oral, TID PRN, Breanne Monroy, APRN  •  insulin glargine (LANTUS, SEMGLEE) injection 15 Units, 15 Units, Subcutaneous, Nightly, Breanne Monroy APRN, 15 Units at 01/08/23 0150  •  insulin lispro (ADMELOG) injection 0-7 Units, 0-7 Units, Subcutaneous, TID AC, Breanne Monroy, ESTRELLA, 2 Units at 01/08/23 0634  •  isosorbide mononitrate (IMDUR) 24 hr tablet 30 mg, 30 mg, Oral, Daily, Breanne Monroy, ESTRELLA  •  metoprolol succinate XL (TOPROL-XL) 24 hr tablet 50 mg, 50 mg, Oral, Daily, Breanne Monroy, ESTRELLA  •  Morphine sulfate  (PF) injection 2 mg, 2 mg, Intravenous, Q4H PRN, 2 mg at 01/08/23 0549 **AND** naloxone (NARCAN) injection 0.4 mg, 0.4 mg, Intravenous, Q5 Min PRN, Breanne Monroy APRN  •  ondansetron (ZOFRAN) injection 4 mg, 4 mg, Intravenous, Q6H PRN, Breanne Monroy APRN  •  oxybutynin XL (DITROPAN-XL) 24 hr tablet 10 mg, 10 mg, Oral, Daily, Breanne Monroy APRN  •  Pharmacy to dose vancomycin, , Does not apply, Continuous PRN, Breanne Monroy APRN  •  polyethylene glycol (MIRALAX) packet 17 g, 17 g, Oral, BID, Breanne Monroy APRN  •  rosuvastatin (CRESTOR) tablet 40 mg, 40 mg, Oral, Daily, Breanne Monroy APRN  •  sacubitril-valsartan (ENTRESTO) 24-26 MG tablet 1 tablet, 1 tablet, Oral, BID, Breanne Monroy APRN  •  sertraline (ZOLOFT) tablet 100 mg, 100 mg, Oral, Daily, Breanne Monroy APRN  •  sodium chloride 0.9 % flush 10 mL, 10 mL, Intravenous, Q12H, Breanne Monroy APRN, 10 mL at 01/08/23 0142  •  sodium chloride 0.9 % flush 10 mL, 10 mL, Intravenous, PRN, Breanne Monroy APRN  •  sodium chloride 0.9 % infusion 40 mL, 40 mL, Intravenous, PRN, Breanne Monroy APRN  •  sodium chloride 0.9 % infusion, 100 mL/hr, Intravenous, Continuous, Breanne Monroy APRN, Last Rate: 100 mL/hr at 01/08/23 0142, 100 mL/hr at 01/08/23 0142  •  vancomycin 1500 mg/500 mL 0.9% NS IVPB ([facility]), 1,500 mg, Intravenous, Q12H, Breanne Monroy APRN, 1,500 mg at 01/08/23 0253      Objective   Vital Signs   Temp:  [98 °F (36.7 °C)-98.4 °F (36.9 °C)] 98 °F (36.7 °C)  Heart Rate:  [78-88] 81  Resp:  [16-22] 22  BP: (101-136)/(71-82) 124/71    Physical Exam:   General: awake, alert, very nice  Eyes: no scleral icterus  ENT: no thrush, CPAP in place  Cardiovascular: NR  Respiratory: normal work of breathing on RA  GI: Abdomen is soft, not tender, + bowel sounds in all four quadrants  :  no Chavez catheter  MSK: R hip incision w/o erythema or drainage  Skin: Candida intertrigo in the  groin  Vasc: PIV w/o erythema    Labs:   CBC, CMP, and CRP reviewed today  Lab Results   Component Value Date    WBC 8.35 01/08/2023    HGB 11.2 (L) 01/08/2023    HCT 36.3 (L) 01/08/2023    MCV 85.6 01/08/2023     01/08/2023       Lab Results   Component Value Date    GLUCOSE 177 (H) 01/08/2023    BUN 10 01/08/2023    CREATININE 0.73 (L) 01/08/2023    EGFRIFNONA 119 08/02/2021    BCR 13.7 01/08/2023    CO2 23.1 01/08/2023    CALCIUM 9.2 01/08/2023    ALBUMIN 3.8 01/08/2023    LABIL2 1.2 (L) 04/19/2021    AST 8 01/08/2023    ALT 10 01/08/2023     Lab Results   Component Value Date    CRP 2.10 (H) 01/08/2023     Lab Results   Component Value Date    HGBA1C 7.80 (H) 01/08/2023       Microbiology:  None this admission    Radiology:  None this admission; OSH films scanned in    ASSESSMENT/PLAN:  1. R hip PJI - recurrent  2. Obesity BMI 38  3. Uncontrolled DM2 - A1c 7.8% - complicating above  4. History of stroke and hemiparesis  5. Candida intertrigo    I agree with concerns for recurrent R PJI. [facility] will [facility] tomorrow to try to find out the aspiration culture results. I will follow-up the orthopedist's evaluation today re: surgical needs. Given history of gram positive infections, continue vancomycin w/ goal -600 but stop cefepime. For Candida intertrigo, I will order Nystatin powder to the groin.     ID will follow.

## 2023-01-08 NOTE — CONSULTS
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 male admitted to [facility] with Septic Arthritis.     The patient was evaluated in the [facility] and was diagnosed with a possible prosthetic joint infection involving the right hip.  His history is significant for a right partial hip replacement for a femoral neck fracture around 2020 at [facility] by Dr. Garrido.    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  Apparently he did undergo a irrigation and debridement and his implants were retained and he was on chronic antibiotic suppression.    He is not a good historian.  He has a history of stroke and chronic medical problems and congestive heart failure with low ejection fraction.  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    He presented to [facility] with complaints of pain in the right hip for the past 1 week.  He was evaluated with a CT scan and three-phase bone scan.  They have requested a transfer to [facility].      Secondary to the age and multiple medical co morbidities the patient was admitted to the hospitalist.   As I was on call for the emergency room I was consulted for further evaluation and treatment.     He is a minimal ambulator.  He does not ambulate at baseline.  He does transfer from bed to his wheelchair.    He has high BMI, sleep apnea, history of stroke, hemiparesis with weakness of the right upper and lower extremity, uncontrolled  diabetes mellitus.      Past medical history, Past surgical history, family history, Social history, current medications, home medications Have been reviewed by me.    Past Medical History:   Diagnosis Date   • Aphasia    • Cardiomyopathy (HCC)    • CPAP (continuous positive airway pressure) dependence    • Diabetes (HCC)    • Hemiparesis (HCC)     Right side    • Morbid obesity (HCC)    • Nonrheumatic mitral valve regurgitation    • BELKYS on CPAP    • Peptic ulcer disease    • Postoperative nausea and vomiting 3/13/2021   • Psoriasis    • Pyogenic arthritis of right hip (HCC)    • Seizures (HCC)    • Sleep apnea    • Stroke (HCC) 2004   • Ventricular ectopy     asymptomatic     Past Surgical History:   Procedure Laterality Date   • CHOLECYSTECTOMY     • EYE SURGERY     • INCISION AND DRAINAGE HIP Right 3/12/2021    Procedure: HIP ANTERIOR INCISION AND DRAINAGE WITH HANA TABLE;  Surgeon: Tao Andrea MD;  Location: [facility];  Service: Orthopedics;  Laterality: Right;   • LUMBAR DISC SURGERY     • US GUIDED FINE NEEDLE ASPIRATION  3/10/2021     Social History     Occupational History   • Not on file   Tobacco Use   • Smoking status: Former   • Smokeless tobacco: Never   • Tobacco comments:     quit 22 years ago    Substance and Sexual Activity   • Alcohol use: Never   • Drug use: Never   • Sexual activity: Defer      Social History     Social History Narrative   • Not on file     Family History   Problem Relation Age of Onset   • Hypertension Mother    • Hyperlipidemia Mother    • Arthritis Mother    • Cancer Mother    • Heart disease Father    • Hyperlipidemia Sister    • Drug abuse Sister    • COPD Sister    • Birth defects Sister    • Early death Brother    • Drug abuse Brother           Allergies   Allergen Reactions   • Fentanyl Mental Status Change   • Ciprofloxacin Unknown - Low Severity   • Depakote [Valproic Acid] Other (See Comments)     Behavioral changes   • Quinolones Hives        Home  Medications:  Medications Prior to Admission   Medication Sig Dispense Refill Last Dose   • acetaminophen (TYLENOL) 325 MG tablet Take 2 tablets by mouth Every 4 (Four) Hours As Needed for Mild Pain  or Headache.   1/7/2023   • aspirin 81 MG chewable tablet Chew 81 mg Daily.   1/7/2023   • bisacodyl (DULCOLAX) 10 MG suppository Insert 1 suppository into the rectum Daily As Needed for Constipation.   1/7/2023   • bumetanide (BUMEX) 2 MG tablet Take 2 mg by mouth Daily.   1/7/2023   • clopidogrel (PLAVIX) 75 MG tablet Take 75 mg by mouth Daily.   1/7/2023   • clotrimazole-betamethasone (LOTRISONE) 1-0.05 % cream Apply  topically to the appropriate area as directed 4 (Four) Times a Day. For psoriasis to inguinal region   1/7/2023   • docusate sodium 100 MG capsule Take 2 capsules by mouth 2 (Two) Times a Day.   1/7/2023   • gabapentin (NEURONTIN) 300 MG capsule Take 1 capsule by mouth 3 (Three) Times a Day. (Patient taking differently: Take 600 mg by mouth 3 (Three) Times a Day.) 9 capsule 0 1/7/2023   • hydrocortisone 2.5 % cream Apply  topically to the appropriate area as directed 2 (Two) Times a Day.   1/7/2023   • hydrOXYzine (ATARAX) 25 MG tablet Take 25 mg by mouth 3 (Three) Times a Day As Needed for Itching.   1/7/2023   • insulin glargine (LANTUS, SEMGLEE) 100 UNIT/ML injection Inject 20 Units under the skin into the appropriate area as directed Every Night. (Patient taking differently: Inject 15 Units under the skin into the appropriate area as directed Every Night.)  12 1/7/2023   • isosorbide mononitrate (IMDUR) 30 MG 24 hr tablet Take 30 mg by mouth Daily.   1/7/2023   • metFORMIN (GLUCOPHAGE) 1000 MG tablet Take 1,000 mg by mouth 2 (Two) Times a Day With Meals.   1/7/2023   • metoprolol succinate XL (TOPROL-XL) 50 MG 24 hr tablet Take 1 tablet by mouth Every 12 (Twelve) Hours. (Patient taking differently: Take 50 mg by mouth Daily.)   1/7/2023   • polyethylene glycol (polyethylene glycol) 17 g packet Take  17 g by mouth 2 (Two) Times a Day.   1/7/2023   • rosuvastatin (CRESTOR) 20 MG tablet Take 40 mg by mouth Daily.   1/7/2023   • sacubitril-valsartan (ENTRESTO) 24-26 MG tablet Take 1 tablet by mouth 2 (Two) Times a Day.   1/7/2023   • sertraline (ZOLOFT) 100 MG tablet Take 100 mg by mouth Daily.   1/7/2023   • triamcinolone (KENALOG) 0.1 % cream Apply  topically to the appropriate area as directed 2 (Two) Times a Day.   1/7/2023   • trospium (SANCTURA) 20 MG tablet Take 20 mg by mouth 2 (Two) Times a Day.   1/7/2023   • ATORVASTATIN CALCIUM PO  (Patient not taking: Reported on 1/7/2023)   Not Taking   • baclofen (LIORESAL) 10 MG tablet Take 10 mg by mouth Every 8 (Eight) Hours. (Patient not taking: Reported on 1/7/2023)   Not Taking   • empagliflozin (JARDIANCE) 25 MG tablet tablet Take  by mouth Daily. (Patient not taking: Reported on 1/7/2023)   Not Taking   • enoxaparin (LOVENOX) 40 MG/0.4ML solution syringe Inject  under the skin into the appropriate area as directed Daily. (Patient not taking: Reported on 1/7/2023)   Not Taking   • insulin aspart (novoLOG) 100 UNIT/ML injection Inject 6 Units under the skin into the appropriate area as directed 3 (Three) Times a Day Before Meals. (Patient not taking: Reported on 1/7/2023)  12 Not Taking   • insulin lispro (ADMELOG) 100 UNIT/ML injection Inject 0-9 Units under the skin into the appropriate area as directed 3 (Three) Times a Day Before Meals. (Patient not taking: Reported on 1/7/2023)  12 Not Taking   • lidocaine (LIDODERM) 5 % Place 1 patch on the skin as directed by provider Daily. For right knee, Remove & Discard patch within 12 hours or as directed by MD (Patient not taking: Reported on 1/7/2023)   Not Taking   • meclizine 25 MG chewable tablet chewable tablet Chew 25 mg 3 (Three) Times a Day As Needed.   Unknown   • MELOXICAM PO    Unknown   • Morphine (MSIR) 15 MG tablet Take 1 tablet by mouth Every 4 (Four) Hours As Needed for Moderate Pain  or Severe Pain  ". (Patient not taking: Reported on 1/7/2023) 18 tablet 0 Not Taking   • nystatin (MYCOSTATIN) 100,000 unit/mL suspension Swish and swallow 500,000 Units 4 (Four) Times a Day. (Patient not taking: Reported on 1/7/2023)   Not Taking       Current Medications:  Scheduled Meds:bumetanide, 2 mg, Oral, Daily  cefepime, 2 g, Intravenous, Q8H  docusate sodium, 200 mg, Oral, BID  gabapentin, 600 mg, Oral, TID  insulin glargine, 15 Units, Subcutaneous, Nightly  insulin lispro, 0-7 Units, Subcutaneous, TID AC  isosorbide mononitrate, 30 mg, Oral, Daily  metoprolol succinate XL, 50 mg, Oral, Daily  oxybutynin XL, 10 mg, Oral, Daily  polyethylene glycol, 17 g, Oral, BID  rosuvastatin, 40 mg, Oral, Daily  sacubitril-valsartan, 1 tablet, Oral, BID  sertraline, 100 mg, Oral, Daily  sodium chloride, 10 mL, Intravenous, Q12H  vancomycin, 1,500 mg, Intravenous, Q12H      Continuous Infusions:Pharmacy to dose vancomycin,   sodium chloride, 100 mL/hr, Last Rate: 100 mL/hr (01/08/23 0142)      PRN Meds:.•  acetaminophen **OR** acetaminophen **OR** acetaminophen  •  bisacodyl  •  dextrose  •  dextrose  •  glucagon (human recombinant)  •  hydrOXYzine  •  Morphine **AND** naloxone  •  ondansetron  •  Pharmacy to dose vancomycin  •  sodium chloride  •  sodium chloride    Review of Systems:   A 12 point system review was reviewed with the patient and from the chart  and is negative except as mentioned in history of present illness.      Physical Exam: 62 y.o. male                    Vitals:    01/07/23 2247 01/07/23 2344 01/08/23 0244 01/08/23 0547   BP: 136/82  101/78 124/71   BP Location: Left arm      Patient Position: Lying      Pulse: 88  78 81   Resp: 18  16 22   Temp: 98.2 °F (36.8 °C)  98.4 °F (36.9 °C) 98 °F (36.7 °C)   TempSrc: Oral  Oral Oral   SpO2: 95%      Weight:  130 kg (285 lb 11.5 oz)     Height:  182.9 cm (72\")          Gait: Not evaluated.     Mental/HEENT/cardio/skin: The patient's general appearance was well-nourished, " well-hydrated, no acute distress.  Orientation was alert and oriented ×3.  The patient's mood was normal.   Pulmonary exam shows normal late exchange, no labored breathing, or shortness of breath.    The  skin exam showed normal temperature and color in the area of examination.    Extremities: Right hip attempted movements are painful and restricted.  He has a previous anterior approach.    Pulses:  Pulses palpable and equal bilaterally    Diagnostic Tests:    Results from last 7 days   Lab Units 01/08/23  0440   WBC 10*3/mm3 8.35   HEMOGLOBIN g/dL 11.2*   HEMATOCRIT % 36.3*   PLATELETS 10*3/mm3 352     Results from last 7 days   Lab Units 01/08/23  0440   SODIUM mmol/L 141   POTASSIUM mmol/L 4.1   CHLORIDE mmol/L 106   CO2 mmol/L 23.1   BUN mg/dL 10   CREATININE mg/dL 0.73*   GLUCOSE mg/dL 177*   CALCIUM mg/dL 9.2     Results from last 7 days   Lab Units 01/08/23  0440   INR  1.16*     No results found for: URICACID  Lab Results   Component Value Date    CRYSTAL None Seen 03/25/2021     Microbiology Results (last 10 days)     ** No results found for the last 240 hours. **        No radiology results for the last 7 days    The labs, X-ray results for preoperative evaluation have been reviewed by me.    Assessment: Prosthetic joint infection right partial hip replacement    Patient Active Problem List   Diagnosis   • CVA (cerebral vascular accident) ([facility])   • Right hemiparesis ([facility])   • BELKYS on CPAP   • Seizure disorder ([facility])   • Type 2 diabetes mellitus ([facility])   • Essential hypertension   • Infection of right prosthetic hip joint ([facility])   • MSSA (methicillin susceptible Staphylococcus aureus) infection   • Group B streptococcal infection   • UTI (urinary tract infection)   • Postoperative nausea and vomiting   • Nonrheumatic mitral valve regurgitation   • Cardiomyopathy ([facility])   • PVCs (premature ventricular contractions)   • Pyogenic arthritis of right hip ([facility])       Plan:    Options and alternatives were discussed in  detail with the patient and   family.   The patient is indicated for possible removal of the right partial hip replacement and placement of antibiotic spacer.     He is a high risk for surgery secondary to his history of congestive heart failure, low ejection fraction.  We will try to see if we can get a cardiology consult prior to the surgery.    Infectious disease service has seen and the patient is on IV antibiotics.    The likely risks and benefits of the well fixed partial hip replacement and placement of antibiotic spacer Prostalac with a polyethylene acetabular liner and a antibiotic cement spacer for the femur has been discussed in detail.  Likely risks and benefits include but not limited to persistent infection, persistent pain, possibility of periprosthetic fractures, possibility of recurrent dislocation, leg length discrepancy, high risk for mortality and morbidity have been discussed in detail.  Despite the risks involved the patient and his wife would like to proceed.    We will get an informed consent and plan to do the surgery on January 9, 2023 if he is cleared.       Date: 1/8/2023    Faustina Beebe MD     CC: Pipe Servin MD; MD Rosi Cortez Stephen A, MD

## 2023-01-08 NOTE — PLAN OF CARE
Goal Outcome Evaluation:   Patient new to floor. [facility]. Wife is at bedside. Has an infection in his right hip, had a recent right hip aspiration. [facility]. MRSA infection. He is flaccid on the right side from CVA. He has Aphasia. Has a Cardiomem that became dislodged and is now attached to his lung. He wears a C-Pap, brought his own with him to use. Pressure injury on bottom present on admission. Redness in groin and abdominal folds. He has psoriasis. VSS. Call light is within reach. Will continue to monitor.

## 2023-01-08 NOTE — PROGRESS NOTES
"[facility] Clinical Pharmacy Services: Vancomycin Pharmacokinetic Initial Consult Note    Mandeep Tracey is a 62 y.o. male who is on day 1 of pharmacy to dose vancomycin. (Previously received 2 days at [facility])    Indication:  MRSA joint infection left hip  Consulting Provider: ESTRELLA Hernandez  Planned Duration of Therapy: 5 days  Loading Dose Ordered or Given: 2000 mg on 1/6 at 1417 ([facility] at [facility])  Culture/Source: ?  Target: -600 mg/L.hr   Other Antimicrobials: N/A    Vitals/Labs  Ht: 182.9 cm (72\"); Wt: 130 kg (285 lb 11.5 oz)  Temp Readings from Last 1 Encounters:   01/07/23 98.2 °F (36.8 °C) (Oral)    CrCl cannot be calculated (Patient's most recent lab result is older than the maximum 30 days allowed.).         Assessment/Plan:    Vancomycin Dose:   1500 mg IV every  12  hours  Vanc Trough has been ordered for 1/8 at 1415     Patient received vancomycin at [facility].  [facility] with he received vancomycin 2 g once on 1/6 @ 1417 and was then started on vancomycin 1500 mg iv every 12 hours.  He received vancomycin 1500 mg 1/7 @ 1000 and missed the evening dose on 1/7.  I will give a 1500 mg dose now to make up for the missed dose and check a level prior to the next dose to ensure clearance.  BMP ordered for AM.  Pharmacy will follow patient's kidney function and will adjust doses and obtain levels as necessary. Thank you for involving pharmacy in this patient's care. Please contact pharmacy with any questions or concerns.                           Francisco Roldan III, [facility]  Clinical Pharmacist    "

## 2023-01-08 NOTE — PLAN OF CARE
Goal Outcome Evaluation:  Plan of Care Reviewed With: patient        Progress: improving  Outcome Evaluation: A&OX4, APHASIC, PAIN CONTROLLED, SX TOMORROW, ISOLATION HX MRSA, MUSCLE SPASMS- MUSCLE RELAXER GIVEN, PT UNABLE TO ROLL OVER FOR SKIN ASSESSMENT AND UNABLE TO OBTAIN PICTURES, LOW AIR LOSS MATTRESS ORDERED, SSI, R SIDED PARALYSIS D/T PREVIOUS CVA

## 2023-01-08 NOTE — H&P
Patient Name:  Mandeep Tracey  YOB: 1960  MRN:  [id number]  Admit Date:  1/7/2023  Patient Care Team:  Pipe Servin MD as PCP - General (Internal Medicine)      Subjective   History Present Illness   Chief complaint: Right hip pain    History of Present Illness   Mr. Tracey is a 62-year-old male with history of pyogenic arthritis of the right hip, cardiomyopathy, MRSA, type 2 diabetes, seizure disorder, right hemiparesis post CVA, obstructive sleep apnea, hypertension who was brought here to [facility] from [facility] for orthopedic consultation on right hip infection.  Patient has significant history of infections of this right prosthetic hip.  He was hospitalized in March 2021 and found to have group B streptococcal infection, followed by infectious disease.  He presented to [facility] on 12/31/2022 with complaints of sudden onset of right hip pain.  Patient does have some right side weakness/hemiparesis due to previous stroke.  Initially patient was started on some pain medication but his pain was poorly controlled initially, he did have improvement of his pain with oxycodone and Dilaudid.  Patient did have his bedside swallow.  Given his right-sided deficits from previous CVA in 2003, he was placed on a regular diet by speech therapy.  Patient had a white blood cells can performed on 12/31/2022 which did not show convincing evidence of an active infection of the right hip hardware.  Infectious disease was consulted on 1 2 and recommended antibiotics be stopped to obtain arthrocentesis by interventional radiology.  Patient had arthrocentesis on 1/5/2023 that yielded 7 cc of purulent, foul-smelling fluid, specimen clotted so cell counts were not obtained but per infectious disease they recommend he start back on vancomycin, cefepime, and metronidazole on 1 6 while awaiting micro results.  Ortho recommended transfer for potential revision of the right hip.  [facility] also had some urinary retention likely secondary to constipation which has resolved since receiving an enema.    Review of Systems   Constitutional: Negative for appetite change and fever.   HENT: Negative for nosebleeds and trouble swallowing.    Eyes: Negative for photophobia, redness and visual disturbance.   Respiratory: Negative for cough, chest tightness, shortness of breath and wheezing.    Cardiovascular: Negative for chest pain, palpitations and leg swelling.   Gastrointestinal: Negative for abdominal distention, abdominal pain, nausea and vomiting.   Endocrine: Negative.    Genitourinary: Negative.    Musculoskeletal: Negative for gait problem and joint swelling.        Right hip pain   Skin: Negative.    Neurological: Negative for dizziness, seizures, speech difficulty, light-headedness and headaches.   Hematological: Negative.    Psychiatric/Behavioral: Negative for behavioral problems and confusion.        Personal History     Past Medical History:   Diagnosis Date   • Aphasia    • Cardiomyopathy (HCC)    • CPAP (continuous positive airway pressure) dependence    • Diabetes (HCC)    • Hemiparesis (HCC)     Right side    • Morbid obesity (HCC)    • Nonrheumatic mitral valve regurgitation    • BELKYS on CPAP    • Peptic ulcer disease    • Postoperative nausea and vomiting 3/13/2021   • Psoriasis    • Pyogenic arthritis of right hip (HCC)    • Seizures (HCC)    • Sleep apnea    • Stroke (HCC) 2004   • Ventricular ectopy     asymptomatic     Past Surgical History:   Procedure Laterality Date   • CHOLECYSTECTOMY     • EYE SURGERY     • INCISION AND DRAINAGE HIP Right 3/12/2021    Procedure: HIP ANTERIOR INCISION AND DRAINAGE WITH HANA TABLE;  Surgeon: Tao Andrea MD;  Location: [facility];  Service: Orthopedics;  Laterality: Right;   • LUMBAR DISC SURGERY     • US GUIDED FINE NEEDLE ASPIRATION  3/10/2021     Family History   Problem Relation Age of Onset   • Hypertension Mother     • Hyperlipidemia Mother    • Arthritis Mother    • Cancer Mother    • Heart disease Father    • Hyperlipidemia Sister    • Drug abuse Sister    • COPD Sister    • Birth defects Sister    • Early death Brother    • Drug abuse Brother      Social History     Tobacco Use   • Smoking status: Former   • Smokeless tobacco: Never   • Tobacco comments:     quit 22 years ago    Substance Use Topics   • Alcohol use: Never   • Drug use: Never     No current facility-administered medications on file prior to encounter.     Current Outpatient Medications on File Prior to Encounter   Medication Sig Dispense Refill   • acetaminophen (TYLENOL) 325 MG tablet Take 2 tablets by mouth Every 4 (Four) Hours As Needed for Mild Pain  or Headache.     • aspirin 81 MG chewable tablet Chew 81 mg Daily.     • bisacodyl (DULCOLAX) 10 MG suppository Insert 1 suppository into the rectum Daily As Needed for Constipation.     • bumetanide (BUMEX) 2 MG tablet Take 2 mg by mouth Daily.     • clopidogrel (PLAVIX) 75 MG tablet Take 75 mg by mouth Daily.     • clotrimazole-betamethasone (LOTRISONE) 1-0.05 % cream Apply  topically to the appropriate area as directed 4 (Four) Times a Day. For psoriasis to inguinal region     • docusate sodium 100 MG capsule Take 2 capsules by mouth 2 (Two) Times a Day.     • gabapentin (NEURONTIN) 300 MG capsule Take 1 capsule by mouth 3 (Three) Times a Day. (Patient taking differently: Take 600 mg by mouth 3 (Three) Times a Day.) 9 capsule 0   • hydrocortisone 2.5 % cream Apply  topically to the appropriate area as directed 2 (Two) Times a Day.     • hydrOXYzine (ATARAX) 25 MG tablet Take 25 mg by mouth 3 (Three) Times a Day As Needed for Itching.     • insulin glargine (LANTUS, SEMGLEE) 100 UNIT/ML injection Inject 20 Units under the skin into the appropriate area as directed Every Night. (Patient taking differently: Inject 15 Units under the skin into the appropriate area as directed Every Night.)  12   • isosorbide  mononitrate (IMDUR) 30 MG 24 hr tablet Take 30 mg by mouth Daily.     • metFORMIN (GLUCOPHAGE) 1000 MG tablet Take 1,000 mg by mouth 2 (Two) Times a Day With Meals.     • metoprolol succinate XL (TOPROL-XL) 50 MG 24 hr tablet Take 1 tablet by mouth Every 12 (Twelve) Hours. (Patient taking differently: Take 50 mg by mouth Daily.)     • polyethylene glycol (polyethylene glycol) 17 g packet Take 17 g by mouth 2 (Two) Times a Day.     • rosuvastatin (CRESTOR) 20 MG tablet Take 40 mg by mouth Daily.     • sacubitril-valsartan (ENTRESTO) 24-26 MG tablet Take 1 tablet by mouth 2 (Two) Times a Day.     • sertraline (ZOLOFT) 100 MG tablet Take 100 mg by mouth Daily.     • triamcinolone (KENALOG) 0.1 % cream Apply  topically to the appropriate area as directed 2 (Two) Times a Day.     • trospium (SANCTURA) 20 MG tablet Take 20 mg by mouth 2 (Two) Times a Day.     • ATORVASTATIN CALCIUM PO  (Patient not taking: Reported on 1/7/2023)     • baclofen (LIORESAL) 10 MG tablet Take 10 mg by mouth Every 8 (Eight) Hours. (Patient not taking: Reported on 1/7/2023)     • empagliflozin (JARDIANCE) 25 MG tablet tablet Take  by mouth Daily. (Patient not taking: Reported on 1/7/2023)     • enoxaparin (LOVENOX) 40 MG/0.4ML solution syringe Inject  under the skin into the appropriate area as directed Daily. (Patient not taking: Reported on 1/7/2023)     • insulin aspart (novoLOG) 100 UNIT/ML injection Inject 6 Units under the skin into the appropriate area as directed 3 (Three) Times a Day Before Meals. (Patient not taking: Reported on 1/7/2023)  12   • insulin lispro (ADMELOG) 100 UNIT/ML injection Inject 0-9 Units under the skin into the appropriate area as directed 3 (Three) Times a Day Before Meals. (Patient not taking: Reported on 1/7/2023)  12   • lidocaine (LIDODERM) 5 % Place 1 patch on the skin as directed by provider Daily. For right knee, Remove & Discard patch within 12 hours or as directed by MD (Patient not taking: Reported on  1/7/2023)     • meclizine 25 MG chewable tablet chewable tablet Chew 25 mg 3 (Three) Times a Day As Needed.     • MELOXICAM PO      • Morphine (MSIR) 15 MG tablet Take 1 tablet by mouth Every 4 (Four) Hours As Needed for Moderate Pain  or Severe Pain . (Patient not taking: Reported on 1/7/2023) 18 tablet 0   • nystatin (MYCOSTATIN) 100,000 unit/mL suspension Swish and swallow 500,000 Units 4 (Four) Times a Day. (Patient not taking: Reported on 1/7/2023)       Allergies   Allergen Reactions   • Fentanyl Mental Status Change   • Ciprofloxacin Unknown - Low Severity   • Depakote [Valproic Acid] Other (See Comments)     Behavioral changes   • Quinolones Hives       Objective    Objective     Vital Signs  Temp:  [98.2 °F (36.8 °C)-98.4 °F (36.9 °C)] 98.4 °F (36.9 °C)  Heart Rate:  [78-88] 78  Resp:  [16-18] 16  BP: (101-136)/(78-82) 101/78  SpO2:  [95 %] 95 %  on   ;      Body mass index is 38.75 kg/m².    Physical Exam  Vitals and nursing note reviewed.   Constitutional:       General: He is not in acute distress.     Appearance: He is well-developed.   HENT:      Head: Normocephalic.   Neck:      Vascular: No JVD.   Cardiovascular:      Rate and Rhythm: Normal rate and regular rhythm.      Heart sounds: Normal heart sounds.   Pulmonary:      Effort: Pulmonary effort is normal.      Breath sounds: Normal breath sounds.      Comments: Lung sounds clear, sats 97% on room air  Abdominal:      General: There is no distension.      Palpations: Abdomen is soft.      Tenderness: There is no abdominal tenderness.   Musculoskeletal:      Cervical back: Normal range of motion.      Right hip: Tenderness and bony tenderness present. Decreased range of motion.      Right lower leg: No edema.      Left lower leg: No edema.   Skin:     General: Skin is warm and dry.      Capillary Refill: Capillary refill takes less than 2 seconds.   Neurological:      Mental Status: He is alert and oriented to person, place, and time.      Comments:  Right-sided residual weakness from CVA, mild slurred speech baseline, patient [facility] and cleared for regular diet.   Psychiatric:         Mood and Affect: Affect is flat.         Behavior: Behavior normal.         Results Review:  I reviewed the patient's new clinical results.  I reviewed the patient's new imaging results and agree with the interpretation.  I reviewed the patient's other test results and agree with the interpretation  I personally viewed and interpreted the patient's EKG/Telemetry data  Discussed with ED provider.    Lab Results (last 24 hours)     ** No results found for the last 24 hours. **          Imaging Results (Last 24 Hours)     ** No results found for the last 24 hours. **          Results for orders placed during the hospital encounter of 03/11/21    Adult Transthoracic Echo Complete W/ Cont if Necessary Per Protocol    Interpretation Summary  · Estimated left ventricular EF = 30% The left ventricular cavity is dilated. Left ventricular diastolic function was indeterminate. This is an extremely difficult study due to the patient's body habitus, positioning, and frequent ventricular ectopy. The ventricle appears to be moderately dilated. There appears to be moderate to severely reduced left ventricular systolic function, ejection fraction 30%. There is hypokinesis of the apex as well as the distal lateral and distal inferior walls.  · The mitral valve is not well-visualized. There is likely severe or moderate to severe mitral regurgitation which is extremely eccentric.      No orders to display        Assessment/Plan     Active Hospital Problems    Diagnosis  POA   • **Pyogenic arthritis of right hip (HCC) [M00.9]  Yes   • Cardiomyopathy (HCC) [I42.9]  Yes   • Right hemiparesis (HCC) [G81.91]  Yes   • BELKYS on CPAP [G47.33, Z99.89]  Not Applicable   • Seizure disorder (HCC) [G40.909]  Yes   • Type 2 diabetes mellitus (HCC) [E11.9]  Yes   • Essential  hypertension [I10]  Yes     Mr. Tracey is a 62-year-old male with history of pyogenic arthritis of the right hip, cardiomyopathy, MRSA, type 2 diabetes, seizure disorder, right hemiparesis post CVA, obstructive sleep apnea, hypertension who was brought here to [facility] from [facility] for orthopedic consultation on right hip infection.    Pyogenic arthritis of the right hip  -Consult orthopedic surgery  -N.p.o. until seen by orthopedic surgery, may have sips of water with meds  -Continue vancomycin and cefepime, patient was on this prior to arrival  -Consult infectious disease, patient has been seen in the past for infection of this hip  -PT to eval and treat  -Check CBC, BMP, sed rate, CRP, lactic acid  -Pain control overnight    Type 2 diabetes  -Accu-Cheks before meals and at bedtime with correctional dose insulin  -Hold metformin  -Continue long-acting insulin    History of CVA/seizure disorder  -PT to eval and treat  -Safety precautions-aspiration precautions  -Seizure precautions  - Continue topiramate 50 mg twice daily    CAD/hypertension/cardiomyopathy  -We will hold Plavix and apixaban until seen by orthopedic surgery  -Consult cardiology given patient possible need for surgery  -Last 2D echo on 9/30/2022 showed severe left ventricular enlargement with an ejection fraction of 14%, no valvular abnormalities  -Continue metoprolol and Imdur      I discussed the patient's findings and my recommendations with patient.    VTE Prophylaxis - SCDs.  Code Status - Full code.       ESTRELLA Syed  [facility]  01/08/23  04:20 EST

## 2023-01-08 NOTE — CONSULTS
[facility]  Cardiology Consult Note    Patient Identification:  Name: Mandeep Tracey  Age: 62 y.o.  Sex: male  :  1960  MRN: [id number]             Requesting Physician: Rosi    Reason for Consultation / Chief Complaint: management recommendations surgery clearance    History of Present Illness:   62-year-old male has a septic hip joint needs surgical clearance denies any chest pains or tightness in the chest    Comorbid cardiac risk factors:     Past Medical History:  Past Medical History:   Diagnosis Date   • Aphasia    • Cardiomyopathy (HCC)    • CPAP (continuous positive airway pressure) dependence    • Diabetes (HCC)    • Hemiparesis (HCC)     Right side    • Morbid obesity (HCC)    • Nonrheumatic mitral valve regurgitation    • BELKYS on CPAP    • Peptic ulcer disease    • Postoperative nausea and vomiting 3/13/2021   • Psoriasis    • Pyogenic arthritis of right hip (HCC)    • Seizures (HCC)    • Sleep apnea    • Stroke (HCC)    • Ventricular ectopy     asymptomatic     Past Surgical History:  Past Surgical History:   Procedure Laterality Date   • CHOLECYSTECTOMY     • EYE SURGERY     • INCISION AND DRAINAGE HIP Right 3/12/2021    Procedure: HIP ANTERIOR INCISION AND DRAINAGE WITH HANA TABLE;  Surgeon: Tao Andrea MD;  Location: [facility];  Service: Orthopedics;  Laterality: Right;   • LUMBAR DISC SURGERY     • US GUIDED FINE NEEDLE ASPIRATION  3/10/2021      Allergies:  Allergies   Allergen Reactions   • Fentanyl Mental Status Change   • Ciprofloxacin Unknown - Low Severity   • Depakote [Valproic Acid] Other (See Comments)     Behavioral changes   • Quinolones Hives     Home Meds:  Medications Prior to Admission   Medication Sig Dispense Refill Last Dose   • acetaminophen (TYLENOL) 325 MG tablet Take 2 tablets by mouth Every 4 (Four) Hours As Needed for Mild Pain  or Headache.   2023   • aspirin 81 MG chewable tablet Chew 81 mg Daily.   2023   • bisacodyl (DULCOLAX)  10 MG suppository Insert 1 suppository into the rectum Daily As Needed for Constipation.   1/7/2023   • bumetanide (BUMEX) 2 MG tablet Take 2 mg by mouth Daily.   1/7/2023   • clopidogrel (PLAVIX) 75 MG tablet Take 75 mg by mouth Daily.   1/7/2023   • clotrimazole-betamethasone (LOTRISONE) 1-0.05 % cream Apply  topically to the appropriate area as directed 4 (Four) Times a Day. For psoriasis to inguinal region   1/7/2023   • docusate sodium 100 MG capsule Take 2 capsules by mouth 2 (Two) Times a Day.   1/7/2023   • gabapentin (NEURONTIN) 300 MG capsule Take 1 capsule by mouth 3 (Three) Times a Day. (Patient taking differently: Take 600 mg by mouth 3 (Three) Times a Day.) 9 capsule 0 1/7/2023   • hydrocortisone 2.5 % cream Apply  topically to the appropriate area as directed 2 (Two) Times a Day.   1/7/2023   • hydrOXYzine (ATARAX) 25 MG tablet Take 25 mg by mouth 3 (Three) Times a Day As Needed for Itching.   1/7/2023   • insulin glargine (LANTUS, SEMGLEE) 100 UNIT/ML injection Inject 20 Units under the skin into the appropriate area as directed Every Night. (Patient taking differently: Inject 15 Units under the skin into the appropriate area as directed Every Night.)  12 1/7/2023   • isosorbide mononitrate (IMDUR) 30 MG 24 hr tablet Take 30 mg by mouth Daily.   1/7/2023   • metFORMIN (GLUCOPHAGE) 1000 MG tablet Take 1,000 mg by mouth 2 (Two) Times a Day With Meals.   1/7/2023   • metoprolol succinate XL (TOPROL-XL) 50 MG 24 hr tablet Take 1 tablet by mouth Every 12 (Twelve) Hours. (Patient taking differently: Take 50 mg by mouth Daily.)   1/7/2023   • polyethylene glycol (polyethylene glycol) 17 g packet Take 17 g by mouth 2 (Two) Times a Day.   1/7/2023   • rosuvastatin (CRESTOR) 20 MG tablet Take 40 mg by mouth Daily.   1/7/2023   • sacubitril-valsartan (ENTRESTO) 24-26 MG tablet Take 1 tablet by mouth 2 (Two) Times a Day.   1/7/2023   • sertraline (ZOLOFT) 100 MG tablet Take 100 mg by mouth Daily.   1/7/2023   •  triamcinolone (KENALOG) 0.1 % cream Apply  topically to the appropriate area as directed 2 (Two) Times a Day.   1/7/2023   • trospium (SANCTURA) 20 MG tablet Take 20 mg by mouth 2 (Two) Times a Day.   1/7/2023   • ATORVASTATIN CALCIUM PO  (Patient not taking: Reported on 1/7/2023)   Not Taking   • baclofen (LIORESAL) 10 MG tablet Take 10 mg by mouth Every 8 (Eight) Hours. (Patient not taking: Reported on 1/7/2023)   Not Taking   • empagliflozin (JARDIANCE) 25 MG tablet tablet Take  by mouth Daily. (Patient not taking: Reported on 1/7/2023)   Not Taking   • enoxaparin (LOVENOX) 40 MG/0.4ML solution syringe Inject  under the skin into the appropriate area as directed Daily. (Patient not taking: Reported on 1/7/2023)   Not Taking   • insulin aspart (novoLOG) 100 UNIT/ML injection Inject 6 Units under the skin into the appropriate area as directed 3 (Three) Times a Day Before Meals. (Patient not taking: Reported on 1/7/2023)  12 Not Taking   • insulin lispro (ADMELOG) 100 UNIT/ML injection Inject 0-9 Units under the skin into the appropriate area as directed 3 (Three) Times a Day Before Meals. (Patient not taking: Reported on 1/7/2023)  12 Not Taking   • lidocaine (LIDODERM) 5 % Place 1 patch on the skin as directed by provider Daily. For right knee, Remove & Discard patch within 12 hours or as directed by MD (Patient not taking: Reported on 1/7/2023)   Not Taking   • meclizine 25 MG chewable tablet chewable tablet Chew 25 mg 3 (Three) Times a Day As Needed.   Unknown   • MELOXICAM PO    Unknown   • Morphine (MSIR) 15 MG tablet Take 1 tablet by mouth Every 4 (Four) Hours As Needed for Moderate Pain  or Severe Pain . (Patient not taking: Reported on 1/7/2023) 18 tablet 0 Not Taking   • nystatin (MYCOSTATIN) 100,000 unit/mL suspension Swish and swallow 500,000 Units 4 (Four) Times a Day. (Patient not taking: Reported on 1/7/2023)   Not Taking     Current Meds:   [unfilled]  Social History:   Social History     Tobacco Use    • Smoking status: Former   • Smokeless tobacco: Never   • Tobacco comments:     quit 22 years ago    Substance Use Topics   • Alcohol use: Never      Family History:  Family History   Problem Relation Age of Onset   • Hypertension Mother    • Hyperlipidemia Mother    • Arthritis Mother    • Cancer Mother    • Heart disease Father    • Hyperlipidemia Sister    • Drug abuse Sister    • COPD Sister    • Birth defects Sister    • Early death Brother    • Drug abuse Brother         Review of Systems    Constitutional: No weakness,fatigue, fever, rigors, chills   Eyes: No vision changes, eye pain   ENT/oropharynx: No difficulty swallowing, sore throat, epistaxis, changes in hearing   Cardiovascular: No chest pain, chest tightness, palpitations, paroxysmal nocturnal dyspnea, orthopnea, diaphoresis, dizziness / syncopal episode   Respiratory: No shortness of breath, dyspnea on exertion, cough, wheezing hemoptysis   Gastrointestinal: No abdominal pain, nausea, vomiting, diarrhea, bloody stools   Genitourinary: No hematuria, dysuria   Neurological: No headache, tremors, numbness,  one-sided weakness    Musculoskeletal: No cramps, myalgias,  joint pain, joint swelling   Integument: No rash, edema           Constitutional:  Temp:  [98 °F (36.7 °C)-98.4 °F (36.9 °C)] 98.2 °F (36.8 °C)  Heart Rate:  [78-88] 79  Resp:  [16-22] 18  BP: (101-136)/(71-82) 121/72    Physical Exam   General:  Appears in no acute distress  Eyes: PERTL,  HEENT:  No JVD. Thyroid not visibly enlarged. No mucosal pallor or cyanosis  Respiratory: Respirations regular and unlabored at rest. BBS with good air entry in all fields. No crackles, rubs or wheezes auscultated  Cardiovascular: S1S2 Regular rate and rhythm. No murmur, rub or gallop auscultated. No carotid bruits. DP/PT pulses    . No pretibial pitting edema  Gastrointestinal: Abdomen soft, flat, non tender. Bowel sounds present. No hepatosplenomegaly. No ascites  Musculoskeletal: GRIMM x4. No abnormal  movements  Extremities: No digital clubbing or cyanosis  Skin: Color pink. Skin warm and dry to touch. No rashes  No xanthoma  Neuro: AAO x3 CN II-XII grossly intact              Cardiographics  ECG:     Telemetry:    Echocardiogram:     Imaging  Chest X-ray:     Lab Review   Results from last 7 days   Lab Units 01/08/23  0440   TROPONIN T ng/mL <0.010         Results from last 7 days   Lab Units 01/08/23  0440   SODIUM mmol/L 141   POTASSIUM mmol/L 4.1   BUN mg/dL 10   CREATININE mg/dL 0.73*   CALCIUM mg/dL 9.2     @[id number]@  Results from last 7 days   Lab Units 01/08/23  0440   WBC 10*3/mm3 8.35   HEMOGLOBIN g/dL 11.2*   HEMATOCRIT % 36.3*   PLATELETS 10*3/mm3 352     Results from last 7 days   Lab Units 01/08/23  0440   INR  1.16*         Assessment:  History of cardiomyopathy  Recent pacemaker infection    Recommendations / Plan:   We will proceed with checking EKG, echo heart, BNP prior to the clearance  Echocardiogram had revealed EF of 30% a year ago      Natalya Shannon MD  1/8/2023, 14:17 EST      EMR Dragon/Transcription:   Dictated utilizing Dragon dictation

## 2023-01-09 ENCOUNTER — APPOINTMENT (OUTPATIENT)
Dept: GENERAL RADIOLOGY | Facility: HOSPITAL | Age: 63
DRG: 467 | End: 2023-01-09
Payer: OTHER GOVERNMENT

## 2023-01-09 ENCOUNTER — ANESTHESIA (OUTPATIENT)
Dept: PERIOP | Facility: HOSPITAL | Age: 63
DRG: 467 | End: 2023-01-09
Payer: OTHER GOVERNMENT

## 2023-01-09 ENCOUNTER — ANESTHESIA EVENT (OUTPATIENT)
Dept: PERIOP | Facility: HOSPITAL | Age: 63
DRG: 467 | End: 2023-01-09
Payer: OTHER GOVERNMENT

## 2023-01-09 ENCOUNTER — APPOINTMENT (OUTPATIENT)
Dept: CARDIOLOGY | Facility: HOSPITAL | Age: 63
DRG: 467 | End: 2023-01-09
Payer: OTHER GOVERNMENT

## 2023-01-09 LAB
ABO GROUP BLD: NORMAL
ALBUMIN SERPL-MCNC: 3.9 G/DL (ref 3.5–5.2)
ALBUMIN/GLOB SERPL: 1.1 G/DL
ALP SERPL-CCNC: 52 U/L (ref 39–117)
ALT SERPL W P-5'-P-CCNC: 12 U/L (ref 1–41)
ANION GAP SERPL CALCULATED.3IONS-SCNC: 15.6 MMOL/L (ref 5–15)
AORTIC DIMENSIONLESS INDEX: 0.5 (DI)
AST SERPL-CCNC: 13 U/L (ref 1–40)
BASOPHILS # BLD AUTO: 0.02 10*3/MM3 (ref 0–0.2)
BASOPHILS NFR BLD AUTO: 0.2 % (ref 0–1.5)
BH CV ECHO MEAS - ACS: 2.33 CM
BH CV ECHO MEAS - AO MAX PG: 10.1 MMHG
BH CV ECHO MEAS - AO MEAN PG: 4.8 MMHG
BH CV ECHO MEAS - AO ROOT DIAM: 3.8 CM
BH CV ECHO MEAS - AO V2 MAX: 159 CM/SEC
BH CV ECHO MEAS - AO V2 VTI: 29.7 CM
BH CV ECHO MEAS - AVA(I,D): 2.11 CM2
BH CV ECHO MEAS - CONTRAST EF 4CH: 26 CM2
BH CV ECHO MEAS - EDV(CUBED): 328.1 ML
BH CV ECHO MEAS - EDV(MOD-SP2): 206 ML
BH CV ECHO MEAS - EDV(MOD-SP4): 220 ML
BH CV ECHO MEAS - EF(MOD-BP): 26.6 %
BH CV ECHO MEAS - EF(MOD-SP2): 32.5 %
BH CV ECHO MEAS - EF(MOD-SP4): 26.8 %
BH CV ECHO MEAS - ESV(CUBED): 167 ML
BH CV ECHO MEAS - ESV(MOD-SP2): 139 ML
BH CV ECHO MEAS - ESV(MOD-SP4): 161 ML
BH CV ECHO MEAS - FS: 20.2 %
BH CV ECHO MEAS - IVSD: 1.08 CM
BH CV ECHO MEAS - LV MAX PG: 2.33 MMHG
BH CV ECHO MEAS - LV MEAN PG: 1.08 MMHG
BH CV ECHO MEAS - LV V1 MAX: 76.3 CM/SEC
BH CV ECHO MEAS - LV V1 VTI: 15 CM
BH CV ECHO MEAS - LVIDD: 6.9 CM
BH CV ECHO MEAS - LVIDS: 5.5 CM
BH CV ECHO MEAS - LVOT AREA: 4.2 CM2
BH CV ECHO MEAS - LVOT DIAM: 2.3 CM
BH CV ECHO MEAS - MR MAX PG: 92.6 MMHG
BH CV ECHO MEAS - MR MAX VEL: 481.2 CM/SEC
BH CV ECHO MEAS - MR MEAN PG: 66.1 MMHG
BH CV ECHO MEAS - MR MEAN VEL: 391.9 CM/SEC
BH CV ECHO MEAS - MR VTI: 161.4 CM
BH CV ECHO MEAS - MV A DUR: 0.15 SEC
BH CV ECHO MEAS - MV A MAX VEL: 113.5 CM/SEC
BH CV ECHO MEAS - MV DEC SLOPE: 499.3 CM/SEC2
BH CV ECHO MEAS - MV DEC TIME: 0.16 MSEC
BH CV ECHO MEAS - MV E MAX VEL: 97.7 CM/SEC
BH CV ECHO MEAS - MV E/A: 0.86
BH CV ECHO MEAS - MV MAX PG: 5.2 MMHG
BH CV ECHO MEAS - MV MEAN PG: 3.1 MMHG
BH CV ECHO MEAS - MV P1/2T: 71.4 MSEC
BH CV ECHO MEAS - MV V2 VTI: 31.2 CM
BH CV ECHO MEAS - MVA(P1/2T): 3.1 CM2
BH CV ECHO MEAS - MVA(VTI): 2.01 CM2
BH CV ECHO MEAS - RAP SYSTOLE: 15 MMHG
BH CV ECHO MEAS - RV MAX PG: 2.27 MMHG
BH CV ECHO MEAS - RV V1 MAX: 75.4 CM/SEC
BH CV ECHO MEAS - RV V1 VTI: 16.3 CM
BH CV ECHO MEAS - SV(LVOT): 62.7 ML
BH CV ECHO MEAS - SV(MOD-SP2): 67 ML
BH CV ECHO MEAS - SV(MOD-SP4): 59 ML
BH CV VAS BP RIGHT ARM: NORMAL MMHG
BILIRUB SERPL-MCNC: 0.3 MG/DL (ref 0–1.2)
BLD GP AB SCN SERPL QL: NEGATIVE
BUN SERPL-MCNC: 11 MG/DL (ref 8–23)
BUN/CREAT SERPL: 12.5 (ref 7–25)
CALCIUM SPEC-SCNC: 9.1 MG/DL (ref 8.6–10.5)
CHLORIDE SERPL-SCNC: 101 MMOL/L (ref 98–107)
CO2 SERPL-SCNC: 23.4 MMOL/L (ref 22–29)
CREAT SERPL-MCNC: 0.88 MG/DL (ref 0.76–1.27)
DEPRECATED RDW RBC AUTO: 44.6 FL (ref 37–54)
EGFRCR SERPLBLD CKD-EPI 2021: 98.4 ML/MIN/1.73
EOSINOPHIL # BLD AUTO: 0.28 10*3/MM3 (ref 0–0.4)
EOSINOPHIL NFR BLD AUTO: 3.3 % (ref 0.3–6.2)
ERYTHROCYTE [DISTWIDTH] IN BLOOD BY AUTOMATED COUNT: 15 % (ref 12.3–15.4)
GLOBULIN UR ELPH-MCNC: 3.4 GM/DL
GLUCOSE BLDC GLUCOMTR-MCNC: 146 MG/DL (ref 70–130)
GLUCOSE BLDC GLUCOMTR-MCNC: 149 MG/DL (ref 70–130)
GLUCOSE BLDC GLUCOMTR-MCNC: 192 MG/DL (ref 70–130)
GLUCOSE BLDC GLUCOMTR-MCNC: 215 MG/DL (ref 70–130)
GLUCOSE SERPL-MCNC: 143 MG/DL (ref 65–99)
HCT VFR BLD AUTO: 38.3 % (ref 37.5–51)
HGB BLD-MCNC: 12.1 G/DL (ref 13–17.7)
IMM GRANULOCYTES # BLD AUTO: 0.05 10*3/MM3 (ref 0–0.05)
IMM GRANULOCYTES NFR BLD AUTO: 0.6 % (ref 0–0.5)
LYMPHOCYTES # BLD AUTO: 1.62 10*3/MM3 (ref 0.7–3.1)
LYMPHOCYTES NFR BLD AUTO: 18.8 % (ref 19.6–45.3)
MAXIMAL PREDICTED HEART RATE: 160 BPM
MCH RBC QN AUTO: 26.1 PG (ref 26.6–33)
MCHC RBC AUTO-ENTMCNC: 31.6 G/DL (ref 31.5–35.7)
MCV RBC AUTO: 82.7 FL (ref 79–97)
MONOCYTES # BLD AUTO: 0.58 10*3/MM3 (ref 0.1–0.9)
MONOCYTES NFR BLD AUTO: 6.7 % (ref 5–12)
MR PISA EROA: 0.3 CM2
NEUTROPHILS NFR BLD AUTO: 6.05 10*3/MM3 (ref 1.7–7)
NEUTROPHILS NFR BLD AUTO: 70.4 % (ref 42.7–76)
NRBC BLD AUTO-RTO: 0 /100 WBC (ref 0–0.2)
NT-PROBNP SERPL-MCNC: 272 PG/ML (ref 0–900)
PISA ALIASING VEL: 4.6 M/S
PISA RADIUS: 0.7 CM
PLATELET # BLD AUTO: 409 10*3/MM3 (ref 140–450)
PMV BLD AUTO: 9.5 FL (ref 6–12)
POTASSIUM SERPL-SCNC: 4 MMOL/L (ref 3.5–5.2)
PROT SERPL-MCNC: 7.3 G/DL (ref 6–8.5)
QT INTERVAL: 411 MS
RBC # BLD AUTO: 4.63 10*6/MM3 (ref 4.14–5.8)
RH BLD: POSITIVE
SODIUM SERPL-SCNC: 140 MMOL/L (ref 136–145)
STRESS TARGET HR: 136 BPM
T&S EXPIRATION DATE: NORMAL
VANCOMYCIN TROUGH SERPL-MCNC: 15.1 MCG/ML (ref 5–20)
WBC NRBC COR # BLD: 8.6 10*3/MM3 (ref 3.4–10.8)

## 2023-01-09 PROCEDURE — 25010000002 MORPHINE PER 10 MG: Performed by: NURSE PRACTITIONER

## 2023-01-09 PROCEDURE — C1776 JOINT DEVICE (IMPLANTABLE): HCPCS | Performed by: ORTHOPAEDIC SURGERY

## 2023-01-09 PROCEDURE — 25010000002 PERFLUTREN (DEFINITY) 8.476 MG IN SODIUM CHLORIDE (PF) 0.9 % 10 ML INJECTION: Performed by: ORTHOPAEDIC SURGERY

## 2023-01-09 PROCEDURE — 25010000002 VANCOMYCIN 1 G RECONSTITUTED SOLUTION: Performed by: ORTHOPAEDIC SURGERY

## 2023-01-09 PROCEDURE — 82962 GLUCOSE BLOOD TEST: CPT

## 2023-01-09 PROCEDURE — 87205 SMEAR GRAM STAIN: CPT | Performed by: ORTHOPAEDIC SURGERY

## 2023-01-09 PROCEDURE — 25010000002 SUCCINYLCHOLINE PER 20 MG: Performed by: NURSE ANESTHETIST, CERTIFIED REGISTERED

## 2023-01-09 PROCEDURE — 93306 TTE W/DOPPLER COMPLETE: CPT | Performed by: INTERNAL MEDICINE

## 2023-01-09 PROCEDURE — C1713 ANCHOR/SCREW BN/BN,TIS/BN: HCPCS | Performed by: ORTHOPAEDIC SURGERY

## 2023-01-09 PROCEDURE — 25010000002 CEFAZOLIN IN DEXTROSE 2-4 GM/100ML-% SOLUTION: Performed by: ORTHOPAEDIC SURGERY

## 2023-01-09 PROCEDURE — 25010000002 MIDAZOLAM PER 1 MG: Performed by: ANESTHESIOLOGY

## 2023-01-09 PROCEDURE — 73501 X-RAY EXAM HIP UNI 1 VIEW: CPT

## 2023-01-09 PROCEDURE — 86900 BLOOD TYPING SEROLOGIC ABO: CPT | Performed by: ORTHOPAEDIC SURGERY

## 2023-01-09 PROCEDURE — 87070 CULTURE OTHR SPECIMN AEROBIC: CPT | Performed by: ORTHOPAEDIC SURGERY

## 2023-01-09 PROCEDURE — 93005 ELECTROCARDIOGRAM TRACING: CPT | Performed by: ORTHOPAEDIC SURGERY

## 2023-01-09 PROCEDURE — 25010000002 PROPOFOL 10 MG/ML EMULSION: Performed by: NURSE ANESTHETIST, CERTIFIED REGISTERED

## 2023-01-09 PROCEDURE — 83880 ASSAY OF NATRIURETIC PEPTIDE: CPT | Performed by: HOSPITALIST

## 2023-01-09 PROCEDURE — 0SP90JZ REMOVAL OF SYNTHETIC SUBSTITUTE FROM RIGHT HIP JOINT, OPEN APPROACH: ICD-10-PCS | Performed by: ORTHOPAEDIC SURGERY

## 2023-01-09 PROCEDURE — 99233 SBSQ HOSP IP/OBS HIGH 50: CPT | Performed by: NURSE PRACTITIONER

## 2023-01-09 PROCEDURE — 99232 SBSQ HOSP IP/OBS MODERATE 35: CPT | Performed by: INTERNAL MEDICINE

## 2023-01-09 PROCEDURE — 86901 BLOOD TYPING SEROLOGIC RH(D): CPT | Performed by: ORTHOPAEDIC SURGERY

## 2023-01-09 PROCEDURE — 80202 ASSAY OF VANCOMYCIN: CPT | Performed by: NURSE PRACTITIONER

## 2023-01-09 PROCEDURE — 25010000002 FENTANYL CITRATE (PF) 50 MCG/ML SOLUTION: Performed by: ANESTHESIOLOGY

## 2023-01-09 PROCEDURE — 87015 SPECIMEN INFECT AGNT CONCNTJ: CPT | Performed by: ORTHOPAEDIC SURGERY

## 2023-01-09 PROCEDURE — 93010 ELECTROCARDIOGRAM REPORT: CPT | Performed by: INTERNAL MEDICINE

## 2023-01-09 PROCEDURE — 87176 TISSUE HOMOGENIZATION CULTR: CPT | Performed by: ORTHOPAEDIC SURGERY

## 2023-01-09 PROCEDURE — 87075 CULTR BACTERIA EXCEPT BLOOD: CPT | Performed by: ORTHOPAEDIC SURGERY

## 2023-01-09 PROCEDURE — 25010000002 VANCOMYCIN 10 G RECONSTITUTED SOLUTION: Performed by: NURSE PRACTITIONER

## 2023-01-09 PROCEDURE — 86850 RBC ANTIBODY SCREEN: CPT | Performed by: ORTHOPAEDIC SURGERY

## 2023-01-09 PROCEDURE — 25010000002 VANCOMYCIN 10 G RECONSTITUTED SOLUTION: Performed by: HOSPITALIST

## 2023-01-09 PROCEDURE — 85025 COMPLETE CBC W/AUTO DIFF WBC: CPT | Performed by: HOSPITALIST

## 2023-01-09 PROCEDURE — 25010000002 NEOSTIGMINE 5 MG/10ML SOLUTION: Performed by: ANESTHESIOLOGY

## 2023-01-09 PROCEDURE — 25010000002 MORPHINE PER 10 MG: Performed by: NURSE ANESTHETIST, CERTIFIED REGISTERED

## 2023-01-09 PROCEDURE — 93306 TTE W/DOPPLER COMPLETE: CPT

## 2023-01-09 PROCEDURE — 0SR90EZ REPLACEMENT OF RIGHT HIP JOINT WITH ARTICULATING SPACER, OPEN APPROACH: ICD-10-PCS | Performed by: ORTHOPAEDIC SURGERY

## 2023-01-09 PROCEDURE — 80053 COMPREHEN METABOLIC PANEL: CPT | Performed by: HOSPITALIST

## 2023-01-09 DEVICE — DEV CONTRL TISS STRATAFIX PDS PLS OS6 REV SZ1 18IN 45CM: Type: IMPLANTABLE DEVICE | Site: HIP | Status: FUNCTIONAL

## 2023-01-09 DEVICE — CMT BONE SIMPLEX/P TMYCIN FDOS 10PK: Type: IMPLANTABLE DEVICE | Site: HIP | Status: FUNCTIONAL

## 2023-01-09 DEVICE — CMT BONE PALACOS RPLSG W/GENT HI/VISC 1X40: Type: IMPLANTABLE DEVICE | Site: HIP | Status: FUNCTIONAL

## 2023-01-09 DEVICE — IMPLANTABLE DEVICE: Type: IMPLANTABLE DEVICE | Site: HIP | Status: FUNCTIONAL

## 2023-01-09 DEVICE — ARTICUL/EZE FEMORAL HEAD DIAMETER 32MM +5 12/14 TAPER
Type: IMPLANTABLE DEVICE | Site: HIP | Status: FUNCTIONAL
Brand: ARTICUL/EZE

## 2023-01-09 DEVICE — SUT FW #2 W/TPR NDL 1/2 CIR 38IN 97CM 26.5MM BLU: Type: IMPLANTABLE DEVICE | Site: HIP | Status: FUNCTIONAL

## 2023-01-09 DEVICE — CUP ACET PROSTALAC 42OD 32ID: Type: IMPLANTABLE DEVICE | Site: HIP | Status: FUNCTIONAL

## 2023-01-09 DEVICE — CEMENTRALIZER STEM CENTRALIZER 14.0MM CEMENTED
Type: IMPLANTABLE DEVICE | Site: HIP | Status: FUNCTIONAL
Brand: CEMENTRALIZER

## 2023-01-09 RX ORDER — FAMOTIDINE 10 MG/ML
20 INJECTION, SOLUTION INTRAVENOUS ONCE
Status: COMPLETED | OUTPATIENT
Start: 2023-01-09 | End: 2023-01-09

## 2023-01-09 RX ORDER — ASPIRIN 81 MG/1
81 TABLET ORAL EVERY 12 HOURS SCHEDULED
Status: DISCONTINUED | OUTPATIENT
Start: 2023-01-10 | End: 2023-01-10

## 2023-01-09 RX ORDER — PROMETHAZINE HYDROCHLORIDE 25 MG/1
25 SUPPOSITORY RECTAL ONCE AS NEEDED
Status: DISCONTINUED | OUTPATIENT
Start: 2023-01-09 | End: 2023-01-09 | Stop reason: HOSPADM

## 2023-01-09 RX ORDER — ACETAMINOPHEN 500 MG
1000 TABLET ORAL ONCE
Status: COMPLETED | OUTPATIENT
Start: 2023-01-09 | End: 2023-01-09

## 2023-01-09 RX ORDER — SODIUM CHLORIDE 0.9 % (FLUSH) 0.9 %
3-10 SYRINGE (ML) INJECTION AS NEEDED
Status: DISCONTINUED | OUTPATIENT
Start: 2023-01-09 | End: 2023-01-09 | Stop reason: HOSPADM

## 2023-01-09 RX ORDER — ROCURONIUM BROMIDE 10 MG/ML
INJECTION, SOLUTION INTRAVENOUS AS NEEDED
Status: DISCONTINUED | OUTPATIENT
Start: 2023-01-09 | End: 2023-01-09 | Stop reason: SURG

## 2023-01-09 RX ORDER — SODIUM CHLORIDE 0.9 % (FLUSH) 0.9 %
1-10 SYRINGE (ML) INJECTION AS NEEDED
Status: DISCONTINUED | OUTPATIENT
Start: 2023-01-09 | End: 2023-01-16 | Stop reason: HOSPADM

## 2023-01-09 RX ORDER — FENTANYL CITRATE 50 UG/ML
50 INJECTION, SOLUTION INTRAMUSCULAR; INTRAVENOUS
Status: DISCONTINUED | OUTPATIENT
Start: 2023-01-09 | End: 2023-01-09 | Stop reason: HOSPADM

## 2023-01-09 RX ORDER — OXYCODONE AND ACETAMINOPHEN 7.5; 325 MG/1; MG/1
1 TABLET ORAL EVERY 4 HOURS PRN
Status: DISCONTINUED | OUTPATIENT
Start: 2023-01-09 | End: 2023-01-09 | Stop reason: HOSPADM

## 2023-01-09 RX ORDER — SODIUM CHLORIDE 9 MG/ML
40 INJECTION, SOLUTION INTRAVENOUS AS NEEDED
Status: DISCONTINUED | OUTPATIENT
Start: 2023-01-09 | End: 2023-01-09 | Stop reason: HOSPADM

## 2023-01-09 RX ORDER — SODIUM CHLORIDE 9 MG/ML
100 INJECTION, SOLUTION INTRAVENOUS CONTINUOUS
Status: ACTIVE | OUTPATIENT
Start: 2023-01-09 | End: 2023-01-10

## 2023-01-09 RX ORDER — FLUMAZENIL 0.1 MG/ML
0.2 INJECTION INTRAVENOUS AS NEEDED
Status: DISCONTINUED | OUTPATIENT
Start: 2023-01-09 | End: 2023-01-09 | Stop reason: HOSPADM

## 2023-01-09 RX ORDER — SODIUM CHLORIDE, SODIUM LACTATE, POTASSIUM CHLORIDE, CALCIUM CHLORIDE 600; 310; 30; 20 MG/100ML; MG/100ML; MG/100ML; MG/100ML
9 INJECTION, SOLUTION INTRAVENOUS CONTINUOUS
Status: DISCONTINUED | OUTPATIENT
Start: 2023-01-09 | End: 2023-01-09

## 2023-01-09 RX ORDER — SODIUM CHLORIDE, SODIUM LACTATE, POTASSIUM CHLORIDE, CALCIUM CHLORIDE 600; 310; 30; 20 MG/100ML; MG/100ML; MG/100ML; MG/100ML
50 INJECTION, SOLUTION INTRAVENOUS CONTINUOUS
Status: DISCONTINUED | OUTPATIENT
Start: 2023-01-09 | End: 2023-01-12

## 2023-01-09 RX ORDER — SODIUM CHLORIDE 0.9 % (FLUSH) 0.9 %
10 SYRINGE (ML) INJECTION EVERY 12 HOURS SCHEDULED
Status: DISCONTINUED | OUTPATIENT
Start: 2023-01-09 | End: 2023-01-16 | Stop reason: HOSPADM

## 2023-01-09 RX ORDER — DIPHENHYDRAMINE HYDROCHLORIDE 50 MG/ML
12.5 INJECTION INTRAMUSCULAR; INTRAVENOUS
Status: DISCONTINUED | OUTPATIENT
Start: 2023-01-09 | End: 2023-01-09 | Stop reason: HOSPADM

## 2023-01-09 RX ORDER — OXYCODONE HCL 10 MG/1
20 TABLET, FILM COATED, EXTENDED RELEASE ORAL ONCE
Status: COMPLETED | OUTPATIENT
Start: 2023-01-09 | End: 2023-01-09

## 2023-01-09 RX ORDER — NALOXONE HCL 0.4 MG/ML
0.1 VIAL (ML) INJECTION
Status: DISCONTINUED | OUTPATIENT
Start: 2023-01-09 | End: 2023-01-16 | Stop reason: HOSPADM

## 2023-01-09 RX ORDER — MAGNESIUM HYDROXIDE 1200 MG/15ML
LIQUID ORAL AS NEEDED
Status: DISCONTINUED | OUTPATIENT
Start: 2023-01-09 | End: 2023-01-09 | Stop reason: HOSPADM

## 2023-01-09 RX ORDER — LABETALOL HYDROCHLORIDE 5 MG/ML
INJECTION, SOLUTION INTRAVENOUS AS NEEDED
Status: DISCONTINUED | OUTPATIENT
Start: 2023-01-09 | End: 2023-01-09

## 2023-01-09 RX ORDER — SUCCINYLCHOLINE CHLORIDE 20 MG/ML
INJECTION INTRAMUSCULAR; INTRAVENOUS AS NEEDED
Status: DISCONTINUED | OUTPATIENT
Start: 2023-01-09 | End: 2023-01-09 | Stop reason: SURG

## 2023-01-09 RX ORDER — GLYCOPYRROLATE 0.2 MG/ML
INJECTION INTRAMUSCULAR; INTRAVENOUS AS NEEDED
Status: DISCONTINUED | OUTPATIENT
Start: 2023-01-09 | End: 2023-01-09 | Stop reason: SURG

## 2023-01-09 RX ORDER — PROMETHAZINE HYDROCHLORIDE 25 MG/1
25 TABLET ORAL ONCE AS NEEDED
Status: DISCONTINUED | OUTPATIENT
Start: 2023-01-09 | End: 2023-01-09 | Stop reason: HOSPADM

## 2023-01-09 RX ORDER — DIPHENHYDRAMINE HCL 25 MG
25 CAPSULE ORAL
Status: DISCONTINUED | OUTPATIENT
Start: 2023-01-09 | End: 2023-01-09 | Stop reason: HOSPADM

## 2023-01-09 RX ORDER — LABETALOL HYDROCHLORIDE 5 MG/ML
5 INJECTION, SOLUTION INTRAVENOUS
Status: DISCONTINUED | OUTPATIENT
Start: 2023-01-09 | End: 2023-01-09 | Stop reason: HOSPADM

## 2023-01-09 RX ORDER — MIDAZOLAM HYDROCHLORIDE 1 MG/ML
INJECTION INTRAMUSCULAR; INTRAVENOUS
Status: COMPLETED | OUTPATIENT
Start: 2023-01-09 | End: 2023-01-09

## 2023-01-09 RX ORDER — HYDRALAZINE HYDROCHLORIDE 20 MG/ML
5 INJECTION INTRAMUSCULAR; INTRAVENOUS
Status: DISCONTINUED | OUTPATIENT
Start: 2023-01-09 | End: 2023-01-09 | Stop reason: HOSPADM

## 2023-01-09 RX ORDER — ACETAMINOPHEN 325 MG/1
325 TABLET ORAL EVERY 4 HOURS PRN
Status: DISCONTINUED | OUTPATIENT
Start: 2023-01-09 | End: 2023-01-16 | Stop reason: HOSPADM

## 2023-01-09 RX ORDER — OXYCODONE HYDROCHLORIDE AND ACETAMINOPHEN 5; 325 MG/1; MG/1
1 TABLET ORAL EVERY 4 HOURS PRN
Status: DISCONTINUED | OUTPATIENT
Start: 2023-01-09 | End: 2023-01-10

## 2023-01-09 RX ORDER — LIDOCAINE HYDROCHLORIDE 20 MG/ML
INJECTION, SOLUTION INFILTRATION; PERINEURAL AS NEEDED
Status: DISCONTINUED | OUTPATIENT
Start: 2023-01-09 | End: 2023-01-09 | Stop reason: SURG

## 2023-01-09 RX ORDER — OXYCODONE HYDROCHLORIDE AND ACETAMINOPHEN 5; 325 MG/1; MG/1
2 TABLET ORAL EVERY 4 HOURS PRN
Status: DISCONTINUED | OUTPATIENT
Start: 2023-01-09 | End: 2023-01-10

## 2023-01-09 RX ORDER — VANCOMYCIN HYDROCHLORIDE 1 G/20ML
INJECTION, POWDER, LYOPHILIZED, FOR SOLUTION INTRAVENOUS AS NEEDED
Status: DISCONTINUED | OUTPATIENT
Start: 2023-01-09 | End: 2023-01-09 | Stop reason: HOSPADM

## 2023-01-09 RX ORDER — MIDAZOLAM HYDROCHLORIDE 1 MG/ML
1 INJECTION INTRAMUSCULAR; INTRAVENOUS
Status: DISCONTINUED | OUTPATIENT
Start: 2023-01-09 | End: 2023-01-09 | Stop reason: HOSPADM

## 2023-01-09 RX ORDER — EPHEDRINE SULFATE 50 MG/ML
5 INJECTION, SOLUTION INTRAVENOUS ONCE AS NEEDED
Status: DISCONTINUED | OUTPATIENT
Start: 2023-01-09 | End: 2023-01-09 | Stop reason: HOSPADM

## 2023-01-09 RX ORDER — HYDROMORPHONE HYDROCHLORIDE 1 MG/ML
0.5 INJECTION, SOLUTION INTRAMUSCULAR; INTRAVENOUS; SUBCUTANEOUS
Status: DISCONTINUED | OUTPATIENT
Start: 2023-01-09 | End: 2023-01-09 | Stop reason: HOSPADM

## 2023-01-09 RX ORDER — DOCUSATE SODIUM 100 MG/1
100 CAPSULE, LIQUID FILLED ORAL 2 TIMES DAILY PRN
Status: DISCONTINUED | OUTPATIENT
Start: 2023-01-09 | End: 2023-01-16 | Stop reason: HOSPADM

## 2023-01-09 RX ORDER — SODIUM CHLORIDE 0.9 % (FLUSH) 0.9 %
3 SYRINGE (ML) INJECTION EVERY 12 HOURS SCHEDULED
Status: DISCONTINUED | OUTPATIENT
Start: 2023-01-09 | End: 2023-01-09 | Stop reason: HOSPADM

## 2023-01-09 RX ORDER — LIDOCAINE HYDROCHLORIDE 10 MG/ML
0.5 INJECTION, SOLUTION EPIDURAL; INFILTRATION; INTRACAUDAL; PERINEURAL ONCE AS NEEDED
Status: DISCONTINUED | OUTPATIENT
Start: 2023-01-09 | End: 2023-01-09 | Stop reason: HOSPADM

## 2023-01-09 RX ORDER — CEFAZOLIN SODIUM 2 G/100ML
2 INJECTION, SOLUTION INTRAVENOUS ONCE
Status: COMPLETED | OUTPATIENT
Start: 2023-01-09 | End: 2023-01-09

## 2023-01-09 RX ORDER — UREA 10 %
1 LOTION (ML) TOPICAL NIGHTLY PRN
Status: DISCONTINUED | OUTPATIENT
Start: 2023-01-09 | End: 2023-01-16 | Stop reason: HOSPADM

## 2023-01-09 RX ORDER — ONDANSETRON 4 MG/1
4 TABLET, FILM COATED ORAL EVERY 6 HOURS PRN
Status: DISCONTINUED | OUTPATIENT
Start: 2023-01-09 | End: 2023-01-16 | Stop reason: HOSPADM

## 2023-01-09 RX ORDER — PROPOFOL 10 MG/ML
VIAL (ML) INTRAVENOUS AS NEEDED
Status: DISCONTINUED | OUTPATIENT
Start: 2023-01-09 | End: 2023-01-09 | Stop reason: SURG

## 2023-01-09 RX ORDER — MORPHINE SULFATE 1 MG/ML
INJECTION, SOLUTION EPIDURAL; INTRATHECAL; INTRAVENOUS AS NEEDED
Status: DISCONTINUED | OUTPATIENT
Start: 2023-01-09 | End: 2023-01-09 | Stop reason: SURG

## 2023-01-09 RX ORDER — ONDANSETRON 2 MG/ML
4 INJECTION INTRAMUSCULAR; INTRAVENOUS ONCE AS NEEDED
Status: DISCONTINUED | OUTPATIENT
Start: 2023-01-09 | End: 2023-01-09 | Stop reason: HOSPADM

## 2023-01-09 RX ORDER — HYDROCODONE BITARTRATE AND ACETAMINOPHEN 7.5; 325 MG/1; MG/1
1 TABLET ORAL ONCE AS NEEDED
Status: DISCONTINUED | OUTPATIENT
Start: 2023-01-09 | End: 2023-01-09 | Stop reason: HOSPADM

## 2023-01-09 RX ORDER — NALOXONE HCL 0.4 MG/ML
0.2 VIAL (ML) INJECTION AS NEEDED
Status: DISCONTINUED | OUTPATIENT
Start: 2023-01-09 | End: 2023-01-09 | Stop reason: HOSPADM

## 2023-01-09 RX ORDER — NEOSTIGMINE METHYLSULFATE 0.5 MG/ML
INJECTION, SOLUTION INTRAVENOUS AS NEEDED
Status: DISCONTINUED | OUTPATIENT
Start: 2023-01-09 | End: 2023-01-09 | Stop reason: SURG

## 2023-01-09 RX ORDER — BISACODYL 5 MG/1
10 TABLET, DELAYED RELEASE ORAL DAILY PRN
Status: DISCONTINUED | OUTPATIENT
Start: 2023-01-09 | End: 2023-01-16 | Stop reason: HOSPADM

## 2023-01-09 RX ORDER — ONDANSETRON 2 MG/ML
4 INJECTION INTRAMUSCULAR; INTRAVENOUS EVERY 6 HOURS PRN
Status: DISCONTINUED | OUTPATIENT
Start: 2023-01-09 | End: 2023-01-16 | Stop reason: HOSPADM

## 2023-01-09 RX ORDER — LABETALOL HYDROCHLORIDE 5 MG/ML
INJECTION, SOLUTION INTRAVENOUS AS NEEDED
Status: DISCONTINUED | OUTPATIENT
Start: 2023-01-09 | End: 2023-01-09 | Stop reason: SURG

## 2023-01-09 RX ADMIN — LIDOCAINE HYDROCHLORIDE 100 MG: 20 INJECTION, SOLUTION INFILTRATION; PERINEURAL at 14:36

## 2023-01-09 RX ADMIN — Medication 10 ML: at 21:32

## 2023-01-09 RX ADMIN — CEFAZOLIN SODIUM 2 G: 2 INJECTION, SOLUTION INTRAVENOUS at 14:16

## 2023-01-09 RX ADMIN — MORPHINE SULFATE 2 MG: 4 INJECTION, SOLUTION INTRAMUSCULAR; INTRAVENOUS at 07:37

## 2023-01-09 RX ADMIN — INSULIN GLARGINE-YFGN 15 UNITS: 100 INJECTION, SOLUTION SUBCUTANEOUS at 21:31

## 2023-01-09 RX ADMIN — MORPHINE SULFATE 3 MG: 1 INJECTION EPIDURAL; INTRATHECAL; INTRAVENOUS at 15:07

## 2023-01-09 RX ADMIN — SODIUM CHLORIDE, POTASSIUM CHLORIDE, SODIUM LACTATE AND CALCIUM CHLORIDE 9 ML/HR: 600; 310; 30; 20 INJECTION, SOLUTION INTRAVENOUS at 14:03

## 2023-01-09 RX ADMIN — MORPHINE SULFATE 2 MG: 4 INJECTION, SOLUTION INTRAMUSCULAR; INTRAVENOUS at 03:19

## 2023-01-09 RX ADMIN — NEOSTIGMINE METHYLSULFATE 3.5 MG: 0.5 INJECTION INTRAVENOUS at 17:11

## 2023-01-09 RX ADMIN — SUCCINYLCHOLINE CHLORIDE 200 MG: 20 INJECTION, SOLUTION INTRAMUSCULAR; INTRAVENOUS; PARENTERAL at 14:36

## 2023-01-09 RX ADMIN — METOPROLOL SUCCINATE 50 MG: 50 TABLET, FILM COATED, EXTENDED RELEASE ORAL at 08:09

## 2023-01-09 RX ADMIN — NYSTATIN: 100000 POWDER TOPICAL at 21:33

## 2023-01-09 RX ADMIN — FENTANYL CITRATE 50 MCG: 50 INJECTION, SOLUTION INTRAMUSCULAR; INTRAVENOUS at 19:27

## 2023-01-09 RX ADMIN — VANCOMYCIN HYDROCHLORIDE 1250 MG: 10 INJECTION, POWDER, LYOPHILIZED, FOR SOLUTION INTRAVENOUS at 21:31

## 2023-01-09 RX ADMIN — MIDAZOLAM 2 MG: 1 INJECTION INTRAMUSCULAR; INTRAVENOUS at 13:19

## 2023-01-09 RX ADMIN — ROCURONIUM BROMIDE 20 MG: 10 INJECTION, SOLUTION INTRAVENOUS at 16:10

## 2023-01-09 RX ADMIN — PROPOFOL 200 MG: 10 INJECTION, EMULSION INTRAVENOUS at 14:36

## 2023-01-09 RX ADMIN — Medication 10 ML: at 21:33

## 2023-01-09 RX ADMIN — Medication 10 ML: at 08:09

## 2023-01-09 RX ADMIN — NYSTATIN: 100000 POWDER TOPICAL at 08:09

## 2023-01-09 RX ADMIN — GLYCOPYRROLATE 0.7 MG: 1 INJECTION INTRAMUSCULAR; INTRAVENOUS at 17:11

## 2023-01-09 RX ADMIN — GABAPENTIN 600 MG: 300 CAPSULE ORAL at 21:31

## 2023-01-09 RX ADMIN — PERFLUTREN 1.5 ML: 6.52 INJECTION, SUSPENSION INTRAVENOUS at 09:45

## 2023-01-09 RX ADMIN — SODIUM CHLORIDE 100 ML/HR: 9 INJECTION, SOLUTION INTRAVENOUS at 21:32

## 2023-01-09 RX ADMIN — MORPHINE SULFATE 5 MG: 1 INJECTION EPIDURAL; INTRATHECAL; INTRAVENOUS at 15:27

## 2023-01-09 RX ADMIN — OXYCODONE AND ACETAMINOPHEN 1 TABLET: 5; 325 TABLET ORAL at 21:31

## 2023-01-09 RX ADMIN — MORPHINE SULFATE 2 MG: 1 INJECTION EPIDURAL; INTRATHECAL; INTRAVENOUS at 14:30

## 2023-01-09 RX ADMIN — VANCOMYCIN HYDROCHLORIDE 1500 MG: 10 INJECTION, POWDER, LYOPHILIZED, FOR SOLUTION INTRAVENOUS at 03:09

## 2023-01-09 RX ADMIN — OXYCODONE HYDROCHLORIDE 20 MG: 10 TABLET, FILM COATED, EXTENDED RELEASE ORAL at 13:35

## 2023-01-09 RX ADMIN — SACUBITRIL AND VALSARTAN 1 TABLET: 24; 26 TABLET, FILM COATED ORAL at 21:31

## 2023-01-09 RX ADMIN — FAMOTIDINE 20 MG: 10 INJECTION INTRAVENOUS at 13:37

## 2023-01-09 RX ADMIN — ROCURONIUM BROMIDE 20 MG: 10 INJECTION, SOLUTION INTRAVENOUS at 15:34

## 2023-01-09 RX ADMIN — LABETALOL HYDROCHLORIDE 10 MG: 5 INJECTION, SOLUTION INTRAVENOUS at 14:45

## 2023-01-09 RX ADMIN — ACETAMINOPHEN 1000 MG: 500 TABLET, FILM COATED ORAL at 13:35

## 2023-01-09 NOTE — OP NOTE
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     Implant Name Type Inv. Item Serial No.  Lot No. LRB No. Used Action   DEV CONTRL TISS STRATAFIX PDS PLS OS6 REV SZ1 18IN 45CM - [id number] Implant DEV CONTRL TISS STRATAFIX PDS PLS OS6 REV SZ1 18IN 45CM  [employer]  DIV [employer] J [employer] J  Right 2 Implanted   CMT BONE PALACOS RPLSG W/GENT HI/VISC 1X40 - [id number] Implant CMT BONE PALACOS RPLSG W/GENT HI/VISC 1X40  [employer] [id number] Right 1 Implanted   CMT BONE PALACOS RPLSG W/GENT HI/VISC 1X40 - [id number] Implant CMT BONE PALACOS RPLSG W/GENT HI/VISC 1X40  [employer] [id number] Right 1 Implanted   CMT BONE SIMPLEX/P TMYCIN FDOS 10PK - [id number] Implant CMT BONE SIMPLEX/P TMYCIN FDOS 10PK  [employer] [id number] Right 2 Implanted   CUP ACET PROSTALAC 42OD 32ID - [id number] Implant CUP ACET PROSTALAC 42OD 32ID  DEPUY [id number] Right 1 Implanted   STEM HIP PROSTALAC STD SZ1 105 RT/LT - [id number] Implant STEM HIP PROSTALAC STD SZ1 105 RT/LT  DEPUY [id number] Right 1 Implanted   CENTRLZR HIP DIST CMT 14MM - [id number] Implant CENTRLZR HIP DIST CMT 14MM  DEPUY [id number] Right 1 Implanted   SUT FW #2 W/TPR NDL 1/2 CIR 38IN  97CM 26.5MM CHRISTINA - [id number] Implant SUT FW #2 W/TPR NDL 1/2 CIR 38IN 97CM 26.5MM CRHISTINA  ARTHREX  Right 3 Implanted   HD FEM ARTICULEZE/MOD COCR 12/14 32MM PLS5 - [id number] Implant HD FEM ARTICULEZE/MOD COCR 12/14 32MM PLS5  DEPUY [id number] Right 1 Implanted       Surgeon(s):  Faustina Beebe MD Nessler, Joseph M, MD    Anesthesia: General  Anesthesiologist: David Serrano MD; Kp Guerrier MD  CRNA: Alyx Bullock CRNA    Staff:   Circulator: Natalie Wild RN  Scrub Person: Stephanie Lopez  Vendor Representative: Curt Rodriguez  Assistant: Jamie Monroe CSA    Assistants :  Morgan Marshall MD, Fellow    AMY Edmonds       The services of a skilled first assistant were necessary for performing the procedure safely and expeditiously.  The first assist was present for the entire duration of the case and helped with positioning, retraction and closure of the incision.     Estimated Blood Loss: 450 mL    Specimens:   Order Name Source Comment Collection Info Order Time   ANAEROBIC CULTURE Hip, Right  Collected By: Faustina Beebe MD 1/9/2023  4:41 PM     Release to patient   Routine Release        TISSUE / BONE CULTURE Hip, Right  Collected By: Faustina Beebe MD 1/9/2023  4:41 PM     Release to patient   Routine Release        TYPE AND SCREEN   Collected By: Aniya Sigala RN 1/9/2023 12:38 PM     Release to patient   Routine Release        TISSUE PATHOLOGY EXAM Hip, Right  Collected By: Faustina Beebe MD 1/9/2023  4:41 PM     Release to patient   Routine Release            COMPLICATIONS: Nil.      DRAINS: Nil.     INDICATIONS:   62 y.o. male admitted to [facility] with Septic Arthritis.      The patient was evaluated in the [facility] and was diagnosed with a possible prosthetic joint infection involving the right hip.  His history is significant for a right partial hip replacement for a femoral neck fracture around 2020 at [facility]  by Dr. Garrido.     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  Apparently he did undergo a irrigation and debridement and his implants were retained and he was on chronic antibiotic suppression.     He is not a good historian.  He has a history of stroke and chronic medical problems and congestive heart failure with low ejection fraction.  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     He presented to [facility] with complaints of pain in the right hip for the past 1 week.  He was evaluated with a CT scan and three-phase bone scan.  They have requested a transfer to [facility].        Secondary to the age and multiple medical co morbidities the patient was admitted to the hospitalist.   As I was on call for the emergency room I was consulted for further evaluation and treatment.      He is a minimal ambulator.  He does not ambulate at baseline.  He does transfer from bed to his wheelchair.     He has high BMI, sleep apnea, history of stroke, hemiparesis with weakness of the right upper and lower extremity, uncontrolled diabetes mellitus.    Options and alternatives were discussed in detail with the patient and   family.   The patient is indicated for possible removal of the right partial hip replacement and placement of antibiotic spacer.      He is a high risk for surgery secondary to his history of congestive heart failure, low ejection fraction.  We will try to see if we can get a cardiology consult prior to the surgery.     Infectious disease service has seen and the patient is on IV antibiotics.     The likely risks and benefits of the well fixed partial hip replacement and placement of antibiotic spacer Prostalac with a polyethylene acetabular liner and a antibiotic cement spacer for  the femur has been discussed in detail.  Likely risks and benefits include but not limited to persistent infection, persistent pain, possibility of periprosthetic fractures, possibility of recurrent dislocation, leg length discrepancy, high risk for mortality and morbidity have been discussed in detail.  Despite the risks involved the patient and his wife would like to proceed.     We will get an informed consent and plan to do the surgery on January 9, 2023 if he is cleared.    Patient has been seen by cardiology service and cleared for this hip surgery at a high risk.      Despite the risks involved, the patient and family elected to proceed. An informed consent has been obtained, and patient  has been scheduled for surgery.   Patient was seen in the preoperative holding area and the operative site was marked.      DESCRIPTION OF PROCEDURE: The patient has been transferred to [facility] Operating Room. Preoperative antibiotics were given in the form of  Kefzol  IV according to SCIP protocol prior to the incision.  After achieving adequate general anesthesia, the patient was placed in the lateral position utilizing a pegboard. All bony prominences were padded well. The operative hip was prepped and draped in the usual sterile fashion. Surgical time-out was done. Correct patient, surgical side and site were identified. Tranexamic acid was given intravenously just prior to the incision.      A skin incision was made centering over the greater trochanter for a  posterior  approach.  I did plan for a extended trochanteric osteotomy and the long skin incision was made along the lateral aspect of the thigh as well.  Skin and subcutaneous tissue were incised and the deep fascia was incised in line with the skin incision. The gluteus joel muscle fibers were split in their direction. Posterior aspect of the hip joint was identified. An L-shaped arthrotomy was made along the superior border of the  pyriformis tendon and along the posterior aspect of the greater trochanter. The capsule was released. There was a small effusion. The femoral head was dislocated.  This was a bipolar femoral head.  The head was removed from the trunnion by utilizing a bone tamp.  The femoral head measured 52 mm in its outer diameter. The acetabular cavity was inspected.  There were some areas of synovitis this was excised.  Some capsule and synovium was sent for culture sensitivity.    Next attention was directed to the femoral implant.  This was a Trev Avenir press-fit implant.  I utilized flexible osteotomes to loosen the anterior and posterior cortical and cancellous bone from the implant and similarly the osteotome was utilized around the medial calcar and the lateral aspect of the implant.  Then utilizing a backslapping I was able to gently remove the implant without any bone loss.    The Acetabular cavity was appropriately exposed with retractors.  Labrum was excised and pulvinar was excised. The cavity was progressively reamed maintaining the correct inclination and version. Adequate fit was found with a size 50 reamer.  Care was taken not to over ream.  Only the cartilage was removed and bleeding bone was visualized.  At this stage I elected to proceed with a Prostalac spacer.  A batch of Palacos cement with 2 g of vancomycin powder was utilized to cement a Prostalac all polyethylene 42 mm outer diameter 32 mm internal diameter acetabulum.  This was seated maintaining correct inclination and version.  Adequate pressure was maintained and excess cement was removed the cement was allowed to set.    I did broach the proximal femur and a size 5 broach for the Prostalac was found to be adequate.  The previously removed implant measured approximately 140 mm in length.  The spacer was also measuring approximately 140 mm.  This was including the cement tip.  I fashioned the spacer after obtaining the template with tobramycin  Simplex cement 2 batches and also 4 g of Vanco powder.  Cement was allowed to set and excess cement was removed.  The spacer was seated into position and additional stability was also obtained with 1 batch of Palacos and 2 g of vancomycin powder cemented proximally the spacer to the femur bone.  Trial reduction was performed. Reduction was found to be satisfactory with good restoration of leg lengths.  Range of motion was checked and there was excellent range of motion with good stability throughout the range for flexion, adduction , internal rotation and external rotation. There was no sign of impingement.  The trial was dislocated and the trial head was replaced with a 32 mm outer diameter metallic head  with a +5 mm neck length was seated into position, checked again for stability and the hip was reduced. Reduction was found to be satisfactory. The hip joint was thoroughly irrigated with saline and soft tissue hemostasis was secured. The posterior capsule was re-anchored to the greater trochanter with the help of FiberWire sutures and drill holes made into the greater trochanter. The sponge count and needle count was correct.  The incision was closed in layers with Ethibond, vicryl and staples. Sterile dressings were placed and the patient was transferred to the recovery room in a stable condition.    10 Yoruba Hemovac drain was placed prior to closure.    The patient prior to closure received periarticular injection of  Naropin  mixture. The patient tolerated the procedure well and had adequate distal pulses and good capillary refill. The patient was then transferred to the recovery room and then later to the floor without any complications.     The patient will receive postoperative antibiotics in the form of  Kefzol and vancomycin IV q.8 h. within the first 24 hours for 2 more doses according to SCIP protocol.   Infectious disease service is on board we will continue to monitor intraoperative tissue culture  [facility].  ASA for DVT prophylaxis will begin in the morning.      I discussed these satisfactory performance of the procedure with the patient's family and discussed with them the postoperative management.      Faustina Beebe MD     Date: 1/9/2023  Time: 16:55 EST    CC: Pipe Servin MD; MD Rosi Cortez Stephen A, MD

## 2023-01-09 NOTE — PROGRESS NOTES
"Nutrition Services    Patient Name:  Mandeep Tracey  YOB: 1960  MRN: [id number]  Admit Date:  1/7/2023    Assessment Date:  01/09/23    NUTRITION SCREENING      Reason for Encounter Pyogenic arthritis of the right hip   Diagnosis/Problem T2DM, hx CVA, CAD, HTN, cardiomyopathy, seizure disorder       PO Diet Diet: Regular/House Diet, Cardiac Diets; Healthy Heart (2-3 Na+); Texture: Regular Texture (IDDSI 7); Fluid Consistency: Thin (IDDSI 0)   PO Supplements/Snacks    PO Intake % Pending        Labs  Listed below, reviewed   Physical Findings Alert and oriented,    GI Function LBM: 1/8   Skin Status Sacral spine (stage 1)        Height:  Weight:  BMI:    Weight Trend Height: 182.9 cm (72\")  Weight: 129 kg (285 lb) (01/09/23 0944)  Body mass index is 38.65 kg/m².    Stable over the past 6 months        Intervention/Plan 1. Monitor PO intake, Weight status and clinical course.          Results from last 7 days   Lab Units 01/09/23  0458 01/08/23  0440   SODIUM mmol/L 140 141   POTASSIUM mmol/L 4.0 4.1   CHLORIDE mmol/L 101 106   CO2 mmol/L 23.4 23.1   BUN mg/dL 11 10   CREATININE mg/dL 0.88 0.73*   CALCIUM mg/dL 9.1 9.2   BILIRUBIN mg/dL 0.3 <0.2   ALK PHOS U/L 52 48   ALT (SGPT) U/L 12 10   AST (SGOT) U/L 13 8   GLUCOSE mg/dL 143* 177*     Results from last 7 days   Lab Units 01/09/23  0458   HEMOGLOBIN g/dL 12.1*   HEMATOCRIT % 38.3     COVID19   Date Value Ref Range Status   08/08/2022 Not Detected Not Detected - Ref. Range Final     Lab Results   Component Value Date    HGBA1C 7.80 (H) 01/08/2023       RD to follow up per protocol.    Electronically signed by:  Jania Sosa RD  01/09/23 11:55 EST  "

## 2023-01-09 NOTE — PROGRESS NOTES
ID PROGRESS NOTE    CC: f/u R hip PJI    Subj: no acute events. No fever. He nods yes when I ask him about ongoing hip pain. Tolerating vancomycin w/o rash. OR later today pending cardiac clearance.    Medications:    Current Facility-Administered Medications:   •  acetaminophen (TYLENOL) tablet 650 mg, 650 mg, Oral, Q4H PRN **OR** acetaminophen (TYLENOL) 160 MG/5ML solution 650 mg, 650 mg, Oral, Q4H PRN **OR** acetaminophen (TYLENOL) suppository 650 mg, 650 mg, Rectal, Q4H PRN, Breanne Monroy APRN  •  aspirin EC tablet 81 mg, 81 mg, Oral, Daily, Jason Aranda MD, 81 mg at 01/08/23 1607  •  bisacodyl (DULCOLAX) suppository 10 mg, 10 mg, Rectal, Daily PRN, Breanne Monroy APRN  •  bumetanide (BUMEX) tablet 2 mg, 2 mg, Oral, Daily, Breanne Monroy APRN, 2 mg at 01/08/23 1607  •  cyclobenzaprine (FLEXERIL) tablet 5 mg, 5 mg, Oral, 4x Daily PRN, Jason Aranda MD, 5 mg at 01/08/23 2218  •  dextrose (D50W) (25 g/50 mL) IV injection 25 g, 25 g, Intravenous, Q15 Min PRN, Breanne Monroy APRN  •  dextrose (GLUTOSE) oral gel 15 g, 15 g, Oral, Q15 Min PRN, Breanne Monroy APRN  •  docusate sodium (COLACE) capsule 200 mg, 200 mg, Oral, BID, Breanne Monroy APRN  •  gabapentin (NEURONTIN) capsule 600 mg, 600 mg, Oral, TID, Breanne Monroy APRN, 600 mg at 01/08/23 2202  •  glucagon (human recombinant) (GLUCAGEN DIAGNOSTIC) injection 1 mg, 1 mg, Intramuscular, Q15 Min PRN, Breanne Monroy APRN  •  hydrOXYzine (ATARAX) tablet 25 mg, 25 mg, Oral, TID PRN, Breanne Monroy APRN  •  insulin glargine (LANTUS, SEMGLEE) injection 15 Units, 15 Units, Subcutaneous, Nightly, Breanne Monroy APRN, 15 Units at 01/08/23 2202  •  insulin lispro (ADMELOG) injection 0-7 Units, 0-7 Units, Subcutaneous, TID AC, Breanne Monroy APRN, 3 Units at 01/08/23 1827  •  isosorbide mononitrate (IMDUR) 24 hr tablet 30 mg, 30 mg, Oral, Daily, Breanne Monroy APRN, 30 mg at 01/08/23 1058  •   metoprolol succinate XL (TOPROL-XL) 24 hr tablet 50 mg, 50 mg, Oral, Daily, Breanne Monroy APRN, 50 mg at 01/09/23 0809  •  Morphine sulfate (PF) injection 2 mg, 2 mg, Intravenous, Q4H PRN, 2 mg at 01/09/23 0737 **AND** naloxone (NARCAN) injection 0.4 mg, 0.4 mg, Intravenous, Q5 Min PRN, Breanne Monroy APRN  •  nystatin (MYCOSTATIN) powder, , Topical, Q12H, Raymundo Miller MD, Given at 01/09/23 0809  •  ondansetron (ZOFRAN) injection 4 mg, 4 mg, Intravenous, Q6H PRN, Breanne Monroy APRN  •  oxybutynin XL (DITROPAN-XL) 24 hr tablet 10 mg, 10 mg, Oral, Daily, Breanne Monroy APRN, 10 mg at 01/08/23 1607  •  Pharmacy to dose vancomycin, , Does not apply, Continuous PRN, Breanne Monroy APRAYDEE  •  polyethylene glycol (MIRALAX) packet 17 g, 17 g, Oral, BID, Breanne Monroy APRN, 17 g at 01/08/23 1607  •  rosuvastatin (CRESTOR) tablet 40 mg, 40 mg, Oral, Daily, Breanne Monroy APRN, 40 mg at 01/08/23 1607  •  sacubitril-valsartan (ENTRESTO) 24-26 MG tablet 1 tablet, 1 tablet, Oral, BID, Breanne Monroy APRN, 1 tablet at 01/08/23 2202  •  sertraline (ZOLOFT) tablet 100 mg, 100 mg, Oral, Daily, Breanne Monroy APRN, 100 mg at 01/08/23 1607  •  sodium chloride 0.9 % flush 10 mL, 10 mL, Intravenous, Q12H, Breanne Monroy APRN, 10 mL at 01/09/23 0809  •  sodium chloride 0.9 % flush 10 mL, 10 mL, Intravenous, PRN, Porfirio, Breanne M, APRN  •  sodium chloride 0.9 % infusion 40 mL, 40 mL, Intravenous, PRN, Breanne Monroy APRN  •  vancomycin 1500 mg/500 mL 0.9% NS IVPB ([facility]), 1,500 mg, Intravenous, Q12H, Breanne Monroy APRN, 1,500 mg at 01/09/23 0309      Objective   Vital Signs   Temp:  [97.6 °F (36.4 °C)-99 °F (37.2 °C)] 98.2 °F (36.8 °C)  Heart Rate:  [] 81  Resp:  [16-20] 17  BP: (118-146)/(67-86) 146/86    Physical Exam:   General: awake, alert, very nice  Eyes: no scleral icterus  ENT: no thrush  Cardiovascular: NR  Respiratory: normal work of  breathing on  GI: Abdomen is soft  :  no Chavez catheter  MSK: R hip incision w/o erythema or drainage; bandaid over incision at prior aspiration site  Skin: Candida intertrigo in the groin is a little better  Vasc: PIV w/o erythema    Labs:   CBC, CMP, and CRP reviewed today  Lab Results   Component Value Date    WBC 8.60 01/09/2023    HGB 12.1 (L) 01/09/2023    HCT 38.3 01/09/2023    MCV 82.7 01/09/2023     01/09/2023     Lab Results   Component Value Date    GLUCOSE 143 (H) 01/09/2023    BUN 11 01/09/2023    CREATININE 0.88 01/09/2023    EGFRIFNONA 119 08/02/2021    BCR 12.5 01/09/2023    CO2 23.4 01/09/2023    CALCIUM 9.1 01/09/2023    ALBUMIN 3.9 01/09/2023    LABIL2 1.2 (L) 04/19/2021    AST 13 01/09/2023    ALT 12 01/09/2023     Lab Results   Component Value Date    CRP 2.10 (H) 01/08/2023     Lab Results   Component Value Date    HGBA1C 7.80 (H) 01/08/2023     Lab Results   Component Value Date    VANCOTROUGH 11.00 04/12/2021    VANCORANDOM 12 03/11/2021       Microbiology:  None this admission; will call [facility] to find out about [facility]    Radiology:  None this admission; OSH films scanned in    ASSESSMENT/PLAN:  1. R hip PJI - recurrent  2. Obesity BMI 38  3. Uncontrolled DM2 - A1c 7.8% - complicating above  4. History of stroke and hemiparesis  5. Candida intertrigo    Noted plans for I&D, removal of hardware, and insertion of a spacer pending cardiac clearance. Please send cultures. [facility] will [facility] to the [facility] to [facility] to find out the aspiration culture results there. Continue vancomycin w/ goal -600. Level ordered for this afternoon. For Candida intertrigo, continue Nystatin powder to the groin with duration TBD.     ID will follow.

## 2023-01-09 NOTE — ANESTHESIA PREPROCEDURE EVALUATION
Anesthesia Evaluation     Patient summary reviewed and Nursing notes reviewed   history of anesthetic complications: PONV  NPO Solid Status: > 8 hours             Airway   Mallampati: III  TM distance: >3 FB  Neck ROM: full  Possible difficult intubation  Dental - normal exam     Pulmonary - normal exam   (+) sleep apnea,   Cardiovascular - normal exam    ECG reviewed    (+) hypertension, valvular problems/murmurs MR, cardiac stents Drug eluting stent within the past 12 months CHF ,     ROS comment:  long standing nonischemic cardiomyopathy. More recently he was found to have coronary artery disease and [facility] drug [facility]uting [facility]). [facility] is followed by the [facility] advanced heart failure clinic. He had a primary ICD placed and was subsequently removed for infection. He also had cardiomems implanted which is presumed to be lodged distally and is not currently in use    EF per echo on this admission is 26-30% with severe MR    Neuro/Psych  (+) CVA residual symptoms,      ROS Comment: Right side hemiplegia   Moderate aphasic   Understands buts need to read lips  GI/Hepatic/Renal/Endo    (+) obesity,  PUD,  diabetes mellitus type 2 poorly controlled,     Musculoskeletal         ROS comment: Septic right hip from hip arthoplasty--painful   Abdominal  - normal exam    Bowel sounds: normal.   Substance History      OB/GYN          Other                        Anesthesia Plan    ASA 3     general       Anesthetic plan, risks, benefits, and alternatives have been provided, discussed and informed consent has been obtained with: patient.        CODE STATUS:    Code Status (Patient has no pulse and is not breathing): CPR (Attempt to Resuscitate)  Medical Interventions (Patient has pulse or is breathing): Full Support

## 2023-01-09 NOTE — NURSING NOTE
Handoff report received at bedside from preop Rosa Kwan; OR ROSA Wild at bedside as well; Vanc due but Vanc trough not resulted yet from lab; OR RN states she will will relay this to CRNA in room.  IV Vanc current order hanging unspiked at bedside of patient on IV pole, tubing provided, OR RN to manage from handoff onwards.

## 2023-01-09 NOTE — ANESTHESIA PROCEDURE NOTES
Arterial Line      Patient reassessed immediately prior to procedure    Patient location during procedure: pre-op   Line placed for ABGs/Labs/ISTAT and hemodynamic monitoring.  Performed By   Anesthesiologist: David Serrano MD   Preanesthetic Checklist  Completed: patient identified, IV checked, surgical consent, monitors and equipment checked, pre-op evaluation and timeout performed  Arterial Line Prep    Sterile Tech: mask, gloves and cap  Prep: ChloraPrep  Arterial Line Procedure   Laterality:left  Location:  radial artery  Catheter size: 20 G   Guidance: ultrasound guided  PROCEDURE NOTE/ULTRASOUND INTERPRETATION.  Using ultrasound guidance the potential vascular sites for insertion of the catheter were visualized to determine the patency of the vessel to be used for vascular access.  After selecting the appropriate site for insertion, the needle was visualized under ultrasound being inserted into the radial artery, followed by ultrasound confirmation of wire and catheter placement. There were no abnormalities seen on ultrasound; an image was taken; and the patient tolerated the procedure with no complications.   Number of attempts: 2  Successful placement: yes   Post Assessment   Dressing Type: biopatch applied.   Complications no   Patient Tolerance: patient tolerated the procedure well with no apparent complications  Additional Notes  Ultrasound interpretation note:  Under ultrasound guidance, potential vessels were viewed, radial artery seen patent.  Needle and wire seen entering vessel, catheter seen in vessel.  Image recorded and placed in chart.  No abnormalities noted.

## 2023-01-09 NOTE — PROGRESS NOTES
[facility]     LOS: 2 days     Subjective:    CC:No chief complaint on file.    DIET:  Diet Order   Procedures   • Diet: Regular/House Diet, Cardiac Diets; Healthy Heart (2-3 Na+); Texture: Regular Texture (IDDSI 7); Fluid Consistency: Thin (IDDSI 0)   no new complaints      Objective:    Vital Signs:  Temp:  [97.6 °F (36.4 °C)-99 °F (37.2 °C)] 98.2 °F (36.8 °C)  Heart Rate:  [] 88  Resp:  [16-23] 23  BP: (120-146)/(67-86) 123/75    SpO2:  [93 %-98 %] 94 %  on   ;   Device (Oxygen Therapy): CPAP  Body mass index is 38.65 kg/m².    Physical Exam  HENT:      Head: Normocephalic and atraumatic.   Cardiovascular:      Heart sounds: No murmur heard.    No friction rub.   Pulmonary:      Effort: Pulmonary effort is normal.      Breath sounds: Normal breath sounds.   Abdominal:      General: Bowel sounds are normal. There is no distension.      Palpations: Abdomen is soft.      Tenderness: There is no abdominal tenderness.   Skin:     General: Skin is warm and dry.         Results Review:    Glucose   Date Value Ref Range Status   01/09/2023 143 (H) 65 - 99 mg/dL Final   01/08/2023 177 (H) 65 - 99 mg/dL Final     Results from last 7 days   Lab Units 01/09/23  0458   WBC 10*3/mm3 8.60   HEMOGLOBIN g/dL 12.1*   HEMATOCRIT % 38.3   PLATELETS 10*3/mm3 409     Results from last 7 days   Lab Units 01/09/23  0458   SODIUM mmol/L 140   POTASSIUM mmol/L 4.0   CHLORIDE mmol/L 101   CO2 mmol/L 23.4   BUN mg/dL 11   CREATININE mg/dL 0.88   CALCIUM mg/dL 9.1   BILIRUBIN mg/dL 0.3   ALK PHOS U/L 52   ALT (SGPT) U/L 12   AST (SGOT) U/L 13   GLUCOSE mg/dL 143*     Results from last 7 days   Lab Units 01/08/23  0440   INR  1.16*         Results from last 7 days   Lab Units 01/08/23  0440   TROPONIN T ng/mL <0.010     Cultures:  No results found for: BLOODCX, URINECX, WOUNDCX, MRSACX, RESPCX, STOOLCX    I have reviewed daily medications and changes in CPOE    Scheduled meds  [MAR Hold] aspirin, 81 mg,  Oral, Daily  [MAR Hold] bumetanide, 2 mg, Oral, Daily  [MAR Hold] docusate sodium, 200 mg, Oral, BID  gabapentin, 600 mg, Oral, TID  [MAR Hold] insulin glargine, 15 Units, Subcutaneous, Nightly  [MAR Hold] insulin lispro, 0-7 Units, Subcutaneous, TID AC  [MAR Hold] isosorbide mononitrate, 30 mg, Oral, Daily  metoprolol succinate XL, 50 mg, Oral, Daily  [MAR Hold] nystatin, , Topical, Q12H  [MAR Hold] oxybutynin XL, 10 mg, Oral, Daily  [MAR Hold] polyethylene glycol, 17 g, Oral, BID  [MAR Hold] rosuvastatin, 40 mg, Oral, Daily  [MAR Hold] sacubitril-valsartan, 1 tablet, Oral, BID  [MAR Hold] sertraline, 100 mg, Oral, Daily  [MAR Hold] sodium chloride, 10 mL, Intravenous, Q12H  sodium chloride, 3 mL, Intravenous, Q12H  vancomycin, 1,500 mg, Intravenous, Q12H        lactated ringers, 50 mL/hr  lactated ringers, 9 mL/hr, Last Rate: 9 mL/hr (01/09/23 1403)  Pharmacy to dose vancomycin,       PRN meds  •  [MAR Hold] acetaminophen **OR** [MAR Hold] acetaminophen **OR** [MAR Hold] acetaminophen  •  [MAR Hold] bisacodyl  •  [MAR Hold] cyclobenzaprine  •  [MAR Hold] dextrose  •  [MAR Hold] dextrose  •  fentanyl  •  [MAR Hold] glucagon (human recombinant)  •  [MAR Hold] hydrOXYzine  •  lidocaine PF 1%  •  midazolam  •  [MAR Hold] Morphine **AND** [MAR Hold] naloxone  •  [MAR Hold] ondansetron  •  Pharmacy to dose vancomycin  •  sodium chloride  •  [MAR Hold] sodium chloride  •  sodium chloride  •  [MAR Hold] sodium chloride  •  [facility] OR LOCAL MIXTURE BUILDER        Pyogenic arthritis of right hip (HCC)    Right hemiparesis (HCC)    BELKYS on CPAP    Seizure disorder (HCC)    Type 2 diabetes mellitus (HCC)    Essential hypertension    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 Tracey is a 62-year-old male with history of pyogenic arthritis of the right hip, cardiomyopathy, MRSA, type 2 diabetes, seizure disorder, right hemiparesis post CVA, obstructive sleep apnea, hypertension  who was brought here to [facility] from [facility] for orthopedic consultation on right hip infection.     Pyogenic arthritis of the right hip  -Consult orthopedic surgery  -N.p.o. for surgery today  -Continue vancomycin   -seen by Consult infectious disease     Type 2 diabetes  -Accu-Cheks before meals and at bedtime with correctional dose insulin  -Hold metformin  -Continue long-acting insulin     History of CVA/seizure disorder  -Safety precautions-aspiration precautions  -Seizure precautions  - Continue topiramate 50 mg twice daily     CAD/hypertension/cardiomyopathy  -We will hold Plavix  -continue with aspirin  -cardiology has seen and ok with surgery  -current Echo with ejection fraction of 26-30%  -Continue metoprolol and Imdur             Jason Aranda MD  01/09/23  16:08 EST

## 2023-01-09 NOTE — NURSING NOTE
01/09/23 0946   Wound 01/09/23 0501 sacral spine   Placement Date/Time: 01/09/23 0501   Present on Hospital Admission: Yes  Location: sacral spine   Pressure Injury Stage 1   Dressing Appearance dry;intact   Base red   Periwound intact;blanchable   Edges irregular   Wound Length (cm) 3.5 cm   Wound Width (cm) 3.5 cm   Wound Surface Area (cm^2) 12.25 cm^2   Drainage Amount none   Care, Wound   (silicone border dressing in place)     WOCN: Coccyx pressure injury present on admission. Silicone border dressing in place. Peeled back to assess and reapplied. Patient with high pain with repositioning right hip. Heels bilateral blanchable. Turn every 2 hours and offload heels. Currently on low air loss mattress. Please call if any further needs.

## 2023-01-09 NOTE — ANESTHESIA PROCEDURE NOTES
Airway  Urgency: elective    Date/Time: 1/9/2023 2:39 PM  Airway not difficult    General Information and Staff    Patient location during procedure: OR  Anesthesiologist: David Serrano MD  CRNA/CAA: Alyx Bullock CRNA    Indications and Patient Condition  Indications for airway management: airway protection    Preoxygenated: yes  Mask difficulty assessment: 2 - vent by mask + OA or adjuvant +/- NMBA    Final Airway Details  Final airway type: endotracheal airway      Successful airway: ETT  Cuffed: yes   Successful intubation technique: direct laryngoscopy  Facilitating devices/methods: intubating stylet and cricoid pressure  Blade: CMAC  Blade size: D  ETT size (mm): 7.5  Cormack-Lehane Classification: grade I - full view of glottis  Placement verified by: chest auscultation and capnometry   Cuff volume (mL): 7  Measured from: lips  ETT/EBT  to lips (cm): 23  Number of attempts at approach: 1  Assessment: lips, teeth, and gum same as pre-op    Additional Comments  EBBS: ETCO2+: Atraumatic intubation

## 2023-01-09 NOTE — PROGRESS NOTES
"    Patient Name: Mandeep Tracey  :1960  62 y.o.      Patient Care Team:  Pipe Servin MD as PCP - General (Internal Medicine)    Chief Complaint: infected hip    Interval History: Mr. Tracey has a long standing nonischemic cardiomyopathy. More recently he was found to have coronary artery disease and [facility] He is followed by the [facility] advanced heart failure clinic. He had a primary ICD placed and was subsequently removed for infection. He also had cardiomems implanted which is presumed to be lodged distally and is not currently in use. He is on GDMT.     We obtained an echocardiogram here - EF 26-30%, severe MR, apical wall motion abnormality. Pretty un expected and LVEF improved since last evaluation I have currently. I think he had more recent testing at the [facility] and records have been requested.     He presented to the [facility] for orthopedic evaluation for infected prosthetic right hip infection. Dr. Beebe plans to take him to surgery today.     He is resting in bed. He denies SOA, PND, orthopnea.     He denies CP.     Extensive records from [facility] reviewed and records [facility] from the [facility].        Objective   Vital Signs  Temp:  [97.6 °F (36.4 °C)-99 °F (37.2 °C)] 98 °F (36.7 °C)  Heart Rate:  [] 76  Resp:  [16-20] 18  BP: (118-136)/(67-86) 120/73    Intake/Output Summary (Last 24 hours) at 2023 0856  Last data filed at 2023 0010  Gross per 24 hour   Intake 460 ml   Output 2400 ml   Net -1940 ml     Flowsheet Rows    Flowsheet Row First Filed Value   Admission Height 182.9 cm (72\") Documented at 2023 2344   Admission Weight 130 kg (285 lb 11.5 oz) Documented at 2023 2344          Physical Exam:   General Appearance:    Alert, cooperative, in no acute distress   Lungs:     Clear to auscultation.  Normal respiratory effort and rate.      Heart:    Regular rhythm and normal rate, normal S1 and S2, no murmurs, gallops or rubs.   "   Chest Wall:    No abnormalities observed   Abdomen:     Soft, nontender, positive bowel sounds.     Extremities:   no cyanosis, clubbing or edema.  No marked joint deformities.  Adequate musculoskeletal strength.       Results Review:    Results from last 7 days   Lab Units 01/09/23  0458   SODIUM mmol/L 140   POTASSIUM mmol/L 4.0   CHLORIDE mmol/L 101   CO2 mmol/L 23.4   BUN mg/dL 11   CREATININE mg/dL 0.88   GLUCOSE mg/dL 143*   CALCIUM mg/dL 9.1     Results from last 7 days   Lab Units 01/08/23  0440   TROPONIN T ng/mL <0.010     Results from last 7 days   Lab Units 01/09/23  0458   WBC 10*3/mm3 8.60   HEMOGLOBIN g/dL 12.1*   HEMATOCRIT % 38.3   PLATELETS 10*3/mm3 409     Results from last 7 days   Lab Units 01/08/23  0440   INR  1.16*                       Medication Review:   aspirin, 81 mg, Oral, Daily  bumetanide, 2 mg, Oral, Daily  docusate sodium, 200 mg, Oral, BID  gabapentin, 600 mg, Oral, TID  insulin glargine, 15 Units, Subcutaneous, Nightly  insulin lispro, 0-7 Units, Subcutaneous, TID AC  isosorbide mononitrate, 30 mg, Oral, Daily  metoprolol succinate XL, 50 mg, Oral, Daily  nystatin, , Topical, Q12H  oxybutynin XL, 10 mg, Oral, Daily  polyethylene glycol, 17 g, Oral, BID  rosuvastatin, 40 mg, Oral, Daily  sacubitril-valsartan, 1 tablet, Oral, BID  sertraline, 100 mg, Oral, Daily  sodium chloride, 10 mL, Intravenous, Q12H  vancomycin, 1,500 mg, Intravenous, Q12H         Pharmacy to dose vancomycin,           Assessment & Plan   1. Infected right prosthetic hip  2. HFrEF - echocardiogram 1/9/23 with LVEF 26-30%, apical wall motion abnormality. Followed by [facility] advanced heart failure clinic. GDMT with metoprolol succinate, sacubitril valsartan, and empagliflozin.   3. Severe mitral valve regurgitation  4. Coronary artery disease status post drug eluting stent placement to OM2 (March 2022).   5. History of infected ICD, placed for primary prevention and subsequently removed.   6. History of cardioMEMS  implant suspected to have migrated distally and causing infection. Lifetime suppressive doxy recommended by primary cards.   7. History of hyperkalemia on spironolactone.   8. Prior stroke with residual deficits  9. Diabetes mellitus type II  10. PAD.     Given his history of cardiomyopathy, heart failure, severe MR, insulin dependent diabetes mellitus and coronary artery disease, he is certainly at higher risk however he does not have any acute decompensation noted. He has been revascularized within the last year with a stent to the LAD. His volume status appears appropriate from a heart failure standpoint. His risk is not prohibitive nor modifiable at this time. Would proceed with surgery as indicated. We will watch him closely.       ESTRELLA Roberts  [facility]  01/09/23  08:56 EST

## 2023-01-10 PROBLEM — T84.51XA: Status: ACTIVE | Noted: 2023-01-10

## 2023-01-10 LAB
ANION GAP SERPL CALCULATED.3IONS-SCNC: 9 MMOL/L (ref 5–15)
BASOPHILS # BLD AUTO: 0.02 10*3/MM3 (ref 0–0.2)
BASOPHILS NFR BLD AUTO: 0.2 % (ref 0–1.5)
BUN SERPL-MCNC: 12 MG/DL (ref 8–23)
BUN/CREAT SERPL: 17.9 (ref 7–25)
CALCIUM SPEC-SCNC: 9.1 MG/DL (ref 8.6–10.5)
CHLORIDE SERPL-SCNC: 102 MMOL/L (ref 98–107)
CO2 SERPL-SCNC: 28 MMOL/L (ref 22–29)
CREAT SERPL-MCNC: 0.67 MG/DL (ref 0.76–1.27)
DEPRECATED RDW RBC AUTO: 44.7 FL (ref 37–54)
EGFRCR SERPLBLD CKD-EPI 2021: 106.9 ML/MIN/1.73
EOSINOPHIL # BLD AUTO: 0.04 10*3/MM3 (ref 0–0.4)
EOSINOPHIL NFR BLD AUTO: 0.3 % (ref 0.3–6.2)
ERYTHROCYTE [DISTWIDTH] IN BLOOD BY AUTOMATED COUNT: 14.8 % (ref 12.3–15.4)
GLUCOSE BLDC GLUCOMTR-MCNC: 190 MG/DL (ref 70–130)
GLUCOSE BLDC GLUCOMTR-MCNC: 212 MG/DL (ref 70–130)
GLUCOSE BLDC GLUCOMTR-MCNC: 214 MG/DL (ref 70–130)
GLUCOSE BLDC GLUCOMTR-MCNC: 257 MG/DL (ref 70–130)
GLUCOSE SERPL-MCNC: 222 MG/DL (ref 65–99)
HCT VFR BLD AUTO: 33.1 % (ref 37.5–51)
HGB BLD-MCNC: 10.4 G/DL (ref 13–17.7)
IMM GRANULOCYTES # BLD AUTO: 0.07 10*3/MM3 (ref 0–0.05)
IMM GRANULOCYTES NFR BLD AUTO: 0.6 % (ref 0–0.5)
LYMPHOCYTES # BLD AUTO: 1.24 10*3/MM3 (ref 0.7–3.1)
LYMPHOCYTES NFR BLD AUTO: 9.8 % (ref 19.6–45.3)
MCH RBC QN AUTO: 26.2 PG (ref 26.6–33)
MCHC RBC AUTO-ENTMCNC: 31.4 G/DL (ref 31.5–35.7)
MCV RBC AUTO: 83.4 FL (ref 79–97)
MONOCYTES # BLD AUTO: 0.84 10*3/MM3 (ref 0.1–0.9)
MONOCYTES NFR BLD AUTO: 6.7 % (ref 5–12)
NEUTROPHILS NFR BLD AUTO: 10.4 10*3/MM3 (ref 1.7–7)
NEUTROPHILS NFR BLD AUTO: 82.4 % (ref 42.7–76)
NRBC BLD AUTO-RTO: 0 /100 WBC (ref 0–0.2)
PLATELET # BLD AUTO: 400 10*3/MM3 (ref 140–450)
PMV BLD AUTO: 9.7 FL (ref 6–12)
POTASSIUM SERPL-SCNC: 3.9 MMOL/L (ref 3.5–5.2)
RBC # BLD AUTO: 3.97 10*6/MM3 (ref 4.14–5.8)
SODIUM SERPL-SCNC: 139 MMOL/L (ref 136–145)
VANCOMYCIN TROUGH SERPL-MCNC: 10 MCG/ML (ref 5–20)
WBC NRBC COR # BLD: 12.61 10*3/MM3 (ref 3.4–10.8)

## 2023-01-10 PROCEDURE — 97162 PT EVAL MOD COMPLEX 30 MIN: CPT

## 2023-01-10 PROCEDURE — 25010000002 VANCOMYCIN 5 G RECONSTITUTED SOLUTION: Performed by: HOSPITALIST

## 2023-01-10 PROCEDURE — 85025 COMPLETE CBC W/AUTO DIFF WBC: CPT | Performed by: ORTHOPAEDIC SURGERY

## 2023-01-10 PROCEDURE — 25010000002 VANCOMYCIN 10 G RECONSTITUTED SOLUTION: Performed by: HOSPITALIST

## 2023-01-10 PROCEDURE — 63710000001 INSULIN LISPRO (HUMAN) PER 5 UNITS: Performed by: ORTHOPAEDIC SURGERY

## 2023-01-10 PROCEDURE — 80202 ASSAY OF VANCOMYCIN: CPT | Performed by: HOSPITALIST

## 2023-01-10 PROCEDURE — 25010000002 HYDROMORPHONE PER 4 MG: Performed by: HOSPITALIST

## 2023-01-10 PROCEDURE — 97166 OT EVAL MOD COMPLEX 45 MIN: CPT | Performed by: OCCUPATIONAL THERAPIST

## 2023-01-10 PROCEDURE — 99232 SBSQ HOSP IP/OBS MODERATE 35: CPT | Performed by: INTERNAL MEDICINE

## 2023-01-10 PROCEDURE — 25010000002 HYDROMORPHONE 1 MG/ML SOLUTION: Performed by: ORTHOPAEDIC SURGERY

## 2023-01-10 PROCEDURE — 80048 BASIC METABOLIC PNL TOTAL CA: CPT | Performed by: ORTHOPAEDIC SURGERY

## 2023-01-10 PROCEDURE — 97530 THERAPEUTIC ACTIVITIES: CPT

## 2023-01-10 PROCEDURE — 82962 GLUCOSE BLOOD TEST: CPT

## 2023-01-10 PROCEDURE — 99232 SBSQ HOSP IP/OBS MODERATE 35: CPT | Performed by: NURSE PRACTITIONER

## 2023-01-10 PROCEDURE — 97110 THERAPEUTIC EXERCISES: CPT | Performed by: OCCUPATIONAL THERAPIST

## 2023-01-10 RX ORDER — OXYCODONE AND ACETAMINOPHEN 7.5; 325 MG/1; MG/1
1 TABLET ORAL EVERY 4 HOURS PRN
Status: DISCONTINUED | OUTPATIENT
Start: 2023-01-10 | End: 2023-01-10

## 2023-01-10 RX ORDER — CLOPIDOGREL BISULFATE 75 MG/1
75 TABLET ORAL DAILY
Status: DISCONTINUED | OUTPATIENT
Start: 2023-01-10 | End: 2023-01-16 | Stop reason: HOSPADM

## 2023-01-10 RX ORDER — OXYCODONE AND ACETAMINOPHEN 7.5; 325 MG/1; MG/1
2 TABLET ORAL EVERY 4 HOURS PRN
Status: DISCONTINUED | OUTPATIENT
Start: 2023-01-10 | End: 2023-01-10

## 2023-01-10 RX ORDER — OXYCODONE AND ACETAMINOPHEN 10; 325 MG/1; MG/1
1 TABLET ORAL EVERY 4 HOURS PRN
Status: DISCONTINUED | OUTPATIENT
Start: 2023-01-10 | End: 2023-01-16 | Stop reason: HOSPADM

## 2023-01-10 RX ORDER — CYCLOBENZAPRINE HCL 10 MG
10 TABLET ORAL 3 TIMES DAILY PRN
Status: DISCONTINUED | OUTPATIENT
Start: 2023-01-10 | End: 2023-01-16 | Stop reason: HOSPADM

## 2023-01-10 RX ORDER — HYDROMORPHONE HYDROCHLORIDE 1 MG/ML
0.5 INJECTION, SOLUTION INTRAMUSCULAR; INTRAVENOUS; SUBCUTANEOUS
Status: DISCONTINUED | OUTPATIENT
Start: 2023-01-10 | End: 2023-01-13

## 2023-01-10 RX ADMIN — SACUBITRIL AND VALSARTAN 1 TABLET: 24; 26 TABLET, FILM COATED ORAL at 08:43

## 2023-01-10 RX ADMIN — HYDROMORPHONE HYDROCHLORIDE 0.5 MG: 1 INJECTION, SOLUTION INTRAMUSCULAR; INTRAVENOUS; SUBCUTANEOUS at 18:37

## 2023-01-10 RX ADMIN — VANCOMYCIN HYDROCHLORIDE 1250 MG: 10 INJECTION, POWDER, LYOPHILIZED, FOR SOLUTION INTRAVENOUS at 21:56

## 2023-01-10 RX ADMIN — Medication 10 ML: at 21:56

## 2023-01-10 RX ADMIN — GABAPENTIN 600 MG: 300 CAPSULE ORAL at 21:56

## 2023-01-10 RX ADMIN — ROSUVASTATIN CALCIUM 40 MG: 40 TABLET, FILM COATED ORAL at 08:43

## 2023-01-10 RX ADMIN — INSULIN LISPRO 4 UNITS: 100 INJECTION, SOLUTION INTRAVENOUS; SUBCUTANEOUS at 17:21

## 2023-01-10 RX ADMIN — SACUBITRIL AND VALSARTAN 1 TABLET: 24; 26 TABLET, FILM COATED ORAL at 21:55

## 2023-01-10 RX ADMIN — CLOPIDOGREL 75 MG: 75 TABLET, FILM COATED ORAL at 16:13

## 2023-01-10 RX ADMIN — METOPROLOL SUCCINATE 50 MG: 50 TABLET, FILM COATED, EXTENDED RELEASE ORAL at 08:43

## 2023-01-10 RX ADMIN — CYCLOBENZAPRINE 10 MG: 10 TABLET, FILM COATED ORAL at 16:13

## 2023-01-10 RX ADMIN — CYCLOBENZAPRINE 10 MG: 10 TABLET, FILM COATED ORAL at 09:07

## 2023-01-10 RX ADMIN — ISOSORBIDE MONONITRATE 30 MG: 30 TABLET, EXTENDED RELEASE ORAL at 08:43

## 2023-01-10 RX ADMIN — Medication 10 ML: at 09:08

## 2023-01-10 RX ADMIN — ASPIRIN 81 MG: 81 TABLET, COATED ORAL at 08:43

## 2023-01-10 RX ADMIN — VANCOMYCIN HYDROCHLORIDE 1250 MG: 10 INJECTION, POWDER, LYOPHILIZED, FOR SOLUTION INTRAVENOUS at 09:08

## 2023-01-10 RX ADMIN — OXYCODONE HYDROCHLORIDE AND ACETAMINOPHEN 1 TABLET: 10; 325 TABLET ORAL at 17:21

## 2023-01-10 RX ADMIN — OXYBUTYNIN CHLORIDE 10 MG: 10 TABLET, EXTENDED RELEASE ORAL at 08:43

## 2023-01-10 RX ADMIN — INSULIN GLARGINE-YFGN 15 UNITS: 100 INJECTION, SOLUTION SUBCUTANEOUS at 21:56

## 2023-01-10 RX ADMIN — OXYCODONE HYDROCHLORIDE AND ACETAMINOPHEN 2 TABLET: 7.5; 325 TABLET ORAL at 09:08

## 2023-01-10 RX ADMIN — OXYCODONE HYDROCHLORIDE AND ACETAMINOPHEN 1 TABLET: 10; 325 TABLET ORAL at 21:55

## 2023-01-10 RX ADMIN — Medication 10 ML: at 21:57

## 2023-01-10 RX ADMIN — BUMETANIDE 2 MG: 2 TABLET ORAL at 08:43

## 2023-01-10 RX ADMIN — POLYETHYLENE GLYCOL 3350 17 G: 17 POWDER, FOR SOLUTION ORAL at 08:43

## 2023-01-10 RX ADMIN — GABAPENTIN 600 MG: 300 CAPSULE ORAL at 08:43

## 2023-01-10 RX ADMIN — NYSTATIN: 100000 POWDER TOPICAL at 09:08

## 2023-01-10 RX ADMIN — INSULIN LISPRO 3 UNITS: 100 INJECTION, SOLUTION INTRAVENOUS; SUBCUTANEOUS at 12:11

## 2023-01-10 RX ADMIN — INSULIN LISPRO 3 UNITS: 100 INJECTION, SOLUTION INTRAVENOUS; SUBCUTANEOUS at 08:43

## 2023-01-10 RX ADMIN — GABAPENTIN 600 MG: 300 CAPSULE ORAL at 16:13

## 2023-01-10 RX ADMIN — OXYCODONE HYDROCHLORIDE AND ACETAMINOPHEN 2 TABLET: 7.5; 325 TABLET ORAL at 13:21

## 2023-01-10 RX ADMIN — SERTRALINE 100 MG: 100 TABLET, FILM COATED ORAL at 08:43

## 2023-01-10 RX ADMIN — NYSTATIN: 100000 POWDER TOPICAL at 21:56

## 2023-01-10 RX ADMIN — DOCUSATE SODIUM 200 MG: 100 CAPSULE, LIQUID FILLED ORAL at 08:43

## 2023-01-10 RX ADMIN — OXYCODONE AND ACETAMINOPHEN 2 TABLET: 5; 325 TABLET ORAL at 04:14

## 2023-01-10 RX ADMIN — HYDROMORPHONE HYDROCHLORIDE 2 MG: 1 INJECTION, SOLUTION INTRAMUSCULAR; INTRAVENOUS; SUBCUTANEOUS at 10:21

## 2023-01-10 NOTE — PROGRESS NOTES
[facility] Clinical Pharmacy Services: Vancomycin Level Monitoring Note    Mandeep Tracey is a 62 y.o. male who is on day 3/42 of pharmacy to dose vancomycin for Bone and/or Joint Infection.    Estimated Creatinine Clearance: 162.9 mL/min (A) (by C-G formula based on SCr of 0.67 mg/dL (L)).    Current Vanc Dose: 1250 mg IV every  12  hours  Results from last 7 days   Lab Units 01/10/23  0753 01/09/23  1357   VANCOMYCIN TR mcg/mL 10.00 15.10   Predicted AUC at current dose:425 mg/L.hr  Next Level Date and Time: Vanc Trough on 1/11 @ 0830    No changes at this time. Pharmacy is continuing to monitor and will adjust as needed.    Huy Velasquez [facility]  Clinical Pharmacist

## 2023-01-10 NOTE — PROGRESS NOTES
ID PROGRESS NOTE    CC: f/u R hip PJI due to GPC    Subj: OR yesterday for I&D w/ removal of prosthesis and insertion of an antibiotic spacer. Culture pending but gram stain has GPCs. History from chart and pt's wife since his communication is limited.     Medications:    Current Facility-Administered Medications:   •  acetaminophen (TYLENOL) tablet 650 mg, 650 mg, Oral, Q4H PRN **OR** acetaminophen (TYLENOL) 160 MG/5ML solution 650 mg, 650 mg, Oral, Q4H PRN **OR** acetaminophen (TYLENOL) suppository 650 mg, 650 mg, Rectal, Q4H PRN, Faustina Beebe MD  •  acetaminophen (TYLENOL) tablet 325 mg, 325 mg, Oral, Q4H PRN, Faustina Beebe MD  •  aspirin EC tablet 81 mg, 81 mg, Oral, Daily, Faustina Beebe MD, 81 mg at 01/10/23 0843  •  aspirin EC tablet 81 mg, 81 mg, Oral, Q12H, Faustina Beebe MD  •  bisacodyl (DULCOLAX) EC tablet 10 mg, 10 mg, Oral, Daily PRN, Faustina Beebe MD  •  bisacodyl (DULCOLAX) suppository 10 mg, 10 mg, Rectal, Daily PRN, Faustina Beebe MD  •  bumetanide (BUMEX) tablet 2 mg, 2 mg, Oral, Daily, Faustina Beebe MD, 2 mg at 01/10/23 0843  •  cyclobenzaprine (FLEXERIL) tablet 10 mg, 10 mg, Oral, TID PRN, Faustina Beebe MD, 10 mg at 01/10/23 0907  •  dextrose (D50W) (25 g/50 mL) IV injection 25 g, 25 g, Intravenous, Q15 Min PRN, Faustina Beebe MD  •  dextrose (GLUTOSE) oral gel 15 g, 15 g, Oral, Q15 Min PRN, Faustina Beebe MD  •  docusate sodium (COLACE) capsule 100 mg, 100 mg, Oral, BID PRN, Faustina Beebe MD  •  docusate sodium (COLACE) capsule 200 mg, 200 mg, Oral, BID, Fautsina Beebe MD, 200 mg at 01/10/23 0843  •  gabapentin (NEURONTIN) capsule 600 mg, 600 mg, Oral, TID, Faustina Beebe MD, 600 mg at 01/10/23 0843  •  glucagon (human recombinant) (GLUCAGEN DIAGNOSTIC) injection 1 mg, 1 mg, Intramuscular, Q15 Min PRN, Faustina Beebe MD  •  HYDROmorphone (DILAUDID)  injection 2 mg, 2 mg, Intramuscular, Q4H PRN **AND** naloxone (NARCAN) injection 0.1 mg, 0.1 mg, Intravenous, Q5 Min PRN, Faustina Beebe MD  •  hydrOXYzine (ATARAX) tablet 25 mg, 25 mg, Oral, TID PRN, Faustina Beebe MD  •  insulin glargine (LANTUS, SEMGLEE) injection 15 Units, 15 Units, Subcutaneous, Nightly, Faustina Beebe MD, 15 Units at 01/09/23 2131  •  insulin lispro (ADMELOG) injection 0-7 Units, 0-7 Units, Subcutaneous, TID AC, Faustina Beebe MD, 3 Units at 01/10/23 0843  •  isosorbide mononitrate (IMDUR) 24 hr tablet 30 mg, 30 mg, Oral, Daily, Faustina Beebe MD, 30 mg at 01/10/23 0843  •  lactated ringers infusion, 50 mL/hr, Intravenous, Continuous, Faustina Beebe MD  •  melatonin tablet 1 mg, 1 mg, Oral, Nightly PRN, Faustina Beebe MD  •  metoprolol succinate XL (TOPROL-XL) 24 hr tablet 50 mg, 50 mg, Oral, Daily, Faustina Beebe MD, 50 mg at 01/10/23 0843  •  nystatin (MYCOSTATIN) powder, , Topical, Q12H, Faustina Beebe MD, Given at 01/09/23 2133  •  ondansetron (ZOFRAN) injection 4 mg, 4 mg, Intravenous, Q6H PRN, Faustina Beebe MD  •  ondansetron (ZOFRAN) tablet 4 mg, 4 mg, Oral, Q6H PRN **OR** ondansetron (ZOFRAN) injection 4 mg, 4 mg, Intravenous, Q6H PRN, Faustina Beebe MD  •  oxybutynin XL (DITROPAN-XL) 24 hr tablet 10 mg, 10 mg, Oral, Daily, Faustina Beebe MD, 10 mg at 01/10/23 0843  •  oxyCODONE-acetaminophen (PERCOCET) 7.5-325 MG per tablet 1 tablet, 1 tablet, Oral, Q4H PRN **OR** oxyCODONE-acetaminophen (PERCOCET) 7.5-325 MG per tablet 2 tablet, 2 tablet, Oral, Q4H PRN, Faustina Beebe MD, 2 tablet at 01/10/23 0908  •  Pharmacy to dose vancomycin, , Does not apply, Continuous PRN, Faustina Beebe MD  •  polyethylene glycol (MIRALAX) packet 17 g, 17 g, Oral, BID, Faustina Beebe MD, 17 g at 01/10/23 0843  •  rosuvastatin (CRESTOR) tablet 40 mg, 40 mg,  Oral, Daily, Faustina Beebe MD, 40 mg at 01/10/23 0843  •  sacubitril-valsartan (ENTRESTO) 24-26 MG tablet 1 tablet, 1 tablet, Oral, BID, Faustina Beebe MD, 1 tablet at 01/10/23 0843  •  sertraline (ZOLOFT) tablet 100 mg, 100 mg, Oral, Daily, Faustina Beebe MD, 100 mg at 01/10/23 0843  •  sodium chloride 0.9 % flush 1-10 mL, 1-10 mL, Intravenous, PRN, Faustina Beebe MD  •  sodium chloride 0.9 % flush 10 mL, 10 mL, Intravenous, Q12H, Faustina Beebe MD, 10 mL at 01/09/23 2132  •  sodium chloride 0.9 % flush 10 mL, 10 mL, Intravenous, PRN, Faustina Beebe MD  •  sodium chloride 0.9 % flush 10 mL, 10 mL, Intravenous, Q12H, Faustina Beebe MD, 10 mL at 01/09/23 2133  •  sodium chloride 0.9 % infusion 40 mL, 40 mL, Intravenous, PRN, Faustina Beebe MD  •  sodium chloride 0.9 % infusion, 100 mL/hr, Intravenous, Continuous, Faustina Beebe MD, Last Rate: 100 mL/hr at 01/09/23 2132, 100 mL/hr at 01/09/23 2132  •  vancomycin 1250 mg/250 mL 0.9% NS IVPB ([facility]), 1,250 mg, Intravenous, Q12H, Jason Aranda MD, 1,250 mg at 01/09/23 2131      Objective   Vital Signs   Temp:  [97.1 °F (36.2 °C)-98.9 °F (37.2 °C)] 97.1 °F (36.2 °C)  Heart Rate:  [77-93] 77  Resp:  [16-23] 16  BP: (123-170)/(54-88) 128/73    Physical Exam:   General: awake, alert, very nice  Eyes: no scleral icterus  ENT: no thrush  Cardiovascular: NR  Respiratory: normal work of breathing  GI: Abdomen is soft  :  no Chavez catheter  MSK: R hip bandaged  Skin: Candida intertrigo in the groin    Labs:   CBC, BMP, VTr, and tissue culture reviewed today  Lab Results   Component Value Date    WBC 12.61 (H) 01/10/2023    HGB 10.4 (L) 01/10/2023    HCT 33.1 (L) 01/10/2023    MCV 83.4 01/10/2023     01/10/2023     Lab Results   Component Value Date    GLUCOSE 222 (H) 01/10/2023    CALCIUM 9.1 01/10/2023     01/10/2023    K 3.9 01/10/2023    CO2 28.0 01/10/2023      01/10/2023    BUN 12 01/10/2023    CREATININE 0.67 (L) 01/10/2023    EGFRIFNONA 119 08/02/2021    BCR 17.9 01/10/2023    ANIONGAP 9.0 01/10/2023     Lab Results   Component Value Date    CRP 2.10 (H) 01/08/2023     Lab Results   Component Value Date    HGBA1C 7.80 (H) 01/08/2023     Lab Results   Component Value Date    VANCOTROUGH 10.00 01/10/2023    VANCORANDOM 12 03/11/2021     Microbiology:  1/9 OR R Hip Cx: GPC on gram stain; culture NGTD    ASSESSMENT/PLAN:  1. R hip PJI - recurrent  2. Obesity BMI 38  3. Uncontrolled DM2 - A1c 7.8% - complicating above  4. History of stroke and hemiparesis  5. Candida intertrigo    On 1/9/23, he underwent  I&D w/ removal of prosthesis and insertion of an antibiotic spacer. Culture pending but gram stain has GPCs. Continue vancomycin w/ goal -600. Dose was adjusted to 1250 mg IV q12h yesterday. I will go ahead and order a PICC line. He prefers it to be placed in his right arm. I plan a 6-week course of antibiotics w/ stop date 2/22/23 at which time he will follow-up w/ me in the ID clinic.     For Candida intertrigo, continue Nystatin powder to the groin with duration TBD.     ID will follow. I anticipate final recommendations tomorrow.

## 2023-01-10 NOTE — PLAN OF CARE
Goal Outcome Evaluation:  Plan of Care Reviewed With: patient        Progress: improving  Outcome Evaluation: vss, nvi, dressing c/d/i, hv in place (keep until tomorrow), sat EOB with PT/OT (nonambulator at baseline), voiding per urinal, turned q2,pain controlled with PO meds and flexeril, HH vs SNF when medically stable, educated on glucose meds and monitoring

## 2023-01-10 NOTE — ANESTHESIA POSTPROCEDURE EVALUATION
Patient: Mandeep Tracey    Procedure Summary     Date: 01/09/23 Room / Location: [facility] OR [address] [place] [address] MAIN OR    Anesthesia Start: 1422 Anesthesia Stop: 1753    Procedure: RT. BIPOLAR HIP REMOVAL AND PLACEMENT OF SPACER (Right: Hip) Diagnosis:       Infection and inflammatory reaction due to internal right hip prosthesis, initial encounter ([facility])      (Infection and inflammatory reaction due to internal right hip prosthesis, initial encounter ([facility]) [T84.51XA])    Surgeons: Faustina Beebe MD Provider: David Serrano MD    Anesthesia Type: general ASA Status: 3          Anesthesia Type: general    Vitals  Vitals Value Taken Time   /71 01/09/23 1916   Temp 37.2 °C (98.9 °F) 01/09/23 1747   Pulse 88 01/09/23 1931   Resp 16 01/09/23 1915   SpO2 96 % 01/09/23 1931   Vitals shown include unvalidated device data.        Post Anesthesia Care and Evaluation    Level of consciousness: awake  Pain management: adequate    Airway patency: patent  Anesthetic complications: No anesthetic complications  PONV Status: none  Cardiovascular status: acceptable  Respiratory status: acceptable  Hydration status: acceptable

## 2023-01-10 NOTE — CASE MANAGEMENT/SOCIAL WORK
Discharge Planning Assessment  [facility]     Patient Name: Mandeep Tracey  MRN: [id number]  Today's Date: 1/10/2023    Admit Date: 1/7/2023    Plan: referral pending to [facility]   Discharge Needs Assessment     Row Name 01/10/23 1011       Living Environment    People in Home spouse    Name(s) of People in Home Erica drummond    Current Living Arrangements home    Primary Care Provided by spouse/significant other    Provides Primary Care For no one, unable/limited ability to care for self    Family Caregiver if Needed spouse    Family Caregiver Names Erica drummond [phone]    Quality of Family Relationships helpful;involved;supportive    Able to Return to Prior Arrangements yes       Resource/Environmental Concerns    Resource/Environmental Concerns none       Transition Planning    Patient/Family Anticipates Transition to inpatient rehabilitation facility    Patient/Family Anticipated Services at Transition     Transportation Anticipated family or friend will provide       Discharge Needs Assessment    Readmission Within the Last 30 Days no previous admission in last 30 days    Equipment Currently Used at Home cpap;hospital bed;walker, standard;wheelchair, motorized;shower chair;grab bar;ramp;power chair,(recliner lift)               Discharge Plan     Row Name 01/10/23 1023       Plan    Plan Comments Per ID note, patient will need 6 weeks of IV abx and PICC line placed. Nimco Osborne RN    Row Name 01/10/23 1014       Plan    Plan Comments Spoke with patient and wife, Erica Tracey ( [phone]), at bedside.  Introduced self and explained role.  Facesheet verified. [facility]   Patient lives with his wife in a ranch style home with 2 steps to enter. The home does have a ramp.  Patient requires assistance with ADLS and at his baseline, he is able to transfer with assist x1.  He does not ambulate.  He has a wheelchair, cane, walker, shower chair,  c-pap, motorized scooter, 3 in 1 commode and motorized wheelchair.  He has someone at home with him 24/7. He has caregivers  5x week for 2 hours a day [facility]. Patient has been to several rehab facilities, but wife strongly prefers that he go to [facility].  Referral for [facility] placed in [facility] and called to [employer].  Per [employer], she had already [facility].  Transfer packet started and in [facility] office.  Will follow. Nimco Osborne RN    Row Name 01/10/23 1013       Plan    Plan referral pending to [facility]    Patient/Family in Agreement with Plan yes              Continued Care and Services - Admitted Since 1/7/2023     Destination     Service Provider Request Status Selected Services Address Phone Fax Patient Preferred    [facility] Pending - Request Sent N/A [address] [id number] [phone] --                 Demographic Summary     Row Name 01/10/23 1011       General Information    Admission Type inpatient    Arrived From home    Referral Source admission list    Reason for Consult discharge planning    Preferred Language English               Functional Status     Row Name 01/10/23 1011       Functional Status    Usual Activity Tolerance fair    Current Activity Tolerance fair       Functional Status, IADL    Medications assistive equipment and person    Meal Preparation assistive equipment and person    Housekeeping assistive equipment and person    Laundry assistive equipment and person    Shopping assistive equipment and person               Psychosocial    No documentation.                Abuse/Neglect    No documentation.                Legal    No documentation.                Substance Abuse    No documentation.                Patient Forms    No documentation.                   Nimco Osborne RN

## 2023-01-10 NOTE — THERAPY EVALUATION
Patient Name: Mandeep Tracey  : 1960    MRN: [id number]                              Today's Date: 1/10/2023       Admit Date: 2023    Visit Dx:     ICD-10-CM ICD-9-CM   1. Infection and inflammatory reaction due to internal right hip prosthesis, initial encounter ([facility])  T84.51XA 996.66   2. Follow-up exam  Z09 V67.9     Patient Active Problem List   Diagnosis   • CVA (cerebral vascular accident) ([facility])   • Right hemiparesis ([facility])   • BELKYS on CPAP   • Seizure disorder ([facility])   • Type 2 diabetes mellitus ([facility])   • Essential hypertension   • Infection of right prosthetic hip joint ([facility])   • MSSA (methicillin susceptible Staphylococcus aureus) infection   • Group B streptococcal infection   • UTI (urinary tract infection)   • Postoperative nausea and vomiting   • Nonrheumatic mitral valve regurgitation   • Cardiomyopathy ([facility])   • PVCs (premature ventricular contractions)   • Pyogenic arthritis of right hip ([facility])     Past Medical History:   Diagnosis Date   • Aphasia    • Cardiomyopathy ([facility])    • CPAP (continuous positive airway pressure) dependence    • Diabetes ([facility])    • Hemiparesis ([facility])     Right side    • Morbid obesity ([facility])    • Nonrheumatic mitral valve regurgitation    • BELKYS on CPAP    • Peptic ulcer disease    • Postoperative nausea and vomiting 3/13/2021   • Psoriasis    • Pyogenic arthritis of right hip ([facility])    • Seizures ([facility])    • Sleep apnea    • Stroke ([facility])    • Ventricular ectopy     asymptomatic     Past Surgical History:   Procedure Laterality Date   • CHOLECYSTECTOMY     • EYE SURGERY     • HIP SPACER INSERTION WITH ANTIBIOTIC CEMENT Right 2023    Procedure: RT. BIPOLAR HIP REMOVAL AND PLACEMENT OF SPACER;  Surgeon: Faustina Beebe MD;  Location: [facility];  Service: Orthopedics;  Laterality: Right;   • INCISION AND DRAINAGE HIP Right 3/12/2021    Procedure: HIP ANTERIOR INCISION AND DRAINAGE WITH HANA TABLE;  Surgeon: Tao Andrea MD;  Location: [facility];   Service: Orthopedics;  Laterality: Right;   • LUMBAR DISC SURGERY     • US GUIDED FINE NEEDLE ASPIRATION  3/10/2021      General Information     Row Name 01/10/23 1520          OT Time and Intention    Document Type evaluation  -     Mode of Treatment occupational therapy;physical therapy;co-treatment  -     Row Name 01/10/23 1520          General Information    Patient Profile Reviewed yes  -     Prior Level of Function mod assist:;ADL's;min assist:;bed mobility;transfer  did not walk PTA  -     Existing Precautions/Restrictions fall;right;hip, posterior  -     Barriers to Rehab medically complex;previous functional deficit  -     Row Name 01/10/23 1520          Living Environment    People in Home spouse  -     Row Name 01/10/23 1520          Cognition    Orientation Status (Cognition) oriented x 3  -[facility] Name 01/10/23 1520          Safety Issues, Functional Mobility    Impairments Affecting Function (Mobility) balance;endurance/activity tolerance;strength  -           User Key  (r) = Recorded By, (t) = Taken By, (c) = Cosigned By    Initials Name Provider Type     Mirta Jones, OTR Occupational Therapist                 Mobility/ADL's     Row Name 01/10/23 1521          Bed Mobility    Bed Mobility scooting/bridging;supine-sit;sit-supine  -     Scooting/Bridging [place] (Bed Mobility) standby assist;verbal cues  -     Supine-Sit [place] (Bed Mobility) set up;verbal cues;minimum assist (75% patient effort);2 person assist  -     Sit-Supine [place] (Bed Mobility) set up;moderate assist (50% patient effort);2 person assist  -[facility] Name 01/10/23 1521          Transfers    Comment, (Transfers) NT  -     Row Name 01/10/23 1521          Functional Mobility    Functional Mobility- Comment NT pt non ambulatory PTA  -     Row Name 01/10/23 1521          Activities of Daily Living    BADL Assessment/Intervention --  wife reports she A him PTA w LBD and UBD  ADLs. pt w h/o stroke from 20 years ago  -     Row Name 01/10/23 1521          Mobility    Extremity Weight-bearing Status right lower extremity  -     Right Lower Extremity (Weight-bearing Status) weight-bearing as tolerated (WBAT)  -           User Key  (r) = Recorded By, (t) = Taken By, (c) = Cosigned By    Initials Name Provider Type    Mirta Busby OTR Occupational Therapist               Obj/Interventions     Row Name 01/10/23 1522          Range of Motion Comprehensive    Comment, General Range of Motion pt demo movement in R shoulder from previous stroke about 1/2 AROM. no hand mvt, wrist, or forearm. only movement in R elbow when moving R shoulder  -     Row Name 01/10/23 1522          Strength Comprehensive (MMT)    Comment, General Manual Muscle Testing (MMT) Assessment previous defecit w R UE from stroke 20 years ago  -     Row Name 01/10/23 1522          Motor Skills    Motor Skills coordination;functional endurance  -     Functional Endurance good  -[facility] Name 01/10/23 1522          Balance    Balance Assessment sitting static balance;sitting dynamic balance  -     Static Sitting Balance standby assist  -     Dynamic Sitting Balance standby assist  -     Balance Interventions sitting;static;dynamic  -           User Key  (r) = Recorded By, (t) = Taken By, (c) = Cosigned By    Initials Name Provider Type    Mirta Busby OTR Occupational Therapist               Goals/Plan     Row Name 01/10/23 1525          Bathing Goal 1 (OT)    Activity/Device (Bathing Goal 1, OT) bathing skills, all  -     [place] Level/Cues Needed (Bathing Goal 1, OT) moderate assist (50-74% patient effort)  -     Time Frame (Bathing Goal 1, OT) short term goal (STG);2 weeks  -     Progress/Outcomes (Bathing Goal 1, OT) goal ongoing  -     Row Name 01/10/23 1525          Dressing Goal 1 (OT)    Activity/Device (Dressing Goal 1, OT) upper body dressing  -      [place]/Cues Needed (Dressing Goal 1, OT) moderate assist (50-74% patient effort)  -     Time Frame (Dressing Goal 1, OT) short term goal (STG);2 weeks  -KP     Progress/Outcome (Dressing Goal 1, OT) goal ongoing  -     Row Name 01/10/23 1525          Strength Goal 1 (OT)    Strength Goal 1 (OT) incr L UE to 5/5  -KP     Time Frame (Strength Goal 1, OT) short term goal (STG);2 weeks  -KP     Progress/Outcome (Strength Goal 1, OT) goal ongoing  -     Row Name 01/10/23 1525          Therapy Assessment/Plan (OT)    Planned Therapy Interventions (OT) activity tolerance training;functional balance retraining;occupation/activity based interventions;BADL retraining;ROM/therapeutic exercise;strengthening exercise  -           User Key  (r) = Recorded By, (t) = Taken By, (c) = Cosigned By    Initials Name Provider Type    Mirta Busby, OTR Occupational Therapist               Clinical Impression     Row Name 01/10/23 1523          Pain Assessment    Pretreatment Pain Rating 0/10 - no pain  -     Posttreatment Pain Rating 0/10 - no pain  -     Row Name 01/10/23 1523          Plan of Care Review    Plan of Care Reviewed With patient;spouse  -     Progress improving  -     Outcome Evaluation pt admitted to the hospital s/p R total hip w posterior precautions s/p infection. pt had A PTA due to previous CVA 20 years ago. pt has some movement in R shoulder but is not functional to A w UBD/LBD and wife A w those areas at home. pt demo bed mobility w minx2 sup to sit and mod x2 sit to sup. pt able to sit EOB SBA and was non ambulatory PTA. pt and wife ed on hip precautions and both verbally understand. pt cont to benefit from OT to incr ADL, strength, balance, and tsf.  -     Row Name 01/10/23 1523          Therapy Assessment/Plan (OT)    Rehab Potential (OT) good, to achieve stated therapy goals  -     Criteria for Skilled Therapeutic Interventions Met (OT) yes;meets criteria;skilled treatment  is necessary  -     Therapy Frequency (OT) 5 times/wk  -     Row Name 01/10/23 1523          Therapy Plan Review/Discharge Plan (OT)    Anticipated Discharge Disposition (OT) skilled nursing facility  -     Row Name 01/10/23 1523          Positioning and Restraints    Pre-Treatment Position in bed  -KP     Post Treatment Position bed  -KP     In Bed supine;call light within reach;encouraged to call for assist;exit alarm on;with family/caregiver  -           User Key  (r) = Recorded By, (t) = Taken By, (c) = Cosigned By    Initials Name Provider Type    Mirta Busby, OTR Occupational Therapist               Outcome Measures     Row Name 01/10/23 1526          How much help from another is currently needed...    Putting on and taking off regular lower body clothing? 1  -KP     Bathing (including washing, rinsing, and drying) 2  -KP     Toileting (which includes using toilet bed pan or urinal) 1  -KP     Putting on and taking off regular upper body clothing 2  -KP     Taking care of personal grooming (such as brushing teeth) 2  -KP     Eating meals 2  -KP     AM-PAC 6 Clicks Score (OT) 10  -     Row Name 01/10/23 0908          How much help from another person do you currently need...    Turning from your back to your side while in flat bed without using bedrails? 2  -MN     Moving from lying on back to sitting on the side of a flat bed without bedrails? 2  -MN     Moving to and from a bed to a chair (including a wheelchair)? 2  -MN     Standing up from a chair using your arms (e.g., wheelchair, bedside chair)? 2  -MN     Climbing 3-5 steps with a railing? 1  -MN     To walk in hospital room? 1  -MN     AM-PAC 6 Clicks Score (PT) 10  -MN     Highest level of mobility 4 --> Transferred to chair/commode  -MN     Row Name 01/10/23 1526          Functional Assessment    Outcome Measure Options AM-PAC 6 Clicks Daily Activity (OT)  -           User Key  (r) = Recorded By, (t) = Taken By, (c) =  Cosigned By    Initials Name Provider Type     Mirta Jones OTR Occupational Therapist    Tari Hartman RN Registered Nurse                Occupational Therapy Education     Title: PT OT SLP Therapies (Done)     Topic: Occupational Therapy (Done)     Point: ADL training (Done)     Description:   Instruct learner(s) on proper safety adaptation and remediation techniques during self care or transfers.   Instruct in proper use of assistive devices.              Learning Progress Summary           Patient Acceptance, E,TB,D, VU,DU by  at 1/10/2023 1527    Comment: ed pt and wife on role of OT. and benefit of therapy, pt w prev CVA 20 years ago. pt ed on hip precautions. pt and wife verbally understand.   Family Acceptance, E,TB,D, VU,DU by  at 1/10/2023 1527    Comment: ed pt and wife on role of OT. and benefit of therapy, pt w prev CVA 20 years ago. pt ed on hip precautions. pt and wife verbally understand.                   Point: Home exercise program (Done)     Description:   Instruct learner(s) on appropriate technique for monitoring, assisting and/or progressing therapeutic exercises/activities.              Learning Progress Summary           Patient Acceptance, E,TB,D, VU,DU by  at 1/10/2023 1527    Comment: ed pt and wife on role of OT. and benefit of therapy, pt w prev CVA 20 years ago. pt ed on hip precautions. pt and wife verbally understand.   Family Acceptance, E,TB,D, VU,DU by  at 1/10/2023 1527    Comment: ed pt and wife on role of OT. and benefit of therapy, pt w prev CVA 20 years ago. pt ed on hip precautions. pt and wife verbally understand.                   Point: Precautions (Done)     Description:   Instruct learner(s) on prescribed precautions during self-care and functional transfers.              Learning Progress Summary           Patient Acceptance, E,TB,D, VU,DU by  at 1/10/2023 1527    Comment: ed pt and wife on role of OT. and benefit of therapy, pt w prev  CVA 20 years ago. pt ed on hip precautions. pt and wife verbally understand.   Family Acceptance, E,TB,D, VU,DU by  at 1/10/2023 1527    Comment: ed pt and wife on role of OT. and benefit of therapy, pt w prev CVA 20 years ago. pt ed on hip precautions. pt and wife verbally understand.                   Point: Body mechanics (Done)     Description:   Instruct learner(s) on proper positioning and spine alignment during self-care, functional mobility activities and/or exercises.              Learning Progress Summary           Patient Acceptance, E,TB,D, VU,DU by  at 1/10/2023 1527    Comment: ed pt and wife on role of OT. and benefit of therapy, pt w prev CVA 20 years ago. pt ed on hip precautions. pt and wife verbally understand.   Family Acceptance, E,TB,D, VU,DU by  at 1/10/2023 1527    Comment: ed pt and wife on role of OT. and benefit of therapy, pt w prev CVA 20 years ago. pt ed on hip precautions. pt and wife verbally understand.                               User Key     Initials Effective Dates Name Provider Type Discipline     06/16/21 -  Mirta Jones, OTR Occupational Therapist OT              OT Recommendation and Plan  Planned Therapy Interventions (OT): activity tolerance training, functional balance retraining, occupation/activity based interventions, BADL retraining, ROM/therapeutic exercise, strengthening exercise  Therapy Frequency (OT): 5 times/wk  Plan of Care Review  Plan of Care Reviewed With: patient, spouse  Progress: improving  Outcome Evaluation: pt admitted to the hospital s/p R total hip w posterior precautions s/p infection. pt had A PTA due to previous CVA 20 years ago. pt has some movement in R shoulder but is not functional to A w UBD/LBD and wife A w those areas at home. pt demo bed mobility w minx2 sup to sit and mod x2 sit to sup. pt able to sit EOB SBA and was non ambulatory PTA. pt and wife ed on hip precautions and both verbally understand. pt cont to benefit  from OT to incr ADL, strength, balance, and tsf.     Time Calculation:    Time Calculation- OT     Row Name 01/10/23 1528             Time Calculation- OT    OT Start Time 1431  -KP      OT Stop Time 1458  -KP      OT Time Calculation (min) 27 min  -KP      Total Timed Code Minutes- OT 12 minute(s)  -KP      OT Received On 01/10/23  -      OT - Next Appointment 01/11/23  -      OT Goal Re-Cert Due Date 01/24/23  -         Timed Charges    28111 - OT Therapeutic Exercise Minutes 12  -KP         Untimed Charges    OT Eval/Re-eval Minutes 15  -KP         Total Minutes    Timed Charges Total Minutes 12  -KP      Untimed Charges Total Minutes 15  -KP       Total Minutes 27  -KP            User Key  (r) = Recorded By, (t) = Taken By, (c) = Cosigned By    Initials Name Provider Type    Mirta Busby OTR Occupational Therapist              Therapy Charges for Today     Code Description Service Date Service Provider Modifiers Qty    68581035236 HC OT THER PROC EA 15 MIN 1/10/2023 Mirta Jones OTR GO 1    42407387771 HC OT EVAL MOD COMPLEXITY 2 1/10/2023 Mirta Jones OTR GO 1               BRANDEN Jules  1/10/2023

## 2023-01-10 NOTE — PLAN OF CARE
Goal Outcome Evaluation:  Plan of Care Reviewed With: patient, spouse        Progress: improving  Outcome Evaluation: pt admitted to the hospital s/p R total hip w posterior precautions s/p infection. pt had A PTA due to previous CVA 20 years ago. pt has some movement in R shoulder but is not functional to A w UBD/LBD and wife A w those areas at home. pt demo bed mobility w minx2 sup to sit and mod x2 sit to sup. pt able to sit EOB SBA and was non ambulatory PTA. pt and wife ed on hip precautions and both verbally understand. pt cont to benefit from OT to incr ADL, strength, balance, and tsf.

## 2023-01-10 NOTE — PROGRESS NOTES
[facility]     LOS: 3 days     Subjective:    CC:No chief complaint on file.    DIET:  Diet Order   Procedures   • Diet: Regular/House Diet, Cardiac Diets; Healthy Heart (2-3 Na+); Texture: Regular Texture (IDDSI 7); Fluid Consistency: Thin (IDDSI 0)   no new complaints      Objective:    Vital Signs:  Temp:  [97.1 °F (36.2 °C)-98.9 °F (37.2 °C)] 97.1 °F (36.2 °C)  Heart Rate:  [77-93] 77  Resp:  [16-18] 16  BP: (128-170)/(54-88) 128/73    SpO2:  [87 %-97 %] 97 %  on  Flow (L/min):  [3-6] 3;   Device (Oxygen Therapy): CPAP  Body mass index is 38.65 kg/m².    Physical Exam  HENT:      Head: Normocephalic and atraumatic.   Cardiovascular:      Heart sounds: No murmur heard.    No friction rub.   Pulmonary:      Effort: Pulmonary effort is normal.      Breath sounds: Normal breath sounds.   Abdominal:      General: Bowel sounds are normal. There is no distension.      Palpations: Abdomen is soft.      Tenderness: There is no abdominal tenderness.   Skin:     General: Skin is warm and dry.         Results Review:    Glucose   Date Value Ref Range Status   01/10/2023 222 (H) 65 - 99 mg/dL Final   01/09/2023 143 (H) 65 - 99 mg/dL Final   01/08/2023 177 (H) 65 - 99 mg/dL Final     Results from last 7 days   Lab Units 01/10/23  0753   WBC 10*3/mm3 12.61*   HEMOGLOBIN g/dL 10.4*   HEMATOCRIT % 33.1*   PLATELETS 10*3/mm3 400     Results from last 7 days   Lab Units 01/10/23  0753 01/09/23  0458   SODIUM mmol/L 139 140   POTASSIUM mmol/L 3.9 4.0   CHLORIDE mmol/L 102 101   CO2 mmol/L 28.0 23.4   BUN mg/dL 12 11   CREATININE mg/dL 0.67* 0.88   CALCIUM mg/dL 9.1 9.1   BILIRUBIN mg/dL  --  0.3   ALK PHOS U/L  --  52   ALT (SGPT) U/L  --  12   AST (SGOT) U/L  --  13   GLUCOSE mg/dL 222* 143*     Results from last 7 days   Lab Units 01/08/23  0440   INR  1.16*         Results from last 7 days   Lab Units 01/08/23  0440   TROPONIN T ng/mL <0.010     Cultures:  No results found for: BLOODCX, URINECX,  WOUNDCX, MRSACX, RESPCX, STOOLCX    I have reviewed daily medications and changes in CPOE    Scheduled meds  aspirin, 81 mg, Oral, Daily  bumetanide, 2 mg, Oral, Daily  docusate sodium, 200 mg, Oral, BID  gabapentin, 600 mg, Oral, TID  insulin glargine, 15 Units, Subcutaneous, Nightly  insulin lispro, 0-7 Units, Subcutaneous, TID AC  isosorbide mononitrate, 30 mg, Oral, Daily  metoprolol succinate XL, 50 mg, Oral, Daily  nystatin, , Topical, Q12H  oxybutynin XL, 10 mg, Oral, Daily  polyethylene glycol, 17 g, Oral, BID  rosuvastatin, 40 mg, Oral, Daily  sacubitril-valsartan, 1 tablet, Oral, BID  sertraline, 100 mg, Oral, Daily  sodium chloride, 10 mL, Intravenous, Q12H  sodium chloride, 10 mL, Intravenous, Q12H  vancomycin, 1,250 mg, Intravenous, Q12H        lactated ringers, 50 mL/hr  Pharmacy to dose vancomycin,   sodium chloride, 100 mL/hr, Last Rate: 100 mL/hr (01/09/23 2132)      PRN meds  •  acetaminophen **OR** acetaminophen **OR** acetaminophen  •  acetaminophen  •  bisacodyl  •  bisacodyl  •  cyclobenzaprine  •  dextrose  •  dextrose  •  docusate sodium  •  glucagon (human recombinant)  •  HYDROmorphone **AND** naloxone  •  hydrOXYzine  •  melatonin  •  ondansetron  •  ondansetron **OR** ondansetron  •  oxyCODONE-acetaminophen **OR** oxyCODONE-acetaminophen  •  Pharmacy to dose vancomycin  •  sodium chloride  •  sodium chloride  •  sodium chloride        Pyogenic arthritis of right hip (HCC)    Right hemiparesis (HCC)    BELKYS on CPAP    Seizure disorder (HCC)    Type 2 diabetes mellitus (HCC)    Essential hypertension    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 Tracey is a 62-year-old male with history of pyogenic arthritis of the right hip, cardiomyopathy, MRSA, type 2 diabetes, seizure disorder, right hemiparesis post CVA, obstructive sleep apnea, hypertension who was brought here to [facility] from [facility] for orthopedic  consultation on right hip infection.     Pyogenic arthritis of the right hip  -s/p RIGHT  BIPOLAR HIP REMOVAL AND PLACEMENT OF SPACER    -Continue vancomycin   -seen by Consult infectious disease     Type 2 diabetes  -Accu-Cheks before meals and at bedtime with correctional dose insulin  -Hold metformin  -Continue long-acting insulin     History of CVA/seizure disorder  -Safety precautions-aspiration precautions  -Seizure precautions  - Continue topiramate 50 mg twice daily     CAD/hypertension/cardiomyopathy  -restart Plavix  -continue with aspirin  -cardiology has seen   -current Echo with ejection fraction of 26-30%  -Continue metoprolol and Imdur             Jason Aranda MD  01/10/23  13:53 EST

## 2023-01-10 NOTE — THERAPY EVALUATION
Patient Name: Mandeep Tracey  : 1960    MRN: [id number]                              Today's Date: 1/10/2023       Admit Date: 2023    Visit Dx:     ICD-10-CM ICD-9-CM   1. Infection and inflammatory reaction due to internal right hip prosthesis, initial encounter ([facility])  T84.51XA 996.66   2. Follow-up exam  Z09 V67.9     Patient Active Problem List   Diagnosis   • CVA (cerebral vascular accident) ([facility])   • Right hemiparesis ([facility])   • BELKYS on CPAP   • Seizure disorder ([facility])   • Type 2 diabetes mellitus ([facility])   • Essential hypertension   • Infection of right prosthetic hip joint ([facility])   • MSSA (methicillin susceptible Staphylococcus aureus) infection   • Group B streptococcal infection   • UTI (urinary tract infection)   • Postoperative nausea and vomiting   • Nonrheumatic mitral valve regurgitation   • Cardiomyopathy ([facility])   • PVCs (premature ventricular contractions)   • Pyogenic arthritis of right hip ([facility])     Past Medical History:   Diagnosis Date   • Aphasia    • Cardiomyopathy ([facility])    • CPAP (continuous positive airway pressure) dependence    • Diabetes ([facility])    • Hemiparesis ([facility])     Right side    • Morbid obesity ([facility])    • Nonrheumatic mitral valve regurgitation    • BELKYS on CPAP    • Peptic ulcer disease    • Postoperative nausea and vomiting 3/13/2021   • Psoriasis    • Pyogenic arthritis of right hip ([facility])    • Seizures ([facility])    • Sleep apnea    • Stroke ([facility])    • Ventricular ectopy     asymptomatic     Past Surgical History:   Procedure Laterality Date   • CHOLECYSTECTOMY     • EYE SURGERY     • HIP SPACER INSERTION WITH ANTIBIOTIC CEMENT Right 2023    Procedure: RT. BIPOLAR HIP REMOVAL AND PLACEMENT OF SPACER;  Surgeon: Faustina Beebe MD;  Location: [facility];  Service: Orthopedics;  Laterality: Right;   • INCISION AND DRAINAGE HIP Right 3/12/2021    Procedure: HIP ANTERIOR INCISION AND DRAINAGE WITH HANA TABLE;  Surgeon: Tao Andrea MD;  Location: [facility];   Service: Orthopedics;  Laterality: Right;   • LUMBAR DISC SURGERY     • US GUIDED FINE NEEDLE ASPIRATION  3/10/2021      General Information     Row Name 01/10/23 1525          Physical Therapy Time and Intention    Document Type evaluation  -DB     Mode of Treatment occupational therapy;physical therapy;co-treatment  -DB     Row Name 01/10/23 1525          General Information    Patient Profile Reviewed yes  -DB     Prior Level of Function mod assist:;ADL's;transfer  -DB     Existing Precautions/Restrictions fall;right;hip, posterior  -DB     Barriers to Rehab medically complex;previous functional deficit  CVA resulting in R yoly  -DB     Row Name 01/10/23 1525          Living Environment    People in Home spouse  -DB     Row Name 01/10/23 1525          Home Main Entrance    Number of Stairs, Main Entrance none  -DB     Row Name 01/10/23 1525          Stairs Within Home, Primary    Number of Stairs, Within Home, Primary none  -DB     Row Name 01/10/23 1525          Cognition    Orientation Status (Cognition) oriented x 3  -DB     Row Name 01/10/23 1525          Safety Issues, Functional Mobility    Impairments Affecting Function (Mobility) balance;endurance/activity tolerance;strength  -DB           User Key  (r) = Recorded By, (t) = Taken By, (c) = Cosigned By    Initials Name Provider Type    DB Marlin Ladd PT Physical Therapist               Mobility     Row Name 01/10/23 1526          Bed Mobility    Bed Mobility scooting/bridging;supine-sit;sit-supine  -DB     Scooting/Bridging [place] (Bed Mobility) standby assist;verbal cues  -DB     Supine-Sit [place] (Bed Mobility) set up;verbal cues;minimum assist (75% patient effort);2 person assist  -DB     Sit-Supine [place] (Bed Mobility) set up;moderate assist (50% patient effort);2 person assist;minimum assist (75% patient effort)  -DB     Assistive Device (Bed Mobility) bed rails;head of bed elevated  -DB     Row Name 01/10/23 1526           Sit-Stand Transfer    Sit-Stand [place] (Transfers) not tested  -DB     Row Name 01/10/23 1526          Gait/Stairs (Locomotion)    [place] Level (Gait) not tested  -DB     Row Name 01/10/23 1526          Mobility    Extremity Weight-bearing Status right lower extremity  -DB     Right Lower Extremity (Weight-bearing Status) weight-bearing as tolerated (WBAT)  -DB           User Key  (r) = Recorded By, (t) = Taken By, (c) = Cosigned By    Initials Name Provider Type    DB Marlin Ladd, CHRISTOPHER Physical Therapist               Obj/Interventions     Row Name 01/10/23 1527          Strength Comprehensive (MMT)    Comment, General Manual Muscle Testing (MMT) Assessment previous deficit in RLE from CVA. 0/5 MMT R DF/PF, 2+/5 R knee extension  -DB     Row Name 01/10/23 1527          Balance    Balance Assessment sitting static balance;sitting dynamic balance  -DB     Static Sitting Balance standby assist  -DB     Dynamic Sitting Balance standby assist  -DB     Position, Sitting Balance unsupported;sitting edge of bed  -DB     Balance Interventions sitting  -DB           User Key  (r) = Recorded By, (t) = Taken By, (c) = Cosigned By    Initials Name Provider Type    DB Marlin Ladd, PT Physical Therapist               Goals/Plan     Row Name 01/10/23 1533          Bed Mobility Goal 1 (PT)    Activity/Assistive Device (Bed Mobility Goal 1, PT) bed mobility activities, all  -DB     [place] Level/Cues Needed (Bed Mobility Goal 1, PT) standby assist  -DB     Time Frame (Bed Mobility Goal 1, PT) 1 week  -DB     Row Name 01/10/23 1533          Transfer Goal 1 (PT)    Activity/Assistive Device (Transfer Goal 1, PT) transfers, all  -DB     [place] Level/Cues Needed (Transfer Goal 1, PT) moderate assist (50-74% patient effort)  -DB     Time Frame (Transfer Goal 1, PT) 1 week  -DB     Row Name 01/10/23 1533          Therapy Assessment/Plan (PT)    Planned Therapy Interventions (PT) balance training;bed mobility  training;home exercise program;neuromuscular re-education;postural re-education;transfer training;strengthening;patient/family education  -DB           User Key  (r) = Recorded By, (t) = Taken By, (c) = Cosigned By    Initials Name Provider Type    DB Marlin Ladd PT Physical Therapist               Clinical Impression     Row Name 01/10/23 1528          Pain    Pain Intervention(s) Ambulation/increased activity;Repositioned  -DB     Row Name 01/10/23 1528          Plan of Care Review    Plan of Care Reviewed With patient;spouse  -DB     Outcome Evaluation Pt is a 61 y/o M POD1 placement of spacer on RLE. At baseline, pt has R sided weakness d/t hx of CVA. Pt recieves assist from wife at home to transfer but does not ambulate. Pt today was Domingo x 2 for sup<>sit and mod/Domingo x 2 for sit<>sup. Pt was able to sit EOB for ~ 10 min and perform LE ther ex with good sitting balance. Pt and wife were educated on posterior hip precautions and verbalize understanding. Pt demo's dec'd strength and endurance and would benefit from skilled PT. Rec D/C home with 24/7 assist and HH vs SNF.  -DB     Row Name 01/10/23 1528          Therapy Assessment/Plan (PT)    Rehab Potential (PT) good, to achieve stated therapy goals  -DB     Criteria for Skilled Interventions Met (PT) yes  -DB     Therapy Frequency (PT) daily  -DB     Row Name 01/10/23 1528          Vital Signs    O2 Delivery Pre Treatment room air  -DB     O2 Delivery Intra Treatment room air  -DB     O2 Delivery Post Treatment room air  -DB     Pre Patient Position Supine  -DB     Intra Patient Position Sitting  -DB     Post Patient Position Supine  -DB     Row Name 01/10/23 1528          Positioning and Restraints    Pre-Treatment Position in bed  -DB     Post Treatment Position bed  -DB     In Bed supine;call light within reach;encouraged to call for assist;exit alarm on;with family/caregiver  -DB           User Key  (r) = Recorded By, (t) = Taken By, (c) = Cosigned By     Initials Name Provider Type    Marlin Thakur, CHRISTOPHER Physical Therapist               Outcome Measures     Row Name 01/10/23 1533 01/10/23 0908       How much help from another person do you currently need...    Turning from your back to your side while in flat bed without using bedrails? 2  -DB 2  -MN    Moving from lying on back to sitting on the side of a flat bed without bedrails? 2  -DB 2  -MN    Moving to and from a bed to a chair (including a wheelchair)? 2  -DB 2  -MN    Standing up from a chair using your arms (e.g., wheelchair, bedside chair)? 2  -DB 2  -MN    Climbing 3-5 steps with a railing? 1  -DB 1  -MN    To walk in hospital room? 1  -DB 1  -MN    AM-PAC 6 Clicks Score (PT) 10  -DB 10  -MN    Highest level of mobility 4 --> Transferred to chair/commode  -DB 4 --> Transferred to chair/commode  -MN    Row Name 01/10/23 1533 01/10/23 1526       Functional Assessment    Outcome Measure Options AM-PAC 6 Clicks Basic Mobility (PT)  -DB AM-PAC 6 Clicks Daily Activity (OT)  -KP          User Key  (r) = Recorded By, (t) = Taken By, (c) = Cosigned By    Initials Name Provider Type    Mirta Busby OTR Occupational Therapist    Tari Hartman RN Registered Nurse    Marlin Thakur, PT Physical Therapist                             Physical Therapy Education     Title: PT OT SLP Therapies (Done)     Topic: Physical Therapy (Done)     Point: Mobility training (Done)     Learning Progress Summary           Patient Acceptance, E, VU by DB at 1/10/2023 1534   Significant Other Acceptance, E, VU by DB at 1/10/2023 1534                   Point: Home exercise program (Done)     Learning Progress Summary           Patient Acceptance, E, VU by DB at 1/10/2023 1534   Significant Other Acceptance, E, VU by DB at 1/10/2023 1534                   Point: Body mechanics (Done)     Learning Progress Summary           Patient Acceptance, E, VU by DB at 1/10/2023 1534   Significant Other Acceptance,  E, VU by DB at 1/10/2023 1534                   Point: Precautions (Done)     Learning Progress Summary           Patient Acceptance, E, VU by DB at 1/10/2023 1534   Significant Other Acceptance, E, VU by DB at 1/10/2023 1534                               User Key     Initials Effective Dates Name Provider Type Discipline    DB 06/16/21 -  Marlin Ladd PT Physical Therapist PT              PT Recommendation and Plan  Planned Therapy Interventions (PT): balance training, bed mobility training, home exercise program, neuromuscular re-education, postural re-education, transfer training, strengthening, patient/family education  Plan of Care Reviewed With: patient, spouse  Outcome Evaluation: Pt is a 61 y/o M POD1 placement of spacer on RLE. At baseline, pt has R sided weakness d/t hx of CVA. Pt recieves assist from wife at home to transfer but does not ambulate. Pt today was Domingo x 2 for sup<>sit and mod/[place] x 2 for sit<>sup. Pt was able to sit EOB for ~ 10 min and perform LE ther ex with good sitting balance. Pt and wife were educated on posterior hip precautions and verbalize understanding. Pt demo's dec'd strength and endurance and would benefit from skilled PT. Rec D/C home with 24/7 assist and HH vs SNF.     Time Calculation:    PT Charges     Row Name 01/10/23 1535             Time Calculation    Start Time 1430  -DB      Stop Time 1457  -DB      Time Calculation (min) 27 min  -DB      PT Received On 01/10/23  -DB      PT - Next Appointment 01/11/23  -DB      PT Goal Re-Cert Due Date 01/17/23  -DB         Time Calculation- PT    Total Timed Code Minutes- PT 10 minute(s)  -DB            User Key  (r) = Recorded By, (t) = Taken By, (c) = Cosigned By    Initials Name Provider Type    DB Marlin Ladd PT Physical Therapist              Therapy Charges for Today     Code Description Service Date Service Provider Modifiers Qty    48304333234 HC PT EVAL MOD COMPLEXITY 3 1/10/2023 Marlin Ladd PT GP 1     [id number]  PT THERAPEUTIC ACT EA 15 MIN 1/10/2023 Marlin Ladd, PT GP 1          PT G-Codes  Outcome Measure Options: AM-PAC 6 Clicks Basic Mobility (PT)  AM-PAC 6 Clicks Score (PT): 10  AM-PAC 6 Clicks Score (OT): 10  PT Discharge Summary  Anticipated Discharge Disposition (PT): home with 24/7 care, home with home health, skilled nursing facility    Marlin Ladd, PT  1/10/2023

## 2023-01-10 NOTE — PROGRESS NOTES
Patient: Mandeep Trcaey  YOB: 1960     Date of Admission: 1/7/2023 10:43 PM Medical Record Number: [id number]     Attending Physician: Jason Aranda MD    Procedure(s):  RT. BIPOLAR HIP REMOVAL AND PLACEMENT OF SPACER Post Operative Day Number: 1    Subjective : No new orthopaedic complaints     Pain Relief: some relief with present medication.     Systemic Complaints: No Complaints  Vitals:    01/09/23 1915 01/09/23 1954 01/10/23 0240 01/10/23 0550   BP: 136/71 148/87 138/74 128/73   BP Location:  Left arm Left arm Left arm   Patient Position:  Lying Lying Lying   Pulse: 93 81 81 77   Resp: 16 16 16 16   Temp:  97.5 °F (36.4 °C) 97.8 °F (36.6 °C) 97.1 °F (36.2 °C)   TempSrc:  Oral Oral Oral   SpO2: 94% 97% 95% 97%   Weight:       Height:           Physical Exam: 62 y.o. male    General Appearance:       Alert, cooperative, in no acute distress                  Extremities:    Dressing Clean, Dry and Intact         Incision healthy without signs or symptoms of infections         No clinical sign of DVT        Able to do good movements of digits    Pulses:   Pulses palpable and equal bilaterally           Diagnostic Tests:     Results from last 7 days   Lab Units 01/10/23  0753 01/09/23  0458 01/08/23  0440   WBC 10*3/mm3 12.61* 8.60 8.35   HEMOGLOBIN g/dL 10.4* 12.1* 11.2*   HEMATOCRIT % 33.1* 38.3 36.3*   PLATELETS 10*3/mm3 400 409 352     Results from last 7 days   Lab Units 01/10/23  0753 01/09/23  0458 01/08/23  0440   SODIUM mmol/L 139 140 141   POTASSIUM mmol/L 3.9 4.0 4.1   CHLORIDE mmol/L 102 101 106   CO2 mmol/L 28.0 23.4 23.1   BUN mg/dL 12 11 10   CREATININE mg/dL 0.67* 0.88 0.73*   GLUCOSE mg/dL 222* 143* 177*   CALCIUM mg/dL 9.1 9.1 9.2     Results from last 7 days   Lab Units 01/08/23 0440   INR  1.16*     Lab Results   Component Value Date    CRP 2.10 (H) 01/08/2023     Lab Results   Component Value Date    SEDRATE 66 (H) 01/08/2023     No results found for: URICACID  Lab  Results   Component Value Date    CRYSTAL None Seen 03/25/2021     Microbiology Results (last 10 days)     Procedure Component Value - Date/Time    Tissue / Bone Culture - Tissue, Hip, Right [[id number]] Collected: 01/09/23 1641    Lab Status: Preliminary result Specimen: Tissue from Hip, Right Updated: 01/10/23 1003     Tissue Culture No growth     Gram Stain Moderate (3+) WBCs per low power field      Occasional Gram positive cocci        XR Hip With or Without Pelvis 1 View Right    Result Date: 1/9/2023  1. Satisfactory postoperative appearance of the right hip.  This report was finalized on 1/9/2023 6:30 PM by Dr. Sherman Alvarenga M.D.              Current Medications:  Scheduled Meds:aspirin, 81 mg, Oral, Daily  aspirin, 81 mg, Oral, Q12H  bumetanide, 2 mg, Oral, Daily  docusate sodium, 200 mg, Oral, BID  gabapentin, 600 mg, Oral, TID  insulin glargine, 15 Units, Subcutaneous, Nightly  insulin lispro, 0-7 Units, Subcutaneous, TID AC  isosorbide mononitrate, 30 mg, Oral, Daily  metoprolol succinate XL, 50 mg, Oral, Daily  nystatin, , Topical, Q12H  oxybutynin XL, 10 mg, Oral, Daily  polyethylene glycol, 17 g, Oral, BID  rosuvastatin, 40 mg, Oral, Daily  sacubitril-valsartan, 1 tablet, Oral, BID  sertraline, 100 mg, Oral, Daily  sodium chloride, 10 mL, Intravenous, Q12H  sodium chloride, 10 mL, Intravenous, Q12H  vancomycin, 1,250 mg, Intravenous, Q12H      Continuous Infusions:lactated ringers, 50 mL/hr  Pharmacy to dose vancomycin,   sodium chloride, 100 mL/hr, Last Rate: 100 mL/hr (01/09/23 2132)      PRN Meds:.•  acetaminophen **OR** acetaminophen **OR** acetaminophen  •  acetaminophen  •  bisacodyl  •  bisacodyl  •  cyclobenzaprine  •  dextrose  •  dextrose  •  docusate sodium  •  glucagon (human recombinant)  •  HYDROmorphone **AND** naloxone  •  hydrOXYzine  •  melatonin  •  ondansetron  •  ondansetron **OR** ondansetron  •  oxyCODONE-acetaminophen **OR** oxyCODONE-acetaminophen  •  Pharmacy to dose  vancomycin  •  sodium chloride  •  sodium chloride  •  sodium chloride    Assessment:    Procedure(s):  RT. BIPOLAR HIP REMOVAL AND PLACEMENT OF SPACER    Patient Active Problem List   Diagnosis   • CVA (cerebral vascular accident) (HCC)   • Right hemiparesis (HCC)   • BELKYS on CPAP   • Seizure disorder (HCC)   • Type 2 diabetes mellitus (HCC)   • Essential hypertension   • Infection of right prosthetic hip joint (HCC)   • MSSA (methicillin susceptible Staphylococcus aureus) infection   • Group B streptococcal infection   • UTI (urinary tract infection)   • Postoperative nausea and vomiting   • Nonrheumatic mitral valve regurgitation   • Cardiomyopathy ([facility])   • PVCs (premature ventricular contractions)   • Pyogenic arthritis of right hip (HCC)       PLAN:   Continues current post-op course  Anticoagulation: Aspirin started, OK to restart Plavix and change aspirin to home dose  Hemovac Drain to be removed tomorrow  ID service on board    Weight Bearing: WBAT  Discharge Plan: OK to plan for discharge in  tomorrow to SNF  from orthopadic perspective.      Faustina Beebe MD    Date: 1/10/2023    Time: 12:29 EST

## 2023-01-10 NOTE — PROGRESS NOTES
"    Patient Name: Mandeep Tracey  :1960  62 y.o.      Patient Care Team:  Pipe Servin MD as PCP - General (Internal Medicine)    Chief Complaint: infected hip    Interval History: He did very well with surgery. He feels good today. Wife thinks he looks fantastic. More energy.        Objective   Vital Signs  Temp:  [97.1 °F (36.2 °C)-98.9 °F (37.2 °C)] 97.1 °F (36.2 °C)  Heart Rate:  [77-93] 77  Resp:  [16-18] 16  BP: (128-170)/(54-88) 128/73    Intake/Output Summary (Last 24 hours) at 1/10/2023 1351  Last data filed at 1/10/2023 1300  Gross per 24 hour   Intake 710 ml   Output 550 ml   Net 160 ml     Flowsheet Rows    Flowsheet Row First Filed Value   Admission Height 182.9 cm (72\") Documented at 2023 2344   Admission Weight 130 kg (285 lb 11.5 oz) Documented at 2023 2344          Physical Exam:   General Appearance:    Alert, cooperative, in no acute distress   Lungs:     Clear to auscultation.  Normal respiratory effort and rate.      Heart:    Regular rhythm and normal rate, normal S1 and S2, no murmurs, gallops or rubs.     Chest Wall:    No abnormalities observed   Abdomen:     Soft, nontender, positive bowel sounds.     Extremities:   no cyanosis, clubbing or edema.  No marked joint deformities.  Adequate musculoskeletal strength.       Results Review:    Results from last 7 days   Lab Units 01/10/23  0753   SODIUM mmol/L 139   POTASSIUM mmol/L 3.9   CHLORIDE mmol/L 102   CO2 mmol/L 28.0   BUN mg/dL 12   CREATININE mg/dL 0.67*   GLUCOSE mg/dL 222*   CALCIUM mg/dL 9.1     Results from last 7 days   Lab Units 23  0440   TROPONIN T ng/mL <0.010     Results from last 7 days   Lab Units 01/10/23  0753   WBC 10*3/mm3 12.61*   HEMOGLOBIN g/dL 10.4*   HEMATOCRIT % 33.1*   PLATELETS 10*3/mm3 400     Results from last 7 days   Lab Units 23  0440   INR  1.16*                       Medication Review:   aspirin, 81 mg, Oral, Daily  aspirin, 81 mg, Oral, Q12H  bumetanide, 2 mg, Oral, " Daily  docusate sodium, 200 mg, Oral, BID  gabapentin, 600 mg, Oral, TID  insulin glargine, 15 Units, Subcutaneous, Nightly  insulin lispro, 0-7 Units, Subcutaneous, TID AC  isosorbide mononitrate, 30 mg, Oral, Daily  metoprolol succinate XL, 50 mg, Oral, Daily  nystatin, , Topical, Q12H  oxybutynin XL, 10 mg, Oral, Daily  polyethylene glycol, 17 g, Oral, BID  rosuvastatin, 40 mg, Oral, Daily  sacubitril-valsartan, 1 tablet, Oral, BID  sertraline, 100 mg, Oral, Daily  sodium chloride, 10 mL, Intravenous, Q12H  sodium chloride, 10 mL, Intravenous, Q12H  vancomycin, 1,250 mg, Intravenous, Q12H         lactated ringers, 50 mL/hr  Pharmacy to dose vancomycin,   sodium chloride, 100 mL/hr, Last Rate: 100 mL/hr (01/09/23 2132)          Assessment & Plan   1. Infected right prosthetic hip  2. HFrEF - echocardiogram 1/9/23 with LVEF 26-30%, apical wall motion abnormality. Followed by [facility] advanced heart failure clinic. GDMT with metoprolol succinate, sacubitril valsartan, and empagliflozin.   3. Severe mitral valve regurgitation  4. Coronary artery disease status post drug eluting stent placement to OM2 (March 2022).   5. History of infected ICD, placed for primary prevention and subsequently removed.   6. History of cardioMEMS implant suspected to have migrated distally and causing infection. Lifetime suppressive doxy recommended by primary cards.   7. History of hyperkalemia on spironolactone.   8. Prior stroke with residual deficits  9. Diabetes mellitus type II  10. PAD.     Doing well post operatively from a cardiac standpoint. Volume status appears compensated. Will see as needed. He should follow up with the [facility] heart failure clinic. Please call with additional questions.    ESTRELLA Roberts  [facility]  01/10/23  13:51 EST

## 2023-01-10 NOTE — PROGRESS NOTES
"[facility] Clinical Pharmacy Services: Vancomycin Monitoring Note    Mandeep Tracey is a 62 y.o. male who is on day 2/5 of pharmacy to dose vancomycin for MRSA Bone and/or Joint Infection.    Previous Vancomycin Dose:   1500 mg IV every  12  hours  Updated Cultures and Sensitivities: none collected  Results from last 7 days   Lab Units 01/09/23  1357   VANCOMYCIN TR mcg/mL 15.10     Vitals/Labs  Ht: 182.9 cm (72\"); Wt: 129 kg (285 lb)   Temp Readings from Last 1 Encounters:   01/09/23 98.2 °F (36.8 °C) (Oral)     Estimated Creatinine Clearance: 124 mL/min (by C-G formula based on SCr of 0.88 mg/dL).       Results from last 7 days   Lab Units 01/09/23  0458 01/08/23  0440   CREATININE mg/dL 0.88 0.73*   WBC 10*3/mm3 8.60 8.35     Assessment/Plan    Current Vancomycin Dose: will lower dose slightly to 1250 mg IV every  12  hours; provides a predicted  mg/L.hr   Next Level Date and Time: Vanc Trough on 1/10 at 0800  We will continue to monitor patient changes and renal function     Thank you for involving pharmacy in this patient's care. Please contact pharmacy with any questions or concerns.       Naila Devine, [facility]  Clinical Pharmacist          "

## 2023-01-10 NOTE — PLAN OF CARE
Goal Outcome Evaluation:  Plan of Care Reviewed With: patient, spouse           Outcome Evaluation: Pt is a 63 y/o M POD1 placement of spacer on RLE. At baseline, pt has R sided weakness d/t hx of CVA. Pt recieves assist from wife at home to transfer but does not ambulate. Pt today was Domingo x 2 for sup<>sit and mod/Domingo x 2 for sit<>sup. Pt was able to sit EOB for ~ 10 min and perform LE ther ex with good sitting balance. Pt and wife were educated on posterior hip precautions and verbalize understanding. Pt demo's dec'd strength and endurance and would benefit from skilled PT. Rec D/C home with 24/7 assist and HH vs SNF.

## 2023-01-10 NOTE — DISCHARGE PLACEMENT REQUEST
"Mandeep Tong (62 y.o. Male)     Date of Birth   1960    Social Security Number       Address   [address] [address]    Home Phone   [phone]    MRN   [id number]       Religious   Sikhism    Marital Status                               Admission Date   1/7/23    Admission Type   Urgent    Admitting Provider   Jason Aranda MD    Attending Provider   Jason Aranda MD    Department, Room/Bed   [address], P791/1       Discharge Date       Discharge Disposition       Discharge Destination                               Attending Provider: Jason Aranda MD    Allergies: Fentanyl, Ciprofloxacin, Depakote [Valproic Acid], Quinolones    Isolation: Contact   Infection: MRSA (08/09/22)   Code Status: CPR    Ht: 182.9 cm (72\")   Wt: 129 kg (285 lb)    Admission Cmt: None   Principal Problem: Pyogenic arthritis of right hip (HCC) [M00.9]                 Active Insurance as of 1/7/2023     Primary Coverage     Payor Plan Insurance Group Employer/Plan Group    [facility] CCN [id number]       Payor Plan Address Payor Plan Phone Number Payor Plan Fax Number Effective Dates    [address] [phone]  7/5/1998 - None Entered    [facility] [id number]       Subscriber Name Subscriber Birth Date Member ID       MANDEEP TONG 1960 [id number]                 Emergency Contacts      (Rel.) Home Phone Work Phone Mobile Phone    TONGJOSETTE (Spouse) -- -- [phone]    TongMandy yin (Daughter) -- -- [phone]              "

## 2023-01-11 LAB
ANION GAP SERPL CALCULATED.3IONS-SCNC: 8 MMOL/L (ref 5–15)
BASOPHILS # BLD AUTO: 0.01 10*3/MM3 (ref 0–0.2)
BASOPHILS NFR BLD AUTO: 0.1 % (ref 0–1.5)
BUN SERPL-MCNC: 12 MG/DL (ref 8–23)
BUN/CREAT SERPL: 18.5 (ref 7–25)
CALCIUM SPEC-SCNC: 8.7 MG/DL (ref 8.6–10.5)
CHLORIDE SERPL-SCNC: 101 MMOL/L (ref 98–107)
CO2 SERPL-SCNC: 28 MMOL/L (ref 22–29)
CREAT SERPL-MCNC: 0.65 MG/DL (ref 0.76–1.27)
DEPRECATED RDW RBC AUTO: 45.8 FL (ref 37–54)
EGFRCR SERPLBLD CKD-EPI 2021: 107.9 ML/MIN/1.73
EOSINOPHIL # BLD AUTO: 0.18 10*3/MM3 (ref 0–0.4)
EOSINOPHIL NFR BLD AUTO: 1.7 % (ref 0.3–6.2)
ERYTHROCYTE [DISTWIDTH] IN BLOOD BY AUTOMATED COUNT: 15.1 % (ref 12.3–15.4)
GLUCOSE BLDC GLUCOMTR-MCNC: 176 MG/DL (ref 70–130)
GLUCOSE BLDC GLUCOMTR-MCNC: 205 MG/DL (ref 70–130)
GLUCOSE BLDC GLUCOMTR-MCNC: 210 MG/DL (ref 70–130)
GLUCOSE BLDC GLUCOMTR-MCNC: 211 MG/DL (ref 70–130)
GLUCOSE BLDC GLUCOMTR-MCNC: 238 MG/DL (ref 70–130)
GLUCOSE SERPL-MCNC: 163 MG/DL (ref 65–99)
HCT VFR BLD AUTO: 29.6 % (ref 37.5–51)
HGB BLD-MCNC: 9.3 G/DL (ref 13–17.7)
IMM GRANULOCYTES # BLD AUTO: 0.04 10*3/MM3 (ref 0–0.05)
IMM GRANULOCYTES NFR BLD AUTO: 0.4 % (ref 0–0.5)
LYMPHOCYTES # BLD AUTO: 1.13 10*3/MM3 (ref 0.7–3.1)
LYMPHOCYTES NFR BLD AUTO: 10.7 % (ref 19.6–45.3)
MCH RBC QN AUTO: 26.2 PG (ref 26.6–33)
MCHC RBC AUTO-ENTMCNC: 31.4 G/DL (ref 31.5–35.7)
MCV RBC AUTO: 83.4 FL (ref 79–97)
MONOCYTES # BLD AUTO: 0.77 10*3/MM3 (ref 0.1–0.9)
MONOCYTES NFR BLD AUTO: 7.3 % (ref 5–12)
NEUTROPHILS NFR BLD AUTO: 79.8 % (ref 42.7–76)
NEUTROPHILS NFR BLD AUTO: 8.46 10*3/MM3 (ref 1.7–7)
NRBC BLD AUTO-RTO: 0 /100 WBC (ref 0–0.2)
PLATELET # BLD AUTO: 347 10*3/MM3 (ref 140–450)
PMV BLD AUTO: 9.3 FL (ref 6–12)
POTASSIUM SERPL-SCNC: 3.9 MMOL/L (ref 3.5–5.2)
RBC # BLD AUTO: 3.55 10*6/MM3 (ref 4.14–5.8)
SODIUM SERPL-SCNC: 137 MMOL/L (ref 136–145)
VANCOMYCIN TROUGH SERPL-MCNC: 8.3 MCG/ML (ref 5–20)
VANCOMYCIN TROUGH SERPL-MCNC: 9.9 MCG/ML (ref 5–20)
WBC NRBC COR # BLD: 10.59 10*3/MM3 (ref 3.4–10.8)

## 2023-01-11 PROCEDURE — 63710000001 INSULIN LISPRO (HUMAN) PER 5 UNITS: Performed by: ORTHOPAEDIC SURGERY

## 2023-01-11 PROCEDURE — 85025 COMPLETE CBC W/AUTO DIFF WBC: CPT | Performed by: ORTHOPAEDIC SURGERY

## 2023-01-11 PROCEDURE — 25010000002 VANCOMYCIN 10 G RECONSTITUTED SOLUTION: Performed by: INTERNAL MEDICINE

## 2023-01-11 PROCEDURE — 80202 ASSAY OF VANCOMYCIN: CPT | Performed by: INTERNAL MEDICINE

## 2023-01-11 PROCEDURE — 97110 THERAPEUTIC EXERCISES: CPT

## 2023-01-11 PROCEDURE — 97112 NEUROMUSCULAR REEDUCATION: CPT | Performed by: OCCUPATIONAL THERAPIST

## 2023-01-11 PROCEDURE — 25010000002 HYDROMORPHONE PER 4 MG: Performed by: HOSPITALIST

## 2023-01-11 PROCEDURE — 80048 BASIC METABOLIC PNL TOTAL CA: CPT | Performed by: ORTHOPAEDIC SURGERY

## 2023-01-11 PROCEDURE — 82962 GLUCOSE BLOOD TEST: CPT

## 2023-01-11 PROCEDURE — 97530 THERAPEUTIC ACTIVITIES: CPT

## 2023-01-11 PROCEDURE — 99232 SBSQ HOSP IP/OBS MODERATE 35: CPT | Performed by: INTERNAL MEDICINE

## 2023-01-11 RX ORDER — SODIUM CHLORIDE 0.9 % (FLUSH) 0.9 %
10 SYRINGE (ML) INJECTION AS NEEDED
Status: CANCELLED | OUTPATIENT
Start: 2023-01-11

## 2023-01-11 RX ORDER — SODIUM CHLORIDE 0.9 % (FLUSH) 0.9 %
20 SYRINGE (ML) INJECTION AS NEEDED
Status: CANCELLED | OUTPATIENT
Start: 2023-01-11

## 2023-01-11 RX ORDER — SODIUM CHLORIDE 0.9 % (FLUSH) 0.9 %
10 SYRINGE (ML) INJECTION EVERY 12 HOURS SCHEDULED
Status: CANCELLED | OUTPATIENT
Start: 2023-01-11

## 2023-01-11 RX ADMIN — VANCOMYCIN HYDROCHLORIDE 1500 MG: 10 INJECTION, POWDER, LYOPHILIZED, FOR SOLUTION INTRAVENOUS at 22:00

## 2023-01-11 RX ADMIN — HYDROMORPHONE HYDROCHLORIDE 0.5 MG: 1 INJECTION, SOLUTION INTRAMUSCULAR; INTRAVENOUS; SUBCUTANEOUS at 11:00

## 2023-01-11 RX ADMIN — DOCUSATE SODIUM 200 MG: 100 CAPSULE, LIQUID FILLED ORAL at 21:01

## 2023-01-11 RX ADMIN — CLOPIDOGREL 75 MG: 75 TABLET, FILM COATED ORAL at 08:08

## 2023-01-11 RX ADMIN — ROSUVASTATIN CALCIUM 40 MG: 40 TABLET, FILM COATED ORAL at 08:08

## 2023-01-11 RX ADMIN — DOCUSATE SODIUM 200 MG: 100 CAPSULE, LIQUID FILLED ORAL at 08:08

## 2023-01-11 RX ADMIN — OXYCODONE HYDROCHLORIDE AND ACETAMINOPHEN 1 TABLET: 10; 325 TABLET ORAL at 06:49

## 2023-01-11 RX ADMIN — OXYCODONE HYDROCHLORIDE AND ACETAMINOPHEN 1 TABLET: 10; 325 TABLET ORAL at 02:56

## 2023-01-11 RX ADMIN — ISOSORBIDE MONONITRATE 30 MG: 30 TABLET, EXTENDED RELEASE ORAL at 08:08

## 2023-01-11 RX ADMIN — POLYETHYLENE GLYCOL 3350 17 G: 17 POWDER, FOR SOLUTION ORAL at 08:09

## 2023-01-11 RX ADMIN — POLYETHYLENE GLYCOL 3350 17 G: 17 POWDER, FOR SOLUTION ORAL at 21:05

## 2023-01-11 RX ADMIN — ASPIRIN 81 MG: 81 TABLET, COATED ORAL at 10:59

## 2023-01-11 RX ADMIN — HYDROMORPHONE HYDROCHLORIDE 0.5 MG: 1 INJECTION, SOLUTION INTRAMUSCULAR; INTRAVENOUS; SUBCUTANEOUS at 21:05

## 2023-01-11 RX ADMIN — INSULIN LISPRO 3 UNITS: 100 INJECTION, SOLUTION INTRAVENOUS; SUBCUTANEOUS at 16:53

## 2023-01-11 RX ADMIN — Medication 10 ML: at 08:14

## 2023-01-11 RX ADMIN — SERTRALINE 100 MG: 100 TABLET, FILM COATED ORAL at 08:08

## 2023-01-11 RX ADMIN — OXYCODONE HYDROCHLORIDE AND ACETAMINOPHEN 1 TABLET: 10; 325 TABLET ORAL at 23:25

## 2023-01-11 RX ADMIN — OXYCODONE HYDROCHLORIDE AND ACETAMINOPHEN 1 TABLET: 10; 325 TABLET ORAL at 14:37

## 2023-01-11 RX ADMIN — OXYCODONE HYDROCHLORIDE AND ACETAMINOPHEN 1 TABLET: 10; 325 TABLET ORAL at 18:54

## 2023-01-11 RX ADMIN — INSULIN LISPRO 2 UNITS: 100 INJECTION, SOLUTION INTRAVENOUS; SUBCUTANEOUS at 08:09

## 2023-01-11 RX ADMIN — GABAPENTIN 600 MG: 300 CAPSULE ORAL at 08:07

## 2023-01-11 RX ADMIN — GABAPENTIN 600 MG: 300 CAPSULE ORAL at 16:53

## 2023-01-11 RX ADMIN — NYSTATIN: 100000 POWDER TOPICAL at 08:15

## 2023-01-11 RX ADMIN — METOPROLOL SUCCINATE 50 MG: 50 TABLET, FILM COATED, EXTENDED RELEASE ORAL at 08:09

## 2023-01-11 RX ADMIN — NYSTATIN: 100000 POWDER TOPICAL at 21:05

## 2023-01-11 RX ADMIN — GABAPENTIN 600 MG: 300 CAPSULE ORAL at 21:02

## 2023-01-11 RX ADMIN — INSULIN GLARGINE-YFGN 15 UNITS: 100 INJECTION, SOLUTION SUBCUTANEOUS at 23:24

## 2023-01-11 RX ADMIN — SACUBITRIL AND VALSARTAN 1 TABLET: 24; 26 TABLET, FILM COATED ORAL at 08:08

## 2023-01-11 RX ADMIN — BUMETANIDE 2 MG: 2 TABLET ORAL at 08:08

## 2023-01-11 RX ADMIN — CYCLOBENZAPRINE 10 MG: 10 TABLET, FILM COATED ORAL at 08:09

## 2023-01-11 RX ADMIN — Medication 10 ML: at 21:06

## 2023-01-11 RX ADMIN — OXYBUTYNIN CHLORIDE 10 MG: 10 TABLET, EXTENDED RELEASE ORAL at 08:08

## 2023-01-11 RX ADMIN — VANCOMYCIN HYDROCHLORIDE 1500 MG: 10 INJECTION, POWDER, LYOPHILIZED, FOR SOLUTION INTRAVENOUS at 10:59

## 2023-01-11 RX ADMIN — INSULIN LISPRO 2 UNITS: 100 INJECTION, SOLUTION INTRAVENOUS; SUBCUTANEOUS at 11:52

## 2023-01-11 RX ADMIN — HYDROMORPHONE HYDROCHLORIDE 0.5 MG: 1 INJECTION, SOLUTION INTRAMUSCULAR; INTRAVENOUS; SUBCUTANEOUS at 17:15

## 2023-01-11 NOTE — NURSING NOTE
Verified pt is ok with using lt arm for picc placement. No consent, education, or behavorial contract has been obtained. Christa is going to talk with wife at [facility] to get consents now.

## 2023-01-11 NOTE — PROGRESS NOTES
Continued Stay Note  [facility]     Patient Name: Mandeep Tracey  MRN: [id number]  Today's Date: 1/11/2023    Admit Date: 1/7/2023    Plan: [facility]  Chaitanya, approved   Discharge Plan     Row Name 01/11/23 1610       Plan    Plan [facility]  Chaitanya, approved    Plan Comments Per Xochilt/Richi patient is approved with bed available when medically stable. May need transport to SNF at d/c. CCP to follow.               Discharge Codes    No documentation.               Expected Discharge Date and Time     Expected Discharge Date Expected Discharge Time    Jan 13, 2023             Judy Iraheta RN

## 2023-01-11 NOTE — PROGRESS NOTES
"[facility] Clinical Pharmacy Services: Vancomycin Monitoring Note    Mandeep Tracey is a 62 y.o. male who is on day 4/42 of pharmacy to dose vancomycin for Bone and/or Joint Infection.    Previous Vancomycin Dose: 1250 mg every 12 hours   Updated Cultures and Sensitivities:   Results from last 7 days   Lab Units 01/11/23  0820 01/10/23  0753 01/09/23  1357   VANCOMYCIN TR mcg/mL 8.30 10.00 15.10     Vitals/Labs  Ht: 182.9 cm (72\"); Wt: 129 kg (285 lb)   Temp Readings from Last 1 Encounters:   01/11/23 98.4 °F (36.9 °C) (Oral)     Estimated Creatinine Clearance: 167.9 mL/min (A) (by C-G formula based on SCr of 0.65 mg/dL (L)).     Results from last 7 days   Lab Units 01/11/23  0820 01/10/23  0753 01/09/23  0458   CREATININE mg/dL 0.65* 0.67* 0.88   WBC 10*3/mm3 10.59 12.61* 8.60     Assessment/Plan  Trough collected at steady state returned at 8.3 mg/L (~10.5 hr level) with a corresponding AUC <400. Current regimen is subtherapeutic and will be increased.     Current Vancomycin Dose: increase to vancomycin 1500 mg IV every  12  hours; provides a predicted  mg/L.hr   Next Level Date and Time: Vanc Trough on 1/13 at 0900  We will continue to monitor patient changes and renal function     Thank you for involving pharmacy in this patient's care. Please contact pharmacy with any questions or concerns.       Cheyenne Gowers, [facility]  Clinical Pharmacist         "

## 2023-01-11 NOTE — PLAN OF CARE
Goal Outcome Evaluation:  Plan of Care Reviewed With: patient            PT POD2 Rt bipolar hip removal and placement of spacer, abductor pillow, IV antibiotics  awaiting PICC line, urinal, iv and oral pain meds prn.

## 2023-01-11 NOTE — PROGRESS NOTES
[facility]     LOS: 4 days     Subjective:    CC:No chief complaint on file.    DIET:  Diet Order   Procedures   • Diet: Regular/House Diet, Cardiac Diets; Healthy Heart (2-3 Na+); Texture: Regular Texture (IDDSI 7); Fluid Consistency: Thin (IDDSI 0)   no new complaints, still with significant hip pain      Objective:    Vital Signs:  Temp:  [97.1 °F (36.2 °C)-100.5 °F (38.1 °C)] 100.5 °F (38.1 °C)  Heart Rate:  [] 81  Resp:  [18] 18  BP: (112-122)/(50-65) 112/55    SpO2:  [95 %-96 %] 96 %  on  Flow (L/min):  [3] 3;   Device (Oxygen Therapy): CPAP  Body mass index is 38.65 kg/m².    Physical Exam  Constitutional:       Appearance: Normal appearance.   HENT:      Head: Normocephalic and atraumatic.   Cardiovascular:      Rate and Rhythm: Normal rate and regular rhythm.      Heart sounds: No murmur heard.    No friction rub.   Pulmonary:      Effort: Pulmonary effort is normal.      Breath sounds: Normal breath sounds.   Abdominal:      General: Bowel sounds are normal. There is no distension.      Palpations: Abdomen is soft.      Tenderness: There is no abdominal tenderness.   Skin:     General: Skin is warm and dry.   Psychiatric:         Mood and Affect: Mood normal.         Behavior: Behavior normal.         Results Review:    Glucose   Date Value Ref Range Status   01/11/2023 163 (H) 65 - 99 mg/dL Final   01/10/2023 222 (H) 65 - 99 mg/dL Final   01/09/2023 143 (H) 65 - 99 mg/dL Final     Results from last 7 days   Lab Units 01/11/23  0820   WBC 10*3/mm3 10.59   HEMOGLOBIN g/dL 9.3*   HEMATOCRIT % 29.6*   PLATELETS 10*3/mm3 347     Results from last 7 days   Lab Units 01/11/23  0820 01/10/23  0753 01/09/23  0458   SODIUM mmol/L 137   < > 140   POTASSIUM mmol/L 3.9   < > 4.0   CHLORIDE mmol/L 101   < > 101   CO2 mmol/L 28.0   < > 23.4   BUN mg/dL 12   < > 11   CREATININE mg/dL 0.65*   < > 0.88   CALCIUM mg/dL 8.7   < > 9.1   BILIRUBIN mg/dL  --   --  0.3   ALK PHOS U/L  --   --   52   ALT (SGPT) U/L  --   --  12   AST (SGOT) U/L  --   --  13   GLUCOSE mg/dL 163*   < > 143*    < > = values in this interval not displayed.     Results from last 7 days   Lab Units 01/08/23  0440   INR  1.16*         Results from last 7 days   Lab Units 01/08/23 0440   TROPONIN T ng/mL <0.010     Cultures:  No results found for: BLOODCX, URINECX, WOUNDCX, MRSACX, RESPCX, STOOLCX    I have reviewed daily medications and changes in CPOE    Scheduled meds  aspirin, 81 mg, Oral, Daily  bumetanide, 2 mg, Oral, Daily  clopidogrel, 75 mg, Oral, Daily  docusate sodium, 200 mg, Oral, BID  gabapentin, 600 mg, Oral, TID  insulin glargine, 15 Units, Subcutaneous, Nightly  insulin lispro, 0-7 Units, Subcutaneous, TID AC  isosorbide mononitrate, 30 mg, Oral, Daily  metoprolol succinate XL, 50 mg, Oral, Daily  nystatin, , Topical, Q12H  oxybutynin XL, 10 mg, Oral, Daily  polyethylene glycol, 17 g, Oral, BID  rosuvastatin, 40 mg, Oral, Daily  sacubitril-valsartan, 1 tablet, Oral, BID  sertraline, 100 mg, Oral, Daily  sodium chloride, 10 mL, Intravenous, Q12H  sodium chloride, 10 mL, Intravenous, Q12H  vancomycin, 1,500 mg, Intravenous, Q12H        lactated ringers, 50 mL/hr  Pharmacy to dose vancomycin,       PRN meds  •  acetaminophen **OR** acetaminophen **OR** acetaminophen  •  acetaminophen  •  bisacodyl  •  bisacodyl  •  cyclobenzaprine  •  dextrose  •  dextrose  •  docusate sodium  •  glucagon (human recombinant)  •  HYDROmorphone  •  hydrOXYzine  •  melatonin  •  [DISCONTINUED] HYDROmorphone **AND** naloxone  •  ondansetron  •  ondansetron **OR** ondansetron  •  oxyCODONE-acetaminophen  •  Pharmacy to dose vancomycin  •  sodium chloride  •  sodium chloride  •  sodium chloride        Pyogenic arthritis of right hip (HCC)    Right hemiparesis (HCC)    BELKYS on CPAP    Seizure disorder (HCC)    Type 2 diabetes mellitus (HCC)    Essential hypertension    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 Tracey is a 62-year-old male with history of pyogenic arthritis of the right hip, cardiomyopathy, MRSA, type 2 diabetes, seizure disorder, right hemiparesis post CVA, obstructive sleep apnea, hypertension who was brought here to [facility] from [facility] for orthopedic consultation on right hip infection.     Pyogenic arthritis of the right hip  -s/p RIGHT  BIPOLAR HIP REMOVAL AND PLACEMENT OF SPACER    -Continue vancomycin   -seen by infectious disease, per their recommendations: plan to continue vancomycin w/ goal -600. Dose [id number] mg IV q12h but level is pending this morning and may need adjustment. PICC ordered. He prefers it to be placed in his right arm. I plan a 6-week course of antibiotics w/ stop date 2/23/23 at which time he will follow-up w/ me in the ID clinic.    -final recommendations tomorrow     Type 2 diabetes  -Accu-Cheks before meals and at bedtime with correctional dose insulin  -Hold metformin  -Continue long-acting insulin     History of CVA/seizure disorder  -Safety precautions-aspiration precautions  -Seizure precautions  - Continue topiramate 50 mg twice daily     CAD/hypertension/cardiomyopathy  -restarted Plavix  -continue with aspirin  -cardiology has seen   -current Echo with ejection fraction of 26-30%  -Continue metoprolol and Imdur             Jason Aranda MD  01/11/23  16:08 EST

## 2023-01-11 NOTE — PROGRESS NOTES
Patient: Mandeep Tracey  YOB: 1960     Date of Admission: 1/7/2023 10:43 PM Medical Record Number: [id number]     Attending Physician: Jason Aranda MD    Procedure(s):  RT. BIPOLAR HIP REMOVAL AND PLACEMENT OF SPACER Post Operative Day Number: 2    Subjective : No new orthopaedic complaints     Pain Relief: some relief with present medication.     Systemic Complaints: No Complaints  Vitals:    01/10/23 1300 01/10/23 1700 01/10/23 2117 01/11/23 0527   BP: 109/57 118/65 122/56 120/61   BP Location: Left arm Right arm Left arm Left arm   Patient Position: Lying Lying Lying Lying   Pulse: 75 80 80 79   Resp: 17 18 18 18   Temp: 97.1 °F (36.2 °C) 98.1 °F (36.7 °C) 97.1 °F (36.2 °C) 98.4 °F (36.9 °C)   TempSrc: Oral Oral Oral Oral   SpO2: 93% 95% 96% 96%   Weight:       Height:           Physical Exam: 62 y.o. male    General Appearance:       Alert, cooperative, in no acute distress                  Extremities:    Dressing Clean, Dry and Intact         Incision healthy without signs or symptoms of infections         No clinical sign of DVT        Able to do good movements of digits    Pulses:   Pulses palpable and equal bilaterally           Diagnostic Tests:     Results from last 7 days   Lab Units 01/10/23  0753 01/09/23  0458 01/08/23  0440   WBC 10*3/mm3 12.61* 8.60 8.35   HEMOGLOBIN g/dL 10.4* 12.1* 11.2*   HEMATOCRIT % 33.1* 38.3 36.3*   PLATELETS 10*3/mm3 400 409 352     Results from last 7 days   Lab Units 01/10/23  0753 01/09/23  0458 01/08/23  0440   SODIUM mmol/L 139 140 141   POTASSIUM mmol/L 3.9 4.0 4.1   CHLORIDE mmol/L 102 101 106   CO2 mmol/L 28.0 23.4 23.1   BUN mg/dL 12 11 10   CREATININE mg/dL 0.67* 0.88 0.73*   GLUCOSE mg/dL 222* 143* 177*   CALCIUM mg/dL 9.1 9.1 9.2     Results from last 7 days   Lab Units 01/08/23 0440   INR  1.16*     Lab Results   Component Value Date    CRP 2.10 (H) 01/08/2023     Lab Results   Component Value Date    SEDRATE 66 (H) 01/08/2023     No  results found for: URICACID  Lab Results   Component Value Date    CRYSTAL None Seen 03/25/2021     Microbiology Results (last 10 days)     Procedure Component Value - Date/Time    Tissue / Bone Culture - Tissue, Hip, Right [[id number]] Collected: 01/09/23 1641    Lab Status: Preliminary result Specimen: Tissue from Hip, Right Updated: 01/10/23 1003     Tissue Culture No growth     Gram Stain Moderate (3+) WBCs per low power field      Occasional Gram positive cocci        XR Hip With or Without Pelvis 1 View Right    Result Date: 1/9/2023  1. Satisfactory postoperative appearance of the right hip.  This report was finalized on 1/9/2023 6:30 PM by Dr. Sherman Alvarenga M.D.              Current Medications:  Scheduled Meds:aspirin, 81 mg, Oral, Daily  bumetanide, 2 mg, Oral, Daily  clopidogrel, 75 mg, Oral, Daily  docusate sodium, 200 mg, Oral, BID  gabapentin, 600 mg, Oral, TID  insulin glargine, 15 Units, Subcutaneous, Nightly  insulin lispro, 0-7 Units, Subcutaneous, TID AC  isosorbide mononitrate, 30 mg, Oral, Daily  metoprolol succinate XL, 50 mg, Oral, Daily  nystatin, , Topical, Q12H  oxybutynin XL, 10 mg, Oral, Daily  polyethylene glycol, 17 g, Oral, BID  rosuvastatin, 40 mg, Oral, Daily  sacubitril-valsartan, 1 tablet, Oral, BID  sertraline, 100 mg, Oral, Daily  sodium chloride, 10 mL, Intravenous, Q12H  sodium chloride, 10 mL, Intravenous, Q12H  vancomycin, 1,250 mg, Intravenous, Q12H      Continuous Infusions:lactated ringers, 50 mL/hr  Pharmacy to dose vancomycin,       PRN Meds:.•  acetaminophen **OR** acetaminophen **OR** acetaminophen  •  acetaminophen  •  bisacodyl  •  bisacodyl  •  cyclobenzaprine  •  dextrose  •  dextrose  •  docusate sodium  •  glucagon (human recombinant)  •  HYDROmorphone  •  hydrOXYzine  •  melatonin  •  [DISCONTINUED] HYDROmorphone **AND** naloxone  •  ondansetron  •  ondansetron **OR** ondansetron  •  oxyCODONE-acetaminophen  •  Pharmacy to dose vancomycin  •  sodium  chloride  •  sodium chloride  •  sodium chloride    Assessment:    Procedure(s):  RT. BIPOLAR HIP REMOVAL AND PLACEMENT OF SPACER    Patient Active Problem List   Diagnosis   • CVA (cerebral vascular accident) ([facility])   • Right hemiparesis ([facility])   • BELKYS on CPAP   • Seizure disorder ([facility])   • Type 2 diabetes mellitus (HCC)   • Essential hypertension   • Infection of right prosthetic hip joint ([facility])   • MSSA (methicillin susceptible Staphylococcus aureus) infection   • Group B streptococcal infection   • UTI (urinary tract infection)   • Postoperative nausea and vomiting   • Nonrheumatic mitral valve regurgitation   • Cardiomyopathy ([facility])   • PVCs (premature ventricular contractions)   • Pyogenic arthritis of right hip ([facility])   • Infection and inflammatory reaction due to internal right hip prosthesis, initial encounter ([facility])       PLAN:   Continues current post-op course  Anticoagulation: Home dose of plavix and ASA  Hemovac Drain to be removed today  Dressing Change PRN  Mobilize with PT as tolerated per protocol  ID following. Cultures are pending. Patient will require 6wks abx. PICC to be placed.   I did speak with Dr. Miller this AM. He would like him to stay one more day to wait for final culture. Dr. Beebe and I are in agreement with this plan.  Appreciate Dr. Miller's recommendations.     Weight Bearing: WBAT  Discharge Plan: OK to plan for discharge in tomorrow from orthopadic perspective.      Addie Swartz, APRN    Date: 1/11/2023    Time: 07:54 EST             18

## 2023-01-11 NOTE — PLAN OF CARE
Goal Outcome Evaluation:  Plan of Care Reviewed With: patient        Progress: improving  Outcome Evaluation: Pt supine in bed at start of session and agreeable to work with PT/OT for co-treat to maximize mobility. Pt req's modA x 2 for bed mobility today, req's inc'd time to complete d/t inc'd pain. Once sitting EOB, performs seated LE ther ex on LLE well with good sitting balance. Attempted LAQ on RLE with limited AROM. Pt returns to supine in bed at end of the session. He continues to benefit from skilled PT. Rec D/C to IRF.

## 2023-01-11 NOTE — PROGRESS NOTES
ID PROGRESS NOTE    CC: f/u R hip PJI due to GPC    Subj: No fever. He reports some hip pain today. RN just gave him some pain meds. Tolerating vanco. Culture negative to date.     Medications:    Current Facility-Administered Medications:   •  acetaminophen (TYLENOL) tablet 650 mg, 650 mg, Oral, Q4H PRN **OR** acetaminophen (TYLENOL) 160 MG/5ML solution 650 mg, 650 mg, Oral, Q4H PRN **OR** acetaminophen (TYLENOL) suppository 650 mg, 650 mg, Rectal, Q4H PRN, Faustina Beebe MD  •  acetaminophen (TYLENOL) tablet 325 mg, 325 mg, Oral, Q4H PRN, Faustina Beebe MD  •  aspirin EC tablet 81 mg, 81 mg, Oral, Daily, Faustina Beebe MD, 81 mg at 01/10/23 0843  •  bisacodyl (DULCOLAX) EC tablet 10 mg, 10 mg, Oral, Daily PRN, Faustina Beebe MD  •  bisacodyl (DULCOLAX) suppository 10 mg, 10 mg, Rectal, Daily PRN, Faustina Beebe MD  •  bumetanide (BUMEX) tablet 2 mg, 2 mg, Oral, Daily, Fausitna Beebe MD, 2 mg at 01/11/23 0808  •  clopidogrel (PLAVIX) tablet 75 mg, 75 mg, Oral, Daily, Jason Aranda MD, 75 mg at 01/11/23 0808  •  cyclobenzaprine (FLEXERIL) tablet 10 mg, 10 mg, Oral, TID PRN, Faustina Beebe MD, 10 mg at 01/11/23 0809  •  dextrose (D50W) (25 g/50 mL) IV injection 25 g, 25 g, Intravenous, Q15 Min PRN, Faustina Beebe MD  •  dextrose (GLUTOSE) oral gel 15 g, 15 g, Oral, Q15 Min PRN, Faustina Beebe MD  •  docusate sodium (COLACE) capsule 100 mg, 100 mg, Oral, BID PRN, Faustina Beebe MD  •  docusate sodium (COLACE) capsule 200 mg, 200 mg, Oral, BID, Faustina Beebe MD, 200 mg at 01/11/23 0808  •  gabapentin (NEURONTIN) capsule 600 mg, 600 mg, Oral, TID, Faustina Beebe MD, 600 mg at 01/11/23 0807  •  glucagon (human recombinant) (GLUCAGEN DIAGNOSTIC) injection 1 mg, 1 mg, Intramuscular, Q15 Min PRN, Faustina Beebe MD  •  HYDROmorphone (DILAUDID) injection 0.5 mg, 0.5 mg, Intravenous, Q2H PRN,  Jason Aranda MD, 0.5 mg at 01/10/23 1837  •  hydrOXYzine (ATARAX) tablet 25 mg, 25 mg, Oral, TID PRN, Faustina Beebe MD  •  insulin glargine (LANTUS, SEMGLEE) injection 15 Units, 15 Units, Subcutaneous, Nightly, Faustina Beebe MD, 15 Units at 01/10/23 2156  •  insulin lispro (ADMELOG) injection 0-7 Units, 0-7 Units, Subcutaneous, TID AC, Faustina Beebe MD, 2 Units at 01/11/23 0809  •  isosorbide mononitrate (IMDUR) 24 hr tablet 30 mg, 30 mg, Oral, Daily, Faustina Beebe MD, 30 mg at 01/11/23 0808  •  lactated ringers infusion, 50 mL/hr, Intravenous, Continuous, Faustina Beebe MD  •  melatonin tablet 1 mg, 1 mg, Oral, Nightly PRN, Faustina Beebe MD  •  metoprolol succinate XL (TOPROL-XL) 24 hr tablet 50 mg, 50 mg, Oral, Daily, Faustina Beebe MD, 50 mg at 01/11/23 0809  •  [DISCONTINUED] HYDROmorphone (DILAUDID) injection 2 mg, 2 mg, Intramuscular, Q4H PRN, 2 mg at 01/10/23 1021 **AND** naloxone (NARCAN) injection 0.1 mg, 0.1 mg, Intravenous, Q5 Min PRN, Faustina Beebe MD  •  nystatin (MYCOSTATIN) powder, , Topical, Q12H, Faustina Beebe MD, Given at 01/11/23 0815  •  ondansetron (ZOFRAN) injection 4 mg, 4 mg, Intravenous, Q6H PRN, Faustina Beebe MD  •  ondansetron (ZOFRAN) tablet 4 mg, 4 mg, Oral, Q6H PRN **OR** ondansetron (ZOFRAN) injection 4 mg, 4 mg, Intravenous, Q6H PRN, Faustina Beebe MD  •  oxybutynin XL (DITROPAN-XL) 24 hr tablet 10 mg, 10 mg, Oral, Daily, Faustina Beebe MD, 10 mg at 01/11/23 0808  •  oxyCODONE-acetaminophen (PERCOCET)  MG per tablet 1 tablet, 1 tablet, Oral, Q4H PRN, Jason Aranda MD, 1 tablet at 01/11/23 0649  •  Pharmacy to dose vancomycin, , Does not apply, Continuous PRN, Raymundo Miller MD  •  polyethylene glycol (MIRALAX) packet 17 g, 17 g, Oral, BID, Faustina Beebe MD, 17 g at 01/11/23 0809  •  rosuvastatin (CRESTOR) tablet 40 mg,  40 mg, Oral, Daily, Faustina Beebe MD, 40 mg at 01/11/23 0808  •  sacubitril-valsartan (ENTRESTO) 24-26 MG tablet 1 tablet, 1 tablet, Oral, BID, Faustina Beebe MD, 1 tablet at 01/11/23 0808  •  sertraline (ZOLOFT) tablet 100 mg, 100 mg, Oral, Daily, Faustina Beebe MD, 100 mg at 01/11/23 0808  •  sodium chloride 0.9 % flush 1-10 mL, 1-10 mL, Intravenous, PRN, Faustina Beebe MD  •  sodium chloride 0.9 % flush 10 mL, 10 mL, Intravenous, Q12H, Faustina Beebe MD, 10 mL at 01/11/23 0814  •  sodium chloride 0.9 % flush 10 mL, 10 mL, Intravenous, PRN, Faustina Beebe MD  •  sodium chloride 0.9 % flush 10 mL, 10 mL, Intravenous, Q12H, Faustina Beebe MD, 10 mL at 01/10/23 2156  •  sodium chloride 0.9 % infusion 40 mL, 40 mL, Intravenous, PRN, Faustina Beebe MD  •  vancomycin 1250 mg/250 mL 0.9% NS IVPB ([facility]), 1,250 mg, Intravenous, Q12H, Jason Aranda MD, Last Rate: 0 mL/hr at 01/10/23 1044, 1,250 mg at 01/10/23 2156      Objective   Vital Signs   Temp:  [97.1 °F (36.2 °C)-98.4 °F (36.9 °C)] 98.4 °F (36.9 °C)  Heart Rate:  [75-80] 79  Resp:  [17-18] 18  BP: (109-122)/(56-65) 120/61    Physical Exam:   General: awake, alert, very nice  Eyes: no scleral icterus  Cardiovascular: NR  Respiratory: normal work of breathing on CPAP  GI: Abdomen is soft  :  no Chavez catheter  MSK: R hip bandaged    Labs:   tissue cultures reviewed today; AM CBC, BMP, VTr ordered and are pending  Lab Results   Component Value Date    WBC 12.61 (H) 01/10/2023    HGB 10.4 (L) 01/10/2023    HCT 33.1 (L) 01/10/2023    MCV 83.4 01/10/2023     01/10/2023     Lab Results   Component Value Date    GLUCOSE 222 (H) 01/10/2023    CALCIUM 9.1 01/10/2023     01/10/2023    K 3.9 01/10/2023    CO2 28.0 01/10/2023     01/10/2023    BUN 12 01/10/2023    CREATININE 0.67 (L) 01/10/2023    EGFRIFNONA 119 08/02/2021    BCR 17.9 01/10/2023    ANIONGAP 9.0 01/10/2023      Lab Results   Component Value Date    CRP 2.10 (H) 01/08/2023     Lab Results   Component Value Date    HGBA1C 7.80 (H) 01/08/2023     Lab Results   Component Value Date    VANCOTROUGH 10.00 01/10/2023    VANCORANDOM 12 03/11/2021     Microbiology:  1/9 OR R Hip Cx: GPC on gram stain; culture NGTD    ASSESSMENT/PLAN:  1. R hip PJI - recurrent  2. Obesity BMI 38  3. Uncontrolled DM2 - A1c 7.8% - complicating above  4. History of stroke and hemiparesis  5. Candida intertrigo    On 1/9/23, he underwent  I&D w/ removal of prosthesis and insertion of an antibiotic spacer. Culture negative to date but gram stain had GPCs. He was on vancomycin prior to his procedure.     I plan to continue vancomycin w/ goal -600. Dose [id number] mg IV q12h but level is pending this morning and may need adjustment. PICC ordered. He prefers it to be placed in his right arm. I plan a 6-week course of antibiotics w/ stop date 2/23/23 at which time he will follow-up w/ me in the ID clinic.     For Candida intertrigo, continue Nystatin powder to the groin with duration TBD.     ID will follow. I anticipate final recommendations tomorrow.     Case/management d/w orthopedic team.

## 2023-01-11 NOTE — THERAPY TREATMENT NOTE
Patient Name: Mandeep Tracey  : 1960    MRN: [id number]                              Today's Date: 2023       Admit Date: 2023    Visit Dx:     ICD-10-CM ICD-9-CM   1. Infection and inflammatory reaction due to internal right hip prosthesis, initial encounter ([facility])  T84.51XA 996.66   2. Follow-up exam  Z09 V67.9     Patient Active Problem List   Diagnosis   • CVA (cerebral vascular accident) ([facility])   • Right hemiparesis ([facility])   • BELKYS on CPAP   • Seizure disorder ([facility])   • Type 2 diabetes mellitus ([facility])   • Essential hypertension   • Infection of right prosthetic hip joint ([facility])   • MSSA (methicillin susceptible Staphylococcus aureus) infection   • Group B streptococcal infection   • UTI (urinary tract infection)   • Postoperative nausea and vomiting   • Nonrheumatic mitral valve regurgitation   • Cardiomyopathy ([facility])   • PVCs (premature ventricular contractions)   • Pyogenic arthritis of right hip ([facility])   • Infection and inflammatory reaction due to internal right hip prosthesis, initial encounter ([facility])     Past Medical History:   Diagnosis Date   • Aphasia    • Cardiomyopathy ([facility])    • CPAP (continuous positive airway pressure) dependence    • Diabetes ([facility])    • Hemiparesis ([facility])     Right side    • Morbid obesity ([facility])    • Nonrheumatic mitral valve regurgitation    • BELKYS on CPAP    • Peptic ulcer disease    • Postoperative nausea and vomiting 3/13/2021   • Psoriasis    • Pyogenic arthritis of right hip ([facility])    • Seizures ([facility])    • Sleep apnea    • Stroke ([facility])    • Ventricular ectopy     asymptomatic     Past Surgical History:   Procedure Laterality Date   • CHOLECYSTECTOMY     • EYE SURGERY     • HIP SPACER INSERTION WITH ANTIBIOTIC CEMENT Right 2023    Procedure: RT. BIPOLAR HIP REMOVAL AND PLACEMENT OF SPACER;  Surgeon: Faustina Beebe MD;  Location: [facility];  Service: Orthopedics;  Laterality: Right;   • INCISION AND DRAINAGE HIP Right 3/12/2021    Procedure: HIP  ANTERIOR INCISION AND DRAINAGE WITH HANA TABLE;  Surgeon: Tao Andrea MD;  Location: [facility] MAIN OR;  Service: Orthopedics;  Laterality: Right;   • LUMBAR DISC SURGERY     • US GUIDED FINE NEEDLE ASPIRATION  3/10/2021      General Information     Row Name 01/11/23 1555          Physical Therapy Time and Intention    Document Type therapy note (daily note)  -DB     Mode of Treatment occupational therapy;physical therapy;co-treatment  -DB     Row Name 01/11/23 1555          General Information    Patient Profile Reviewed yes  -DB           User Key  (r) = Recorded By, (t) = Taken By, (c) = Cosigned By    Initials Name Provider Type    DB Marlin Ladd PT Physical Therapist               Mobility     Row Name 01/11/23 1555          Bed Mobility    Bed Mobility scooting/bridging;supine-sit;sit-supine  -DB     Scooting/Bridging Green (Bed Mobility) set up;minimum assist (75% patient effort)  -DB     Supine-Sit Green (Bed Mobility) set up;moderate assist (50% patient effort);2 person assist  -DB     Sit-Supine Green (Bed Mobility) set up;moderate assist (50% patient effort);2 person assist  -DB     Assistive Device (Bed Mobility) bed rails;draw sheet;head of bed elevated  -DB     Row Name 01/11/23 1555          Sit-Stand Transfer    Sit-Stand Green (Transfers) not tested  -DB     Row Name 01/11/23 1555          Gait/Stairs (Locomotion)    Green Level (Gait) not tested  -DB           User Key  (r) = Recorded By, (t) = Taken By, (c) = Cosigned By    Initials Name Provider Type    DB Marlin Ladd PT Physical Therapist               Obj/Interventions     Row Name 01/11/23 1556          Motor Skills    Therapeutic Exercise other (see comments)  seated MIP, LAQ, AP on LLE; attempt LAQ on RLE with limited AROM  -DB     Row Name 01/11/23 1556          Balance    Balance Assessment sitting static balance;sitting dynamic balance  -DB     Static Sitting Balance standby assist  -DB      Dynamic Sitting Balance standby assist  -DB     Position, Sitting Balance unsupported;sitting edge of bed  -DB     Balance Interventions sitting  -DB           User Key  (r) = Recorded By, (t) = Taken By, (c) = Cosigned By    Initials Name Provider Type    Marlin Thakur PT Physical Therapist               Goals/Plan    No documentation.                Clinical Impression     Row Name 01/11/23 1556          Pain    Pain Intervention(s) Ambulation/increased activity;Repositioned  -DB     Row Name 01/11/23 1556          Plan of Care Review    Plan of Care Reviewed With patient  -DB     Progress improving  -DB     Outcome Evaluation Pt supine in bed at start of session and agreeable to work with PT/OT for co-treat to maximize mobility. Pt req's modA x 2 for bed mobility today, req's inc'd time to complete d/t inc'd pain. Once sitting EOB, performs seated LE ther ex on LLE well with good sitting balance. Attempted LAQ on RLE with limited AROM. Pt returns to supine in bed at end of the session. He continues to benefit from skilled PT. Rec D/C to IRF.  -DB     Row Name 01/11/23 1556          Vital Signs    O2 Delivery Pre Treatment room air  -DB     O2 Delivery Intra Treatment room air  -DB     O2 Delivery Post Treatment room air  -DB     Pre Patient Position Supine  -DB     Intra Patient Position Sitting  -DB     Post Patient Position Supine  -DB     Row Name 01/11/23 1556          Positioning and Restraints    Pre-Treatment Position in bed  -DB     Post Treatment Position bed  -DB     In Bed supine;notified nsg;call light within reach;encouraged to call for assist;exit alarm on  -DB           User Key  (r) = Recorded By, (t) = Taken By, (c) = Cosigned By    Initials Name Provider Type    Marlin Thakur PT Physical Therapist               Outcome Measures     Row Name 01/11/23 1559 01/11/23 1059       How much help from another person do you currently need...    Turning from your back to your side while in  flat bed without using bedrails? 2  -DB 2  -SH    Moving from lying on back to sitting on the side of a flat bed without bedrails? 2  -DB 2  -SH    Moving to and from a bed to a chair (including a wheelchair)? 2  -DB 2  -SH    Standing up from a chair using your arms (e.g., wheelchair, bedside chair)? 1  -DB 2  -SH    Climbing 3-5 steps with a railing? 1  -DB 1  -SH    To walk in hospital room? 1  -DB 1  -SH    AM-PAC 6 Clicks Score (PT) 9  -DB 10  -SH    Highest level of mobility 3 --> Sat at edge of bed  -DB 4 --> Transferred to chair/commode  -SH    Row Name 01/11/23 0800          How much help from another person do you currently need...    Turning from your back to your side while in flat bed without using bedrails? 2  -SH     Moving from lying on back to sitting on the side of a flat bed without bedrails? 2  -SH     Moving to and from a bed to a chair (including a wheelchair)? 2  -SH     Standing up from a chair using your arms (e.g., wheelchair, bedside chair)? 2  -SH     Climbing 3-5 steps with a railing? 1  -SH     To walk in hospital room? 1  -SH     AM-PAC 6 Clicks Score (PT) 10  -SH     Highest level of mobility 4 --> Transferred to chair/commode  -SH     Row Name 01/11/23 1559 01/11/23 1511       Functional Assessment    Outcome Measure Options AM-PAC 6 Clicks Basic Mobility (PT)  -DB AM-PAC 6 Clicks Daily Activity (OT)  -          User Key  (r) = Recorded By, (t) = Taken By, (c) = Cosigned By    Initials Name Provider Type    Mirta Busby OTR Occupational Therapist    Marlin Thakur, PT Physical Therapist    Christa Alfonso, RN Registered Nurse                             Physical Therapy Education     Title: PT OT SLP Therapies (Done)     Topic: Physical Therapy (Done)     Point: Mobility training (Done)     Learning Progress Summary           Patient Acceptance, E, VU by DB at 1/11/2023 1559    Acceptance, E, VU by DB at 1/10/2023 1534   Significant Other Acceptance, E, VU  by DB at 1/10/2023 1534                   Point: Home exercise program (Done)     Learning Progress Summary           Patient Acceptance, E, VU by DB at 1/11/2023 1559    Acceptance, E, VU by DB at 1/10/2023 1534   Significant Other Acceptance, E, VU by DB at 1/10/2023 1534                   Point: Body mechanics (Done)     Learning Progress Summary           Patient Acceptance, E, VU by DB at 1/11/2023 1559    Acceptance, E, VU by DB at 1/10/2023 1534   Significant Other Acceptance, E, VU by DB at 1/10/2023 1534                   Point: Precautions (Done)     Learning Progress Summary           Patient Acceptance, E, VU by DB at 1/11/2023 1559    Acceptance, E, VU by DB at 1/10/2023 1534   Significant Other Acceptance, E, VU by DB at 1/10/2023 1534                               User Key     Initials Effective Dates Name Provider Type Discipline    DB 06/16/21 -  Marlin Ladd PT Physical Therapist PT              PT Recommendation and Plan  Planned Therapy Interventions (PT): balance training, bed mobility training, home exercise program, neuromuscular re-education, postural re-education, transfer training, strengthening, patient/family education  Plan of Care Reviewed With: patient  Progress: improving  Outcome Evaluation: Pt supine in bed at start of session and agreeable to work with PT/OT for co-treat to maximize mobility. Pt req's modA x 2 for bed mobility today, req's inc'd time to complete d/t inc'd pain. Once sitting EOB, performs seated LE ther ex on LLE well with good sitting balance. Attempted LAQ on RLE with limited AROM. Pt returns to supine in bed at end of the session. He continues to benefit from skilled PT. Rec D/C to IRF.     Time Calculation:    PT Charges     Row Name 01/11/23 1600             Time Calculation    Start Time 1409  -DB      Stop Time 1436  -DB      Time Calculation (min) 27 min  -DB      PT Received On 01/11/23  -DB      PT - Next Appointment 01/12/23  -DB         Time  Calculation- PT    Total Timed Code Minutes- PT 27 minute(s)  -DB            User Key  (r) = Recorded By, (t) = Taken By, (c) = Cosigned By    Initials Name Provider Type    Marlin Thakur, PT Physical Therapist              Therapy Charges for Today     Code Description Service Date Service Provider Modifiers Qty    36823307710  PT EVAL MOD COMPLEXITY 3 1/10/2023 Marlin Ladd, PT GP 1    [id number]  PT THERAPEUTIC ACT EA 15 MIN 1/10/2023 Marlin Ladd, PT GP 1    [id number]  PT THERAPEUTIC ACT EA 15 MIN 1/11/2023 Marlin Ladd, PT GP 1    [id number]  PT THER PROC EA 15 MIN 1/11/2023 Marlin Ladd, PT GP 1          PT G-Codes  Outcome Measure Options: AM-PAC 6 Clicks Basic Mobility (PT)  AM-PAC 6 Clicks Score (PT): 9  AM-PAC 6 Clicks Score (OT): 11  PT Discharge Summary  Anticipated Discharge Disposition (PT): inpatient rehabilitation facility, skilled nursing facility    Marlin Ladd PT  1/11/2023

## 2023-01-11 NOTE — PLAN OF CARE
Goal Outcome Evaluation:  Plan of Care Reviewed With: patient        Progress: no change  Outcome Evaluation: pt worked w OT/PT in co treat session. pt w incr pain this date in R LE. notified RN who provided pt w pain med. pt completed bed mobility w mod x2 and sat EOB SBA and min A when completing LE ex. pt completed UE ex AAROM and PROM/stretch to R hand . pt plan to dc to rehab upon dc from hospital. cont therapy to incr ADL, balance, and tsf.

## 2023-01-11 NOTE — PLAN OF CARE
Goal Outcome Evaluation:  Plan of Care Reviewed With: patient        Progress: improving  Outcome Evaluation: VSS, optifoam dressing c/d/i. hemovac, voiding per urinal, able to sit side of bed with PT. po pain meds and flexeril effective. aphasic, baseline, ABD pillow, order for picc line.

## 2023-01-11 NOTE — THERAPY TREATMENT NOTE
Patient Name: Mandeep Tracey  : 1960    MRN: [id number]                              Today's Date: 2023       Admit Date: 2023    Visit Dx:     ICD-10-CM ICD-9-CM   1. Infection and inflammatory reaction due to internal right hip prosthesis, initial encounter (Formerly [facility])  T84.51XA 996.66   2. Follow-up exam  Z09 V67.9     Patient Active Problem List   Diagnosis   • CVA (cerebral vascular accident) (Formerly [facility])   • Right hemiparesis (Formerly [facility])   • BELKYS on CPAP   • Seizure disorder (Formerly [facility])   • Type 2 diabetes mellitus (Formerly [facility])   • Essential hypertension   • Infection of right prosthetic hip joint (Formerly [facility])   • MSSA (methicillin susceptible Staphylococcus aureus) infection   • Group B streptococcal infection   • UTI (urinary tract infection)   • Postoperative nausea and vomiting   • Nonrheumatic mitral valve regurgitation   • Cardiomyopathy (Formerly [facility])   • PVCs (premature ventricular contractions)   • Pyogenic arthritis of right hip (Formerly [facility])   • Infection and inflammatory reaction due to internal right hip prosthesis, initial encounter (Formerly [facility])     Past Medical History:   Diagnosis Date   • Aphasia    • Cardiomyopathy (Formerly [facility])    • CPAP (continuous positive airway pressure) dependence    • Diabetes (Formerly [facility])    • Hemiparesis (Formerly [facility])     Right side    • Morbid obesity (Formerly [facility])    • Nonrheumatic mitral valve regurgitation    • BELKYS on CPAP    • Peptic ulcer disease    • Postoperative nausea and vomiting 3/13/2021   • Psoriasis    • Pyogenic arthritis of right hip (Formerly [facility])    • Seizures (Formerly [facility])    • Sleep apnea    • Stroke (Formerly [facility])    • Ventricular ectopy     asymptomatic     Past Surgical History:   Procedure Laterality Date   • CHOLECYSTECTOMY     • EYE SURGERY     • HIP SPACER INSERTION WITH ANTIBIOTIC CEMENT Right 2023    Procedure: RT. BIPOLAR HIP REMOVAL AND PLACEMENT OF SPACER;  Surgeon: Faustina Beebe MD;  Location: [facility];  Service: Orthopedics;  Laterality: Right;   • INCISION AND DRAINAGE HIP Right 3/12/2021    Procedure: HIP  ANTERIOR INCISION AND DRAINAGE WITH HANA TABLE;  Surgeon: Tao Andrea MD;  Location: [facility] MAIN OR;  Service: Orthopedics;  Laterality: Right;   • LUMBAR DISC SURGERY     • US GUIDED FINE NEEDLE ASPIRATION  3/10/2021      General Information     Row Name 01/11/23 1507          OT Time and Intention    Document Type therapy note (daily note)  -     Mode of Treatment occupational therapy;physical therapy;co-treatment  -     Row Name 01/11/23 1507          General Information    Existing Precautions/Restrictions fall;hip, posterior;right  -     Row Name 01/11/23 1507          Cognition    Orientation Status (Cognition) oriented x 3  -     Row Name 01/11/23 1507          Safety Issues, Functional Mobility    Impairments Affecting Function (Mobility) balance;endurance/activity tolerance;strength;pain  -           User Key  (r) = Recorded By, (t) = Taken By, (c) = Cosigned By    Initials Name Provider Type     Mirta Jones, OTR Occupational Therapist                 Mobility/ADL's     Row Name 01/11/23 1508          Bed Mobility    Scooting/Bridging [place] (Bed Mobility) set up;minimum assist (75% patient effort)  -     Supine-Sit [place] (Bed Mobility) set up;moderate assist (50% patient effort);2 person assist  -     Sit-Supine [place] (Bed Mobility) set up;moderate assist (50% patient effort);2 person assist  -     Assistive Device (Bed Mobility) bed rails;draw sheet;head of bed elevated  -[facility] Name 01/11/23 1508          Transfers    Comment, (Transfers) NT incr pain in R LE  -     Row Name 01/11/23 1508          Activities of Daily Living    BADL Assessment/Intervention grooming  -     Row Name 01/11/23 1508          Mobility    Right Lower Extremity (Weight-bearing Status) weight-bearing as tolerated (WBAT)  -     Row Name 01/11/23 1508          Grooming Assessment/Training    [place] Level (Grooming) grooming skills;wash face, hands;standby assist;set  up  -     Position (Grooming) edge of bed sitting  -           User Key  (r) = Recorded By, (t) = Taken By, (c) = Cosigned By    Initials Name Provider Type    Mirta Busby OTR Occupational Therapist               Obj/Interventions     Row Name 01/11/23 1509          Shoulder (Therapeutic Exercise)    Shoulder (Therapeutic Exercise) AAROM (active assistive range of motion)  -     Shoulder AAROM (Therapeutic Exercise) right;flexion;extension;10 repetitions  -     Row Name 01/11/23 1509          Elbow/Forearm (Therapeutic Exercise)    Elbow/Forearm (Therapeutic Exercise) AAROM (active assistive range of motion)  -     Elbow/Forearm AAROM (Therapeutic Exercise) right;flexion;extension;10 repetitions;sitting  -     Row Name 01/11/23 1509          Hand (Therapeutic Exercise)    Hand (Therapeutic Exercise) PROM (passive range of motion)  -     Hand PROM (Therapeutic Exercise) right;10 repetitions;extrinsic stretch;intrinsic stretch;finger extension;finger flexion  -     Row Name 01/11/23 1509          Motor Skills    Therapeutic Exercise shoulder;elbow/forearm;hand  -     Row Name 01/11/23 1509          Balance    Static Sitting Balance set-up;standby assist  -     Dynamic Sitting Balance set-up;minimal assist  -     Balance Interventions sitting;static;dynamic  -           User Key  (r) = Recorded By, (t) = Taken By, (c) = Cosigned By    Initials Name Provider Type    Mirta Busby OTR Occupational Therapist               Goals/Plan    No documentation.                Clinical Impression     Row Name 01/11/23 1510          Pain Assessment    Pretreatment Pain Rating 7/10  -KP     Posttreatment Pain Rating 7/10  -KP     Pain Location - Side/Orientation Right  -     Pain Location lower  -     Pain Location - extremity  -     Pre/Posttreatment Pain Comment nsg aware and providing pain meds  -     Pain Intervention(s) Repositioned  -     Row Name 01/11/23 8039           Plan of Care Review    Plan of Care Reviewed With patient  -     Progress no change  -     Outcome Evaluation pt worked w OT/PT in co treat session. pt w incr pain this date in R LE. notified RN who provided pt w pain med. pt completed bed mobility w mod x2 and sat EOB SBA and min A when completing LE ex. pt completed UE ex AAROM and PROM/stretch to R hand . pt plan to dc to rehab upon dc from hospital. cont therapy to incr ADL, balance, and tsf.  -     Row Name 01/11/23 1510          Positioning and Restraints    Pre-Treatment Position in bed  -KP     Post Treatment Position bed  -KP     In Bed supine;call light within reach;encouraged to call for assist;exit alarm on;with ns  -           User Key  (r) = Recorded By, (t) = Taken By, (c) = Cosigned By    Initials Name Provider Type    Mirta Busby, OTR Occupational Therapist               Outcome Measures     Row Name 01/11/23 1511          How much help from another is currently needed...    Putting on and taking off regular lower body clothing? 1  -KP     Bathing (including washing, rinsing, and drying) 2  -KP     Toileting (which includes using toilet bed pan or urinal) 1  -KP     Putting on and taking off regular upper body clothing 2  -KP     Taking care of personal grooming (such as brushing teeth) 3  -KP     Eating meals 2  -KP     AM-PAC 6 Clicks Score (OT) 11  -     Row Name 01/11/23 1059 01/11/23 0800       How much help from another person do you currently need...    Turning from your back to your side while in flat bed without using bedrails? 2  -SH 2  -SH    Moving from lying on back to sitting on the side of a flat bed without bedrails? 2  -SH 2  -SH    Moving to and from a bed to a chair (including a wheelchair)? 2  -SH 2  -SH    Standing up from a chair using your arms (e.g., wheelchair, bedside chair)? 2  -SH 2  -SH    Climbing 3-5 steps with a railing? 1  -SH 1  -SH    To walk in hospital room? 1  -SH 1  -SH    AM-PAC  6 Clicks Score (PT) 10  -SH 10  -SH    Highest level of mobility 4 --> Transferred to chair/commode  -SH 4 --> Transferred to chair/commode  -SH    Row Name 01/11/23 1511          Functional Assessment    Outcome Measure Options AM-PAC 6 Clicks Daily Activity (OT)  -           User Key  (r) = Recorded By, (t) = Taken By, (c) = Cosigned By    Initials Name Provider Type    Mirta Busby OTR Occupational Therapist     Christa Garcia, RN Registered Nurse                Occupational Therapy Education     Title: PT OT SLP Therapies (Done)     Topic: Occupational Therapy (Done)     Point: ADL training (Done)     Description:   Instruct learner(s) on proper safety adaptation and remediation techniques during self care or transfers.   Instruct in proper use of assistive devices.              Learning Progress Summary           Patient Acceptance, E,TB,D, VU,DU by  at 1/10/2023 1527    Comment: ed pt and wife on role of OT. and benefit of therapy, pt w prev CVA 20 years ago. pt ed on hip precautions. pt and wife verbally understand.   Family Acceptance, E,TB,D, VU,DU by  at 1/10/2023 1527    Comment: ed pt and wife on role of OT. and benefit of therapy, pt w prev CVA 20 years ago. pt ed on hip precautions. pt and wife verbally understand.                   Point: Home exercise program (Done)     Description:   Instruct learner(s) on appropriate technique for monitoring, assisting and/or progressing therapeutic exercises/activities.              Learning Progress Summary           Patient Acceptance, E,TB,D, VU,DU by  at 1/10/2023 1527    Comment: ed pt and wife on role of OT. and benefit of therapy, pt w prev CVA 20 years ago. pt ed on hip precautions. pt and wife verbally understand.   Family Acceptance, E,TB,D, VU,DU by  at 1/10/2023 1527    Comment: ed pt and wife on role of OT. and benefit of therapy, pt w prev CVA 20 years ago. pt ed on hip precautions. pt and wife verbally understand.                    Point: Precautions (Done)     Description:   Instruct learner(s) on prescribed precautions during self-care and functional transfers.              Learning Progress Summary           Patient Acceptance, E,TB,D, VU,DU by  at 1/10/2023 1527    Comment: ed pt and wife on role of OT. and benefit of therapy, pt w prev CVA 20 years ago. pt ed on hip precautions. pt and wife verbally understand.   Family Acceptance, E,TB,D, VU,DU by  at 1/10/2023 1527    Comment: ed pt and wife on role of OT. and benefit of therapy, pt w prev CVA 20 years ago. pt ed on hip precautions. pt and wife verbally understand.                   Point: Body mechanics (Done)     Description:   Instruct learner(s) on proper positioning and spine alignment during self-care, functional mobility activities and/or exercises.              Learning Progress Summary           Patient Acceptance, E,TB,D, VU,DU by  at 1/10/2023 1527    Comment: ed pt and wife on role of OT. and benefit of therapy, pt w prev CVA 20 years ago. pt ed on hip precautions. pt and wife verbally understand.   Family Acceptance, E,TB,D, VU,DU by  at 1/10/2023 1527    Comment: ed pt and wife on role of OT. and benefit of therapy, pt w prev CVA 20 years ago. pt ed on hip precautions. pt and wife verbally understand.                               User Key     Initials Effective Dates Name Provider Type Discipline     06/16/21 -  Mirta Jones, OTR Occupational Therapist OT              OT Recommendation and Plan  Planned Therapy Interventions (OT): activity tolerance training, functional balance retraining, occupation/activity based interventions, BADL retraining, ROM/therapeutic exercise, strengthening exercise  Therapy Frequency (OT): 5 times/wk  Plan of Care Review  Plan of Care Reviewed With: patient  Progress: no change  Outcome Evaluation: pt worked w OT/PT in co treat session. pt w incr pain this date in [facility]. notified RN who provided pt w pain med.  pt completed bed mobility w mod x2 and sat EOB SBA and min A when completing LE ex. pt completed UE ex AAROM and PROM/stretch to R hand . pt plan to dc to rehab upon dc from hospital. cont therapy to incr ADL, balance, and tsf.     Time Calculation:    Time Calculation- OT     Row Name 01/11/23 1512             Time Calculation- OT    OT Start Time 1409  -KP      OT Stop Time 1437  -KP      OT Time Calculation (min) 28 min  -KP      Total Timed Code Minutes- OT 28 minute(s)  -KP      OT Received On 01/11/23  -      OT - Next Appointment 01/12/23  -         Timed Charges    13325 -  OT Neuromuscular Reeducation Minutes 28  -KP         Total Minutes    Timed Charges Total Minutes 28  -KP       Total Minutes 28  -KP            User Key  (r) = Recorded By, (t) = Taken By, (c) = Cosigned By    Initials Name Provider Type     Mirta Jones OTR Occupational Therapist              Therapy Charges for Today     Code Description Service Date Service Provider Modifiers Qty    77652990418 HC OT THER PROC EA 15 MIN 1/10/2023 Mirta Jones OTR GO 1    35934269246 HC OT EVAL MOD COMPLEXITY 2 1/10/2023 Mirta Jones OTR GO 1    [id number]  OT NEUROMUSC RE EDUCATION EA 15 MIN 1/11/2023 Mirta Jones OTR GO 2               BRANDEN Jules  1/11/2023

## 2023-01-11 NOTE — NURSING NOTE
Iv team consulted to place a picc for longterm vancomycin with end date 2/22/23. Reviewed chart and labs, H&P, and xray ok for line placement. Spoke with Christa PIPER, she will obtain consent , education, and behavorial contract then notify iv team. Also per order it is noted pt prefers Rt arm; will not accept Lt arm PICC as it is the arm he can use due to Rt side hemipariesis, reported to Christa PIPER that his Rt arm would be contraindicated due to increase risk for complications due to pt unlikely to be able to detect discomfort, pain, or swelling in arm and the dominant arm is always recommended to decrease the chance of blood clots due to increased movement and circulation. Iv team will discuss with pt the pros and cons of using his lt arm over his rt arm and will need further MD orders to use his restricted rt extremity. Discharge has yet to be determined.

## 2023-01-11 NOTE — DISCHARGE INSTRUCTIONS
Discharge and Follow up Instructions:     I. ACTIVITIES:  1. Exercises:  Complete exercise program as taught by the hospital physical therapist 2 times per day  Exercise program will be advanced by the physical therapist  During the day be up ambulating every 2 hours (while awake) for short distances  Complete the ankle pump exercises at least 10 times per hour (while awake)  Elevate legs when in bed and for at least 30 minutes during the day.Use cold packs 20-30 minutes approximately 5 times per day. This should be done before and after completing your exercises and at any time you are experiencing pain/ stiffness in your operative extremity.      2. Activities of Daily Living:  No tub baths, hot tubs, or swimming pools for 4-6 weeks  May shower and let water run over the incision on post-operative day #5 if no drainage. Do not scrub or rub the incision. Simply let the water run over the incision and pat dry.    II. Restrictions  Continue hip precautions as taught at the hospital  Your surgeon will discuss with you when you will be able to drive again. Usual guidelines are you are to be off pain medications prior to driving.  Weight bearing is as tolerated with an assist device  First week stay inside on even terrain. May go up and down stairs one stair at a time utilizing the hand rail once cleared by physical therapy to do so.  After one week, you may venture outside (if cleared to do so by physical therapist).    III. Precautions:  Everyone that comes near you should wash their hands  No elective dental, genital-urinary, or colon procedures or surgical procedures for 12 weeks after surgery unless absolutely necessary.   If dental work or surgical procedure is deemed absolutely necessary, you will need to contact your surgeon as you will need to take antibiotics 1 hour prior to any dental work (including teeth cleanings).  Please discuss with your surgeon prophylactic antibiotics as the length of time this  intervention will be necessary for you varies with each patient’s health history and situation.  Avoid sick people. If you must be around someone who is ill, they should wear a mask.  Avoid visits to the Emergency Room or Urgent Care. If you feel you need to go to the Emergency Room, please notify your Surgeon's office.  Stockings are to be worn for one week after surgery and are to be placed on in the morning and removed at night. Observe your skin when stocking is removed for any problems. Monitor the stockings to ensure that any swelling is not causing the stockings to become too tight. In this case, remove stockings immediately.      IV. INCISION CARE:  Wash your hands prior to dressing changes  Change the dressing as needed to keep incision clean and dry. Utilize dry gauze and paper tape. Avoid touching the side of the gauze that goes against the incision with your hands.  No creams or ointments to the incision  May remove dressing once the incision is free of drainage  Do not touch or pick at the incision  Check incision every day and notify surgeon immediately if any of the following signs or symptoms are noted:  Increase in redness  Increase in swelling around the incision and of the entire extremity  Increase in pain  Drainage oozing from the incision  Pulling apart of the edges of the incision  Increase in overall body temperature (greater than 100.5 degrees)  You have absorbable sutures with steristrips, please do not remove the  steristrips for 14 days, you can shower on them 6 days after surgery.     V. Medications:   1. Anticoagulants: You will be discharged on an anticoagulant. This is a prophylactic medication that helps prevent blood clots during your post-operative period.  You will be on home dose of Plavix and ASA.   While taking the anticoagulant, you should avoid taking any additional aspirin, ibuprofen (Advil or Motrin), Aleve (Naprosyn) or other non-steroidal anti-inflammatory medications.    Notify surgeon immediately if any gabby bleeding is noted in the urine, stool, emesis, or from the nose or the incision. Blood in the stool will often appear as black rather than red. Blood in urine may appear as pink. Blood in emesis may appear as brown/black like coffee grounds.  You will need to apply pressure for longer periods of time to any cuts or abrasions to stop bleeding  Avoid alcohol while taking anticoagulants    2. Stool Softeners: You will be at greater risk of constipation after surgery due to being less mobile and the pain medications.   Take stool softeners as instructed by your surgeon while on pain medications. Over the counter Colace 100 mg 1-2 capsules twice daily.   If stools become too loose or too frequent, please decreases the dosage or stop the stool softener.  If constipation occurs despite use of stool softeners, you are to continue the stool softeners and add a laxative (Milk of Magnesia 1 ounce daily as needed).  Dulcolax oral tabs or suppository, or a fleets enema can also be utilized for constipation and can be obtained over the counter.   If above interventions are unsuccessful in inducing bowel movements, please contact your surgeon's office / family physician's office.  Drink plenty of fluids, and eat fruits and vegetables during your recovery time    3. Pain Medications utilized after surgery are narcotics and the law requires that the following information be given to all patients that are prescribed narcotics:  CLASSIFICATION: Pain medications are called Opioids and are narcotics  LEGALITIES: It is illegal to share narcotics with others and to drive within 24 hours of taking narcotics  POTENTIAL SIDE EFFECTS: Potential side effects of opioids include: nausea, vomiting, itching, dizziness, drowsiness, dry mouth, constipation, and difficulty urinating.  POTENTIAL ADVERSE EFFECTS:   Opioid tolerance can develop with use of pain medications and this simply means that it requires  more and more of the medication to control pain; however, this is seen more in patients that use opioids for longer periods of time.  Opioid dependence can develop with use of Opioids and this simply means that to stop the medication can cause withdrawal symptoms; however, this is seen with patients that use Opioids for longer periods of time.  Opioid addiction can develop with use of Opioids and the incidence of this is very unlikely in patients who take the medications as ordered and stop the medications as instructed.  Opioid overdose can be dangerous, but is unlikely when the medication is taken as ordered and stopped when ordered. It is important not to mix opioids with alcohol or with and type of sedative such as Benadryl as this can lead to over sedation and respiratory difficulty.  DOSAGE:   Pain medications will need to be taken consistently for the first week to decrease pain and promote adequate pain relief and participation in physical therapy.  After the initial surgical pain begins to resolve, you may begin to decrease the pain medication. By the end of 6 weeks, you should be off of pain medications.  Refills will not be given by the office during evening hours, on weekends, or after 6 weeks post-op.  To seek refills on pain medications during the initial 6 week post-operative period, you must call the office 48 hours in advance to request the refill. The office will then notify you when to  the prescription. DO NOT wait until you are out of the medication to request a refill.    V. FOLLOW-UP VISITS:  You will need to follow up in the office with your surgeon on Jan 31, 2023. Please call this number [phone]  to schedule this appointment.  If you have any concerns or suspected complications prior to your follow up visit, please call your surgeons office. Do not wait until your appointment time if you suspect complications. These will need to be addressed in the office promptly.

## 2023-01-12 LAB
ANION GAP SERPL CALCULATED.3IONS-SCNC: 11 MMOL/L (ref 5–15)
BACTERIA SPEC AEROBE CULT: NORMAL
BASOPHILS # BLD AUTO: 0.01 10*3/MM3 (ref 0–0.2)
BASOPHILS NFR BLD AUTO: 0.1 % (ref 0–1.5)
BUN SERPL-MCNC: 12 MG/DL (ref 8–23)
BUN/CREAT SERPL: 15 (ref 7–25)
CALCIUM SPEC-SCNC: 8.7 MG/DL (ref 8.6–10.5)
CHLORIDE SERPL-SCNC: 98 MMOL/L (ref 98–107)
CO2 SERPL-SCNC: 28 MMOL/L (ref 22–29)
CREAT SERPL-MCNC: 0.8 MG/DL (ref 0.76–1.27)
DEPRECATED RDW RBC AUTO: 45.7 FL (ref 37–54)
EGFRCR SERPLBLD CKD-EPI 2021: 101.3 ML/MIN/1.73
EOSINOPHIL # BLD AUTO: 0.26 10*3/MM3 (ref 0–0.4)
EOSINOPHIL NFR BLD AUTO: 2.5 % (ref 0.3–6.2)
ERYTHROCYTE [DISTWIDTH] IN BLOOD BY AUTOMATED COUNT: 15 % (ref 12.3–15.4)
GLUCOSE BLDC GLUCOMTR-MCNC: 189 MG/DL (ref 70–130)
GLUCOSE BLDC GLUCOMTR-MCNC: 238 MG/DL (ref 70–130)
GLUCOSE BLDC GLUCOMTR-MCNC: 241 MG/DL (ref 70–130)
GLUCOSE BLDC GLUCOMTR-MCNC: 256 MG/DL (ref 70–130)
GLUCOSE SERPL-MCNC: 163 MG/DL (ref 65–99)
GRAM STN SPEC: NORMAL
GRAM STN SPEC: NORMAL
HCT VFR BLD AUTO: 28.2 % (ref 37.5–51)
HGB BLD-MCNC: 9 G/DL (ref 13–17.7)
IMM GRANULOCYTES # BLD AUTO: 0.06 10*3/MM3 (ref 0–0.05)
IMM GRANULOCYTES NFR BLD AUTO: 0.6 % (ref 0–0.5)
LYMPHOCYTES # BLD AUTO: 1.53 10*3/MM3 (ref 0.7–3.1)
LYMPHOCYTES NFR BLD AUTO: 15 % (ref 19.6–45.3)
MCH RBC QN AUTO: 26.7 PG (ref 26.6–33)
MCHC RBC AUTO-ENTMCNC: 31.9 G/DL (ref 31.5–35.7)
MCV RBC AUTO: 83.7 FL (ref 79–97)
MONOCYTES # BLD AUTO: 0.95 10*3/MM3 (ref 0.1–0.9)
MONOCYTES NFR BLD AUTO: 9.3 % (ref 5–12)
NEUTROPHILS NFR BLD AUTO: 7.4 10*3/MM3 (ref 1.7–7)
NEUTROPHILS NFR BLD AUTO: 72.5 % (ref 42.7–76)
NRBC BLD AUTO-RTO: 0.1 /100 WBC (ref 0–0.2)
PLATELET # BLD AUTO: 329 10*3/MM3 (ref 140–450)
PMV BLD AUTO: 9.5 FL (ref 6–12)
POTASSIUM SERPL-SCNC: 3.8 MMOL/L (ref 3.5–5.2)
RBC # BLD AUTO: 3.37 10*6/MM3 (ref 4.14–5.8)
SODIUM SERPL-SCNC: 137 MMOL/L (ref 136–145)
WBC NRBC COR # BLD: 10.21 10*3/MM3 (ref 3.4–10.8)

## 2023-01-12 PROCEDURE — 82962 GLUCOSE BLOOD TEST: CPT

## 2023-01-12 PROCEDURE — 80048 BASIC METABOLIC PNL TOTAL CA: CPT | Performed by: ORTHOPAEDIC SURGERY

## 2023-01-12 PROCEDURE — 97110 THERAPEUTIC EXERCISES: CPT

## 2023-01-12 PROCEDURE — 63710000001 INSULIN LISPRO (HUMAN) PER 5 UNITS: Performed by: ORTHOPAEDIC SURGERY

## 2023-01-12 PROCEDURE — 97530 THERAPEUTIC ACTIVITIES: CPT

## 2023-01-12 PROCEDURE — 25010000002 VANCOMYCIN 10 G RECONSTITUTED SOLUTION: Performed by: INTERNAL MEDICINE

## 2023-01-12 PROCEDURE — C1751 CATH, INF, PER/CENT/MIDLINE: HCPCS

## 2023-01-12 PROCEDURE — 25010000002 HYDROMORPHONE PER 4 MG: Performed by: HOSPITALIST

## 2023-01-12 PROCEDURE — 99232 SBSQ HOSP IP/OBS MODERATE 35: CPT | Performed by: INTERNAL MEDICINE

## 2023-01-12 PROCEDURE — 85025 COMPLETE CBC W/AUTO DIFF WBC: CPT | Performed by: ORTHOPAEDIC SURGERY

## 2023-01-12 RX ORDER — SODIUM CHLORIDE 0.9 % (FLUSH) 0.9 %
10 SYRINGE (ML) INJECTION AS NEEDED
Status: CANCELLED | OUTPATIENT
Start: 2023-01-12

## 2023-01-12 RX ORDER — SODIUM CHLORIDE 0.9 % (FLUSH) 0.9 %
20 SYRINGE (ML) INJECTION AS NEEDED
Status: CANCELLED | OUTPATIENT
Start: 2023-01-12

## 2023-01-12 RX ORDER — SODIUM CHLORIDE 0.9 % (FLUSH) 0.9 %
10 SYRINGE (ML) INJECTION EVERY 12 HOURS SCHEDULED
Status: CANCELLED | OUTPATIENT
Start: 2023-01-12

## 2023-01-12 RX ADMIN — OXYCODONE HYDROCHLORIDE AND ACETAMINOPHEN 1 TABLET: 10; 325 TABLET ORAL at 13:56

## 2023-01-12 RX ADMIN — ISOSORBIDE MONONITRATE 30 MG: 30 TABLET, EXTENDED RELEASE ORAL at 09:08

## 2023-01-12 RX ADMIN — INSULIN GLARGINE-YFGN 15 UNITS: 100 INJECTION, SOLUTION SUBCUTANEOUS at 20:36

## 2023-01-12 RX ADMIN — ACETAMINOPHEN 650 MG: 325 TABLET, FILM COATED ORAL at 20:36

## 2023-01-12 RX ADMIN — GABAPENTIN 600 MG: 300 CAPSULE ORAL at 09:08

## 2023-01-12 RX ADMIN — OXYCODONE HYDROCHLORIDE AND ACETAMINOPHEN 1 TABLET: 10; 325 TABLET ORAL at 09:09

## 2023-01-12 RX ADMIN — GABAPENTIN 600 MG: 300 CAPSULE ORAL at 20:36

## 2023-01-12 RX ADMIN — METOPROLOL SUCCINATE 50 MG: 50 TABLET, FILM COATED, EXTENDED RELEASE ORAL at 09:07

## 2023-01-12 RX ADMIN — ROSUVASTATIN CALCIUM 40 MG: 40 TABLET, FILM COATED ORAL at 09:07

## 2023-01-12 RX ADMIN — NYSTATIN: 100000 POWDER TOPICAL at 09:09

## 2023-01-12 RX ADMIN — OXYBUTYNIN CHLORIDE 10 MG: 10 TABLET, EXTENDED RELEASE ORAL at 09:07

## 2023-01-12 RX ADMIN — HYDROMORPHONE HYDROCHLORIDE 0.5 MG: 1 INJECTION, SOLUTION INTRAMUSCULAR; INTRAVENOUS; SUBCUTANEOUS at 04:54

## 2023-01-12 RX ADMIN — INSULIN LISPRO 3 UNITS: 100 INJECTION, SOLUTION INTRAVENOUS; SUBCUTANEOUS at 11:20

## 2023-01-12 RX ADMIN — OXYCODONE HYDROCHLORIDE AND ACETAMINOPHEN 1 TABLET: 10; 325 TABLET ORAL at 18:00

## 2023-01-12 RX ADMIN — SERTRALINE 100 MG: 100 TABLET, FILM COATED ORAL at 09:07

## 2023-01-12 RX ADMIN — BUMETANIDE 2 MG: 2 TABLET ORAL at 09:08

## 2023-01-12 RX ADMIN — SACUBITRIL AND VALSARTAN 1 TABLET: 24; 26 TABLET, FILM COATED ORAL at 20:36

## 2023-01-12 RX ADMIN — Medication 10 ML: at 20:37

## 2023-01-12 RX ADMIN — ASPIRIN 81 MG: 81 TABLET, COATED ORAL at 09:08

## 2023-01-12 RX ADMIN — Medication 10 ML: at 09:09

## 2023-01-12 RX ADMIN — DOCUSATE SODIUM 200 MG: 100 CAPSULE, LIQUID FILLED ORAL at 09:08

## 2023-01-12 RX ADMIN — INSULIN LISPRO 4 UNITS: 100 INJECTION, SOLUTION INTRAVENOUS; SUBCUTANEOUS at 16:39

## 2023-01-12 RX ADMIN — INSULIN LISPRO 2 UNITS: 100 INJECTION, SOLUTION INTRAVENOUS; SUBCUTANEOUS at 09:09

## 2023-01-12 RX ADMIN — SACUBITRIL AND VALSARTAN 1 TABLET: 24; 26 TABLET, FILM COATED ORAL at 09:07

## 2023-01-12 RX ADMIN — CLOPIDOGREL 75 MG: 75 TABLET, FILM COATED ORAL at 09:08

## 2023-01-12 RX ADMIN — POLYETHYLENE GLYCOL 3350 17 G: 17 POWDER, FOR SOLUTION ORAL at 09:09

## 2023-01-12 RX ADMIN — NYSTATIN: 100000 POWDER TOPICAL at 20:38

## 2023-01-12 RX ADMIN — POLYETHYLENE GLYCOL 3350 17 G: 17 POWDER, FOR SOLUTION ORAL at 20:36

## 2023-01-12 RX ADMIN — VANCOMYCIN HYDROCHLORIDE 1500 MG: 10 INJECTION, POWDER, LYOPHILIZED, FOR SOLUTION INTRAVENOUS at 09:10

## 2023-01-12 RX ADMIN — GABAPENTIN 600 MG: 300 CAPSULE ORAL at 16:39

## 2023-01-12 NOTE — PROGRESS NOTES
ID PROGRESS NOTE    CC: f/u R hip PJI due to GPC    Subj: No fever. No acute events. Awaiting PICC line and disposition. Tolerating vanco w/o rash. Culture from OR remains negative.     Medications:    Current Facility-Administered Medications:   •  acetaminophen (TYLENOL) tablet 650 mg, 650 mg, Oral, Q4H PRN **OR** acetaminophen (TYLENOL) 160 MG/5ML solution 650 mg, 650 mg, Oral, Q4H PRN **OR** acetaminophen (TYLENOL) suppository 650 mg, 650 mg, Rectal, Q4H PRN, Faustina Beebe MD  •  acetaminophen (TYLENOL) tablet 325 mg, 325 mg, Oral, Q4H PRN, Faustina Beebe MD  •  aspirin EC tablet 81 mg, 81 mg, Oral, Daily, Faustina Beebe MD, 81 mg at 01/12/23 0908  •  bisacodyl (DULCOLAX) EC tablet 10 mg, 10 mg, Oral, Daily PRN, Faustina Beebe MD  •  bisacodyl (DULCOLAX) suppository 10 mg, 10 mg, Rectal, Daily PRN, Faustina Beebe MD  •  bumetanide (BUMEX) tablet 2 mg, 2 mg, Oral, Daily, Faustina Beebe MD, 2 mg at 01/12/23 0908  •  clopidogrel (PLAVIX) tablet 75 mg, 75 mg, Oral, Daily, Jason Aranda MD, 75 mg at 01/12/23 0908  •  cyclobenzaprine (FLEXERIL) tablet 10 mg, 10 mg, Oral, TID PRN, Faustina Beebe MD, 10 mg at 01/11/23 0809  •  dextrose (D50W) (25 g/50 mL) IV injection 25 g, 25 g, Intravenous, Q15 Min PRN, Faustina Beebe MD  •  dextrose (GLUTOSE) oral gel 15 g, 15 g, Oral, Q15 Min PRN, Faustina Beebe MD  •  docusate sodium (COLACE) capsule 100 mg, 100 mg, Oral, BID PRN, Faustina Beebe MD  •  docusate sodium (COLACE) capsule 200 mg, 200 mg, Oral, BID, Faustina Beebe MD, 200 mg at 01/12/23 0908  •  gabapentin (NEURONTIN) capsule 600 mg, 600 mg, Oral, TID, Faustina Beebe MD, 600 mg at 01/12/23 0908  •  glucagon (human recombinant) (GLUCAGEN DIAGNOSTIC) injection 1 mg, 1 mg, Intramuscular, Q15 Min PRN, Faustina Beebe MD  •  HYDROmorphone (DILAUDID) injection 0.5 mg, 0.5 mg, Intravenous, Q2H  PRN, Jason Aranda MD, 0.5 mg at 01/12/23 0454  •  hydrOXYzine (ATARAX) tablet 25 mg, 25 mg, Oral, TID PRN, Faustina Beebe MD  •  insulin glargine (LANTUS, SEMGLEE) injection 15 Units, 15 Units, Subcutaneous, Nightly, Faustina Beebe MD, 15 Units at 01/11/23 2324  •  insulin lispro (ADMELOG) injection 0-7 Units, 0-7 Units, Subcutaneous, TID AC, Faustina Beebe MD, 2 Units at 01/12/23 0909  •  isosorbide mononitrate (IMDUR) 24 hr tablet 30 mg, 30 mg, Oral, Daily, Faustina Beebe MD, 30 mg at 01/12/23 0908  •  melatonin tablet 1 mg, 1 mg, Oral, Nightly PRN, Faustina Beebe MD  •  metoprolol succinate XL (TOPROL-XL) 24 hr tablet 50 mg, 50 mg, Oral, Daily, Faustina Beebe MD, 50 mg at 01/12/23 0907  •  [DISCONTINUED] HYDROmorphone (DILAUDID) injection 2 mg, 2 mg, Intramuscular, Q4H PRN, 2 mg at 01/10/23 1021 **AND** naloxone (NARCAN) injection 0.1 mg, 0.1 mg, Intravenous, Q5 Min PRN, Faustina Beebe MD  •  nystatin (MYCOSTATIN) powder, , Topical, Q12H, Faustina Beebe MD, Given at 01/12/23 0909  •  ondansetron (ZOFRAN) injection 4 mg, 4 mg, Intravenous, Q6H PRN, Faustina Beebe MD  •  ondansetron (ZOFRAN) tablet 4 mg, 4 mg, Oral, Q6H PRN **OR** ondansetron (ZOFRAN) injection 4 mg, 4 mg, Intravenous, Q6H PRN, Faustina Beebe MD  •  oxybutynin XL (DITROPAN-XL) 24 hr tablet 10 mg, 10 mg, Oral, Daily, Faustina Beebe MD, 10 mg at 01/12/23 0907  •  oxyCODONE-acetaminophen (PERCOCET)  MG per tablet 1 tablet, 1 tablet, Oral, Q4H PRN, Jason Aranda MD, 1 tablet at 01/12/23 0909  •  Pharmacy to dose vancomycin, , Does not apply, Continuous PRN, Raymundo Miller MD  •  polyethylene glycol (MIRALAX) packet 17 g, 17 g, Oral, BID, Faustina Beebe MD, 17 g at 01/12/23 0909  •  rosuvastatin (CRESTOR) tablet 40 mg, 40 mg, Oral, Daily, Faustina Beebe MD, 40 mg at 01/12/23 0907  •   sacubitril-valsartan (ENTRESTO) 24-26 MG tablet 1 tablet, 1 tablet, Oral, BID, Faustina Beebe MD, 1 tablet at 01/12/23 0907  •  sertraline (ZOLOFT) tablet 100 mg, 100 mg, Oral, Daily, Faustina Beebe MD, 100 mg at 01/12/23 0907  •  sodium chloride 0.9 % flush 1-10 mL, 1-10 mL, Intravenous, PRN, Faustina Beebe MD  •  sodium chloride 0.9 % flush 10 mL, 10 mL, Intravenous, Q12H, Faustina Beebe MD, 10 mL at 01/12/23 0909  •  sodium chloride 0.9 % flush 10 mL, 10 mL, Intravenous, PRN, Faustina Beebe MD  •  sodium chloride 0.9 % flush 10 mL, 10 mL, Intravenous, Q12H, Faustina Beebe MD, 10 mL at 01/12/23 0909  •  sodium chloride 0.9 % infusion 40 mL, 40 mL, Intravenous, PRN, Faustina Beebe MD  •  vancomycin 1500 mg/500 mL 0.9% NS IVPB ([facility]), 1,500 mg, Intravenous, Q12H, Raymundo Miller MD, 1,500 mg at 01/12/23 0910      Objective   Vital Signs   Temp:  [97.2 °F (36.2 °C)-100.5 °F (38.1 °C)] 98 °F (36.7 °C)  Heart Rate:  [80-97] 83  Resp:  [18] 18  BP: (100-113)/(48-58) 111/58    Physical Exam:   General: NAD  Eyes: no scleral icterus  Cardiovascular: NR  Respiratory: normal work of breathing on CPAP  GI: Abdomen is soft, not tender  MSK: R hip bandaged    Labs:   CBC, BMP, VTr, and tissue cultures reviewed today  Lab Results   Component Value Date    WBC 10.21 01/12/2023    HGB 9.0 (L) 01/12/2023    HCT 28.2 (L) 01/12/2023    MCV 83.7 01/12/2023     01/12/2023     Lab Results   Component Value Date    GLUCOSE 163 (H) 01/12/2023    CALCIUM 8.7 01/12/2023     01/12/2023    K 3.8 01/12/2023    CO2 28.0 01/12/2023    CL 98 01/12/2023    BUN 12 01/12/2023    CREATININE 0.80 01/12/2023    EGFRIFNONA 119 08/02/2021    BCR 15.0 01/12/2023    ANIONGAP 11.0 01/12/2023     Lab Results   Component Value Date    CRP 2.10 (H) 01/08/2023     Lab Results   Component Value Date    HGBA1C 7.80 (H) 01/08/2023     Lab Results   Component Value Date     GALA 9.90 01/11/2023    NHI 12 03/11/2021     Microbiology:  1/9 OR R Hip Cx: GPC on gram stain; culture NGTD    ASSESSMENT/PLAN:  1. R hip PJI - recurrent  2. Obesity BMI 38  3. Uncontrolled DM2 - A1c 7.8% - complicating above  4. History of stroke and hemiparesis  5. Candida intertrigo    On 1/9/23, he underwent  I&D w/ removal of prosthesis and insertion of an antibiotic spacer. Culture negative to date but gram stain had GPCs. He was on vancomycin prior to his procedure.     I plan to continue vancomycin 1500 mg IV q12h w/ goal -600.  PICC ordered. He prefers it to be placed in his right arm since the left arm is the one that he can use and needs for all activities of daily living. I plan a 6-week course of antibiotics w/ stop date 2/23/23 at which time he will follow-up w/ me in the ID clinic. Weekly CBC w/ diff, BMP, VTr, and CRP faxed to me at [phone].     For Candida intertrigo, continue Nystatin powder to the groin x 7 days total.     Thank you for allowing me to be involved in the care of this patient. Infectious diseases will sign off at this time with antibiotics plan in place, but please call me at [phone] if any further ID questions or new ID concerns.

## 2023-01-12 NOTE — PROGRESS NOTES
Continued Stay Note  [facility]     Patient Name: Mandeep Tracey  MRN: [id number]  Today's Date: 1/12/2023    Admit Date: 1/7/2023    Plan: [facility]   Discharge Plan     Row Name 01/12/23 0944       Plan    Plan Comments Per Xochilt/Richi no beds available today at rehab. They will continue to follow.               Discharge Codes    No documentation.               Expected Discharge Date and Time     Expected Discharge Date Expected Discharge Time    Jan 13, 2023             Judy Iraheta RN

## 2023-01-12 NOTE — PLAN OF CARE
Goal Outcome Evaluation:  Plan of Care Reviewed With: patient           Outcome Evaluation: VSS, pt voiding well via urinal, IV nurse attempted to place PICC with no success, PICC placement with IR order was placed, they were unable to get pt in today and have him on the schedule for tomorrow morning, PRN oral pain medication requested multiple times throughout the day, pt was offered stool softeners today due to not having bowel movement in 4 days but pt refused prn bowel medication at this time, placement at [facility] still pending.

## 2023-01-12 NOTE — PLAN OF CARE
Goal Outcome Evaluation:  Plan of Care Reviewed With: patient        Progress: improving  Outcome Evaluation: Pt supine in bed at start of the session and agreeable to work with PT. Pt demo's improvement in bed mobility this date, Domingo x 2 for sup<>sit and SBA for sit<>sup. Able to perform seated LE ther ex while sitting EOB. Pt declines further mobility today, does not feel comfortable attempting STS. Pt continues to benefit from skilled PT, will progress as able.

## 2023-01-12 NOTE — NURSING NOTE
Left arm cephalic vein picc attempt unsuccessful. Picc would not advance the last 10-15 cm. Suggest placement in IR. Rose adame informed.  3 needles, 2 guidewires and 1 scalpel counted post procedure.

## 2023-01-12 NOTE — PROGRESS NOTES
[facility]     LOS: 5 days     Subjective:    CC:No chief complaint on file.    DIET:  Diet Order   Procedures   • Diet: Regular/House Diet, Cardiac Diets; Healthy Heart (2-3 Na+); Texture: Regular Texture (IDDSI 7); Fluid Consistency: Thin (IDDSI 0)   no new complaints, still with significant hip pain      Objective:    Vital Signs:  Temp:  [97.2 °F (36.2 °C)-100.5 °F (38.1 °C)] 98 °F (36.7 °C)  Heart Rate:  [80-97] 83  Resp:  [18] 18  BP: (100-113)/(48-58) 111/58    SpO2:  [92 %-96 %] 94 %  on  Flow (L/min):  [3] 3;   Device (Oxygen Therapy): CPAP  Body mass index is 38.65 kg/m².    Physical Exam  Constitutional:       Appearance: Normal appearance.   HENT:      Head: Normocephalic and atraumatic.   Cardiovascular:      Rate and Rhythm: Normal rate and regular rhythm.      Heart sounds: No murmur heard.    No friction rub.   Pulmonary:      Effort: Pulmonary effort is normal.      Breath sounds: Normal breath sounds.   Abdominal:      General: Bowel sounds are normal. There is no distension.      Palpations: Abdomen is soft.      Tenderness: There is no abdominal tenderness.   Musculoskeletal:      Comments: Dressing c/d/i, severe pain with movement of the right leg   Skin:     General: Skin is warm and dry.   Psychiatric:         Mood and Affect: Mood normal.         Behavior: Behavior normal.         Results Review:    Glucose   Date Value Ref Range Status   01/12/2023 163 (H) 65 - 99 mg/dL Final   01/11/2023 163 (H) 65 - 99 mg/dL Final   01/10/2023 222 (H) 65 - 99 mg/dL Final     Results from last 7 days   Lab Units 01/12/23  0528   WBC 10*3/mm3 10.21   HEMOGLOBIN g/dL 9.0*   HEMATOCRIT % 28.2*   PLATELETS 10*3/mm3 329     Results from last 7 days   Lab Units 01/12/23  0528 01/10/23  0753 01/09/23  0458   SODIUM mmol/L 137   < > 140   POTASSIUM mmol/L 3.8   < > 4.0   CHLORIDE mmol/L 98   < > 101   CO2 mmol/L 28.0   < > 23.4   BUN mg/dL 12   < > 11   CREATININE mg/dL 0.80   < > 0.88    CALCIUM mg/dL 8.7   < > 9.1   BILIRUBIN mg/dL  --   --  0.3   ALK PHOS U/L  --   --  52   ALT (SGPT) U/L  --   --  12   AST (SGOT) U/L  --   --  13   GLUCOSE mg/dL 163*   < > 143*    < > = values in this interval not displayed.     Results from last 7 days   Lab Units 01/08/23  0440   INR  1.16*         Results from last 7 days   Lab Units 01/08/23  0440   TROPONIN T ng/mL <0.010     Cultures:  No results found for: BLOODCX, URINECX, WOUNDCX, MRSACX, RESPCX, STOOLCX    I have reviewed daily medications and changes in CPOE    Scheduled meds  aspirin, 81 mg, Oral, Daily  bumetanide, 2 mg, Oral, Daily  clopidogrel, 75 mg, Oral, Daily  docusate sodium, 200 mg, Oral, BID  gabapentin, 600 mg, Oral, TID  insulin glargine, 15 Units, Subcutaneous, Nightly  insulin lispro, 0-7 Units, Subcutaneous, TID AC  isosorbide mononitrate, 30 mg, Oral, Daily  metoprolol succinate XL, 50 mg, Oral, Daily  nystatin, , Topical, Q12H  oxybutynin XL, 10 mg, Oral, Daily  polyethylene glycol, 17 g, Oral, BID  rosuvastatin, 40 mg, Oral, Daily  sacubitril-valsartan, 1 tablet, Oral, BID  sertraline, 100 mg, Oral, Daily  sodium chloride, 10 mL, Intravenous, Q12H  sodium chloride, 10 mL, Intravenous, Q12H  vancomycin, 1,500 mg, Intravenous, Q12H        lactated ringers, 50 mL/hr  Pharmacy to dose vancomycin,       PRN meds  •  acetaminophen **OR** acetaminophen **OR** acetaminophen  •  acetaminophen  •  bisacodyl  •  bisacodyl  •  cyclobenzaprine  •  dextrose  •  dextrose  •  docusate sodium  •  glucagon (human recombinant)  •  HYDROmorphone  •  hydrOXYzine  •  melatonin  •  [DISCONTINUED] HYDROmorphone **AND** naloxone  •  ondansetron  •  ondansetron **OR** ondansetron  •  oxyCODONE-acetaminophen  •  Pharmacy to dose vancomycin  •  sodium chloride  •  sodium chloride  •  sodium chloride        Pyogenic arthritis of right hip (HCC)    Right hemiparesis (HCC)    BELKYS on CPAP    Seizure disorder (HCC)    Type 2 diabetes mellitus (HCC)    Essential  hypertension    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 Tracey is a 62-year-old male with history of pyogenic arthritis of the right hip, cardiomyopathy, MRSA, type 2 diabetes, seizure disorder, right hemiparesis post CVA, obstructive sleep apnea, hypertension who was brought here to [facility] from [facility] for orthopedic consultation on right hip infection.     Pyogenic arthritis of the right hip  -s/p RIGHT  BIPOLAR HIP REMOVAL AND PLACEMENT OF SPACER    -Continue vancomycin   -seen by infectious disease, per their recommendations: plan to continue vancomycin w/ goal -600. Dose [id number] mg IV q12h but level is pending this morning and may need adjustment. PICC ordered. He prefers it to be placed in his right arm. I plan a 6-week course of antibiotics w/ stop date 2/23/23 at which time he will follow-up w/ me in the ID clinic.      Type 2 diabetes  -Accu-Cheks before meals and at bedtime with correctional dose insulin  -Hold metformin  -Continue long-acting insulin     History of CVA/seizure disorder  -Safety precautions-aspiration precautions  -Seizure precautions  - Continue topiramate 50 mg twice daily     CAD/hypertension/cardiomyopathy  -restarted Plavix  -continue with aspirin  -cardiology has seen   -current Echo with ejection fraction of 26-30%  -Continue metoprolol and Imdur     Plan:  Needs picc line  Awaiting rehab approval  WBAT  Encourage patient to take miralax for BM  Stop fluids    Jason Aranda MD  01/12/23  09:52 EST

## 2023-01-12 NOTE — PLAN OF CARE
Goal Outcome Evaluation:              Outcome Evaluation: Pt POD (R) Bipolar hip removal and spacer placement. VSS, CPAP on,  pain managed with PO PRN medication x1 & IV PRN med x2 this shift. IV Vanc given as ordered, no adverse reactions noted. Voiding via urinal at bedside. Redness to coccyx and groin area treated. Pt refused Entresto this shif. PICC to be placed on dayshift.  Abductor pillow in place, seizure precautions in place. Pt remains in contact precautions for MRSA.  D/C plans pending pt ongoing needs.

## 2023-01-12 NOTE — THERAPY TREATMENT NOTE
Patient Name: Mandeep Tracey  : 1960    MRN: [id number]                              Today's Date: 2023       Admit Date: 2023    Visit Dx:     ICD-10-CM ICD-9-CM   1. Infection and inflammatory reaction due to internal right hip prosthesis, initial encounter ([facility])  T84.51XA 996.66   2. Follow-up exam  Z09 V67.9     Patient Active Problem List   Diagnosis   • CVA (cerebral vascular accident) ([facility])   • Right hemiparesis ([facility])   • BELKYS on CPAP   • Seizure disorder ([facility])   • Type 2 diabetes mellitus ([facility])   • Essential hypertension   • Infection of right prosthetic hip joint ([facility])   • MSSA (methicillin susceptible Staphylococcus aureus) infection   • Group B streptococcal infection   • UTI (urinary tract infection)   • Postoperative nausea and vomiting   • Nonrheumatic mitral valve regurgitation   • Cardiomyopathy ([facility])   • PVCs (premature ventricular contractions)   • Pyogenic arthritis of right hip ([facility])   • Infection and inflammatory reaction due to internal right hip prosthesis, initial encounter ([facility])     Past Medical History:   Diagnosis Date   • Aphasia    • Cardiomyopathy ([facility])    • CPAP (continuous positive airway pressure) dependence    • Diabetes ([facility])    • Hemiparesis ([facility])     Right side    • Morbid obesity ([facility])    • Nonrheumatic mitral valve regurgitation    • BELKYS on CPAP    • Peptic ulcer disease    • Postoperative nausea and vomiting 3/13/2021   • Psoriasis    • Pyogenic arthritis of right hip ([facility])    • Seizures ([facility])    • Sleep apnea    • Stroke ([facility])    • Ventricular ectopy     asymptomatic     Past Surgical History:   Procedure Laterality Date   • CHOLECYSTECTOMY     • EYE SURGERY     • HIP SPACER INSERTION WITH ANTIBIOTIC CEMENT Right 2023    Procedure: RT. BIPOLAR HIP REMOVAL AND PLACEMENT OF SPACER;  Surgeon: Faustina Beebe MD;  Location: [facility];  Service: Orthopedics;  Laterality: Right;   • INCISION AND DRAINAGE HIP Right 3/12/2021    Procedure: HIP  ANTERIOR INCISION AND DRAINAGE WITH HANA TABLE;  Surgeon: Tao Andrea MD;  Location: [facility] MAIN OR;  Service: Orthopedics;  Laterality: Right;   • LUMBAR DISC SURGERY     • US GUIDED FINE NEEDLE ASPIRATION  3/10/2021      General Information     Row Name 01/12/23 1541          Physical Therapy Time and Intention    Document Type therapy note (daily note)  -DB     Mode of Treatment individual therapy;physical therapy  -DB     Row Name 01/12/23 1541          General Information    Patient Profile Reviewed yes  -DB           User Key  (r) = Recorded By, (t) = Taken By, (c) = Cosigned By    Initials Name Provider Type    DB Marlin Ladd PT Physical Therapist               Mobility     Row Name 01/12/23 1542          Bed Mobility    Bed Mobility scooting/bridging;supine-sit;sit-supine  -DB     Scooting/Bridging [place] (Bed Mobility) set up;standby assist  -DB     Supine-Sit [place] (Bed Mobility) minimum assist (75% patient effort);verbal cues;set up  -DB     Sit-Supine [place] (Bed Mobility) standby assist;set up;verbal cues  -DB     Assistive Device (Bed Mobility) bed rails;head of bed elevated  -DB     Row Name 01/12/23 1542          Sit-Stand Transfer    Sit-Stand [place] (Transfers) not tested  -DB           User Key  (r) = Recorded By, (t) = Taken By, (c) = Cosigned By    Initials Name Provider Type    DB Marlin Ladd PT Physical Therapist               Obj/Interventions     Row Name 01/12/23 1542          Motor Skills    Therapeutic Exercise other (see comments)  seated LE ther ex x 15  -DB     Row Name 01/12/23 1542          Balance    Balance Assessment sitting static balance;sitting dynamic balance  -DB     Static Sitting Balance supervision  -DB     Dynamic Sitting Balance supervision  -DB     Position, Sitting Balance unsupported;sitting edge of bed  -DB     Balance Interventions sitting  -DB           User Key  (r) = Recorded By, (t) = Taken By, (c) = Cosigned By     Initials Name Provider Type    Marlin Thakur PT Physical Therapist               Goals/Plan    No documentation.                Clinical Impression     Row Name 01/12/23 1543          Pain    Pain Intervention(s) Ambulation/increased activity;Repositioned  -DB     Row Name 01/12/23 1543          Plan of Care Review    Plan of Care Reviewed With patient  -DB     Progress improving  -DB     Outcome Evaluation Pt supine in bed at start of the session and agreeable to work with PT. Pt demo's improvement in bed mobility this date, Domingo x 2 for sup<>sit and SBA for sit<>sup. Able to perform seated LE ther ex while sitting EOB. Pt declines further mobility today, does not feel comfortable attempting STS. Pt continues to benefit from skilled PT, will progress as able.  -DB     Row Name 01/12/23 1543          Vital Signs    O2 Delivery Pre Treatment room air  -DB     O2 Delivery Intra Treatment room air  -DB     O2 Delivery Post Treatment room air  -DB     Pre Patient Position Supine  -DB     Intra Patient Position Sitting  -DB     Post Patient Position Supine  -DB     Row Name 01/12/23 1543          Positioning and Restraints    Pre-Treatment Position in bed  -DB     Post Treatment Position bed  -DB     In Bed supine;call light within reach;encouraged to call for assist;exit alarm on  -DB           User Key  (r) = Recorded By, (t) = Taken By, (c) = Cosigned By    Initials Name Provider Type    Marlin Thakur PT Physical Therapist               Outcome Measures     Row Name 01/12/23 1548          How much help from another person do you currently need...    Turning from your back to your side while in flat bed without using bedrails? 2  -DB     Moving from lying on back to sitting on the side of a flat bed without bedrails? 2  -DB     Moving to and from a bed to a chair (including a wheelchair)? 2  -DB     Standing up from a chair using your arms (e.g., wheelchair, bedside chair)? 2  -DB     Climbing 3-5 steps  with a railing? 1  -DB     To walk in hospital room? 1  -DB     AM-PAC 6 Clicks Score (PT) 10  -DB     Highest level of mobility 4 --> Transferred to chair/commode  -DB     Row Name 01/12/23 1548          Functional Assessment    Outcome Measure Options AM-PAC 6 Clicks Basic Mobility (PT)  -DB           User Key  (r) = Recorded By, (t) = Taken By, (c) = Cosigned By    Initials Name Provider Type    DB Marlin Ladd PT Physical Therapist                             Physical Therapy Education     Title: PT OT SLP Therapies (Done)     Topic: Physical Therapy (Done)     Point: Mobility training (Done)     Learning Progress Summary           Patient Acceptance, E, VU by DB at 1/12/2023 1548    Acceptance, E, VU by DB at 1/11/2023 1559    Acceptance, E, VU by DB at 1/10/2023 1534   Significant Other Acceptance, E, VU by DB at 1/10/2023 1534                   Point: Home exercise program (Done)     Learning Progress Summary           Patient Acceptance, E, VU by DB at 1/12/2023 1548    Acceptance, E, VU by DB at 1/11/2023 1559    Acceptance, E, VU by DB at 1/10/2023 1534   Significant Other Acceptance, E, VU by DB at 1/10/2023 1534                   Point: Body mechanics (Done)     Learning Progress Summary           Patient Acceptance, E, VU by DB at 1/12/2023 1548    Acceptance, E, VU by DB at 1/11/2023 1559    Acceptance, E, VU by DB at 1/10/2023 1534   Significant Other Acceptance, E, VU by DB at 1/10/2023 1534                   Point: Precautions (Done)     Learning Progress Summary           Patient Acceptance, E, VU by DB at 1/12/2023 1548    Acceptance, E, VU by DB at 1/11/2023 1559    Acceptance, E, VU by DB at 1/10/2023 1534   Significant Other Acceptance, E, VU by DB at 1/10/2023 1534                               User Key     Initials Effective Dates Name Provider Type Discipline    DB 06/16/21 -  Marlin Ladd PT Physical Therapist PT              PT Recommendation and Plan  Planned Therapy  Interventions (PT): balance training, bed mobility training, home exercise program, neuromuscular re-education, postural re-education, transfer training, strengthening, patient/family education  Plan of Care Reviewed With: patient  Progress: improving  Outcome Evaluation: Pt supine in bed at start of the session and agreeable to work with PT. Pt mikeo's improvement in bed mobility this date, Domingo x 2 for sup<>sit and SBA for sit<>sup. Able to perform seated LE ther ex while sitting EOB. Pt declines further mobility today, does not feel comfortable attempting STS. Pt continues to benefit from skilled PT, will progress as able.     Time Calculation:    PT Charges     Row Name 01/12/23 1549             Time Calculation    Start Time 1508  -DB      Stop Time 1536  -DB      Time Calculation (min) 28 min  -DB      PT Received On 01/12/23  -DB      PT - Next Appointment 01/13/23  -DB         Time Calculation- PT    Total Timed Code Minutes- PT 28 minute(s)  -DB            User Key  (r) = Recorded By, (t) = Taken By, (c) = Cosigned By    Initials Name Provider Type    DB Marlin Ladd, PT Physical Therapist              Therapy Charges for Today     Code Description Service Date Service Provider Modifiers Qty    38029995535 HC PT THERAPEUTIC ACT EA 15 MIN 1/11/2023 Marlin Ladd, PT GP 1    20704533623 HC PT THER PROC EA 15 MIN 1/11/2023 Marlin Ladd, PT GP 1    94464711866 HC PT THERAPEUTIC ACT EA 15 MIN 1/12/2023 Marlin Ladd, PT GP 1    39991461138 HC PT THER PROC EA 15 MIN 1/12/2023 Marlin Ladd, PT GP 1    82452877158 HC PT THER SUPP EA 15 MIN 1/12/2023 Marlin Ladd, PT GP 1          PT G-Codes  Outcome Measure Options: AM-PAC 6 Clicks Basic Mobility (PT)  AM-PAC 6 Clicks Score (PT): 10  AM-PAC 6 Clicks Score (OT): 11  PT Discharge Summary  Anticipated Discharge Disposition (PT): inpatient rehabilitation facility, skilled nursing facility    Marlin Ladd PT  1/12/2023

## 2023-01-13 ENCOUNTER — APPOINTMENT (OUTPATIENT)
Dept: GENERAL RADIOLOGY | Facility: HOSPITAL | Age: 63
DRG: 467 | End: 2023-01-13
Payer: OTHER GOVERNMENT

## 2023-01-13 ENCOUNTER — APPOINTMENT (OUTPATIENT)
Dept: INTERVENTIONAL RADIOLOGY/VASCULAR | Facility: HOSPITAL | Age: 63
DRG: 467 | End: 2023-01-13
Payer: OTHER GOVERNMENT

## 2023-01-13 LAB
ANION GAP SERPL CALCULATED.3IONS-SCNC: 7 MMOL/L (ref 5–15)
BUN SERPL-MCNC: 10 MG/DL (ref 8–23)
BUN/CREAT SERPL: 14.5 (ref 7–25)
CALCIUM SPEC-SCNC: 8.8 MG/DL (ref 8.6–10.5)
CHLORIDE SERPL-SCNC: 100 MMOL/L (ref 98–107)
CO2 SERPL-SCNC: 30 MMOL/L (ref 22–29)
CREAT SERPL-MCNC: 0.69 MG/DL (ref 0.76–1.27)
EGFRCR SERPLBLD CKD-EPI 2021: 105.9 ML/MIN/1.73
GLUCOSE BLDC GLUCOMTR-MCNC: 178 MG/DL (ref 70–130)
GLUCOSE BLDC GLUCOMTR-MCNC: 199 MG/DL (ref 70–130)
GLUCOSE BLDC GLUCOMTR-MCNC: 233 MG/DL (ref 70–130)
GLUCOSE BLDC GLUCOMTR-MCNC: 251 MG/DL (ref 70–130)
GLUCOSE SERPL-MCNC: 182 MG/DL (ref 65–99)
POTASSIUM SERPL-SCNC: 3.9 MMOL/L (ref 3.5–5.2)
SODIUM SERPL-SCNC: 137 MMOL/L (ref 136–145)
VANCOMYCIN TROUGH SERPL-MCNC: <4 MCG/ML (ref 5–20)
WHOLE BLOOD HOLD SPECIMEN: NORMAL

## 2023-01-13 PROCEDURE — 73590 X-RAY EXAM OF LOWER LEG: CPT

## 2023-01-13 PROCEDURE — 80048 BASIC METABOLIC PNL TOTAL CA: CPT | Performed by: ORTHOPAEDIC SURGERY

## 2023-01-13 PROCEDURE — 25010000002 VANCOMYCIN 5 G RECONSTITUTED SOLUTION: Performed by: INTERNAL MEDICINE

## 2023-01-13 PROCEDURE — 80202 ASSAY OF VANCOMYCIN: CPT | Performed by: INTERNAL MEDICINE

## 2023-01-13 PROCEDURE — 63710000001 INSULIN LISPRO (HUMAN) PER 5 UNITS: Performed by: ORTHOPAEDIC SURGERY

## 2023-01-13 PROCEDURE — C1751 CATH, INF, PER/CENT/MIDLINE: HCPCS

## 2023-01-13 PROCEDURE — 82962 GLUCOSE BLOOD TEST: CPT

## 2023-01-13 PROCEDURE — 25010000002 VANCOMYCIN 10 G RECONSTITUTED SOLUTION: Performed by: INTERNAL MEDICINE

## 2023-01-13 PROCEDURE — 05HY33Z INSERTION OF INFUSION DEVICE INTO UPPER VEIN, PERCUTANEOUS APPROACH: ICD-10-PCS | Performed by: HOSPITALIST

## 2023-01-13 RX ORDER — LIDOCAINE HYDROCHLORIDE 10 MG/ML
INJECTION, SOLUTION EPIDURAL; INFILTRATION; INTRACAUDAL; PERINEURAL AS NEEDED
Status: COMPLETED | OUTPATIENT
Start: 2023-01-13 | End: 2023-01-13

## 2023-01-13 RX ADMIN — Medication 10 ML: at 21:45

## 2023-01-13 RX ADMIN — OXYCODONE HYDROCHLORIDE AND ACETAMINOPHEN 1 TABLET: 10; 325 TABLET ORAL at 00:06

## 2023-01-13 RX ADMIN — POLYETHYLENE GLYCOL 3350 17 G: 17 POWDER, FOR SOLUTION ORAL at 08:40

## 2023-01-13 RX ADMIN — INSULIN LISPRO 2 UNITS: 100 INJECTION, SOLUTION INTRAVENOUS; SUBCUTANEOUS at 11:50

## 2023-01-13 RX ADMIN — ACETAMINOPHEN 650 MG: 325 TABLET, FILM COATED ORAL at 13:37

## 2023-01-13 RX ADMIN — VANCOMYCIN HYDROCHLORIDE 1500 MG: 10 INJECTION, POWDER, LYOPHILIZED, FOR SOLUTION INTRAVENOUS at 11:50

## 2023-01-13 RX ADMIN — OXYBUTYNIN CHLORIDE 10 MG: 10 TABLET, EXTENDED RELEASE ORAL at 08:40

## 2023-01-13 RX ADMIN — ROSUVASTATIN CALCIUM 40 MG: 40 TABLET, FILM COATED ORAL at 08:40

## 2023-01-13 RX ADMIN — SACUBITRIL AND VALSARTAN 1 TABLET: 24; 26 TABLET, FILM COATED ORAL at 08:40

## 2023-01-13 RX ADMIN — ISOSORBIDE MONONITRATE 30 MG: 30 TABLET, EXTENDED RELEASE ORAL at 08:40

## 2023-01-13 RX ADMIN — NYSTATIN: 100000 POWDER TOPICAL at 08:40

## 2023-01-13 RX ADMIN — GABAPENTIN 600 MG: 300 CAPSULE ORAL at 16:03

## 2023-01-13 RX ADMIN — CLOPIDOGREL 75 MG: 75 TABLET, FILM COATED ORAL at 08:40

## 2023-01-13 RX ADMIN — GABAPENTIN 600 MG: 300 CAPSULE ORAL at 21:43

## 2023-01-13 RX ADMIN — CYCLOBENZAPRINE 10 MG: 10 TABLET, FILM COATED ORAL at 23:57

## 2023-01-13 RX ADMIN — INSULIN LISPRO 2 UNITS: 100 INJECTION, SOLUTION INTRAVENOUS; SUBCUTANEOUS at 07:59

## 2023-01-13 RX ADMIN — GABAPENTIN 600 MG: 300 CAPSULE ORAL at 08:40

## 2023-01-13 RX ADMIN — SERTRALINE 100 MG: 100 TABLET, FILM COATED ORAL at 08:40

## 2023-01-13 RX ADMIN — INSULIN LISPRO 4 UNITS: 100 INJECTION, SOLUTION INTRAVENOUS; SUBCUTANEOUS at 18:26

## 2023-01-13 RX ADMIN — METFORMIN HYDROCHLORIDE 1000 MG: 1000 TABLET, FILM COATED ORAL at 18:24

## 2023-01-13 RX ADMIN — ACETAMINOPHEN 650 MG: 325 TABLET, FILM COATED ORAL at 08:50

## 2023-01-13 RX ADMIN — DOCUSATE SODIUM 200 MG: 100 CAPSULE, LIQUID FILLED ORAL at 08:40

## 2023-01-13 RX ADMIN — NYSTATIN: 100000 POWDER TOPICAL at 21:44

## 2023-01-13 RX ADMIN — BUMETANIDE 2 MG: 2 TABLET ORAL at 08:40

## 2023-01-13 RX ADMIN — VANCOMYCIN HYDROCHLORIDE 1500 MG: 10 INJECTION, POWDER, LYOPHILIZED, FOR SOLUTION INTRAVENOUS at 21:45

## 2023-01-13 RX ADMIN — SACUBITRIL AND VALSARTAN 1 TABLET: 24; 26 TABLET, FILM COATED ORAL at 21:47

## 2023-01-13 RX ADMIN — OXYCODONE HYDROCHLORIDE AND ACETAMINOPHEN 1 TABLET: 10; 325 TABLET ORAL at 16:14

## 2023-01-13 RX ADMIN — OXYCODONE HYDROCHLORIDE AND ACETAMINOPHEN 1 TABLET: 10; 325 TABLET ORAL at 21:43

## 2023-01-13 RX ADMIN — METOPROLOL SUCCINATE 50 MG: 50 TABLET, FILM COATED, EXTENDED RELEASE ORAL at 08:40

## 2023-01-13 RX ADMIN — INSULIN GLARGINE-YFGN 15 UNITS: 100 INJECTION, SOLUTION SUBCUTANEOUS at 21:43

## 2023-01-13 RX ADMIN — OXYCODONE HYDROCHLORIDE AND ACETAMINOPHEN 1 TABLET: 10; 325 TABLET ORAL at 11:50

## 2023-01-13 RX ADMIN — OXYCODONE HYDROCHLORIDE AND ACETAMINOPHEN 1 TABLET: 10; 325 TABLET ORAL at 05:56

## 2023-01-13 RX ADMIN — ASPIRIN 81 MG: 81 TABLET, COATED ORAL at 08:40

## 2023-01-13 RX ADMIN — LIDOCAINE HYDROCHLORIDE 3 ML: 10 INJECTION, SOLUTION EPIDURAL; INFILTRATION; INTRACAUDAL; PERINEURAL at 11:05

## 2023-01-13 NOTE — PLAN OF CARE
Goal Outcome Evaluation:              Outcome Evaluation: Pt AxO x4. VSS. NVI. Pain controlled with PRN PO pain meds, c/o of mid-calf pain in RLE. XR ordered but not completed at this time. PICC line placed for IV abx. Pt using home CPAP, at bedside. SSI in place. Voiding function intact, BM last night. Pt awaiting bed in SNF, possible d/c monday, see CCP notes. Educated pt on importance of diabetic diet, needs reinforcement. Call light near patient, bed alarm set. All needs met at this time.

## 2023-01-13 NOTE — PLAN OF CARE
Goal Outcome Evaluation:  Plan of Care Reviewed With: patient        Progress: improving  Outcome Evaluation: vss, pain controlled by prn pain meds. pt was able to have bm during shift. encouraged pt to turn q2. pt voiding well. pt was unable to have picc or iv placed and missed dose of vancomycin. spoke with jay jay from [facility], she is aware and spoke with pharmacy. pt to have picc placed in IR today. pt rested well during shift. will continue to monitor. pt awaiting bed in SNF.

## 2023-01-13 NOTE — PROGRESS NOTES
Continued Stay Note  [facility]     Patient Name: Mandeep Tracey  MRN: [id number]  Today's Date: 1/13/2023    Admit Date: 1/7/2023    Plan: [facility]   Discharge Plan     Row Name 01/13/23 1134       Plan    Plan Comments Per [facility] no bed available over the weekend. They believe the will have one on Monday 1/16, CCP to f/u. Pt has [employer] insurance and lives in [facility] [facility] [place] so options are limited. Pt would like to d/c to [facility], CCP to follow.               Discharge Codes    No documentation.               Expected Discharge Date and Time     Expected Discharge Date Expected Discharge Time    Jan 13, 2023             Judy Iraheta RN

## 2023-01-13 NOTE — PROGRESS NOTES
"    DAILY PROGRESS NOTE  [facility]    Patient Identification:  Name: Mandeep Tracey  Age: 62 y.o.  Sex: male  :  1960  MRN: [id number]         Primary Care Physician: Pipe Servin MD    Subjective:  Interval History: Complains of right tibia pain.  Denies any chest pain troubles breathing.  Wearing his CPAP upon entering the room.  Denies any nausea vomiting diarrhea.    Objective: Pain control seems quite adequate as patient relaxed in bed conversational and nontoxic.  No family present at bedside    Scheduled Meds:aspirin, 81 mg, Oral, Daily  bumetanide, 2 mg, Oral, Daily  clopidogrel, 75 mg, Oral, Daily  docusate sodium, 200 mg, Oral, BID  gabapentin, 600 mg, Oral, TID  insulin glargine, 15 Units, Subcutaneous, Nightly  insulin lispro, 0-7 Units, Subcutaneous, TID AC  isosorbide mononitrate, 30 mg, Oral, Daily  metoprolol succinate XL, 50 mg, Oral, Daily  nystatin, , Topical, Q12H  oxybutynin XL, 10 mg, Oral, Daily  polyethylene glycol, 17 g, Oral, BID  rosuvastatin, 40 mg, Oral, Daily  sacubitril-valsartan, 1 tablet, Oral, BID  sertraline, 100 mg, Oral, Daily  sodium chloride, 10 mL, Intravenous, Q12H  sodium chloride, 10 mL, Intravenous, Q12H  vancomycin, 1,500 mg, Intravenous, Q12H      Continuous Infusions:Pharmacy to dose vancomycin,         Vital signs in last 24 hours:  Temp:  [97.8 °F (36.6 °C)-98.6 °F (37 °C)] 98 °F (36.7 °C)  Heart Rate:  [74-92] 87  Resp:  [16-20] 16  BP: (111-131)/(55-84) 121/58    Intake/Output:    Intake/Output Summary (Last 24 hours) at 2023 1323  Last data filed at 2023 1142  Gross per 24 hour   Intake 820 ml   Output 1640 ml   Net -820 ml       Exam:  /58 (BP Location: Left arm, Patient Position: Lying)   Pulse 87   Temp 98 °F (36.7 °C) (Oral)   Resp 16   Ht 182.9 cm (72\")   Wt 129 kg (285 lb)   SpO2 93%   BMI 38.65 kg/m²     General Appearance:    Alert, cooperative/follows simple commands, AAOx3                          Head:    " Normocephalic, without obvious abnormality, atraumatic                           Eyes:    Conjunctivae/corneas clear, EOM's intact, both eyes                         Throat:   Oral mucosa pink and moist                           Neck:   Supple, symmetrical, trachea midline, no JVD                         Lungs:    Clear to auscultation bilaterally, respirations unlabored                 Chest Wall:    No tenderness or deformity                          Heart:    Regular rate and rhythm, S1 and S2 normal                  Abdomen:     Soft, nontender, bowel sounds active                 Extremities: Seems a bit exquisitely tender with slight touch to his mid right tibial area.  I cannot appreciate any other visual abnormalities and there is certainly no secondary cellulitis in this area.                          Pulses:   Pulses palpable in lower extremities                   Neurologic:   CNII-XII intact     Data Review:  Labs in chart were reviewed.    Assessment:  Active Hospital Problems    Diagnosis  POA   • **Pyogenic arthritis of right hip ([facility]) [M00.9]  Yes   • Infection and inflammatory reaction due to internal right hip prosthesis, initial encounter ([facility]) [T84.51XA]  Yes   • Cardiomyopathy ([facility]) [I42.9]  Yes   • Right hemiparesis ([facility]) [G81.91]  Yes   • BELKYS on CPAP [G47.33, Z99.89]  Not Applicable   • Seizure disorder ([facility]) [G40.909]  Yes   • Type 2 diabetes mellitus ([facility]) [E11.9]  Yes   • Essential hypertension [I10]  Yes   • Infection of right prosthetic hip joint ([facility]) [T84.51XA]  Not Applicable      Resolved Hospital Problems   No resolved problems to display.       Plan:    Septic arthritis right hip status post bipolar hip removal with antibiotic spacer placement on 1/9/2023   -ID dosing vancomycin via PICC line in right upper extremity for 6-week course with stop date of 2/23/2023   -ID dosing intertrigo/Candida with nystatin   -Complains of pain in his mid tibia area.  There is no visual  abnormalities I can appreciate in this area though he is exquisitely tender to the slightest touch and we will check an x-ray   -Repeat CBC, otherwise afebrile    DM2 uncontrolled   -Resume metformin   -Continue nightly Lantus as dosed   -Continue sliding scale    Past history of CVA and seizure disorder -continue ASA Plavix and statin    CAD/HTN/cardiomyopathy   -Continue meds   -Previously evaluated by cardiology -EF 26-30%        Disposition -medically ready by tomorrow but it appears due to logistic issues we would not be able to transfer patient until Monday of this upcoming week    Fazal Givens MD  1/13/2023  13:23 EST

## 2023-01-13 NOTE — PROGRESS NOTES
"[facility] Clinical Pharmacy Services: Vancomycin Monitoring Note    Mandeep Tracey is a 62 y.o. male who is on day 6/42 of pharmacy to dose vancomycin for Bone and/or Joint Infection.    Previous Vancomycin Dose: 1500 mg every 12 hours   Updated Cultures and Sensitivities: NGTD  Results from last 7 days   Lab Units 01/13/23  0913 01/11/23  1017 01/11/23  0820   VANCOMYCIN TR mcg/mL <4.00* 9.90 8.30     Vitals/Labs  Ht: 182.9 cm (72\"); Wt: 129 kg (285 lb)   Temp Readings from Last 1 Encounters:   01/13/23 98 °F (36.7 °C) (Oral)     Estimated Creatinine Clearance: 158.1 mL/min (A) (by C-G formula based on SCr of 0.69 mg/dL (L)).     Results from last 7 days   Lab Units 01/13/23  0542 01/12/23  0528 01/11/23  0820 01/10/23  0753   CREATININE mg/dL 0.69* 0.80 0.65* 0.67*   WBC 10*3/mm3  --  10.21 10.59 12.61*     Assessment/Plan  Patient lost IV access yesterday evening and thus missed a dose of vancomycin. Trough collected this AM has no clinical application secondary to this. Patient is in IR now getting PICC line placement. Recommend continuation of current regimen unchanged once access is re-established with follow up trough at some point this weekend.    1. Current Vancomycin Dose: Continue vancomycin 1500 mg IV every 12 hours.  2. Next Level Date and Time: TBD once regimen is resumed.  3. We will continue to monitor patient changes and renal function     Thank you for involving pharmacy in this patient's care. Please contact pharmacy with any questions or concerns.       Cheyenne Gowers, [facility]  Clinical Pharmacist    "

## 2023-01-13 NOTE — NURSING NOTE
Pt does not have IV access. IV team was not able to place IV or PICC.  Pt will have PICC placed in IR 1/13/2023. Spoke with Bea from [facility] , no new orders given. told to make a note. Spoke with pharmacy about not being able to give 2200 dose on 1/12/2023, they said they would make and note as well.

## 2023-01-14 LAB
BACTERIA SPEC ANAEROBE CULT: NORMAL
CREAT SERPL-MCNC: 0.75 MG/DL (ref 0.76–1.27)
DEPRECATED RDW RBC AUTO: 44.8 FL (ref 37–54)
EGFRCR SERPLBLD CKD-EPI 2021: 103.3 ML/MIN/1.73
ERYTHROCYTE [DISTWIDTH] IN BLOOD BY AUTOMATED COUNT: 14.9 % (ref 12.3–15.4)
GLUCOSE BLDC GLUCOMTR-MCNC: 183 MG/DL (ref 70–130)
GLUCOSE BLDC GLUCOMTR-MCNC: 191 MG/DL (ref 70–130)
GLUCOSE BLDC GLUCOMTR-MCNC: 249 MG/DL (ref 70–130)
HCT VFR BLD AUTO: 28.5 % (ref 37.5–51)
HGB BLD-MCNC: 9.1 G/DL (ref 13–17.7)
MCH RBC QN AUTO: 26.5 PG (ref 26.6–33)
MCHC RBC AUTO-ENTMCNC: 31.9 G/DL (ref 31.5–35.7)
MCV RBC AUTO: 82.8 FL (ref 79–97)
PLATELET # BLD AUTO: 190 10*3/MM3 (ref 140–450)
PMV BLD AUTO: 10.4 FL (ref 6–12)
RBC # BLD AUTO: 3.44 10*6/MM3 (ref 4.14–5.8)
WBC NRBC COR # BLD: 6.86 10*3/MM3 (ref 3.4–10.8)

## 2023-01-14 PROCEDURE — 82962 GLUCOSE BLOOD TEST: CPT

## 2023-01-14 PROCEDURE — 82565 ASSAY OF CREATININE: CPT | Performed by: HOSPITALIST

## 2023-01-14 PROCEDURE — 63710000001 INSULIN LISPRO (HUMAN) PER 5 UNITS: Performed by: ORTHOPAEDIC SURGERY

## 2023-01-14 PROCEDURE — 85027 COMPLETE CBC AUTOMATED: CPT | Performed by: HOSPITALIST

## 2023-01-14 PROCEDURE — 25010000002 VANCOMYCIN 10 G RECONSTITUTED SOLUTION: Performed by: INTERNAL MEDICINE

## 2023-01-14 PROCEDURE — 97530 THERAPEUTIC ACTIVITIES: CPT

## 2023-01-14 RX ADMIN — Medication 10 ML: at 21:33

## 2023-01-14 RX ADMIN — VANCOMYCIN HYDROCHLORIDE 1500 MG: 10 INJECTION, POWDER, LYOPHILIZED, FOR SOLUTION INTRAVENOUS at 21:32

## 2023-01-14 RX ADMIN — METFORMIN HYDROCHLORIDE 1000 MG: 1000 TABLET, FILM COATED ORAL at 08:35

## 2023-01-14 RX ADMIN — GABAPENTIN 600 MG: 300 CAPSULE ORAL at 16:23

## 2023-01-14 RX ADMIN — ASPIRIN 81 MG: 81 TABLET, COATED ORAL at 08:35

## 2023-01-14 RX ADMIN — SACUBITRIL AND VALSARTAN 1 TABLET: 24; 26 TABLET, FILM COATED ORAL at 08:35

## 2023-01-14 RX ADMIN — OXYBUTYNIN CHLORIDE 10 MG: 10 TABLET, EXTENDED RELEASE ORAL at 08:35

## 2023-01-14 RX ADMIN — Medication 10 ML: at 09:36

## 2023-01-14 RX ADMIN — INSULIN LISPRO 2 UNITS: 100 INJECTION, SOLUTION INTRAVENOUS; SUBCUTANEOUS at 08:35

## 2023-01-14 RX ADMIN — CYCLOBENZAPRINE 10 MG: 10 TABLET, FILM COATED ORAL at 11:16

## 2023-01-14 RX ADMIN — NYSTATIN: 100000 POWDER TOPICAL at 09:35

## 2023-01-14 RX ADMIN — GABAPENTIN 600 MG: 300 CAPSULE ORAL at 08:34

## 2023-01-14 RX ADMIN — OXYCODONE HYDROCHLORIDE AND ACETAMINOPHEN 1 TABLET: 10; 325 TABLET ORAL at 08:39

## 2023-01-14 RX ADMIN — NYSTATIN: 100000 POWDER TOPICAL at 21:36

## 2023-01-14 RX ADMIN — OXYCODONE HYDROCHLORIDE AND ACETAMINOPHEN 1 TABLET: 10; 325 TABLET ORAL at 16:40

## 2023-01-14 RX ADMIN — VANCOMYCIN HYDROCHLORIDE 1500 MG: 10 INJECTION, POWDER, LYOPHILIZED, FOR SOLUTION INTRAVENOUS at 11:16

## 2023-01-14 RX ADMIN — GABAPENTIN 600 MG: 300 CAPSULE ORAL at 21:37

## 2023-01-14 RX ADMIN — SERTRALINE 100 MG: 100 TABLET, FILM COATED ORAL at 08:35

## 2023-01-14 RX ADMIN — INSULIN GLARGINE-YFGN 15 UNITS: 100 INJECTION, SOLUTION SUBCUTANEOUS at 21:30

## 2023-01-14 RX ADMIN — OXYCODONE HYDROCHLORIDE AND ACETAMINOPHEN 1 TABLET: 10; 325 TABLET ORAL at 12:57

## 2023-01-14 RX ADMIN — CLOPIDOGREL 75 MG: 75 TABLET, FILM COATED ORAL at 08:35

## 2023-01-14 RX ADMIN — SACUBITRIL AND VALSARTAN 1 TABLET: 24; 26 TABLET, FILM COATED ORAL at 21:37

## 2023-01-14 RX ADMIN — METFORMIN HYDROCHLORIDE 1000 MG: 1000 TABLET, FILM COATED ORAL at 17:33

## 2023-01-14 RX ADMIN — ACETAMINOPHEN 650 MG: 325 TABLET, FILM COATED ORAL at 11:16

## 2023-01-14 RX ADMIN — DOCUSATE SODIUM 200 MG: 100 CAPSULE, LIQUID FILLED ORAL at 21:37

## 2023-01-14 RX ADMIN — ROSUVASTATIN CALCIUM 40 MG: 40 TABLET, FILM COATED ORAL at 08:35

## 2023-01-14 RX ADMIN — ISOSORBIDE MONONITRATE 30 MG: 30 TABLET, EXTENDED RELEASE ORAL at 08:35

## 2023-01-14 RX ADMIN — INSULIN LISPRO 3 UNITS: 100 INJECTION, SOLUTION INTRAVENOUS; SUBCUTANEOUS at 12:16

## 2023-01-14 RX ADMIN — INSULIN LISPRO 2 UNITS: 100 INJECTION, SOLUTION INTRAVENOUS; SUBCUTANEOUS at 17:33

## 2023-01-14 RX ADMIN — METOPROLOL SUCCINATE 50 MG: 50 TABLET, FILM COATED, EXTENDED RELEASE ORAL at 08:35

## 2023-01-14 NOTE — PROGRESS NOTES
"    DAILY PROGRESS NOTE  [facility]    Patient Identification:  Name: Mandeep Tracey  Age: 62 y.o.  Sex: male  :  1960  MRN: [id number]         Primary Care Physician: Pipe Servin MD    Subjective:  Interval History: No new complaints today.  Feeling better overall.  Pain in lower extremity is even better.  Denies any chest pain troubles breathing or emesis.  Patient reports good appetite    Objective: Clinically seems stable.  No family present.  Nontoxic    Scheduled Meds:aspirin, 81 mg, Oral, Daily  bumetanide, 2 mg, Oral, Daily  clopidogrel, 75 mg, Oral, Daily  docusate sodium, 200 mg, Oral, BID  gabapentin, 600 mg, Oral, TID  insulin glargine, 15 Units, Subcutaneous, Nightly  insulin lispro, 0-7 Units, Subcutaneous, TID AC  isosorbide mononitrate, 30 mg, Oral, Daily  metFORMIN, 1,000 mg, Oral, BID With Meals  metoprolol succinate XL, 50 mg, Oral, Daily  nystatin, , Topical, Q12H  oxybutynin XL, 10 mg, Oral, Daily  polyethylene glycol, 17 g, Oral, BID  rosuvastatin, 40 mg, Oral, Daily  sacubitril-valsartan, 1 tablet, Oral, BID  sertraline, 100 mg, Oral, Daily  sodium chloride, 10 mL, Intravenous, Q12H  sodium chloride, 10 mL, Intravenous, Q12H  vancomycin, 1,500 mg, Intravenous, Q12H      Continuous Infusions:Pharmacy to dose vancomycin,         Vital signs in last 24 hours:  Temp:  [97.8 °F (36.6 °C)-98.8 °F (37.1 °C)] 98.6 °F (37 °C)  Heart Rate:  [73-92] 74  Resp:  [16-20] 18  BP: (118-141)/(58-88) 133/88    Intake/Output:    Intake/Output Summary (Last 24 hours) at 2023 0917  Last data filed at 2023 0900  Gross per 24 hour   Intake 940 ml   Output 1025 ml   Net -85 ml       Exam:  /88   Pulse 74   Temp 98.6 °F (37 °C) (Oral)   Resp 18   Ht 182.9 cm (72\")   Wt 129 kg (285 lb)   SpO2 95%   BMI 38.65 kg/m²     General Appearance:    Alert, cooperative, no distress, AAOx3                         Throat:   Oral mucosa pink and moist                         Lungs:    " Clear to auscultation bilaterally, respirations unlabored                         Heart:    Regular rate and rhythm, S1 and S2 normal                  Abdomen:     Soft, nontender, bowel sounds active, morbidly obese with a BMI greater than 38                 Extremities: Seems much less tender to gentle palpation of right lower extremity.  No abnormalities noted on the skin.  Negative Homans' sign.  Good pulses distally with no vascular compromise or cyanosis noted                             Data Review:  Labs in chart were reviewed.    Assessment:  Active Hospital Problems    Diagnosis  POA   • **Pyogenic arthritis of right hip ([facility]) [M00.9]  Yes   • Infection and inflammatory reaction due to internal right hip prosthesis, initial encounter ([facility]) [T84.51XA]  Yes   • Cardiomyopathy ([facility]) [I42.9]  Yes   • Right hemiparesis ([facility]) [G81.91]  Yes   • BELKYS on CPAP [G47.33, Z99.89]  Not Applicable   • Seizure disorder ([facility]) [G40.909]  Yes   • Type 2 diabetes mellitus ([facility]) [E11.9]  Yes   • Essential hypertension [I10]  Yes   • Infection of right prosthetic hip joint ([facility]) [T84.51XA]  Not Applicable      Resolved Hospital Problems   No resolved problems to display.       Plan:    Septic arthritis right hip status post bipolar hip removal with antibiotic spacer placement on 1/9/2023              -ID dosing vancomycin via PICC line in right upper extremity for 6-week course with stop date of 2/23/2023              -ID dosing intertrigo/Candida with nystatin              -Complains of pain in his mid tibia area.  There is no visual abnormalities I can appreciate in this area and seems less tender today.  Further investigation with x-ray was unremarkable.  Homans' sign negative with good pulses distally and no aspects of vascular compromise such as cyanosis of toes              -Repeat CBC stable and improved with no plans to further monitor     DM2 uncontrolled              -Resumed metformin              -Continue nightly  Lantus as dosed              -Continue sliding scale   -BS better this AM at 183 will monitor trends     Past history of CVA and seizure disorder -continue ASA Plavix and statin     CAD/HTN/cardiomyopathy              -Continue meds              -Previously evaluated by cardiology -EF 26-30%     BELKYS compliant with CPAP     Disposition -medically ready at any time.  Unfortunately no bed available over weekend with hopeful plans to transfer on Monday    Fazal Givens MD  1/14/2023  09:17 EST

## 2023-01-14 NOTE — THERAPY TREATMENT NOTE
Patient Name: Mandeep Tracey  : 1960    MRN: [id number]                              Today's Date: 2023       Admit Date: 2023    Visit Dx:     ICD-10-CM ICD-9-CM   1. Infection and inflammatory reaction due to internal right hip prosthesis, initial encounter ([facility])  T84.51XA 996.66   2. Follow-up exam  Z09 V67.9     Patient Active Problem List   Diagnosis   • CVA (cerebral vascular accident) ([facility])   • Right hemiparesis ([facility])   • BELKYS on CPAP   • Seizure disorder ([facility])   • Type 2 diabetes mellitus ([facility])   • Essential hypertension   • Infection of right prosthetic hip joint ([facility])   • MSSA (methicillin susceptible Staphylococcus aureus) infection   • Group B streptococcal infection   • UTI (urinary tract infection)   • Postoperative nausea and vomiting   • Nonrheumatic mitral valve regurgitation   • Cardiomyopathy ([facility])   • PVCs (premature ventricular contractions)   • Pyogenic arthritis of right hip ([facility])   • Infection and inflammatory reaction due to internal right hip prosthesis, initial encounter ([facility])     Past Medical History:   Diagnosis Date   • Aphasia    • Cardiomyopathy ([facility])    • CPAP (continuous positive airway pressure) dependence    • Diabetes ([facility])    • Hemiparesis ([facility])     Right side    • Morbid obesity ([facility])    • Nonrheumatic mitral valve regurgitation    • BELKYS on CPAP    • Peptic ulcer disease    • Postoperative nausea and vomiting 3/13/2021   • Psoriasis    • Pyogenic arthritis of right hip ([facility])    • Seizures ([facility])    • Sleep apnea    • Stroke ([facility])    • Ventricular ectopy     asymptomatic     Past Surgical History:   Procedure Laterality Date   • CHOLECYSTECTOMY     • EYE SURGERY     • HIP SPACER INSERTION WITH ANTIBIOTIC CEMENT Right 2023    Procedure: RT. BIPOLAR HIP REMOVAL AND PLACEMENT OF SPACER;  Surgeon: Faustina Beebe MD;  Location: [facility];  Service: Orthopedics;  Laterality: Right;   • INCISION AND DRAINAGE HIP Right 3/12/2021    Procedure: HIP  ANTERIOR INCISION AND DRAINAGE WITH HANA TABLE;  Surgeon: Tao Andrea MD;  Location: [facility] MAIN OR;  Service: Orthopedics;  Laterality: Right;   • LUMBAR DISC SURGERY     • US GUIDED FINE NEEDLE ASPIRATION  3/10/2021      General Information     Row Name 01/14/23 1401          Physical Therapy Time and Intention    Document Type therapy note (daily note)  -     Mode of Treatment individual therapy;physical therapy  -     Row Name 01/14/23 1401          General Information    Patient Profile Reviewed yes  -     Existing Precautions/Restrictions fall;hip, posterior;right  -     Row Name 01/14/23 1401          Cognition    Orientation Status (Cognition) oriented x 3  -     Row Name 01/14/23 1401          Safety Issues, Functional Mobility    Impairments Affecting Function (Mobility) balance;endurance/activity tolerance;strength;pain  -           User Key  (r) = Recorded By, (t) = Taken By, (c) = Cosigned By    Initials Name Provider Type     Urvashi Mazariegos, PT Physical Therapist               Mobility     Row Name 01/14/23 1402          Mobility    Extremity Weight-bearing Status right lower extremity  -     Right Lower Extremity (Weight-bearing Status) weight-bearing as tolerated (WBAT)  -           User Key  (r) = Recorded By, (t) = Taken By, (c) = Cosigned By    Initials Name Provider Type     Urvashi Mazariegos, PT Physical Therapist               Obj/Interventions     Row Name 01/14/23 1403          Motor Skills    Therapeutic Exercise other (see comments)  B LE ther ex, assist with R LE; ankle pumps, hip abd/adduction, leg lifts, heel slides, glute sets; assisted in postioning R LE for comfort at end of session  -           User Key  (r) = Recorded By, (t) = Taken By, (c) = Cosigned By    Initials Name Provider Type     Urvashi Mazariegos, PT Physical Therapist               Goals/Plan    No documentation.                Clinical Impression     Row Name 01/14/23 1404          Plan of Care  Review    Outcome Evaluation Pt. agreeable to LE ther ex, he does have complaints of pain in R LE particularly around knee. Performed B LE ther ex x15-20 reps, assist with R LE; ankle pumps, hip abd/adduction, leg lifts, heel slides, glute sets; assisted in postioning R LE for comfort at end of session. He will continue to benefit from skilled PT.  -     Row Name 01/14/23 1404          Therapy Assessment/Plan (PT)    Rehab Potential (PT) good, to achieve stated therapy goals  -     Criteria for Skilled Interventions Met (PT) yes  -     Therapy Frequency (PT) 3 times/wk  -     Row Name 01/14/23 1404          Positioning and Restraints    Pre-Treatment Position in bed  -     Post Treatment Position bed  -     In Bed supine;call light within reach;encouraged to call for assist;exit alarm on;with family/caregiver  -           User Key  (r) = Recorded By, (t) = Taken By, (c) = Cosigned By    Initials Name Provider Type     Urvashi Mazariegos, PT Physical Therapist               Outcome Measures     Row Name 01/14/23 1407 01/14/23 0839       How much help from another person do you currently need...    Turning from your back to your side while in flat bed without using bedrails? 2  - 2  -NH    Moving from lying on back to sitting on the side of a flat bed without bedrails? 2  - 2  -NH    Moving to and from a bed to a chair (including a wheelchair)? 2  - 2  -NH    Standing up from a chair using your arms (e.g., wheelchair, bedside chair)? 2  - 2  -NH    Climbing 3-5 steps with a railing? 1  - 1  -NH    To walk in hospital room? 1  - 1  -NH    AM-PAC 6 Clicks Score (PT) 10  - 10  -NH    Highest level of mobility 4 --> Transferred to chair/commode  - 4 --> Transferred to chair/commode  -NH    Row Name 01/14/23 1407          Functional Assessment    Outcome Measure Options AM-PAC 6 Clicks Basic Mobility (PT)  -           User Key  (r) = Recorded By, (t) = Taken By, (c) = Cosigned By    Initials  Name Provider Type     Urvashi Mazariegos, PT Physical Therapist    Bipin Riggs, RN Registered Nurse                             Physical Therapy Education     Title: PT OT SLP Therapies (Done)     Topic: Physical Therapy (Done)     Point: Mobility training (Done)     Learning Progress Summary           Patient Acceptance, E,TB,D, VU,DU,NR by  at 1/14/2023 1407    Acceptance, E, VU by DB at 1/12/2023 1548    Acceptance, E, VU by DB at 1/11/2023 1559    Acceptance, E, VU by DB at 1/10/2023 1534   Significant Other Acceptance, E, VU by DB at 1/10/2023 1534                   Point: Home exercise program (Done)     Learning Progress Summary           Patient Acceptance, E, VU by DB at 1/12/2023 1548    Acceptance, E, VU by DB at 1/11/2023 1559    Acceptance, E, VU by DB at 1/10/2023 1534   Significant Other Acceptance, E, VU by DB at 1/10/2023 1534                   Point: Body mechanics (Done)     Learning Progress Summary           Patient Acceptance, E,TB,D, VU,DU,NR by  at 1/14/2023 1407    Acceptance, E, VU by DB at 1/12/2023 1548    Acceptance, E, VU by DB at 1/11/2023 1559    Acceptance, E, VU by DB at 1/10/2023 1534   Significant Other Acceptance, E, VU by DB at 1/10/2023 1534                   Point: Precautions (Done)     Learning Progress Summary           Patient Acceptance, E, VU by DB at 1/12/2023 1548    Acceptance, E, VU by DB at 1/11/2023 1559    Acceptance, E, VU by DB at 1/10/2023 1534   Significant Other Acceptance, E, VU by DB at 1/10/2023 1534                               User Key     Initials Effective Dates Name Provider Type Discipline     06/16/21 -  Marlin Ladd, CHRISTOPHER Physical Therapist PT     05/26/20 -  Urvashi Mazariegos PT Physical Therapist PT              PT Recommendation and Plan     Plan of Care Reviewed With: patient  Outcome Evaluation: Pt. agreeable to LE ther ex, he does have complaints of pain in R LE particularly around knee. Performed B LE ther ex x15-20 reps,  assist with R LE; ankle pumps, hip abd/adduction, leg lifts, heel slides, glute sets; assisted in postioning R LE for comfort at end of session. He will continue to benefit from skilled PT.     Time Calculation:    PT Charges     Row Name 01/14/23 1408             Time Calculation    Start Time 1045  -      Stop Time 1105  -      Time Calculation (min) 20 min  -      PT Received On 01/14/23  -      PT - Next Appointment 01/16/23  -         Time Calculation- PT    Total Timed Code Minutes- PT 13 minute(s)  -         Timed Charges    30496 - PT Therapeutic Activity Minutes 13  -MH         Total Minutes    Timed Charges Total Minutes 13  -MH       Total Minutes 13  -MH            User Key  (r) = Recorded By, (t) = Taken By, (c) = Cosigned By    Initials Name Provider Type     Urvashi Mazariegos, CHRISTOPHER Physical Therapist              Therapy Charges for Today     Code Description Service Date Service Provider Modifiers Qty    71678395068 HC PT THERAPEUTIC ACT EA 15 MIN 1/14/2023 Urvashi Mazariegos, PT GP 1          PT G-Codes  Outcome Measure Options: AM-PAC 6 Clicks Basic Mobility (PT)  AM-PAC 6 Clicks Score (PT): 10  AM-PAC 6 Clicks Score (OT): 11  PT Discharge Summary  Anticipated Discharge Disposition (PT): inpatient rehabilitation facility, skilled nursing facility    Urvashi Mazariegos PT  1/14/2023

## 2023-01-14 NOTE — PLAN OF CARE
Goal Outcome Evaluation:           Progress: no change  Outcome Evaluation: Pt AxO x4. VSS. NVI. Pain controlled with PRN PO pain meds. PICC line placed for IV abx. Pt using home CPAP, at bedside. SSI in place. Voiding function intact, BM today. Pt awaiting bed in SNF, possible d/c monday, see CCP notes. Educated pt on fall prevention. All needs met at this time.

## 2023-01-14 NOTE — PLAN OF CARE
Goal Outcome Evaluation:              Outcome Evaluation: Pt A/O x 4. Pt has aphasia which is his baseline from previous CVA, pt will use hand gestures and point to things when expressing his wants and needs. Pt requires x 2-3 assist when moving in bed. Pain controlled with PRN pain medication. Denies any nausea at this time. Pt continues to wear CPAP machine. VSS at this time, will continue to monitor.

## 2023-01-14 NOTE — PLAN OF CARE
Goal Outcome Evaluation:  Plan of Care Reviewed With: patient           Outcome Evaluation: Pt. agreeable to LE ther ex, he does have complaints of pain in R LE particularly around knee. Performed B LE ther ex x15-20 reps, assist with R LE; ankle pumps, hip abd/adduction, leg lifts, heel slides, glute sets; assisted in postioning R LE for comfort at end of session. He will continue to benefit from skilled PT.

## 2023-01-15 LAB
GLUCOSE BLDC GLUCOMTR-MCNC: 181 MG/DL (ref 70–130)
GLUCOSE BLDC GLUCOMTR-MCNC: 205 MG/DL (ref 70–130)
GLUCOSE BLDC GLUCOMTR-MCNC: 214 MG/DL (ref 70–130)
GLUCOSE BLDC GLUCOMTR-MCNC: 220 MG/DL (ref 70–130)
VANCOMYCIN TROUGH SERPL-MCNC: 9.1 MCG/ML (ref 5–20)
WHOLE BLOOD HOLD SPECIMEN: NORMAL

## 2023-01-15 PROCEDURE — 82962 GLUCOSE BLOOD TEST: CPT

## 2023-01-15 PROCEDURE — 63710000001 INSULIN LISPRO (HUMAN) PER 5 UNITS: Performed by: ORTHOPAEDIC SURGERY

## 2023-01-15 PROCEDURE — 25010000002 VANCOMYCIN 10 G RECONSTITUTED SOLUTION: Performed by: INTERNAL MEDICINE

## 2023-01-15 PROCEDURE — 80202 ASSAY OF VANCOMYCIN: CPT | Performed by: INTERNAL MEDICINE

## 2023-01-15 PROCEDURE — 25010000002 VANCOMYCIN 10 G RECONSTITUTED SOLUTION: Performed by: HOSPITALIST

## 2023-01-15 RX ADMIN — GABAPENTIN 600 MG: 300 CAPSULE ORAL at 20:54

## 2023-01-15 RX ADMIN — OXYCODONE HYDROCHLORIDE AND ACETAMINOPHEN 1 TABLET: 10; 325 TABLET ORAL at 21:52

## 2023-01-15 RX ADMIN — OXYCODONE HYDROCHLORIDE AND ACETAMINOPHEN 1 TABLET: 10; 325 TABLET ORAL at 12:15

## 2023-01-15 RX ADMIN — OXYCODONE HYDROCHLORIDE AND ACETAMINOPHEN 1 TABLET: 10; 325 TABLET ORAL at 08:15

## 2023-01-15 RX ADMIN — METOPROLOL SUCCINATE 50 MG: 50 TABLET, FILM COATED, EXTENDED RELEASE ORAL at 08:14

## 2023-01-15 RX ADMIN — CYCLOBENZAPRINE 10 MG: 10 TABLET, FILM COATED ORAL at 13:56

## 2023-01-15 RX ADMIN — Medication 10 ML: at 10:19

## 2023-01-15 RX ADMIN — METFORMIN HYDROCHLORIDE 1000 MG: 1000 TABLET, FILM COATED ORAL at 08:15

## 2023-01-15 RX ADMIN — DOCUSATE SODIUM 200 MG: 100 CAPSULE, LIQUID FILLED ORAL at 08:15

## 2023-01-15 RX ADMIN — Medication 10 ML: at 20:57

## 2023-01-15 RX ADMIN — SACUBITRIL AND VALSARTAN 1 TABLET: 24; 26 TABLET, FILM COATED ORAL at 08:15

## 2023-01-15 RX ADMIN — ISOSORBIDE MONONITRATE 30 MG: 30 TABLET, EXTENDED RELEASE ORAL at 08:15

## 2023-01-15 RX ADMIN — METFORMIN HYDROCHLORIDE 1000 MG: 1000 TABLET, FILM COATED ORAL at 17:36

## 2023-01-15 RX ADMIN — VANCOMYCIN HYDROCHLORIDE 1750 MG: 10 INJECTION, POWDER, LYOPHILIZED, FOR SOLUTION INTRAVENOUS at 21:12

## 2023-01-15 RX ADMIN — DOCUSATE SODIUM 200 MG: 100 CAPSULE, LIQUID FILLED ORAL at 20:54

## 2023-01-15 RX ADMIN — ASPIRIN 81 MG: 81 TABLET, COATED ORAL at 08:14

## 2023-01-15 RX ADMIN — INSULIN LISPRO 3 UNITS: 100 INJECTION, SOLUTION INTRAVENOUS; SUBCUTANEOUS at 17:33

## 2023-01-15 RX ADMIN — CLOPIDOGREL 75 MG: 75 TABLET, FILM COATED ORAL at 08:14

## 2023-01-15 RX ADMIN — ROSUVASTATIN CALCIUM 40 MG: 40 TABLET, FILM COATED ORAL at 08:15

## 2023-01-15 RX ADMIN — INSULIN LISPRO 2 UNITS: 100 INJECTION, SOLUTION INTRAVENOUS; SUBCUTANEOUS at 08:14

## 2023-01-15 RX ADMIN — OXYCODONE HYDROCHLORIDE AND ACETAMINOPHEN 1 TABLET: 10; 325 TABLET ORAL at 00:51

## 2023-01-15 RX ADMIN — INSULIN GLARGINE-YFGN 15 UNITS: 100 INJECTION, SOLUTION SUBCUTANEOUS at 20:54

## 2023-01-15 RX ADMIN — OXYCODONE HYDROCHLORIDE AND ACETAMINOPHEN 1 TABLET: 10; 325 TABLET ORAL at 17:33

## 2023-01-15 RX ADMIN — SERTRALINE 100 MG: 100 TABLET, FILM COATED ORAL at 08:15

## 2023-01-15 RX ADMIN — GABAPENTIN 600 MG: 300 CAPSULE ORAL at 08:15

## 2023-01-15 RX ADMIN — CYCLOBENZAPRINE 10 MG: 10 TABLET, FILM COATED ORAL at 21:52

## 2023-01-15 RX ADMIN — VANCOMYCIN HYDROCHLORIDE 1500 MG: 10 INJECTION, POWDER, LYOPHILIZED, FOR SOLUTION INTRAVENOUS at 10:34

## 2023-01-15 RX ADMIN — INSULIN LISPRO 3 UNITS: 100 INJECTION, SOLUTION INTRAVENOUS; SUBCUTANEOUS at 12:15

## 2023-01-15 RX ADMIN — SACUBITRIL AND VALSARTAN 1 TABLET: 24; 26 TABLET, FILM COATED ORAL at 20:55

## 2023-01-15 RX ADMIN — OXYBUTYNIN CHLORIDE 10 MG: 10 TABLET, EXTENDED RELEASE ORAL at 08:15

## 2023-01-15 RX ADMIN — GABAPENTIN 600 MG: 300 CAPSULE ORAL at 17:33

## 2023-01-15 NOTE — PROGRESS NOTES
"    DAILY PROGRESS NOTE  [facility]    Patient Identification:  Name: Mandeep Tracey  Age: 62 y.o.  Sex: male  :  1960  MRN: [id number]         Primary Care Physician: Pipe Servin MD    Subjective:  Interval History: Not voicing any new complaints    Objective:    Scheduled Meds:aspirin, 81 mg, Oral, Daily  bumetanide, 2 mg, Oral, Daily  clopidogrel, 75 mg, Oral, Daily  docusate sodium, 200 mg, Oral, BID  gabapentin, 600 mg, Oral, TID  insulin glargine, 15 Units, Subcutaneous, Nightly  insulin lispro, 0-7 Units, Subcutaneous, TID AC  isosorbide mononitrate, 30 mg, Oral, Daily  metFORMIN, 1,000 mg, Oral, BID With Meals  metoprolol succinate XL, 50 mg, Oral, Daily  oxybutynin XL, 10 mg, Oral, Daily  polyethylene glycol, 17 g, Oral, BID  rosuvastatin, 40 mg, Oral, Daily  sacubitril-valsartan, 1 tablet, Oral, BID  sertraline, 100 mg, Oral, Daily  sodium chloride, 10 mL, Intravenous, Q12H  sodium chloride, 10 mL, Intravenous, Q12H  vancomycin, 1,750 mg, Intravenous, Q12H      Continuous Infusions:Pharmacy to dose vancomycin,         Vital signs in last 24 hours:  Temp:  [96.7 °F (35.9 °C)-98.8 °F (37.1 °C)] 96.7 °F (35.9 °C)  Heart Rate:  [72-79] 72  Resp:  [18] 18  BP: (131-144)/(68-70) 140/70    Intake/Output:    Intake/Output Summary (Last 24 hours) at 1/15/2023 1125  Last data filed at 1/15/2023 1030  Gross per 24 hour   Intake 840 ml   Output 800 ml   Net 40 ml       Exam:  /70 (BP Location: Left arm, Patient Position: Lying)   Pulse 72   Temp 96.7 °F (35.9 °C) (Oral)   Resp 18   Ht 182.9 cm (72\")   Wt 129 kg (285 lb)   SpO2 95%   BMI 38.65 kg/m²     Exam deferred      Data Review:  Labs in chart were reviewed.    Assessment:  Active Hospital Problems    Diagnosis  POA   • **Pyogenic arthritis of right hip (HCC) [M00.9]  Yes   • Infection and inflammatory reaction due to internal right hip prosthesis, initial encounter ([facility]) [T84.51XA]  Yes   • Cardiomyopathy (HCC) [I42.9]  Yes "   • Right hemiparesis (HCC) [G81.91]  Yes   • BELKYS on CPAP [G47.33, Z99.89]  Not Applicable   • Seizure disorder (HCC) [G40.909]  Yes   • Type 2 diabetes mellitus (HCC) [E11.9]  Yes   • Essential hypertension [I10]  Yes   • Infection of right prosthetic hip joint (HCC) [T84.51XA]  Not Applicable      Resolved Hospital Problems   No resolved problems to display.       Plan:    Medically ready at any time    Disposition pending CCP/logistics hopeful from tomorrow    Fazal Fadi Givens MD  1/15/2023  11:25 EST

## 2023-01-15 NOTE — PLAN OF CARE
Goal Outcome Evaluation:  Plan of Care Reviewed With: patient        Progress: no change  Outcome Evaluation: Care assumed from patient at 1500 from previous RN. Patient stable, VSS and voiding function intact. Pain managed with prn percocet. SSI given for elevated BG. CCP following for d/c placement, potential bed available on Monday.

## 2023-01-15 NOTE — PROGRESS NOTES
"[facility] Clinical Pharmacy Services: Vancomycin Monitoring Note    Mandeep Tracey is a 62 y.o. male who is on day 7/42 of pharmacy to dose vancomycin for Bone and/or Joint Infection.    Previous Vancomycin Dose: 1500 mg every 12 hours   Updated Cultures and Sensitivities: NGTD  Results from last 7 days   Lab Units 01/15/23  0753 01/13/23  0913 01/11/23  1017   VANCOMYCIN TR mcg/mL 9.10 <4.00* 9.90     Vitals/Labs  Ht: 182.9 cm (72\"); Wt: 129 kg (285 lb)   Temp Readings from Last 1 Encounters:   01/15/23 96.7 °F (35.9 °C) (Oral)     Estimated Creatinine Clearance: 145.5 mL/min (A) (by C-G formula based on SCr of 0.75 mg/dL (L)).     Results from last 7 days   Lab Units 01/14/23  0432 01/13/23  0542 01/12/23  0528 01/11/23  0820   CREATININE mg/dL 0.75* 0.69* 0.80 0.65*   WBC 10*3/mm3 6.86  --  10.21 10.59     Assessment/Plan  Level 9.1, AUC within range at 404, but given source of infection will plan to increase future doses.   Current Vancomycin Dose: 1750mg q12h to start tonight  Next Level Date and Time: 1/17 @ 0830 vanc trough  We will continue to monitor patient changes and renal function     Thank you for involving pharmacy in this patient's care. Please contact pharmacy with any questions or concerns.       Terri Sparks, [facility]  Clinical Pharmacist        "

## 2023-01-15 NOTE — PLAN OF CARE
Goal Outcome Evaluation:           Progress: improving  Outcome Evaluation: Patient is alert and oriented. Pain is under control with prn percocet. Scheduled vancomycin iv given via PICC. VSS. Patient is waiting on a bed at SNF.

## 2023-01-16 VITALS
BODY MASS INDEX: 38.6 KG/M2 | RESPIRATION RATE: 17 BRPM | HEART RATE: 83 BPM | TEMPERATURE: 97.2 F | WEIGHT: 285 LBS | HEIGHT: 72 IN | DIASTOLIC BLOOD PRESSURE: 60 MMHG | OXYGEN SATURATION: 95 % | SYSTOLIC BLOOD PRESSURE: 129 MMHG

## 2023-01-16 LAB
CREAT SERPL-MCNC: 0.63 MG/DL (ref 0.76–1.27)
EGFRCR SERPLBLD CKD-EPI 2021: 108.9 ML/MIN/1.73
GLUCOSE BLDC GLUCOMTR-MCNC: 155 MG/DL (ref 70–130)
GLUCOSE BLDC GLUCOMTR-MCNC: 158 MG/DL (ref 70–130)
GLUCOSE BLDC GLUCOMTR-MCNC: 168 MG/DL (ref 70–130)
WHOLE BLOOD HOLD SPECIMEN: NORMAL

## 2023-01-16 PROCEDURE — 63710000001 INSULIN LISPRO (HUMAN) PER 5 UNITS: Performed by: ORTHOPAEDIC SURGERY

## 2023-01-16 PROCEDURE — 97530 THERAPEUTIC ACTIVITIES: CPT

## 2023-01-16 PROCEDURE — 82565 ASSAY OF CREATININE: CPT | Performed by: HOSPITALIST

## 2023-01-16 PROCEDURE — 25010000002 VANCOMYCIN 10 G RECONSTITUTED SOLUTION: Performed by: HOSPITALIST

## 2023-01-16 PROCEDURE — 82962 GLUCOSE BLOOD TEST: CPT

## 2023-01-16 RX ORDER — INSULIN LISPRO 100 [IU]/ML
0-7 INJECTION, SOLUTION INTRAVENOUS; SUBCUTANEOUS
Refills: 12
Start: 2023-01-16

## 2023-01-16 RX ORDER — METOPROLOL SUCCINATE 50 MG/1
50 TABLET, EXTENDED RELEASE ORAL DAILY
Start: 2023-01-16

## 2023-01-16 RX ORDER — GABAPENTIN 300 MG/1
600 CAPSULE ORAL 3 TIMES DAILY
Start: 2023-01-16

## 2023-01-16 RX ORDER — OXYCODONE AND ACETAMINOPHEN 10; 325 MG/1; MG/1
1 TABLET ORAL EVERY 4 HOURS PRN
Refills: 0
Start: 2023-01-16 | End: 2023-01-17

## 2023-01-16 RX ADMIN — GABAPENTIN 600 MG: 300 CAPSULE ORAL at 08:36

## 2023-01-16 RX ADMIN — OXYCODONE HYDROCHLORIDE AND ACETAMINOPHEN 1 TABLET: 10; 325 TABLET ORAL at 17:06

## 2023-01-16 RX ADMIN — ISOSORBIDE MONONITRATE 30 MG: 30 TABLET, EXTENDED RELEASE ORAL at 08:38

## 2023-01-16 RX ADMIN — Medication 10 ML: at 10:03

## 2023-01-16 RX ADMIN — INSULIN LISPRO 2 UNITS: 100 INJECTION, SOLUTION INTRAVENOUS; SUBCUTANEOUS at 08:35

## 2023-01-16 RX ADMIN — VANCOMYCIN HYDROCHLORIDE 1750 MG: 10 INJECTION, POWDER, LYOPHILIZED, FOR SOLUTION INTRAVENOUS at 10:03

## 2023-01-16 RX ADMIN — OXYCODONE HYDROCHLORIDE AND ACETAMINOPHEN 1 TABLET: 10; 325 TABLET ORAL at 12:49

## 2023-01-16 RX ADMIN — INSULIN LISPRO 2 UNITS: 100 INJECTION, SOLUTION INTRAVENOUS; SUBCUTANEOUS at 12:49

## 2023-01-16 RX ADMIN — CLOPIDOGREL 75 MG: 75 TABLET, FILM COATED ORAL at 08:37

## 2023-01-16 RX ADMIN — METFORMIN HYDROCHLORIDE 1000 MG: 1000 TABLET, FILM COATED ORAL at 17:06

## 2023-01-16 RX ADMIN — OXYCODONE HYDROCHLORIDE AND ACETAMINOPHEN 1 TABLET: 10; 325 TABLET ORAL at 02:08

## 2023-01-16 RX ADMIN — ROSUVASTATIN CALCIUM 40 MG: 40 TABLET, FILM COATED ORAL at 08:38

## 2023-01-16 RX ADMIN — METOPROLOL SUCCINATE 50 MG: 50 TABLET, FILM COATED, EXTENDED RELEASE ORAL at 08:38

## 2023-01-16 RX ADMIN — ASPIRIN 81 MG: 81 TABLET, COATED ORAL at 08:36

## 2023-01-16 RX ADMIN — GABAPENTIN 600 MG: 300 CAPSULE ORAL at 17:06

## 2023-01-16 RX ADMIN — OXYCODONE HYDROCHLORIDE AND ACETAMINOPHEN 1 TABLET: 10; 325 TABLET ORAL at 08:35

## 2023-01-16 RX ADMIN — METFORMIN HYDROCHLORIDE 1000 MG: 1000 TABLET, FILM COATED ORAL at 08:37

## 2023-01-16 RX ADMIN — ACETAMINOPHEN 650 MG: 325 TABLET, FILM COATED ORAL at 04:18

## 2023-01-16 RX ADMIN — OXYBUTYNIN CHLORIDE 10 MG: 10 TABLET, EXTENDED RELEASE ORAL at 08:37

## 2023-01-16 RX ADMIN — CYCLOBENZAPRINE 10 MG: 10 TABLET, FILM COATED ORAL at 04:18

## 2023-01-16 RX ADMIN — Medication 10 ML: at 08:36

## 2023-01-16 RX ADMIN — SERTRALINE 100 MG: 100 TABLET, FILM COATED ORAL at 08:38

## 2023-01-16 RX ADMIN — SACUBITRIL AND VALSARTAN 1 TABLET: 24; 26 TABLET, FILM COATED ORAL at 08:38

## 2023-01-16 NOTE — DISCHARGE SUMMARY
[facility]    Date of Discharge:  1/16/2023    PCP: Pipe Servin MD    Discharge Diagnosis:   Active Hospital Problems    Diagnosis  POA   • **Pyogenic arthritis of right hip (HCC) [M00.9]  Yes   • Infection and inflammatory reaction due to internal right hip prosthesis, initial encounter ([facility]) [T84.51XA]  Yes   • Cardiomyopathy ([facility]) [I42.9]  Yes   • Right hemiparesis ([facility]) [G81.91]  Yes   • BELKYS on CPAP [G47.33, Z99.89]  Not Applicable   • Seizure disorder ([facility]) [G40.909]  Yes   • Type 2 diabetes mellitus ([facility]) [E11.9]  Yes   • Essential hypertension [I10]  Yes   • Infection of right prosthetic hip joint ([facility]) [T84.51XA]  Not Applicable      Resolved Hospital Problems   No resolved problems to display.     Procedure(s):  RT. BIPOLAR HIP REMOVAL AND PLACEMENT OF SPACER     Consults     Date and Time Order Name Status Description    1/9/2023  7:56 PM Inpatient Consult to Hospitalist      1/8/2023  4:36 AM Inpatient Cardiology Consult      1/8/2023  1:31 AM Inpatient Infectious Diseases Consult Completed     1/8/2023  1:24 AM Inpatient Orthopedic Surgery Consult Completed         Hospital Course  62 y.o. male with a past hx of stroke and aphasia who has a long h/o of right hip infection, treated with iv antibiotics 2 years ago and then on oral supressive medication for 12 months and came off of this.  He was placed back on doxycycline prior to admission and now with gram + on gram stain from hip though culture showed no growth.  Patient seen by orthopedics and underwent removal of hardware of right hip as well as antibiotic spacer placement on 1/9/2023 per Dr. Beebe.  ID has recommended 6-week course of IV vancomycin with a stop date of 2/23/2023.  They have treated his Candida intertrigo with nystatin powder.  Dr. Miller with infectious disease would like weekly CBC with differential BMP and vancomycin trough as well as CRP faxed to his office at  [phone].  Further infection compromised by uncontrolled diabetes.  He can continue his lout nightly Lantus and metformin has been resumed and he will also maintain on sliding scale.  Given his past history of CVA and seizure disorder, he is maintained on aspirin Plavix statin and gabapentin.  He is remained stable for numerous days in regards to hypertension cardiomyopathy/CAD and is maintained on aspirin Plavix Bumex metoprolol and Entresto in addition to statin for hyperlipidemia.  Ultimately this patient was ready last week and he had to remain in house over the weekend as we awaited insurance precertification.  I been notified by [facility] that all has been finalized and patient remains medically stable for transfer today.  Patient is thankful for the care he received while here as well as amenable to the above plan.        Condition on Discharge: Improved.     Temp:  [97.6 °F (36.4 °C)-98.8 °F (37.1 °C)] 97.7 °F (36.5 °C)  Heart Rate:  [68-91] 68  Resp:  [16] 16  BP: (118-136)/(65-69) 120/65  Body mass index is 38.65 kg/m².    Physical Exam  HENT:      Head: Normocephalic.      Nose: Nose normal.      Mouth/Throat:      Mouth: Mucous membranes are moist.      Pharynx: Oropharynx is clear.   Eyes:      Extraocular Movements: Extraocular movements intact.      Pupils: Pupils are equal, round, and reactive to light.   Cardiovascular:      Rate and Rhythm: Normal rate.   Pulmonary:      Effort: Pulmonary effort is normal.      Breath sounds: Normal breath sounds.   Abdominal:      General: Bowel sounds are normal. There is no distension.      Palpations: Abdomen is soft.      Tenderness: There is no abdominal tenderness.   Musculoskeletal:         General: No swelling.   Skin:     General: Skin is warm and dry.   Neurological:      Mental Status: He is alert and oriented to person, place, and time. Mental status is at baseline.      Motor: Weakness present.      Gait: Gait abnormal.       Disposition: Skilled Nursing  Facility (DC - External)       Discharge Medications      New Medications      Instructions Start Date   oxyCODONE-acetaminophen  MG per tablet  Commonly known as: PERCOCET   1 tablet, Oral, Every 4 Hours PRN      PHARMACY TO DOSE VANCOMYCIN   Does not apply, Continuous PRN      vancomycin   1,750 mg, Intravenous, Every 12 Hours         Changes to Medications      Instructions Start Date   insulin glargine 100 UNIT/ML injection  Commonly known as: LANTUS, SEMGLEE  What changed:   · medication strength  · how much to take   15 Units, Subcutaneous, Nightly      insulin lispro 100 UNIT/ML injection  Commonly known as: ADMELOG  What changed: how much to take   0-7 Units, Subcutaneous, 3 Times Daily Before Meals         Continue These Medications      Instructions Start Date   acetaminophen 325 MG tablet  Commonly known as: TYLENOL   650 mg, Oral, Every 4 Hours PRN      aspirin 81 MG chewable tablet   81 mg, Oral, Daily      bisacodyl 10 MG suppository  Commonly known as: DULCOLAX   10 mg, Rectal, Daily PRN      bumetanide 2 MG tablet  Commonly known as: BUMEX   2 mg, Oral, Daily      clopidogrel 75 MG tablet  Commonly known as: PLAVIX   75 mg, Oral, Daily      docusate sodium 100 MG capsule   200 mg, Oral, 2 Times Daily      gabapentin 300 MG capsule  Commonly known as: NEURONTIN   600 mg, Oral, 3 Times Daily      hydrOXYzine 25 MG tablet  Commonly known as: ATARAX   25 mg, Oral, 3 Times Daily PRN      isosorbide mononitrate 30 MG 24 hr tablet  Commonly known as: IMDUR   30 mg, Oral, Daily      metFORMIN 1000 MG tablet  Commonly known as: GLUCOPHAGE   1,000 mg, Oral, 2 Times Daily With Meals      metoprolol succinate XL 50 MG 24 hr tablet  Commonly known as: TOPROL-XL   50 mg, Oral, Daily      polyethylene glycol 17 g packet  Commonly known as: MIRALAX   17 g, Oral, 2 Times Daily      rosuvastatin 20 MG tablet  Commonly known as: CRESTOR   40 mg, Oral, Daily      sacubitril-valsartan 24-26 MG tablet  Commonly  known as: ENTRESTO   1 tablet, Oral, 2 Times Daily      sertraline 100 MG tablet  Commonly known as: ZOLOFT   100 mg, Oral, Daily      trospium 20 MG tablet  Commonly known as: SANCTURA   20 mg, Oral, 2 Times Daily         Stop These Medications    ATORVASTATIN CALCIUM PO     baclofen 10 MG tablet  Commonly known as: LIORESAL     clotrimazole-betamethasone 1-0.05 % cream  Commonly known as: LOTRISONE     empagliflozin 25 MG tablet tablet  Commonly known as: JARDIANCE     enoxaparin 40 MG/0.4ML solution syringe  Commonly known as: LOVENOX     hydrocortisone 2.5 % cream     insulin aspart 100 UNIT/ML injection  Commonly known as: novoLOG     lidocaine 5 %  Commonly known as: LIDODERM     meclizine 25 MG chewable tablet chewable tablet     MELOXICAM PO     Morphine 15 MG tablet  Commonly known as: MSIR     nystatin 100,000 unit/mL suspension  Commonly known as: MYCOSTATIN     triamcinolone 0.1 % cream  Commonly known as: KENALOG             Additional Instructions for the Follow-ups that You Need to Schedule     Discharge Follow-up with PCP   As directed       Currently Documented PCP:    Pipe Servin MD    PCP Phone Number:    [phone]     Follow Up Details: PCP post rehab.  ID and orthopedics per their recommendations            Contact information for follow-up providers     Faustina Beebe MD. Schedule an appointment as soon as possible for a visit on 1/31/2023.    Specialty: Orthopedic Surgery  Contact information:  [address]  [facility] [id number] [phone]             Pipe Servin MD .    Specialties: Internal Medicine, Emergency Medicine  Why: PCP post rehab.  ID and orthopedics per their recommendations  Contact information:  [address] [address]  [address]  [facility] [id number] [phone]                   Contact information for after-discharge care     Destination     [facility] .    Service: Inpatient Rehabilitation  Contact  information:  [address] [place] [id number]  [phone]                            Pending Labs     Order Current Status    Tissue Pathology Exam Collected (01/09/23 6071)         Fazal Givens MD  [facility]  01/16/23    Discharge time spent greater than 30 minutes.

## 2023-01-16 NOTE — PLAN OF CARE
Goal Outcome Evaluation:         Patient discharged to facility via stretcher van. Report called to [facility]. PICC line flushed and capped prior to transport.

## 2023-01-16 NOTE — CASE MANAGEMENT/SOCIAL WORK
Continued Stay Note  [facility]     Patient Name: Mandeep Tracey  MRN: [id number]  Today's Date: 1/16/2023    Admit Date: 1/7/2023    Plan: [facility]  [place]   Discharge Plan     Row Name 01/16/23 0953       Plan    Plan [facility] [place]    Plan Comments [facility] made outbound call to [place] with [facility] this date at 9:38am to follow up on bed availability. Sorrento stating she was waiting on bed availability information and she would call [facility] back. [facility] received an inbound call from [place] this date at 9:45am stating a bed would be available at 7pm this evening. [facility] made an outbound call to [facility] this date at 9:50am, spoke with Karly and made stretcher transport arrangements at 5pm this afternoon. [facility] made outbound call to Sorrento with [facility] this date at 9:57am to update her on transportation arragements. Sorrento stated patient does not need a RAPID COVID prior to discharge.               Discharge Codes    No documentation.               Expected Discharge Date and Time     Expected Discharge Date Expected Discharge Time    Jan 13, 2023

## 2023-01-16 NOTE — THERAPY TREATMENT NOTE
Patient Name: Mandeep Tracey  : 1960    MRN: [id number]                              Today's Date: 2023       Admit Date: 2023    Visit Dx:     ICD-10-CM ICD-9-CM   1. Infection and inflammatory reaction due to internal right hip prosthesis, initial encounter ([facility])  T84.51XA 996.66   2. Follow-up exam  Z09 V67.9     Patient Active Problem List   Diagnosis   • CVA (cerebral vascular accident) ([facility])   • Right hemiparesis ([facility])   • BELKYS on CPAP   • Seizure disorder ([facility])   • Type 2 diabetes mellitus ([facility])   • Essential hypertension   • Infection of right prosthetic hip joint ([facility])   • MSSA (methicillin susceptible Staphylococcus aureus) infection   • Group B streptococcal infection   • UTI (urinary tract infection)   • Postoperative nausea and vomiting   • Nonrheumatic mitral valve regurgitation   • Cardiomyopathy ([facility])   • PVCs (premature ventricular contractions)   • Pyogenic arthritis of right hip ([facility])   • Infection and inflammatory reaction due to internal right hip prosthesis, initial encounter ([facility])     Past Medical History:   Diagnosis Date   • Aphasia    • Cardiomyopathy ([facility])    • CPAP (continuous positive airway pressure) dependence    • Diabetes ([facility])    • Hemiparesis ([facility])     Right side    • Morbid obesity ([facility])    • Nonrheumatic mitral valve regurgitation    • BELKYS on CPAP    • Peptic ulcer disease    • Postoperative nausea and vomiting 3/13/2021   • Psoriasis    • Pyogenic arthritis of right hip ([facility])    • Seizures ([facility])    • Sleep apnea    • Stroke ([facility])    • Ventricular ectopy     asymptomatic     Past Surgical History:   Procedure Laterality Date   • CHOLECYSTECTOMY     • EYE SURGERY     • HIP SPACER INSERTION WITH ANTIBIOTIC CEMENT Right 2023    Procedure: RT. BIPOLAR HIP REMOVAL AND PLACEMENT OF SPACER;  Surgeon: Faustina Beebe MD;  Location: [facility];  Service: Orthopedics;  Laterality: Right;   • INCISION AND DRAINAGE HIP Right 3/12/2021    Procedure: HIP  ANTERIOR INCISION AND DRAINAGE WITH HANA TABLE;  Surgeon: Tao Andrea MD;  Location: [facility] MA[facility] OR;  Service: Orthopedics;  Laterality: Right;   • LUMBAR DISC SURGERY     • US GUIDED FINE NEEDLE ASPIRATION  3/10/2021      General Information     Row Name 01/16/23 1023          Physical Therapy Time and Intention    Document Type therapy note (daily note)  -CW     Mode of Treatment physical therapy;individual therapy  -CW     Row Name 01/16/23 1023          General Information    Patient Profile Reviewed yes  -CW     Prior Level of Function --  wears R shoe with AFO  -CW     Existing Precautions/Restrictions fall;hip, posterior;right  -CW     Row Name 01/16/23 1023          Cognition    Orientation Status (Cognition) oriented x 3  -CW     Row Name 01/16/23 1023          Safety Issues, Functional Mobility    Safety Issues Affecting Function (Mobility) insight into deficits/self-awareness;impulsivity;safety precautions follow-through/compliance;awareness of need for assistance;judgment;problem-solving;safety precaution awareness  -CW     Impairments Affecting Function (Mobility) balance;endurance/activity tolerance;strength;pain;motor control  -CW           User Key  (r) = Recorded By, (t) = Taken By, (c) = Cosigned By    Initials Name Provider Type    CW Kenzie Linares, PT Physical Therapist               Mobility     Row Name 01/16/23 1024          Bed Mobility    Bed Mobility supine-sit  -CW     Supine-Sit [place] (Bed Mobility) minimum assist (75% patient effort);moderate assist (50% patient effort)  -CW     Sit-Supine [place] (Bed Mobility) minimum assist (75% patient effort);verbal cues  -CW     Assistive Device (Bed Mobility) bed rails;head of bed elevated  -CW     Comment, (Bed Mobility) Cues for safety, moves quickly and impulsively at times  -CW     Row Name 01/16/23 1024          Transfers    Comment, (Transfers) Was able to scoot laterally towards L side using bed rail and extra time to get  closer to HOB  -CW     Row Name 01/16/23 1024          Bed-Chair Transfer    Bed-Chair [place] (Transfers) unable to assess  -CW     Row Name 01/16/23 1024          Sit-Stand Transfer    Sit-Stand [place] (Transfers) dependent (less than 25% patient effort);2 person assist  -CW     Comment, (Sit-Stand Transfer) attempted twice, pt did not clear bottom from bed. Demonstrates fear of falling, not agreeable to much help from PT  -CW     Row Name 01/16/23 1024          Mobility    Extremity Weight-bearing Status right lower extremity  -CW     Right Lower Extremity (Weight-bearing Status) weight-bearing as tolerated (WBAT)  -CW           User Key  (r) = Recorded By, (t) = Taken By, (c) = Cosigned By    Initials Name Provider Type    CW Kenzie Linares, PT Physical Therapist               Obj/Interventions    No documentation.                Goals/Plan    No documentation.                Clinical Impression     Row Name 01/16/23 1030          Pain    Pretreatment Pain Rating 5/10  -CW     Posttreatment Pain Rating 5/10  -CW     Pain Location - Side/Orientation Right  -CW     Pain Location - hip  -CW     Pain Intervention(s) Repositioned;Rest;Ambulation/increased activity  -CW     Row Name 01/16/23 1030          Plan of Care Review    Plan of Care Reviewed With patient  -CW     Outcome Evaluation Pt participated with PT this PM. He completed supine>sit with min/mod A - cues for safety as pt close to EOB and with R sided deficits. Donned shoes and R AFO for standing. Attempted to stand twice with total assist but unable to clear bottom from EOB. Pt does become frustrated at times when he has difficulty mobilizing. Reassurance provided. He typically transfers with assist at home - currently is below baseline level and would benefit from SNF upon d/c.  -CW     Row Name 01/16/23 1030          Vital Signs    O2 Delivery Pre Treatment room air  -CW     O2 Delivery Intra Treatment room air  -CW     O2 Delivery Post  Treatment room air  -CW     Row Name 01/16/23 1030          Positioning and Restraints    Pre-Treatment Position in bed  -CW     Post Treatment Position bed  -CW     In Bed notified nsg;call light within reach;encouraged to call for assist;exit alarm on;fowlers  -CW           User Key  (r) = Recorded By, (t) = Taken By, (c) = Cosigned By    Initials Name Provider Type    Kenzie Escudero PT Physical Therapist               Outcome Measures     Row Name 01/16/23 1035          How much help from another person do you currently need...    Turning from your back to your side while in flat bed without using bedrails? 3  -CW     Moving from lying on back to sitting on the side of a flat bed without bedrails? 3  -CW     Moving to and from a bed to a chair (including a wheelchair)? 1  -CW     Standing up from a chair using your arms (e.g., wheelchair, bedside chair)? 1  -CW     Climbing 3-5 steps with a railing? 1  -CW     To walk in hospital room? 1  -CW     AM-PAC 6 Clicks Score (PT) 10  -CW     Highest level of mobility 4 --> Transferred to chair/commode  -CW     Row Name 01/16/23 1035          Functional Assessment    Outcome Measure Options AM-PAC 6 Clicks Basic Mobility (PT)  -CW           User Key  (r) = Recorded By, (t) = Taken By, (c) = Cosigned By    Initials Name Provider Type    Kenzie Escudero PT Physical Therapist                             Physical Therapy Education     Title: PT OT SLP Therapies (Done)     Topic: Physical Therapy (Done)     Point: Mobility training (Done)     Learning Progress Summary           Patient Acceptance, E, VU,NR by CW at 1/16/2023 1036    Acceptance, E,TB,D, VU,DU,NR by  at 1/14/2023 1407    Acceptance, E, VU by DB at 1/12/2023 1548    Acceptance, E, VU by DB at 1/11/2023 1559    Acceptance, E, VU by DB at 1/10/2023 1534   Significant Other Acceptance, E, VU by DB at 1/10/2023 1534                   Point: Home exercise program (Done)     Learning Progress Summary            Patient Acceptance, E, VU by DB at 1/12/2023 1548    Acceptance, E, VU by DB at 1/11/2023 1559    Acceptance, E, VU by DB at 1/10/2023 1534   Significant Other Acceptance, E, VU by DB at 1/10/2023 1534                   Point: Body mechanics (Done)     Learning Progress Summary           Patient Acceptance, E, VU,NR by CW at 1/16/2023 1036    Acceptance, E,TB,D, VU,DU,NR by  at 1/14/2023 1407    Acceptance, E, VU by DB at 1/12/2023 1548    Acceptance, E, VU by DB at 1/11/2023 1559    Acceptance, E, VU by DB at 1/10/2023 1534   Significant Other Acceptance, E, VU by DB at 1/10/2023 1534                   Point: Precautions (Done)     Learning Progress Summary           Patient Acceptance, E, VU,NR by CW at 1/16/2023 1036    Acceptance, E, VU by DB at 1/12/2023 1548    Acceptance, E, VU by DB at 1/11/2023 1559    Acceptance, E, VU by DB at 1/10/2023 1534   Significant Other Acceptance, E, VU by DB at 1/10/2023 1534                               User Key     Initials Effective Dates Name Provider Type Discipline     06/16/21 -  Marlin Ladd, PT Physical Therapist PT     05/26/20 -  Urvashi Mazariegos, PT Physical Therapist PT     12/13/22 -  Kenzie Linares, CHRISTOPHER Physical Therapist PT              PT Recommendation and Plan     Plan of Care Reviewed With: patient  Outcome Evaluation: Pt participated with PT this PM. He completed supine>sit with min/mod A - cues for safety as pt close to EOB and with R sided deficits. Donned shoes and R AFO for standing. Attempted to stand twice with total assist but unable to clear bottom from EOB. Pt does become frustrated at times when he has difficulty mobilizing. Reassurance provided. He typically transfers with assist at home - currently is below baseline level and would benefit from SNF upon d/c.     Time Calculation:    PT Charges     Row Name 01/16/23 1022             Time Calculation    Start Time 0850  -CW      Stop Time 0920  -CW      Time Calculation (min)  30 min  -CW      PT Received On 01/16/23  -LAURITA      PT - Next Appointment 01/18/23  -LAURITA            User Key  (r) = Recorded By, (t) = Taken By, (c) = Cosigned By    Initials Name Provider Type    Kenzie Escudero, CHRISTOPHER Physical Therapist              Therapy Charges for Today     Code Description Service Date Service Provider Modifiers Qty    72667277747  PT THERAPEUTIC ACT EA 15 MIN 1/16/2023 Kenzie Linares, PT GP 2    80104141317 HC PT THER SUPP EA 15 MIN 1/16/2023 Kenzie Linares, PT GP 2          PT G-Codes  Outcome Measure Options: AM-PAC 6 Clicks Basic Mobility (PT)  AM-PAC 6 Clicks Score (PT): 10  AM-PAC 6 Clicks Score (OT): 11  PT Discharge Summary  Anticipated Discharge Disposition (PT): skilled nursing facility    Kenzie Linares PT  1/16/2023

## 2023-01-16 NOTE — CASE MANAGEMENT/SOCIAL WORK
"Physicians Statement of Medical Necessity for  Ambulance Transportation    GENERAL INFORMATION     Name: Mandeep Tracey  YOB: 1960   [facility] OPTUM#: [id number]  Transport Date: 1/16/2023 (Valid for round trips this date, or for scheduled repetitive trips for 60 days from the date signed below.)  Origin: [facility]  Destination: [facility]  Is the Patient's stay covered under Medicare Part A (PPS/DRG?)No   Closest appropriate facility? Yes  If this a hosp-hosp transfer? No  Is this a hospice patient? No    MEDICAL NECESSITY QUESTIONAIRE    Ambulance Transportation is medically necessary only if other means of transportation are contraindicated or would be potentially harmful to the patient.  To meet this requirement, the patient must be either \"bed confined\" or suffer from a condition such that transport by means other than an ambulance is contraindicated by the patient's condition.  The following questions must be answered by the healthcare professional signing below for this form to be valid:     1) Describe the MEDICAL CONDITION (physical and/or mental) of this patient AT THE TIME OF AMBULANCE TRANSPORT that requires the patient to be transported in an ambulance, and why transport by other means is contraindicated by the patient's condition:   Past Medical History:   Diagnosis Date   • Aphasia    • Cardiomyopathy (HCC)    • CPAP (continuous positive airway pressure) dependence    • Diabetes (HCC)    • Hemiparesis (HCC)     Right side    • Morbid obesity (HCC)    • Nonrheumatic mitral valve regurgitation    • BELKYS on CPAP    • Peptic ulcer disease    • Postoperative nausea and vomiting 3/13/2021   • Psoriasis    • Pyogenic arthritis of right hip (HCC)    • Seizures (HCC)    • Sleep apnea    • Stroke (HCC) 2004   • Ventricular ectopy     asymptomatic      Past Surgical History:   Procedure Laterality Date   • CHOLECYSTECTOMY     • EYE SURGERY     • HIP SPACER INSERTION WITH " "ANTIBIOTIC CEMENT Right 1/9/2023    Procedure: RT. BIPOLAR HIP REMOVAL AND PLACEMENT OF SPACER;  Surgeon: Faustina Beebe MD;  Location: [facility];  Service: Orthopedics;  Laterality: Right;   • INCISION AND DRAINAGE HIP Right 3/12/2021    Procedure: HIP ANTERIOR INCISION AND DRAINAGE WITH HANA TABLE;  Surgeon: Tao Andrea MD;  Location: [facility] OR;  Service: Orthopedics;  Laterality: Right;   • LUMBAR DISC SURGERY     • US GUIDED FINE NEEDLE ASPIRATION  3/10/2021      2) Is this patient \"bed confined\" as defined below?Yes   To be \"bed confined\" the patient must satisfy all three of the following criteria:  (1) unable to get up from bed without assistance; AND (2) unable to ambulate;  AND (3) unable to sit in a chair or wheelchair.  3) Can this patient safely be transported by car or wheelchair van (I.e., may safely sit during transport, without an attendant or monitoring?)No   4. In addition to completing questions 1-3 above, please check any of the following conditions that apply*:          *Note: supporting documentation for any boxes checked must be maintained in the patient's medical records Unable to tolerate seated position for time needed to transport and Other Patient is flacid on right side due to a CVA       SIGNATURE OF PHYSICIAN OR OTHER AUTHORIZED HEALTHCARE PROFESSIONAL    I certify that the above information is true and correct based on my evaluation of this patient, and represent that the patient requires transport by ambulance and that other forms of transport are contraindicated.  I understand that this information will be used by the Centers for Medicare and Medicaid Services (CMS) to support the determiniation of medical necessity for ambulance services, and I represent that I have personal knowledge of the patient's condition at the time of transport.       If this box is checked, I also certify that the patient is physically or mentally incapable of signing the ambulance " service's claim form and that the institution with which I am affiliated has furnished care, services or assistance to the patient.  My signature below is made on behalf of the patient pursuant to 42 .36(b)(4). In accordance with 42 .37, the specific reason(s) that the patient is physically or mentally incapable of signing the claim for is as follows:     Signature of Physician or Healthcare Professional  Date/Time:   1/16/2023  10:04 EST       (For Scheduled repetitive transport, this form is not valid for transports performed more than 60 days after this date).                                                                                                                                            --------------------------------------------------------------------------------------------  Printed Name and Credentials of Physician or Authorized Healthcare Professional     *Form must be signed by patient's attending physician for scheduled, repetitive transports,.  For non-repetitive ambulance transports, if unable to obtain the signature of the attending physician, any of the following may sign (please select below):     Physician  Clinical Nurse Specialist  Registered Nurse     Physician Assistant  Discharge Planner  Licensed Practical Nurse     Nurse Practitioner

## 2023-01-16 NOTE — PLAN OF CARE
Goal Outcome Evaluation:  Plan of Care Reviewed With: patient        Progress: improving  Outcome Evaluation: VSS on RA and CPAP while sleeping. A&Ox4. PRN pain meds given per request. Voiding per urinal. Barrier cream applied to skin folds. Potential bed available 1/16 and pt. agreeable to this plan of care. Rounded on frequently with needs met.

## 2023-01-16 NOTE — PLAN OF CARE
Goal Outcome Evaluation:  Plan of Care Reviewed With: patient           Outcome Evaluation: Pt participated with PT this PM. He completed supine>sit with min/mod A - cues for safety as pt close to EOB and with R sided deficits. Donned shoes and R AFO for standing. Attempted to stand twice with total assist but unable to clear bottom from EOB. Pt does become frustrated at times when he has difficulty mobilizing. Reassurance provided. He typically transfers with assist at home - currently is below baseline level and would benefit from SNF upon d/c.

## 2023-01-16 NOTE — CASE MANAGEMENT/SOCIAL WORK
Continued Stay Note  [facility]     Patient Name: Mandeep Tracey  MRN: [id number]  Today's Date: 1/16/2023    Admit Date: 1/7/2023    Plan: [facility] [place]   Discharge Plan     Row Name 01/16/23 1249       Plan    Plan [facility] [place]    Plan Comments CCP received an inbound call from [facility] with EMS stating there was a call in this evening and she needed to cancel patient's EMS transport. CCP made outbound call to [facility] this date at 12:10pm, spoke to Alonzo and booked stretcher transport at 5pm this afternoon. Reservation # [id number].               Discharge Codes    No documentation.               Expected Discharge Date and Time     Expected Discharge Date Expected Discharge Time    Jan 16, 2023

## 2023-01-17 NOTE — CASE MANAGEMENT/SOCIAL WORK
Case Management Discharge Note      Final Note: Patient dc'd to [facility] via [facility]. Nimco JOHNSON RN         Selected Continued Care - Discharged on 1/16/2023 Admission date: 1/7/2023 - Discharge disposition: Skilled Nursing Facility (DC - External)    Destination Coordination complete.    Service Provider Selected Services Address Phone Fax Patient Preferred    [facility] [address] [place] [id number] [phone] --          Durable Medical Equipment    No services have been selected for the patient.              Dialysis/Infusion    No services have been selected for the patient.              Home Medical Care    No services have been selected for the patient.              Therapy    No services have been selected for the patient.              Community Resources    No services have been selected for the patient.              Community & DME    No services have been selected for the patient.                  Transportation Services  Ambulance: [facility]    Final Discharge Disposition Code: 62 - inpatient rehab facility

## 2023-01-24 ENCOUNTER — OUTSIDE FACILITY SERVICE (OUTPATIENT)
Dept: PODIATRY | Facility: CLINIC | Age: 63
End: 2023-01-24
Payer: MEDICARE

## 2023-01-30 ENCOUNTER — HOSPITAL ENCOUNTER (EMERGENCY)
Facility: HOSPITAL | Age: 63
Discharge: HOME | End: 2023-01-30
Attending: EMERGENCY MEDICINE | Admitting: EMERGENCY MEDICINE
Payer: OTHER GOVERNMENT

## 2023-01-30 ENCOUNTER — APPOINTMENT (OUTPATIENT)
Dept: CT IMAGING | Facility: HOSPITAL | Age: 63
End: 2023-01-30
Payer: OTHER GOVERNMENT

## 2023-01-30 VITALS
HEART RATE: 89 BPM | WEIGHT: 271.17 LBS | OXYGEN SATURATION: 90 % | HEIGHT: 72 IN | RESPIRATION RATE: 24 BRPM | DIASTOLIC BLOOD PRESSURE: 95 MMHG | SYSTOLIC BLOOD PRESSURE: 125 MMHG | TEMPERATURE: 98.8 F | BODY MASS INDEX: 36.73 KG/M2

## 2023-01-30 DIAGNOSIS — R11.2 NAUSEA AND VOMITING, UNSPECIFIED VOMITING TYPE: ICD-10-CM

## 2023-01-30 DIAGNOSIS — R10.9 ABDOMINAL PAIN, UNSPECIFIED ABDOMINAL LOCATION: Primary | ICD-10-CM

## 2023-01-30 LAB
ALBUMIN SERPL-MCNC: 4.4 G/DL (ref 3.5–5.2)
ALBUMIN/GLOB SERPL: 1.4 G/DL
ALP SERPL-CCNC: 94 U/L (ref 39–117)
ALT SERPL W P-5'-P-CCNC: 13 U/L (ref 1–41)
ANION GAP SERPL CALCULATED.3IONS-SCNC: 12 MMOL/L (ref 5–15)
AST SERPL-CCNC: 11 U/L (ref 1–40)
BASOPHILS # BLD AUTO: 0.02 10*3/MM3 (ref 0–0.2)
BASOPHILS NFR BLD AUTO: 0.2 % (ref 0–1.5)
BILIRUB SERPL-MCNC: 0.4 MG/DL (ref 0–1.2)
BILIRUB UR QL STRIP: NEGATIVE
BUN SERPL-MCNC: 15 MG/DL (ref 8–23)
BUN/CREAT SERPL: 23.1 (ref 7–25)
CALCIUM SPEC-SCNC: 9.6 MG/DL (ref 8.6–10.5)
CHLORIDE SERPL-SCNC: 99 MMOL/L (ref 98–107)
CLARITY UR: CLEAR
CO2 SERPL-SCNC: 26 MMOL/L (ref 22–29)
COLOR UR: YELLOW
CREAT SERPL-MCNC: 0.65 MG/DL (ref 0.76–1.27)
D-LACTATE SERPL-SCNC: 1.8 MMOL/L (ref 0.5–2)
DEPRECATED RDW RBC AUTO: 47.4 FL (ref 37–54)
EGFRCR SERPLBLD CKD-EPI 2021: 107.2 ML/MIN/1.73
EOSINOPHIL # BLD AUTO: 0.05 10*3/MM3 (ref 0–0.4)
EOSINOPHIL NFR BLD AUTO: 0.5 % (ref 0.3–6.2)
ERYTHROCYTE [DISTWIDTH] IN BLOOD BY AUTOMATED COUNT: 15.7 % (ref 12.3–15.4)
GLOBULIN UR ELPH-MCNC: 3.2 GM/DL
GLUCOSE SERPL-MCNC: 192 MG/DL (ref 65–99)
GLUCOSE UR STRIP-MCNC: NEGATIVE MG/DL
HCT VFR BLD AUTO: 35.5 % (ref 37.5–51)
HGB BLD-MCNC: 10.9 G/DL (ref 13–17.7)
HGB UR QL STRIP.AUTO: NEGATIVE
HOLD SPECIMEN: NORMAL
HOLD SPECIMEN: NORMAL
IMM GRANULOCYTES # BLD AUTO: 0.04 10*3/MM3 (ref 0–0.05)
IMM GRANULOCYTES NFR BLD AUTO: 0.4 % (ref 0–0.5)
KETONES UR QL STRIP: ABNORMAL
LEUKOCYTE ESTERASE UR QL STRIP.AUTO: NEGATIVE
LIPASE SERPL-CCNC: 60 U/L (ref 13–60)
LYMPHOCYTES # BLD AUTO: 1.65 10*3/MM3 (ref 0.7–3.1)
LYMPHOCYTES NFR BLD AUTO: 15.2 % (ref 19.6–45.3)
MCH RBC QN AUTO: 25.6 PG (ref 26.6–33)
MCHC RBC AUTO-ENTMCNC: 30.7 G/DL (ref 31.5–35.7)
MCV RBC AUTO: 83.5 FL (ref 79–97)
MONOCYTES # BLD AUTO: 0.67 10*3/MM3 (ref 0.1–0.9)
MONOCYTES NFR BLD AUTO: 6.2 % (ref 5–12)
NEUTROPHILS NFR BLD AUTO: 77.5 % (ref 42.7–76)
NEUTROPHILS NFR BLD AUTO: 8.46 10*3/MM3 (ref 1.7–7)
NITRITE UR QL STRIP: NEGATIVE
NRBC BLD AUTO-RTO: 0 /100 WBC (ref 0–0.2)
PH UR STRIP.AUTO: 7 [PH] (ref 5–8)
PLATELET # BLD AUTO: 416 10*3/MM3 (ref 140–450)
PMV BLD AUTO: 9.2 FL (ref 6–12)
POTASSIUM SERPL-SCNC: 4.1 MMOL/L (ref 3.5–5.2)
PROT SERPL-MCNC: 7.6 G/DL (ref 6–8.5)
PROT UR QL STRIP: ABNORMAL
QT INTERVAL: 400 MS
RBC # BLD AUTO: 4.25 10*6/MM3 (ref 4.14–5.8)
SODIUM SERPL-SCNC: 137 MMOL/L (ref 136–145)
SP GR UR STRIP: 1.03 (ref 1–1.03)
TROPONIN T SERPL-MCNC: 0.01 NG/ML (ref 0–0.03)
UROBILINOGEN UR QL STRIP: ABNORMAL
WBC NRBC COR # BLD: 10.89 10*3/MM3 (ref 3.4–10.8)
WHOLE BLOOD HOLD COAG: NORMAL
WHOLE BLOOD HOLD SPECIMEN: NORMAL

## 2023-01-30 PROCEDURE — 83605 ASSAY OF LACTIC ACID: CPT | Performed by: EMERGENCY MEDICINE

## 2023-01-30 PROCEDURE — 83690 ASSAY OF LIPASE: CPT | Performed by: EMERGENCY MEDICINE

## 2023-01-30 PROCEDURE — 93005 ELECTROCARDIOGRAM TRACING: CPT | Performed by: EMERGENCY MEDICINE

## 2023-01-30 PROCEDURE — 96376 TX/PRO/DX INJ SAME DRUG ADON: CPT

## 2023-01-30 PROCEDURE — 96375 TX/PRO/DX INJ NEW DRUG ADDON: CPT

## 2023-01-30 PROCEDURE — 96374 THER/PROPH/DIAG INJ IV PUSH: CPT

## 2023-01-30 PROCEDURE — 80053 COMPREHEN METABOLIC PANEL: CPT | Performed by: EMERGENCY MEDICINE

## 2023-01-30 PROCEDURE — 85025 COMPLETE CBC W/AUTO DIFF WBC: CPT | Performed by: EMERGENCY MEDICINE

## 2023-01-30 PROCEDURE — 84484 ASSAY OF TROPONIN QUANT: CPT | Performed by: EMERGENCY MEDICINE

## 2023-01-30 PROCEDURE — 0 IOPAMIDOL PER 1 ML: Performed by: EMERGENCY MEDICINE

## 2023-01-30 PROCEDURE — 93010 ELECTROCARDIOGRAM REPORT: CPT | Performed by: INTERNAL MEDICINE

## 2023-01-30 PROCEDURE — 81003 URINALYSIS AUTO W/O SCOPE: CPT | Performed by: EMERGENCY MEDICINE

## 2023-01-30 PROCEDURE — 25010000002 ONDANSETRON PER 1 MG: Performed by: NURSE PRACTITIONER

## 2023-01-30 PROCEDURE — 99284 EMERGENCY DEPT VISIT MOD MDM: CPT

## 2023-01-30 PROCEDURE — 74177 CT ABD & PELVIS W/CONTRAST: CPT

## 2023-01-30 PROCEDURE — 25010000002 ONDANSETRON PER 1 MG: Performed by: EMERGENCY MEDICINE

## 2023-01-30 PROCEDURE — 93005 ELECTROCARDIOGRAM TRACING: CPT

## 2023-01-30 PROCEDURE — 25010000002 MORPHINE PER 10 MG: Performed by: EMERGENCY MEDICINE

## 2023-01-30 PROCEDURE — 36415 COLL VENOUS BLD VENIPUNCTURE: CPT

## 2023-01-30 RX ORDER — ONDANSETRON 2 MG/ML
4 INJECTION INTRAMUSCULAR; INTRAVENOUS ONCE
Status: COMPLETED | OUTPATIENT
Start: 2023-01-30 | End: 2023-01-30

## 2023-01-30 RX ORDER — FAMOTIDINE 10 MG/ML
20 INJECTION, SOLUTION INTRAVENOUS ONCE
Status: COMPLETED | OUTPATIENT
Start: 2023-01-30 | End: 2023-01-30

## 2023-01-30 RX ORDER — SODIUM CHLORIDE 0.9 % (FLUSH) 0.9 %
10 SYRINGE (ML) INJECTION AS NEEDED
Status: DISCONTINUED | OUTPATIENT
Start: 2023-01-30 | End: 2023-01-31 | Stop reason: HOSPADM

## 2023-01-30 RX ORDER — ONDANSETRON 4 MG/1
8 TABLET, ORALLY DISINTEGRATING ORAL EVERY 8 HOURS PRN
Qty: 15 TABLET | Refills: 0 | Status: SHIPPED | OUTPATIENT
Start: 2023-01-30 | End: 2023-02-04

## 2023-01-30 RX ORDER — DICYCLOMINE HCL 20 MG
20 TABLET ORAL EVERY 6 HOURS
Qty: 28 TABLET | Refills: 0 | Status: SHIPPED | OUTPATIENT
Start: 2023-01-30 | End: 2023-02-06

## 2023-01-30 RX ADMIN — FAMOTIDINE 20 MG: 10 INJECTION INTRAVENOUS at 18:39

## 2023-01-30 RX ADMIN — ONDANSETRON 4 MG: 2 INJECTION INTRAMUSCULAR; INTRAVENOUS at 17:33

## 2023-01-30 RX ADMIN — ONDANSETRON 4 MG: 2 INJECTION INTRAMUSCULAR; INTRAVENOUS at 15:13

## 2023-01-30 RX ADMIN — SODIUM CHLORIDE 1000 ML: 9 INJECTION, SOLUTION INTRAVENOUS at 15:13

## 2023-01-30 RX ADMIN — IOPAMIDOL 100 ML: 755 INJECTION, SOLUTION INTRAVENOUS at 17:11

## 2023-01-30 RX ADMIN — MORPHINE SULFATE 4 MG: 4 INJECTION, SOLUTION INTRAMUSCULAR; INTRAVENOUS at 17:33

## 2023-01-30 RX ADMIN — ONDANSETRON 4 MG: 2 INJECTION INTRAMUSCULAR; INTRAVENOUS at 18:39

## 2023-02-07 ENCOUNTER — LAB REQUISITION (OUTPATIENT)
Dept: LAB | Facility: HOSPITAL | Age: 63
End: 2023-02-07
Payer: OTHER GOVERNMENT

## 2023-02-07 DIAGNOSIS — Z79.899 OTHER LONG TERM (CURRENT) DRUG THERAPY: ICD-10-CM

## 2023-02-07 LAB
ALBUMIN SERPL-MCNC: 4.2 G/DL (ref 3.5–5.2)
ALBUMIN/GLOB SERPL: 1.4 G/DL
ALP SERPL-CCNC: 92 U/L (ref 39–117)
ALT SERPL W P-5'-P-CCNC: 15 U/L (ref 1–41)
ANION GAP SERPL CALCULATED.3IONS-SCNC: 14.8 MMOL/L (ref 5–15)
AST SERPL-CCNC: 17 U/L (ref 1–40)
BASOPHILS # BLD AUTO: 0.02 10*3/MM3 (ref 0–0.2)
BASOPHILS NFR BLD AUTO: 0.3 % (ref 0–1.5)
BILIRUB SERPL-MCNC: 0.2 MG/DL (ref 0–1.2)
BUN SERPL-MCNC: 7 MG/DL (ref 8–23)
BUN/CREAT SERPL: 10 (ref 7–25)
CALCIUM SPEC-SCNC: 9.3 MG/DL (ref 8.6–10.5)
CHLORIDE SERPL-SCNC: 104 MMOL/L (ref 98–107)
CO2 SERPL-SCNC: 21.2 MMOL/L (ref 22–29)
CREAT SERPL-MCNC: 0.7 MG/DL (ref 0.76–1.27)
CRP SERPL-MCNC: 0.34 MG/DL (ref 0–0.5)
DEPRECATED RDW RBC AUTO: 47.5 FL (ref 37–54)
EGFRCR SERPLBLD CKD-EPI 2021: 104.8 ML/MIN/1.73
EOSINOPHIL # BLD AUTO: 0.37 10*3/MM3 (ref 0–0.4)
EOSINOPHIL NFR BLD AUTO: 5 % (ref 0.3–6.2)
ERYTHROCYTE [DISTWIDTH] IN BLOOD BY AUTOMATED COUNT: 15.5 % (ref 12.3–15.4)
ERYTHROCYTE [SEDIMENTATION RATE] IN BLOOD: 20 MM/HR (ref 0–20)
GLOBULIN UR ELPH-MCNC: 3 GM/DL
GLUCOSE SERPL-MCNC: 134 MG/DL (ref 65–99)
HCT VFR BLD AUTO: 34.4 % (ref 37.5–51)
HGB BLD-MCNC: 10.5 G/DL (ref 13–17.7)
HOLD SPECIMEN: NORMAL
IMM GRANULOCYTES # BLD AUTO: 0.02 10*3/MM3 (ref 0–0.05)
IMM GRANULOCYTES NFR BLD AUTO: 0.3 % (ref 0–0.5)
LYMPHOCYTES # BLD AUTO: 1.36 10*3/MM3 (ref 0.7–3.1)
LYMPHOCYTES NFR BLD AUTO: 18.5 % (ref 19.6–45.3)
MCH RBC QN AUTO: 25.5 PG (ref 26.6–33)
MCHC RBC AUTO-ENTMCNC: 30.5 G/DL (ref 31.5–35.7)
MCV RBC AUTO: 83.7 FL (ref 79–97)
MONOCYTES # BLD AUTO: 0.58 10*3/MM3 (ref 0.1–0.9)
MONOCYTES NFR BLD AUTO: 7.9 % (ref 5–12)
NEUTROPHILS NFR BLD AUTO: 5.01 10*3/MM3 (ref 1.7–7)
NEUTROPHILS NFR BLD AUTO: 68 % (ref 42.7–76)
NRBC BLD AUTO-RTO: 0 /100 WBC (ref 0–0.2)
PLATELET # BLD AUTO: 299 10*3/MM3 (ref 140–450)
PMV BLD AUTO: 10.4 FL (ref 6–12)
POTASSIUM SERPL-SCNC: 3.7 MMOL/L (ref 3.5–5.2)
PROT SERPL-MCNC: 7.2 G/DL (ref 6–8.5)
RBC # BLD AUTO: 4.11 10*6/MM3 (ref 4.14–5.8)
SODIUM SERPL-SCNC: 140 MMOL/L (ref 136–145)
VANCOMYCIN TROUGH SERPL-MCNC: 14 MCG/ML (ref 5–20)
WBC NRBC COR # BLD: 7.36 10*3/MM3 (ref 3.4–10.8)

## 2023-02-07 PROCEDURE — 86140 C-REACTIVE PROTEIN: CPT | Performed by: INTERNAL MEDICINE

## 2023-02-07 PROCEDURE — 85025 COMPLETE CBC W/AUTO DIFF WBC: CPT | Performed by: INTERNAL MEDICINE

## 2023-02-07 PROCEDURE — 85652 RBC SED RATE AUTOMATED: CPT | Performed by: INTERNAL MEDICINE

## 2023-02-07 PROCEDURE — 80053 COMPREHEN METABOLIC PANEL: CPT | Performed by: INTERNAL MEDICINE

## 2023-02-07 PROCEDURE — 80202 ASSAY OF VANCOMYCIN: CPT | Performed by: INTERNAL MEDICINE

## 2023-02-13 ENCOUNTER — LAB REQUISITION (OUTPATIENT)
Dept: LAB | Facility: HOSPITAL | Age: 63
End: 2023-02-13
Payer: MEDICARE

## 2023-02-13 DIAGNOSIS — I10 ESSENTIAL (PRIMARY) HYPERTENSION: ICD-10-CM

## 2023-02-13 DIAGNOSIS — E11.9 TYPE 2 DIABETES MELLITUS WITHOUT COMPLICATIONS: ICD-10-CM

## 2023-02-13 DIAGNOSIS — Z79.899 OTHER LONG TERM (CURRENT) DRUG THERAPY: ICD-10-CM

## 2023-02-13 DIAGNOSIS — A49.02 METHICILLIN RESISTANT STAPHYLOCOCCUS AUREUS INFECTION, UNSPECIFIED SITE: ICD-10-CM

## 2023-02-13 LAB
ALBUMIN SERPL-MCNC: 4.2 G/DL (ref 3.5–5.2)
ALBUMIN/GLOB SERPL: 1.4 G/DL
ALP SERPL-CCNC: 77 U/L (ref 39–117)
ALT SERPL W P-5'-P-CCNC: 17 U/L (ref 1–41)
ANION GAP SERPL CALCULATED.3IONS-SCNC: 14.3 MMOL/L (ref 5–15)
AST SERPL-CCNC: 19 U/L (ref 1–40)
BASOPHILS # BLD AUTO: 0.02 10*3/MM3 (ref 0–0.2)
BASOPHILS NFR BLD AUTO: 0.3 % (ref 0–1.5)
BILIRUB SERPL-MCNC: 0.2 MG/DL (ref 0–1.2)
BUN SERPL-MCNC: 8 MG/DL (ref 8–23)
BUN/CREAT SERPL: 11.3 (ref 7–25)
CALCIUM SPEC-SCNC: 9.1 MG/DL (ref 8.6–10.5)
CHLORIDE SERPL-SCNC: 105 MMOL/L (ref 98–107)
CO2 SERPL-SCNC: 21.7 MMOL/L (ref 22–29)
CREAT SERPL-MCNC: 0.71 MG/DL (ref 0.76–1.27)
CRP SERPL-MCNC: 0.34 MG/DL (ref 0–0.5)
DEPRECATED RDW RBC AUTO: 46.2 FL (ref 37–54)
EGFRCR SERPLBLD CKD-EPI 2021: 104.4 ML/MIN/1.73
EOSINOPHIL # BLD AUTO: 0.4 10*3/MM3 (ref 0–0.4)
EOSINOPHIL NFR BLD AUTO: 5.1 % (ref 0.3–6.2)
ERYTHROCYTE [DISTWIDTH] IN BLOOD BY AUTOMATED COUNT: 15.5 % (ref 12.3–15.4)
ERYTHROCYTE [SEDIMENTATION RATE] IN BLOOD: 22 MM/HR (ref 0–20)
GLOBULIN UR ELPH-MCNC: 3 GM/DL
GLUCOSE SERPL-MCNC: 145 MG/DL (ref 65–99)
HCT VFR BLD AUTO: 34.9 % (ref 37.5–51)
HGB BLD-MCNC: 10.7 G/DL (ref 13–17.7)
IMM GRANULOCYTES # BLD AUTO: 0.01 10*3/MM3 (ref 0–0.05)
IMM GRANULOCYTES NFR BLD AUTO: 0.1 % (ref 0–0.5)
LYMPHOCYTES # BLD AUTO: 1.32 10*3/MM3 (ref 0.7–3.1)
LYMPHOCYTES NFR BLD AUTO: 17 % (ref 19.6–45.3)
MCH RBC QN AUTO: 25.1 PG (ref 26.6–33)
MCHC RBC AUTO-ENTMCNC: 30.7 G/DL (ref 31.5–35.7)
MCV RBC AUTO: 81.7 FL (ref 79–97)
MONOCYTES # BLD AUTO: 0.45 10*3/MM3 (ref 0.1–0.9)
MONOCYTES NFR BLD AUTO: 5.8 % (ref 5–12)
NEUTROPHILS NFR BLD AUTO: 5.57 10*3/MM3 (ref 1.7–7)
NEUTROPHILS NFR BLD AUTO: 71.7 % (ref 42.7–76)
NRBC BLD AUTO-RTO: 0 /100 WBC (ref 0–0.2)
PLATELET # BLD AUTO: 286 10*3/MM3 (ref 140–450)
PMV BLD AUTO: 10.6 FL (ref 6–12)
POTASSIUM SERPL-SCNC: 4 MMOL/L (ref 3.5–5.2)
PROT SERPL-MCNC: 7.2 G/DL (ref 6–8.5)
RBC # BLD AUTO: 4.27 10*6/MM3 (ref 4.14–5.8)
SODIUM SERPL-SCNC: 141 MMOL/L (ref 136–145)
VANCOMYCIN TROUGH SERPL-MCNC: 12.8 MCG/ML (ref 5–20)
WBC NRBC COR # BLD: 7.77 10*3/MM3 (ref 3.4–10.8)

## 2023-02-13 PROCEDURE — 85652 RBC SED RATE AUTOMATED: CPT | Performed by: INTERNAL MEDICINE

## 2023-02-13 PROCEDURE — 80053 COMPREHEN METABOLIC PANEL: CPT | Performed by: INTERNAL MEDICINE

## 2023-02-13 PROCEDURE — 80202 ASSAY OF VANCOMYCIN: CPT | Performed by: INTERNAL MEDICINE

## 2023-02-13 PROCEDURE — 85025 COMPLETE CBC W/AUTO DIFF WBC: CPT | Performed by: INTERNAL MEDICINE

## 2023-02-13 PROCEDURE — 86140 C-REACTIVE PROTEIN: CPT | Performed by: INTERNAL MEDICINE

## 2023-02-20 ENCOUNTER — LAB REQUISITION (OUTPATIENT)
Dept: LAB | Facility: HOSPITAL | Age: 63
End: 2023-02-20
Payer: OTHER GOVERNMENT

## 2023-02-20 DIAGNOSIS — Z79.2 LONG TERM (CURRENT) USE OF ANTIBIOTICS: ICD-10-CM

## 2023-02-20 DIAGNOSIS — E11.9 TYPE 2 DIABETES MELLITUS WITHOUT COMPLICATIONS: ICD-10-CM

## 2023-02-20 DIAGNOSIS — Z79.899 OTHER LONG TERM (CURRENT) DRUG THERAPY: ICD-10-CM

## 2023-02-20 DIAGNOSIS — I10 ESSENTIAL (PRIMARY) HYPERTENSION: ICD-10-CM

## 2023-02-20 DIAGNOSIS — B95.62 METHICILLIN RESISTANT STAPHYLOCOCCUS AUREUS INFECTION AS THE CAUSE OF DISEASES CLASSIFIED ELSEWHERE: ICD-10-CM

## 2023-02-20 LAB
ALBUMIN SERPL-MCNC: 4.6 G/DL (ref 3.5–5.2)
ALBUMIN/GLOB SERPL: 1.4 G/DL
ALP SERPL-CCNC: 79 U/L (ref 39–117)
ALT SERPL W P-5'-P-CCNC: 19 U/L (ref 1–41)
ANION GAP SERPL CALCULATED.3IONS-SCNC: 14.7 MMOL/L (ref 5–15)
AST SERPL-CCNC: 26 U/L (ref 1–40)
BASOPHILS # BLD AUTO: 0.02 10*3/MM3 (ref 0–0.2)
BASOPHILS NFR BLD AUTO: 0.2 % (ref 0–1.5)
BILIRUB SERPL-MCNC: 0.2 MG/DL (ref 0–1.2)
BUN SERPL-MCNC: 7 MG/DL (ref 8–23)
BUN/CREAT SERPL: 9.9 (ref 7–25)
CALCIUM SPEC-SCNC: 9.7 MG/DL (ref 8.6–10.5)
CHLORIDE SERPL-SCNC: 99 MMOL/L (ref 98–107)
CO2 SERPL-SCNC: 23.3 MMOL/L (ref 22–29)
CREAT SERPL-MCNC: 0.71 MG/DL (ref 0.76–1.27)
CRP SERPL-MCNC: <0.3 MG/DL (ref 0–0.5)
DEPRECATED RDW RBC AUTO: 43 FL (ref 37–54)
EGFRCR SERPLBLD CKD-EPI 2021: 104.4 ML/MIN/1.73
EOSINOPHIL # BLD AUTO: 0.33 10*3/MM3 (ref 0–0.4)
EOSINOPHIL NFR BLD AUTO: 3.5 % (ref 0.3–6.2)
ERYTHROCYTE [DISTWIDTH] IN BLOOD BY AUTOMATED COUNT: 14.6 % (ref 12.3–15.4)
ERYTHROCYTE [SEDIMENTATION RATE] IN BLOOD: 31 MM/HR (ref 0–20)
GLOBULIN UR ELPH-MCNC: 3.2 GM/DL
GLUCOSE SERPL-MCNC: 161 MG/DL (ref 65–99)
HCT VFR BLD AUTO: 37.9 % (ref 37.5–51)
HGB BLD-MCNC: 11.8 G/DL (ref 13–17.7)
IMM GRANULOCYTES # BLD AUTO: 0.02 10*3/MM3 (ref 0–0.05)
IMM GRANULOCYTES NFR BLD AUTO: 0.2 % (ref 0–0.5)
LYMPHOCYTES # BLD AUTO: 1.67 10*3/MM3 (ref 0.7–3.1)
LYMPHOCYTES NFR BLD AUTO: 17.5 % (ref 19.6–45.3)
MCH RBC QN AUTO: 24.9 PG (ref 26.6–33)
MCHC RBC AUTO-ENTMCNC: 31.1 G/DL (ref 31.5–35.7)
MCV RBC AUTO: 80 FL (ref 79–97)
MONOCYTES # BLD AUTO: 0.66 10*3/MM3 (ref 0.1–0.9)
MONOCYTES NFR BLD AUTO: 6.9 % (ref 5–12)
NEUTROPHILS NFR BLD AUTO: 6.85 10*3/MM3 (ref 1.7–7)
NEUTROPHILS NFR BLD AUTO: 71.7 % (ref 42.7–76)
NRBC BLD AUTO-RTO: 0 /100 WBC (ref 0–0.2)
PLATELET # BLD AUTO: 324 10*3/MM3 (ref 140–450)
PMV BLD AUTO: 10.5 FL (ref 6–12)
POTASSIUM SERPL-SCNC: 3.8 MMOL/L (ref 3.5–5.2)
PROT SERPL-MCNC: 7.8 G/DL (ref 6–8.5)
RBC # BLD AUTO: 4.74 10*6/MM3 (ref 4.14–5.8)
SODIUM SERPL-SCNC: 137 MMOL/L (ref 136–145)
VANCOMYCIN TROUGH SERPL-MCNC: 13.5 MCG/ML (ref 5–20)
WBC NRBC COR # BLD: 9.55 10*3/MM3 (ref 3.4–10.8)

## 2023-02-20 PROCEDURE — 85025 COMPLETE CBC W/AUTO DIFF WBC: CPT | Performed by: INTERNAL MEDICINE

## 2023-02-20 PROCEDURE — 85652 RBC SED RATE AUTOMATED: CPT | Performed by: INTERNAL MEDICINE

## 2023-02-20 PROCEDURE — 80202 ASSAY OF VANCOMYCIN: CPT | Performed by: INTERNAL MEDICINE

## 2023-02-20 PROCEDURE — 80053 COMPREHEN METABOLIC PANEL: CPT | Performed by: INTERNAL MEDICINE

## 2023-02-20 PROCEDURE — 86140 C-REACTIVE PROTEIN: CPT | Performed by: INTERNAL MEDICINE

## 2023-02-23 ENCOUNTER — OFFICE VISIT (OUTPATIENT)
Dept: INFECTIOUS DISEASES | Facility: CLINIC | Age: 63
End: 2023-02-23
Payer: OTHER GOVERNMENT

## 2023-02-23 VITALS
RESPIRATION RATE: 16 BRPM | DIASTOLIC BLOOD PRESSURE: 72 MMHG | HEART RATE: 99 BPM | TEMPERATURE: 98 F | SYSTOLIC BLOOD PRESSURE: 122 MMHG

## 2023-02-23 DIAGNOSIS — E11.69 TYPE 2 DIABETES MELLITUS WITH OTHER SPECIFIED COMPLICATION, WITH LONG-TERM CURRENT USE OF INSULIN: ICD-10-CM

## 2023-02-23 DIAGNOSIS — T84.59XD INFECTION OF PROSTHETIC HIP JOINT, SUBSEQUENT ENCOUNTER: ICD-10-CM

## 2023-02-23 DIAGNOSIS — Z86.73 HISTORY OF STROKE: ICD-10-CM

## 2023-02-23 DIAGNOSIS — E66.9 OBESITY (BMI 30-39.9): ICD-10-CM

## 2023-02-23 DIAGNOSIS — Z79.4 TYPE 2 DIABETES MELLITUS WITH OTHER SPECIFIED COMPLICATION, WITH LONG-TERM CURRENT USE OF INSULIN: ICD-10-CM

## 2023-02-23 DIAGNOSIS — Z96.649 INFECTION OF PROSTHETIC HIP JOINT, SUBSEQUENT ENCOUNTER: ICD-10-CM

## 2023-02-23 DIAGNOSIS — A49.9 GRAM POSITIVE BACTERIAL INFECTION: Primary | ICD-10-CM

## 2023-02-23 DIAGNOSIS — Z79.2 LONG TERM (CURRENT) USE OF ANTIBIOTICS: ICD-10-CM

## 2023-02-23 PROCEDURE — 99214 OFFICE O/P EST MOD 30 MIN: CPT | Performed by: INTERNAL MEDICINE

## 2023-02-23 NOTE — PROGRESS NOTES
ID CLINIC NOTE    CC: f/u R hip PJI due to GPC    His wife provides most of the history due to his history of stroke.     HPI: Mandeep Tracey is a 63 y.o. male here for f/u R hip PJI due to GPC. On 1/9/23, he underwent  I&D w/ removal of prosthesis and insertion of an antibiotic spacer. Culture was negative but the gram stain had GPCs. He was on vancomycin prior to his procedure.     He has now completed a 6-week course of IV vancomycin. His most recent CRP was < 0.3 mg/dL. He and his wife say the hip is doing well. Incision is healed. No heat or drainage. He sees Dr Beebe on Monday 2/27 to discuss timing of reinsertion. Tolerated vancomycin w/o rash or diarrhea. No issues w/ his PICC line.      Past Medical History:   Diagnosis Date   • Aphasia    • Cardiomyopathy (HCC)    • CPAP (continuous positive airway pressure) dependence    • Diabetes (HCC)    • Hemiparesis (HCC)     Right side    • Morbid obesity (HCC)    • Nonrheumatic mitral valve regurgitation    • BELKYS on CPAP    • Peptic ulcer disease    • Postoperative nausea and vomiting 3/13/2021   • Psoriasis    • Pyogenic arthritis of right hip (HCC)    • Seizures (HCC)    • Sleep apnea    • Stroke (HCC) 2004   • Ventricular ectopy     asymptomatic       Past Surgical History:   Procedure Laterality Date   • CHOLECYSTECTOMY     • EYE SURGERY     • HIP SPACER INSERTION WITH ANTIBIOTIC CEMENT Right 1/9/2023    Procedure: RT. BIPOLAR HIP REMOVAL AND PLACEMENT OF SPACER;  Surgeon: Faustina Beebe MD;  Location: [facility];  Service: Orthopedics;  Laterality: Right;   • INCISION AND DRAINAGE HIP Right 3/12/2021    Procedure: HIP ANTERIOR INCISION AND DRAINAGE WITH HANA TABLE;  Surgeon: Tao Andrea MD;  Location: [facility] OR;  Service: Orthopedics;  Laterality: Right;   • LUMBAR DISC SURGERY     • US GUIDED FINE NEEDLE ASPIRATION  3/10/2021       Social History:       Antibiotic allergies and intolerances:    Cipro     Medications:     Current  Outpatient Medications:   •  acetaminophen (TYLENOL) 325 MG tablet, Take 2 tablets by mouth Every 4 (Four) Hours As Needed for Mild Pain  or Headache., Disp: , Rfl:   •  aspirin 81 MG chewable tablet, Chew 81 mg Daily., Disp: , Rfl:   •  bisacodyl (DULCOLAX) 10 MG suppository, Insert 1 suppository into the rectum Daily As Needed for Constipation., Disp: , Rfl:   •  bumetanide (BUMEX) 2 MG tablet, Take 2 mg by mouth Daily., Disp: , Rfl:   •  clopidogrel (PLAVIX) 75 MG tablet, Take 75 mg by mouth Daily., Disp: , Rfl:   •  docusate sodium 100 MG capsule, Take 2 capsules by mouth 2 (Two) Times a Day., Disp: , Rfl:   •  gabapentin (NEURONTIN) 300 MG capsule, Take 2 capsules by mouth 3 (Three) Times a Day., Disp: , Rfl:   •  hydrOXYzine (ATARAX) 25 MG tablet, Take 25 mg by mouth 3 (Three) Times a Day As Needed for Itching., Disp: , Rfl:   •  insulin glargine (LANTUS, SEMGLEE) 100 UNIT/ML injection, Inject 15 Units under the skin into the appropriate area as directed Every Night., Disp: , Rfl: 12  •  insulin lispro (ADMELOG) 100 UNIT/ML injection, Inject 0-7 Units under the skin into the appropriate area as directed 3 (Three) Times a Day Before Meals., Disp: , Rfl: 12  •  isosorbide mononitrate (IMDUR) 30 MG 24 hr tablet, Take 30 mg by mouth Daily., Disp: , Rfl:   •  metFORMIN (GLUCOPHAGE) 1000 MG tablet, Take 1,000 mg by mouth 2 (Two) Times a Day With Meals., Disp: , Rfl:   •  metoprolol succinate XL (TOPROL-XL) 50 MG 24 hr tablet, Take 1 tablet by mouth Daily., Disp: , Rfl:   •  polyethylene glycol (polyethylene glycol) 17 g packet, Take 17 g by mouth 2 (Two) Times a Day., Disp: , Rfl:   •  rosuvastatin (CRESTOR) 20 MG tablet, Take 40 mg by mouth Daily., Disp: , Rfl:   •  sacubitril-valsartan (ENTRESTO) 24-26 MG tablet, Take 1 tablet by mouth 2 (Two) Times a Day., Disp: , Rfl:   •  sertraline (ZOLOFT) 100 MG tablet, Take 100 mg by mouth Daily., Disp: , Rfl:   •  trospium (SANCTURA) 20 MG tablet, Take 20 mg by mouth 2  (Two) Times a Day., Disp: , Rfl:   •  vancomycin 1750 mg/500 mL 0.9% NS IVPB ([facility]), Infuse 500 mL into a venous catheter Every 12 (Twelve) Hours for 75 doses. Indications: Bone and/or Joint Infection, Disp: 60844 mL, Rfl: 1      OBJECTIVE:  /72   Pulse 99   Temp 98 °F (36.7 °C)   Resp 16       General: awake, alert, NAD  Eyes: no scleral icterus  ENT: wearing mask  Cardiovascular: NR  Respiratory: normal work of breathing on RA  GI: Abdomen is soft  MSK: R hip incision is healed w/o any erythema  Vasc: RUE PICC w/o erythema    DIAGNOSTICS:  CBC, BMP, VTr, body fluid culture, and CRP reviewed today  Lab Results   Component Value Date    WBC 9.55 02/20/2023    HGB 11.8 (L) 02/20/2023    HCT 37.9 02/20/2023     02/20/2023     Lab Results   Component Value Date    GLUCOSE 161 (H) 02/20/2023    CALCIUM 9.7 02/20/2023     02/20/2023    K 3.8 02/20/2023    CO2 23.3 02/20/2023    CL 99 02/20/2023    BUN 7 (L) 02/20/2023    CREATININE 0.71 (L) 02/20/2023    EGFR 104.4 02/20/2023    BCR 9.9 02/20/2023    ANIONGAP 14.7 02/20/2023     Lab Results   Component Value Date    CRP <0.30 02/20/2023     Lab Results   Component Value Date    VANCOTROUGH 13.50 02/20/2023    VANCORANDOM 15.71 01/18/2023     Lab Results   Component Value Date    HGBA1C 7.80 (H) 01/08/2023     Microbiology:  1/9 OR R Hip Cx: GPC on gram stain; culture negative     ASSESSMENT/PLAN:  1. R hip PJI due to gram positive bacteria  2. Obesity BMI 36  3. Uncontrolled DM2 - A1c 7.8% - complicating above  4. History of stroke and hemiparesis  5. Long term use of antibiotics    He has experienced a good clinical and laboratory response to I&D w/ removal of prosthesis, insertion of a spacer, and 6 weeks of IV vancomycin. DC PICC line and IV vancomycin today. He will follow-up with Dr Beebe to determine timing of reinsertion. Please consult me when the patient is in the hospital for reinsertion. I would plan to place him on a 3-month course of  doxycycline following reinsertion.     Fax copy of this note to Dr Beebe.

## 2023-08-03 ENCOUNTER — PREP FOR SURGERY (OUTPATIENT)
Dept: OTHER | Facility: HOSPITAL | Age: 63
End: 2023-08-03
Payer: OTHER GOVERNMENT

## 2023-08-03 ENCOUNTER — OFFICE VISIT (OUTPATIENT)
Dept: SURGERY | Facility: CLINIC | Age: 63
End: 2023-08-03
Payer: OTHER GOVERNMENT

## 2023-08-03 VITALS — HEART RATE: 100 BPM | HEIGHT: 72 IN | WEIGHT: 275 LBS | BODY MASS INDEX: 37.25 KG/M2

## 2023-08-03 DIAGNOSIS — R11.2 NAUSEA AND VOMITING, UNSPECIFIED VOMITING TYPE: ICD-10-CM

## 2023-08-03 DIAGNOSIS — K21.9 GASTROESOPHAGEAL REFLUX DISEASE WITHOUT ESOPHAGITIS: Primary | ICD-10-CM

## 2023-08-03 DIAGNOSIS — K92.1 BLOOD IN STOOL: ICD-10-CM

## 2023-08-03 DIAGNOSIS — K21.00 GASTROESOPHAGEAL REFLUX DISEASE WITH ESOPHAGITIS WITHOUT HEMORRHAGE: Primary | ICD-10-CM

## 2023-08-03 PROCEDURE — 99203 OFFICE O/P NEW LOW 30 MIN: CPT | Performed by: NURSE PRACTITIONER

## 2023-08-03 RX ORDER — SODIUM CHLORIDE 0.9 % (FLUSH) 0.9 %
3 SYRINGE (ML) INJECTION EVERY 12 HOURS SCHEDULED
OUTPATIENT
Start: 2023-08-03

## 2023-08-03 RX ORDER — PEG-3350, SODIUM SULFATE, SODIUM CHLORIDE, POTASSIUM CHLORIDE, SODIUM ASCORBATE AND ASCORBIC ACID 7.5-2.691G
1000 KIT ORAL ONCE
Qty: 1000 ML | Refills: 0 | Status: SHIPPED | OUTPATIENT
Start: 2023-08-03 | End: 2023-08-03

## 2023-08-03 RX ORDER — SIMETHICONE 80 MG
TABLET,CHEWABLE ORAL
Qty: 4 TABLET | Refills: 0 | Status: SHIPPED | OUTPATIENT
Start: 2023-08-03

## 2023-08-03 RX ORDER — SODIUM CHLORIDE 9 MG/ML
40 INJECTION, SOLUTION INTRAVENOUS AS NEEDED
OUTPATIENT
Start: 2023-08-03

## 2023-08-03 RX ORDER — SODIUM CHLORIDE 0.9 % (FLUSH) 0.9 %
10 SYRINGE (ML) INJECTION AS NEEDED
OUTPATIENT
Start: 2023-08-03

## 2023-08-07 ENCOUNTER — TELEPHONE (OUTPATIENT)
Dept: SURGERY | Facility: CLINIC | Age: 63
End: 2023-08-07
Payer: OTHER GOVERNMENT

## 2023-08-07 NOTE — TELEPHONE ENCOUNTER
REAL, NURSE AT [facility], CALLED.  SHE SAID THEY RECEIVED A CARDIAC  CLEARANCE REQUEST.  SHE SAID CARDIOLOGY IS DEFERRING TO THE PRESCRIBER, WHO IS THE PCP, DR. MAYES.  HE IS AT [facility].    THE  IS [facility].  THE PHONE NUMBER THERE [phone] AND THE FAX [phone].    SHE SAID WE NEED TO SEND THE REQUEST TO THAT OFFICE.

## 2023-08-24 ENCOUNTER — TELEPHONE (OUTPATIENT)
Dept: SURGERY | Facility: CLINIC | Age: 63
End: 2023-08-24
Payer: OTHER GOVERNMENT

## 2023-08-24 NOTE — TELEPHONE ENCOUNTER
[facility] to check on [facility] clearance since I have not received a response back.  Spoke to Az and she is sending a message to  staff to reply.

## 2023-08-28 ENCOUNTER — TELEPHONE (OUTPATIENT)
Dept: SURGERY | Facility: CLINIC | Age: 63
End: 2023-08-28

## 2023-08-28 NOTE — TELEPHONE ENCOUNTER
Caller: TOMMY    Relationship: [facility]    Best call back number: [phone]    What form or medical record are you requesting: OFFICE NOTES    Who is requesting this form or medical record from you: [facility]    How would you like to receive the form or medical records (pick-up, mail, fax): FAX  If fax, what is the fax number: [phone]    Timeframe paperwork needed: ASAP    Additional notes: PLEASE FAX OFFICE NOTES FROM 8-3-23

## 2023-09-01 NOTE — TELEPHONE ENCOUNTER
Clearance is scanned into [employer].    Called pt to inform to hold his Eliquis 2 days prior to his Colon and EGD on 9/12 with .  Pt did not answer, lmom.

## 2023-09-12 ENCOUNTER — TELEPHONE (OUTPATIENT)
Dept: SURGERY | Facility: CLINIC | Age: 63
End: 2023-09-12
Payer: OTHER GOVERNMENT

## 2023-09-12 NOTE — TELEPHONE ENCOUNTER
Spoke with patient daughter Mandy and was able to r/s procedure for 10/17 with a 6:30 a.m arrival time per Dorita in scheduling. Reviewed bowel prep instructions, and instructions to stop eliquis 2 days prior to procedure. She v/u and had no other questions.

## 2023-09-12 NOTE — TELEPHONE ENCOUNTER
Pt was a no show for procedure today. When Jennifer called him he said that he had an issue with transportation and needs to be rescheduled.

## 2023-10-16 ENCOUNTER — ANESTHESIA EVENT (OUTPATIENT)
Dept: GASTROENTEROLOGY | Facility: HOSPITAL | Age: 63
End: 2023-10-16
Payer: OTHER GOVERNMENT

## 2023-10-16 NOTE — ANESTHESIA PREPROCEDURE EVALUATION
Anesthesia Evaluation     NPO Solid Status: > 8 hours  NPO Liquid Status: > 2 hours           Airway   Mallampati: III  Large neck circumference and Possible difficult intubation  Dental      Pulmonary - normal exam   (+) ,sleep apnea on CPAP  Cardiovascular - normal exam    (+) hypertension, valvular problems/murmurs MR, CAD, cardiac stents (3/22) more than 12 months ago , CHF Systolic <55%      Neuro/Psych  (+) seizures, CVA (Rt hemiplegia, mod aphasia)  GI/Hepatic/Renal/Endo    (+) obesity, morbid obesity, PUD, diabetes mellitus    Musculoskeletal     Abdominal    Substance History      OB/GYN          Other   arthritis,     ROS/Med Hx Other: Last dose plavix 10/15  Last dose eliquis 10/15    EK2023 :Sinus rhythm HR 83 bpm  Ventricular premature complex  When compared with ECG of 2023 8:00:07,  Significant axis, voltage or hypertrophy change  Electronically Signed By: Gato De La Cruz ([facility]) 2023 21:32:05  Date and Time of Study: 2023 14:49:09    Echo 2023: ·  Left ventricular systolic function is severely decreased. Left ventricular ejection fraction appears to be 26 - 30%.  ·  The left ventricular cavity is moderately dilated.  ·  The following left ventricular wall segments are akinetic: apical septal and apex.  ·  Left ventricular diastolic function was indeterminate.  ·  Severe mitral valve regurgitation is present.                    Phys Exam Other: Intubated  with CMAC, grade 1 view easy intubation              Anesthesia Plan    ASA 4     general   total IV anesthesia    Anesthetic plan, risks, benefits, and alternatives have been provided, discussed and informed consent has been obtained with: patient.  Pre-procedure education provided      CODE STATUS:

## 2023-10-17 ENCOUNTER — TELEPHONE (OUTPATIENT)
Dept: SURGERY | Facility: CLINIC | Age: 63
End: 2023-10-17
Payer: OTHER GOVERNMENT

## 2023-10-17 ENCOUNTER — ANESTHESIA (OUTPATIENT)
Dept: GASTROENTEROLOGY | Facility: HOSPITAL | Age: 63
End: 2023-10-17
Payer: OTHER GOVERNMENT

## 2023-10-17 ENCOUNTER — HOSPITAL ENCOUNTER (OUTPATIENT)
Facility: HOSPITAL | Age: 63
Setting detail: HOSPITAL OUTPATIENT SURGERY
Discharge: HOME | End: 2023-10-17
Attending: SURGERY | Admitting: SURGERY
Payer: OTHER GOVERNMENT

## 2023-10-17 VITALS
TEMPERATURE: 98.5 F | DIASTOLIC BLOOD PRESSURE: 57 MMHG | OXYGEN SATURATION: 96 % | BODY MASS INDEX: 38.87 KG/M2 | WEIGHT: 286.6 LBS | SYSTOLIC BLOOD PRESSURE: 118 MMHG | HEART RATE: 94 BPM | RESPIRATION RATE: 18 BRPM

## 2023-10-17 DIAGNOSIS — K21.00 GASTROESOPHAGEAL REFLUX DISEASE WITH ESOPHAGITIS WITHOUT HEMORRHAGE: ICD-10-CM

## 2023-10-17 DIAGNOSIS — R11.2 NAUSEA AND VOMITING, UNSPECIFIED VOMITING TYPE: ICD-10-CM

## 2023-10-17 DIAGNOSIS — K92.1 BLOOD IN STOOL: ICD-10-CM

## 2023-10-17 LAB — GLUCOSE BLDC GLUCOMTR-MCNC: 181 MG/DL (ref 70–99)

## 2023-10-17 PROCEDURE — 25810000003 LACTATED RINGERS PER 1000 ML: Performed by: ANESTHESIOLOGY

## 2023-10-17 PROCEDURE — 25010000002 PROPOFOL 10 MG/ML EMULSION: Performed by: NURSE ANESTHETIST, CERTIFIED REGISTERED

## 2023-10-17 PROCEDURE — 82948 REAGENT STRIP/BLOOD GLUCOSE: CPT

## 2023-10-17 PROCEDURE — 88305 TISSUE EXAM BY PATHOLOGIST: CPT | Performed by: SURGERY

## 2023-10-17 RX ORDER — PROPOFOL 10 MG/ML
VIAL (ML) INTRAVENOUS AS NEEDED
Status: DISCONTINUED | OUTPATIENT
Start: 2023-10-17 | End: 2023-10-17 | Stop reason: SURG

## 2023-10-17 RX ORDER — SODIUM CHLORIDE 9 MG/ML
40 INJECTION, SOLUTION INTRAVENOUS AS NEEDED
Status: DISCONTINUED | OUTPATIENT
Start: 2023-10-17 | End: 2023-10-17 | Stop reason: HOSPADM

## 2023-10-17 RX ORDER — SODIUM CHLORIDE 0.9 % (FLUSH) 0.9 %
3 SYRINGE (ML) INJECTION EVERY 12 HOURS SCHEDULED
Status: DISCONTINUED | OUTPATIENT
Start: 2023-10-17 | End: 2023-10-17 | Stop reason: HOSPADM

## 2023-10-17 RX ORDER — SODIUM CHLORIDE, SODIUM LACTATE, POTASSIUM CHLORIDE, CALCIUM CHLORIDE 600; 310; 30; 20 MG/100ML; MG/100ML; MG/100ML; MG/100ML
30 INJECTION, SOLUTION INTRAVENOUS CONTINUOUS
Status: DISCONTINUED | OUTPATIENT
Start: 2023-10-17 | End: 2023-10-17 | Stop reason: HOSPADM

## 2023-10-17 RX ORDER — SODIUM CHLORIDE 0.9 % (FLUSH) 0.9 %
10 SYRINGE (ML) INJECTION AS NEEDED
Status: DISCONTINUED | OUTPATIENT
Start: 2023-10-17 | End: 2023-10-17 | Stop reason: HOSPADM

## 2023-10-17 RX ORDER — LIDOCAINE HYDROCHLORIDE 20 MG/ML
INJECTION, SOLUTION EPIDURAL; INFILTRATION; INTRACAUDAL; PERINEURAL AS NEEDED
Status: DISCONTINUED | OUTPATIENT
Start: 2023-10-17 | End: 2023-10-17 | Stop reason: SURG

## 2023-10-17 RX ADMIN — LIDOCAINE HYDROCHLORIDE 100 MG: 20 INJECTION, SOLUTION EPIDURAL; INFILTRATION; INTRACAUDAL; PERINEURAL at 13:27

## 2023-10-17 RX ADMIN — PROPOFOL 200 MCG/KG/MIN: 10 INJECTION, EMULSION INTRAVENOUS at 13:22

## 2023-10-17 RX ADMIN — PROPOFOL 50 MG: 10 INJECTION, EMULSION INTRAVENOUS at 13:22

## 2023-10-17 RX ADMIN — PROPOFOL 50 MG: 10 INJECTION, EMULSION INTRAVENOUS at 13:27

## 2023-10-17 RX ADMIN — SODIUM CHLORIDE, POTASSIUM CHLORIDE, SODIUM LACTATE AND CALCIUM CHLORIDE: 600; 310; 30; 20 INJECTION, SOLUTION INTRAVENOUS at 13:14

## 2023-10-17 NOTE — TELEPHONE ENCOUNTER
Spoke to patients wife and gave her his arrival time of 1:00 pm for his sx today. Also verified that he did hold his Eliquis for 2 days prior.

## 2023-10-17 NOTE — ANESTHESIA POSTPROCEDURE EVALUATION
Patient: Mandeep Tracey    Procedure Summary       Date: 10/17/23 Room / Location: [facility] ENDOSCOPY 1 / [facility] ENDOSCOPY    Anesthesia Start: 1314 Anesthesia Stop: 1413    Procedures:       ESOPHAGOGASTRODUODENOSCOPY WITH BIOPSIES      COLONOSCOPY WITH POLYPECTOMIES Diagnosis:       Gastroesophageal reflux disease with esophagitis without hemorrhage      Nausea and vomiting, unspecified vomiting type      Blood in stool      (Gastroesophageal reflux disease with esophagitis without hemorrhage [K21.00])      (Nausea and vomiting, unspecified vomiting type [R11.2])      (Blood in stool [K92.1])    Surgeons: Angel Luis Mendoza MD Provider: Rebecca Barajas CRNA    Anesthesia Type: general ASA Status: 4            Anesthesia Type: general    Vitals  Vitals Value Taken Time   /57 10/17/23 1433   Temp 36.9 °C (98.5 °F) 10/17/23 1430   Pulse 85 10/17/23 1434   Resp 18 10/17/23 1433   SpO2 96 % 10/17/23 1434   Vitals shown include unfiled device data.        Post Anesthesia Care and Evaluation    Post-procedure mental status: acceptable.  Pain management: satisfactory to patient    Airway patency: patent  Anesthetic complications: No anesthetic complications    Cardiovascular status: acceptable  Respiratory status: acceptable    Comments: Per chart review

## 2023-10-17 NOTE — H&P
General Surgery/Colorectal Surgery Note    Patient Name:  Mandeep Tracey  YOB: 1960  [id number]    Referring Provider: Angel Luis Mendoza, *      Patient Care Team:  Pipe Servin MD as PCP - General (Internal Medicine)  Charlene Sotelo APRN as Nurse Practitioner (Nurse Practitioner)    Subjective .     History of present illness:    HPI   referral from Dr. Pipe Servin for an EGD and colonoscopy consultation.  Patient reports that he is having some coffee-ground emesis with nausea.  Patient's wife reports that he is having excessive belching.  He does admit to heartburn and indigestion despite taking OTC meds.  Patient denies any melena.  Denies any pain before or after eating.     Patient does report that he is having some rectal bleeding.  Patient is seeing this while wiping.  He denies any lower abdominal pain or change in bowel habit.  Denies any family history of colorectal cancer.  Patient reports he had a flex sig in June 2020 and was normal.     Patient does report that he takes Eliquis daily; he also has a history of congestive heart failure.     History:  Past Medical History:   Diagnosis Date    Aphasia     Cardiomyopathy     CPAP (continuous positive airway pressure) dependence     Diabetes     Hemiparesis     Right side     Morbid obesity     Nonrheumatic mitral valve regurgitation     BELKYS on CPAP     Peptic ulcer disease     Postoperative nausea and vomiting 3/13/2021    Psoriasis     Pyogenic arthritis of right hip     Seizures     Sleep apnea     Stroke 2004    Ventricular ectopy     asymptomatic       Past Surgical History:   Procedure Laterality Date    CHOLECYSTECTOMY      EYE SURGERY      HIP SPACER INSERTION WITH ANTIBIOTIC CEMENT Right 1/9/2023    Procedure: RT. BIPOLAR HIP REMOVAL AND PLACEMENT OF SPACER;  Surgeon: Faustina Beebe MD;  Location: [facility];  Service: Orthopedics;  Laterality: Right;    INCISION AND DRAINAGE HIP Right 3/12/2021    Procedure: HIP  ANTERIOR INCISION AND DRAINAGE WITH HANA TABLE;  Surgeon: Tao Andrea MD;  Location: [facility] MAIN OR;  Service: Orthopedics;  Laterality: Right;    LUMBAR DISC SURGERY      US GUIDED FINE NEEDLE ASPIRATION  3/10/2021       Family History   Problem Relation Age of Onset    Hypertension Mother     Hyperlipidemia Mother     Arthritis Mother     Cancer Mother     Heart disease Father     Hyperlipidemia Sister     Drug abuse Sister     COPD Sister     Birth defects Sister     Early death Brother     Drug abuse Brother        Social History     Tobacco Use    Smoking status: Former    Smokeless tobacco: Never    Tobacco comments:     quit 22 years ago    Vaping Use    Vaping Use: Never used   Substance Use Topics    Alcohol use: Never    Drug use: Never       Review of Systems: See HPI    Review of Systems            Medications Prior to Admission   Medication Sig Dispense Refill Last Dose    acetaminophen (TYLENOL) 325 MG tablet Take 2 tablets by mouth Every 4 (Four) Hours As Needed for Mild Pain  or Headache.       Apixaban (ELIQUIS PO) Take  by mouth.       aspirin 81 MG chewable tablet Chew 1 tablet Daily.       bisacodyl (DULCOLAX) 10 MG suppository Insert 1 suppository into the rectum Daily As Needed for Constipation.       bumetanide (BUMEX) 2 MG tablet Take 1 tablet by mouth Daily.       clopidogrel (PLAVIX) 75 MG tablet Take 1 tablet by mouth Daily. (Patient not taking: Reported on 8/3/2023)       docusate sodium 100 MG capsule Take 2 capsules by mouth 2 (Two) Times a Day. (Patient not taking: Reported on 8/3/2023)       gabapentin (NEURONTIN) 300 MG capsule Take 2 capsules by mouth 3 (Three) Times a Day.       hydrOXYzine (ATARAX) 25 MG tablet Take 1 tablet by mouth 3 (Three) Times a Day As Needed for Itching.       insulin glargine (LANTUS, SEMGLEE) 100 UNIT/ML injection Inject 15 Units under the skin into the appropriate area as directed Every Night. (Patient not taking: Reported on 8/3/2023)  12     insulin  lispro (ADMELOG) 100 UNIT/ML injection Inject 0-7 Units under the skin into the appropriate area as directed 3 (Three) Times a Day Before Meals.  12     isosorbide mononitrate (IMDUR) 30 MG 24 hr tablet Take 1 tablet by mouth Daily.       metFORMIN (GLUCOPHAGE) 1000 MG tablet Take 1 tablet by mouth 2 (Two) Times a Day With Meals.       metoprolol succinate XL (TOPROL-XL) 50 MG 24 hr tablet Take 1 tablet by mouth Daily.       polyethylene glycol (polyethylene glycol) 17 g packet Take 17 g by mouth 2 (Two) Times a Day. (Patient not taking: Reported on 8/3/2023)       rosuvastatin (CRESTOR) 20 MG tablet Take 2 tablets by mouth Daily.       sacubitril-valsartan (ENTRESTO) 24-26 MG tablet Take 1 tablet by mouth 2 (Two) Times a Day.       sertraline (ZOLOFT) 100 MG tablet Take 1 tablet by mouth Daily.       simethicone (Gas-X) 80 MG chewable tablet Take 2 tablets PO after completing movi prep and 2 tablets PO 4 hours prior to procedure. 4 tablet 0     trospium (SANCTURA) 20 MG tablet Take 1 tablet by mouth 2 (Two) Times a Day.            Home Meds:      Prior to Admission medications    Medication Sig Start Date End Date Taking? Authorizing Provider   acetaminophen (TYLENOL) 325 MG tablet Take 2 tablets by mouth Every 4 (Four) Hours As Needed for Mild Pain  or Headache. 3/31/21   Angel Luis Dejesus MD   Apixaban (ELIQUIS PO) Take  by mouth.    ProviderRachel MD   aspirin 81 MG chewable tablet Chew 1 tablet Daily.    ProviderRachel MD   bisacodyl (DULCOLAX) 10 MG suppository Insert 1 suppository into the rectum Daily As Needed for Constipation. 3/31/21   Angel Luis Dejesus MD   bumetanide (BUMEX) 2 MG tablet Take 1 tablet by mouth Daily.    ProviderRachel MD   clopidogrel (PLAVIX) 75 MG tablet Take 1 tablet by mouth Daily.  Patient not taking: Reported on 8/3/2023    Rachel Perales MD   docusate sodium 100 MG capsule Take 2 capsules by mouth 2 (Two) Times a Day.  Patient not taking: Reported on  8/3/2023 3/17/21   Colby Ford MD   gabapentin (NEURONTIN) 300 MG capsule Take 2 capsules by mouth 3 (Three) Times a Day. 1/16/23   Fazal Givens MD   hydrOXYzine (ATARAX) 25 MG tablet Take 1 tablet by mouth 3 (Three) Times a Day As Needed for Itching.    Rachel Perales MD   insulin glargine (LANTUS, SEMGLEE) 100 UNIT/ML injection Inject 15 Units under the skin into the appropriate area as directed Every Night.  Patient not taking: Reported on 8/3/2023 1/16/23   Fazal Givens MD   insulin lispro (ADMELOG) 100 UNIT/ML injection Inject 0-7 Units under the skin into the appropriate area as directed 3 (Three) Times a Day Before Meals. 1/16/23   Fazal Givens MD   isosorbide mononitrate (IMDUR) 30 MG 24 hr tablet Take 1 tablet by mouth Daily.    Rachel Perales MD   metFORMIN (GLUCOPHAGE) 1000 MG tablet Take 1 tablet by mouth 2 (Two) Times a Day With Meals.    Rachel Perales MD   metoprolol succinate XL (TOPROL-XL) 50 MG 24 hr tablet Take 1 tablet by mouth Daily. 1/16/23   Fazal Givens MD   polyethylene glycol (polyethylene glycol) 17 g packet Take 17 g by mouth 2 (Two) Times a Day.  Patient not taking: Reported on 8/3/2023 3/31/21   Angel Luis Dejesus MD   rosuvastatin (CRESTOR) 20 MG tablet Take 2 tablets by mouth Daily.    Rachel Perales MD   sacubitril-valsartan (ENTRESTO) 24-26 MG tablet Take 1 tablet by mouth 2 (Two) Times a Day.    Rachel Perales MD   sertraline (ZOLOFT) 100 MG tablet Take 1 tablet by mouth Daily.    Rachel Perales MD   simethicone (Gas-X) 80 MG chewable tablet Take 2 tablets PO after completing movi prep and 2 tablets PO 4 hours prior to procedure. 8/3/23   Charlene Sotelo APRN   trospium (SANCTURA) 20 MG tablet Take 1 tablet by mouth 2 (Two) Times a Day.    Rachel Perales MD            Allergies:  Fentanyl, Ciprofloxacin, Depakote [valproic acid], and Quinolones      Objective     Vital Signs   Temp:  [97.8 °F  (36.6 °C)] 97.8 °F (36.6 °C)  Heart Rate:  [98] 98  Resp:  [16] 16  BP: (137)/(99) 137/99    Physical Exam:     Physical Exam    NAD, A/O x 4, normal circulation, normal respiration      Result Review :     Assessment & Plan     Diagnoses and all orders for this visit:    1. Gastroesophageal reflux disease with esophagitis without hemorrhage  Overview:  Added automatically from request for surgery [id number]    Orders:  -     sodium chloride 0.9 % flush 3 mL  -     sodium chloride 0.9 % flush 10 mL  -     sodium chloride 0.9 % infusion 40 mL    2. Nausea and vomiting, unspecified vomiting type  -     sodium chloride 0.9 % flush 3 mL  -     sodium chloride 0.9 % flush 10 mL  -     sodium chloride 0.9 % infusion 40 mL    3. Blood in stool  Overview:  Added automatically from request for surgery [id number]    Orders:  -     sodium chloride 0.9 % flush 3 mL  -     sodium chloride 0.9 % flush 10 mL  -     sodium chloride 0.9 % infusion 40 mL    Other orders  -     Follow Anesthesia Guidelines / Protocol; Standing  -     Verify Bowel Prep Was Successful; Standing  -     Give Tap Water Enema If Bowel Prep Insufficient; Standing  -     Insert Peripheral IV; Standing  -     Saline Lock & Maintain IV Access; Standing  -     Verify NPO Status; Standing  -     Obtain Informed Consent; Standing  -     Follow Anesthesia Guidelines / Protocol  -     Verify Bowel Prep Was Successful  -     Give Tap Water Enema If Bowel Prep Insufficient  -     Insert Peripheral IV  -     Saline Lock & Maintain IV Access  -     Verify NPO Status  -     Obtain Informed Consent  -     POC Glucose Once; Standing  -     POC Glucose Once  -     POC Glucose Once; Standing  -     Insert Peripheral IV; Standing  -     lactated ringers infusion  -     POC Glucose Once  -     Insert Peripheral IV           Risks including bleeding, perforation, pain, infection, missed polyp(s). Benefits, and alternatives of EGD and Colonoscopy discussed with patient.  All questions  answered.  Consent verified.         Angel Luis Mendoza MD  10/17/23 13:07 EDT

## 2023-10-19 LAB
CYTO UR: NORMAL
LAB AP CASE REPORT: NORMAL
LAB AP CLINICAL INFORMATION: NORMAL
PATH REPORT.FINAL DX SPEC: NORMAL
PATH REPORT.GROSS SPEC: NORMAL

## 2023-10-20 RX ORDER — FLUCONAZOLE 100 MG/1
100 TABLET ORAL DAILY
Qty: 3 TABLET | Refills: 0 | Status: SHIPPED | OUTPATIENT
Start: 2023-10-20 | End: 2023-10-23

## 2023-10-20 NOTE — PROGRESS NOTES
Typically we use Nystatin swish and swallow along with the Diflucan for candida. Are you ok with that too?

## 2023-10-22 ENCOUNTER — HOSPITAL ENCOUNTER (EMERGENCY)
Facility: HOSPITAL | Age: 63
Discharge: HOME | End: 2023-10-22
Attending: EMERGENCY MEDICINE | Admitting: EMERGENCY MEDICINE
Payer: OTHER GOVERNMENT

## 2023-10-22 ENCOUNTER — APPOINTMENT (OUTPATIENT)
Dept: CT IMAGING | Facility: HOSPITAL | Age: 63
End: 2023-10-22
Payer: OTHER GOVERNMENT

## 2023-10-22 VITALS
DIASTOLIC BLOOD PRESSURE: 71 MMHG | WEIGHT: 284 LBS | HEART RATE: 110 BPM | HEIGHT: 72 IN | TEMPERATURE: 99.2 F | SYSTOLIC BLOOD PRESSURE: 125 MMHG | BODY MASS INDEX: 38.47 KG/M2 | RESPIRATION RATE: 18 BRPM | OXYGEN SATURATION: 95 %

## 2023-10-22 DIAGNOSIS — N39.0 ACUTE UTI: Primary | ICD-10-CM

## 2023-10-22 LAB
ALBUMIN SERPL-MCNC: 4.4 G/DL (ref 3.5–5.2)
ALBUMIN/GLOB SERPL: 1.3 G/DL
ALP SERPL-CCNC: 73 U/L (ref 39–117)
ALT SERPL W P-5'-P-CCNC: 19 U/L (ref 1–41)
ANION GAP SERPL CALCULATED.3IONS-SCNC: 13.4 MMOL/L (ref 5–15)
AST SERPL-CCNC: 12 U/L (ref 1–40)
BACTERIA UR QL AUTO: ABNORMAL /HPF
BASOPHILS # BLD AUTO: 0.05 10*3/MM3 (ref 0–0.2)
BASOPHILS NFR BLD AUTO: 0.4 % (ref 0–1.5)
BILIRUB SERPL-MCNC: 0.6 MG/DL (ref 0–1.2)
BILIRUB UR QL STRIP: NEGATIVE
BUN SERPL-MCNC: 9 MG/DL (ref 8–23)
BUN/CREAT SERPL: 9.9 (ref 7–25)
CALCIUM SPEC-SCNC: 9.6 MG/DL (ref 8.6–10.5)
CHLORIDE SERPL-SCNC: 101 MMOL/L (ref 98–107)
CLARITY UR: ABNORMAL
CO2 SERPL-SCNC: 20.6 MMOL/L (ref 22–29)
COLOR UR: YELLOW
CREAT SERPL-MCNC: 0.91 MG/DL (ref 0.76–1.27)
D-LACTATE SERPL-SCNC: 1.5 MMOL/L (ref 0.5–2)
DEPRECATED RDW RBC AUTO: 48.3 FL (ref 37–54)
EGFRCR SERPLBLD CKD-EPI 2021: 95.9 ML/MIN/1.73
EOSINOPHIL # BLD AUTO: 0.15 10*3/MM3 (ref 0–0.4)
EOSINOPHIL NFR BLD AUTO: 1.3 % (ref 0.3–6.2)
ERYTHROCYTE [DISTWIDTH] IN BLOOD BY AUTOMATED COUNT: 15.2 % (ref 12.3–15.4)
GLOBULIN UR ELPH-MCNC: 3.3 GM/DL
GLUCOSE SERPL-MCNC: 134 MG/DL (ref 65–99)
GLUCOSE UR STRIP-MCNC: NEGATIVE MG/DL
HCT VFR BLD AUTO: 41.4 % (ref 37.5–51)
HGB BLD-MCNC: 13.3 G/DL (ref 13–17.7)
HGB UR QL STRIP.AUTO: ABNORMAL
HYALINE CASTS UR QL AUTO: ABNORMAL /LPF
IMM GRANULOCYTES # BLD AUTO: 0.04 10*3/MM3 (ref 0–0.05)
IMM GRANULOCYTES NFR BLD AUTO: 0.3 % (ref 0–0.5)
KETONES UR QL STRIP: NEGATIVE
LEUKOCYTE ESTERASE UR QL STRIP.AUTO: ABNORMAL
LYMPHOCYTES # BLD AUTO: 0.99 10*3/MM3 (ref 0.7–3.1)
LYMPHOCYTES NFR BLD AUTO: 8.6 % (ref 19.6–45.3)
MCH RBC QN AUTO: 28 PG (ref 26.6–33)
MCHC RBC AUTO-ENTMCNC: 32.1 G/DL (ref 31.5–35.7)
MCV RBC AUTO: 87.2 FL (ref 79–97)
MONOCYTES # BLD AUTO: 0.67 10*3/MM3 (ref 0.1–0.9)
MONOCYTES NFR BLD AUTO: 5.8 % (ref 5–12)
NEUTROPHILS NFR BLD AUTO: 83.6 % (ref 42.7–76)
NEUTROPHILS NFR BLD AUTO: 9.58 10*3/MM3 (ref 1.7–7)
NITRITE UR QL STRIP: NEGATIVE
NRBC BLD AUTO-RTO: 0 /100 WBC (ref 0–0.2)
PH UR STRIP.AUTO: 8 [PH] (ref 5–8)
PLATELET # BLD AUTO: 192 10*3/MM3 (ref 140–450)
PMV BLD AUTO: 10.4 FL (ref 6–12)
POTASSIUM SERPL-SCNC: 4.6 MMOL/L (ref 3.5–5.2)
PROT SERPL-MCNC: 7.7 G/DL (ref 6–8.5)
PROT UR QL STRIP: ABNORMAL
RBC # BLD AUTO: 4.75 10*6/MM3 (ref 4.14–5.8)
RBC # UR STRIP: ABNORMAL /HPF
REF LAB TEST METHOD: ABNORMAL
SODIUM SERPL-SCNC: 135 MMOL/L (ref 136–145)
SP GR UR STRIP: 1.01 (ref 1–1.03)
SQUAMOUS #/AREA URNS HPF: ABNORMAL /HPF
UROBILINOGEN UR QL STRIP: ABNORMAL
WBC # UR STRIP: ABNORMAL /HPF
WBC NRBC COR # BLD: 11.48 10*3/MM3 (ref 3.4–10.8)

## 2023-10-22 PROCEDURE — 88312 SPECIAL STAINS GROUP 1: CPT | Performed by: EMERGENCY MEDICINE

## 2023-10-22 PROCEDURE — 25810000003 SODIUM CHLORIDE 0.9 % SOLUTION: Performed by: EMERGENCY MEDICINE

## 2023-10-22 PROCEDURE — 83605 ASSAY OF LACTIC ACID: CPT | Performed by: EMERGENCY MEDICINE

## 2023-10-22 PROCEDURE — 81001 URINALYSIS AUTO W/SCOPE: CPT

## 2023-10-22 PROCEDURE — 25010000002 ONDANSETRON PER 1 MG: Performed by: EMERGENCY MEDICINE

## 2023-10-22 PROCEDURE — 25510000001 IOPAMIDOL PER 1 ML: Performed by: EMERGENCY MEDICINE

## 2023-10-22 PROCEDURE — 25010000002 CEFTRIAXONE PER 250 MG: Performed by: EMERGENCY MEDICINE

## 2023-10-22 PROCEDURE — 87040 BLOOD CULTURE FOR BACTERIA: CPT | Performed by: EMERGENCY MEDICINE

## 2023-10-22 PROCEDURE — 85025 COMPLETE CBC W/AUTO DIFF WBC: CPT

## 2023-10-22 PROCEDURE — 96375 TX/PRO/DX INJ NEW DRUG ADDON: CPT

## 2023-10-22 PROCEDURE — 96365 THER/PROPH/DIAG IV INF INIT: CPT

## 2023-10-22 PROCEDURE — 99285 EMERGENCY DEPT VISIT HI MDM: CPT

## 2023-10-22 PROCEDURE — 74177 CT ABD & PELVIS W/CONTRAST: CPT

## 2023-10-22 PROCEDURE — 80053 COMPREHEN METABOLIC PANEL: CPT

## 2023-10-22 PROCEDURE — 36415 COLL VENOUS BLD VENIPUNCTURE: CPT

## 2023-10-22 RX ORDER — CEPHALEXIN 500 MG/1
500 CAPSULE ORAL 4 TIMES DAILY
Qty: 40 CAPSULE | Refills: 0 | Status: SHIPPED | OUTPATIENT
Start: 2023-10-22

## 2023-10-22 RX ORDER — CEFTRIAXONE SODIUM 1 G/50ML
1000 INJECTION, SOLUTION INTRAVENOUS ONCE
Status: COMPLETED | OUTPATIENT
Start: 2023-10-22 | End: 2023-10-22

## 2023-10-22 RX ORDER — ONDANSETRON 2 MG/ML
4 INJECTION INTRAMUSCULAR; INTRAVENOUS ONCE
Status: COMPLETED | OUTPATIENT
Start: 2023-10-22 | End: 2023-10-22

## 2023-10-22 RX ADMIN — SODIUM CHLORIDE 1000 ML: 9 INJECTION, SOLUTION INTRAVENOUS at 18:57

## 2023-10-22 RX ADMIN — IOPAMIDOL 100 ML: 755 INJECTION, SOLUTION INTRAVENOUS at 19:24

## 2023-10-22 RX ADMIN — CEFTRIAXONE SODIUM 1000 MG: 1 INJECTION, SOLUTION INTRAVENOUS at 18:58

## 2023-10-22 RX ADMIN — ONDANSETRON 4 MG: 2 INJECTION INTRAMUSCULAR; INTRAVENOUS at 18:58

## 2023-10-22 NOTE — ED PROVIDER NOTES
Time: 6:02 PM EDT  Date of encounter:  10/22/2023  Independent Historian/Clinical History and Information was obtained by:   Patient    History is limited by: N/A    Chief Complaint   Patient presents with    Dysuria    Vomiting         History of Present Illness:  Patient is a 63 y.o. year old male who presents to the emergency department for evaluation of incontinence, dysuria, increase in frequency, chills, and lower back pain X 2 days. Pt has history of kidney stones and infections. Last stone 5 yrs. (BAYRON Roberto, ESTRELLA)       Patient Care Team  Primary Care Provider: Pipe Servin MD    Past Medical History:     Allergies   Allergen Reactions    Fentanyl Mental Status Change    Ciprofloxacin Unknown - Low Severity    Depakote [Valproic Acid] Other (See Comments)     Behavioral changes    Quinolones Hives     Past Medical History:   Diagnosis Date    Aphasia     Cardiomyopathy     CPAP (continuous positive airway pressure) dependence     Diabetes     Hemiparesis     Right side     Morbid obesity     Nonrheumatic mitral valve regurgitation     BELKYS on CPAP     Peptic ulcer disease     Postoperative nausea and vomiting 3/13/2021    Psoriasis     Pyogenic arthritis of right hip     Seizures     Sleep apnea     Stroke 2004    Ventricular ectopy     asymptomatic     Past Surgical History:   Procedure Laterality Date    CHOLECYSTECTOMY      COLONOSCOPY N/A 10/17/2023    Procedure: COLONOSCOPY WITH POLYPECTOMIES;  Surgeon: Angel Luis Mendoza MD;  Location: [facility] ENDOSCOPY;  Service: General;  Laterality: N/A;  COLON POLYPS, DIVERTICULOSIS    ENDOSCOPY N/A 10/17/2023    Procedure: ESOPHAGOGASTRODUODENOSCOPY WITH BIOPSIES;  Surgeon: Angel Luis Mendoza MD;  Location: [facility] ENDOSCOPY;  Service: General;  Laterality: N/A;  GASTRIC POLYP    EYE SURGERY      HIP SPACER INSERTION WITH ANTIBIOTIC CEMENT Right 1/9/2023    Procedure: RT. BIPOLAR HIP REMOVAL AND PLACEMENT OF SPACER;  Surgeon: Faustina Beebe  MD MARCOS;  Location: [facility] MAIN OR;  Service: Orthopedics;  Laterality: Right;    INCISION AND DRAINAGE HIP Right 3/12/2021    Procedure: HIP ANTERIOR INCISION AND DRAINAGE WITH HANA TABLE;  Surgeon: Tao Andrea MD;  Location: [facility] MAIN OR;  Service: Orthopedics;  Laterality: Right;    LUMBAR DISC SURGERY      US GUIDED FINE NEEDLE ASPIRATION  3/10/2021     Family History   Problem Relation Age of Onset    Hypertension Mother     Hyperlipidemia Mother     Arthritis Mother     Cancer Mother     Heart disease Father     Hyperlipidemia Sister     Drug abuse Sister     COPD Sister     Birth defects Sister     Early death Brother     Drug abuse Brother        Home Medications:  Prior to Admission medications    Medication Sig Start Date End Date Taking? Authorizing Provider   acetaminophen (TYLENOL) 325 MG tablet Take 2 tablets by mouth Every 4 (Four) Hours As Needed for Mild Pain  or Headache. 3/31/21   Angel Luis Deejsus MD   Apixaban (ELIQUIS PO) Take  by mouth.    ProviderRachel MD   aspirin 81 MG chewable tablet Chew 1 tablet Daily.    ProviderRachel MD   bisacodyl (DULCOLAX) 10 MG suppository Insert 1 suppository into the rectum Daily As Needed for Constipation. 3/31/21   Angel Luis Dejesus MD   bumetanide (BUMEX) 2 MG tablet Take 1 tablet by mouth Daily.    ProviderRachel MD   clopidogrel (PLAVIX) 75 MG tablet Take 1 tablet by mouth Daily.  Patient not taking: Reported on 8/3/2023    Rachel Preales MD   docusate sodium 100 MG capsule Take 2 capsules by mouth 2 (Two) Times a Day.  Patient not taking: Reported on 8/3/2023 3/17/21   Colby Ford MD   fluconazole (Diflucan) 100 MG tablet Take 1 tablet by mouth Daily for 3 days. 10/20/23 10/23/23  Charlene Sotelo, APRN   gabapentin (NEURONTIN) 300 MG capsule Take 2 capsules by mouth 3 (Three) Times a Day. 1/16/23   Fazal Givens MD   hydrOXYzine (ATARAX) 25 MG tablet Take 1 tablet by mouth 3 (Three) Times a Day As Needed for  Itching.    Rachel Perales MD   insulin glargine (LANTUS, SEMGLEE) 100 UNIT/ML injection Inject 15 Units under the skin into the appropriate area as directed Every Night.  Patient not taking: Reported on 8/3/2023 1/16/23   Fazal Givens MD   insulin lispro (ADMELOG) 100 UNIT/ML injection Inject 0-7 Units under the skin into the appropriate area as directed 3 (Three) Times a Day Before Meals. 1/16/23   Fazal Givens MD   isosorbide mononitrate (IMDUR) 30 MG 24 hr tablet Take 1 tablet by mouth Daily.    Rachel Perales MD   metFORMIN (GLUCOPHAGE) 1000 MG tablet Take 1 tablet by mouth 2 (Two) Times a Day With Meals.    Rachel Perales MD   metoprolol succinate XL (TOPROL-XL) 50 MG 24 hr tablet Take 1 tablet by mouth Daily. 1/16/23   Fazal Givens MD   nystatin (MYCOSTATIN) 100,000 unit/mL suspension Swish and swallow 5 mL 3 (Three) Times a Day. 10/20/23   Charlene Sotelo APRN   polyethylene glycol (polyethylene glycol) 17 g packet Take 17 g by mouth 2 (Two) Times a Day.  Patient not taking: Reported on 8/3/2023 3/31/21   Angel Luis Dejesus MD   rosuvastatin (CRESTOR) 20 MG tablet Take 2 tablets by mouth Daily.    ProviderRachel MD   sacubitril-valsartan (ENTRESTO) 24-26 MG tablet Take 1 tablet by mouth 2 (Two) Times a Day.    Rachel Perales MD   sertraline (ZOLOFT) 100 MG tablet Take 1 tablet by mouth Daily.    Rachel Perales MD   simethicone (Gas-X) 80 MG chewable tablet Take 2 tablets PO after completing movi prep and 2 tablets PO 4 hours prior to procedure. 8/3/23   Charlene Sotelo APRN   trospium (SANCTURA) 20 MG tablet Take 1 tablet by mouth 2 (Two) Times a Day.    Rachel Perales MD        Social History:   Social History     Tobacco Use    Smoking status: Former    Smokeless tobacco: Never    Tobacco comments:     quit 22 years ago    Vaping Use    Vaping Use: Never used   Substance Use Topics    Alcohol use: Never    Drug use: Never         Review  "of Systems:  Review of Systems   Constitutional:  Negative for chills and fever.   HENT:  Negative for congestion, rhinorrhea and sore throat.    Eyes:  Negative for pain and visual disturbance.   Respiratory:  Negative for apnea, cough, chest tightness and shortness of breath.    Cardiovascular:  Negative for chest pain and palpitations.   Gastrointestinal:  Negative for abdominal pain, diarrhea, nausea and vomiting.   Genitourinary:  Negative for difficulty urinating and dysuria.   Musculoskeletal:  Negative for joint swelling and myalgias.   Skin:  Negative for color change.   Neurological:  Negative for seizures and headaches.   Psychiatric/Behavioral: Negative.     All other systems reviewed and are negative.       Physical Exam:  /71 (BP Location: Left arm, Patient Position: Sitting)   Pulse 110   Temp 99.2 °F (37.3 °C) (Oral)   Resp 18   Ht 182.9 cm (72\")   Wt 129 kg (284 lb)   SpO2 95%   BMI 38.52 kg/m²         Physical Exam  Vitals and nursing note reviewed.   Constitutional:       General: He is not in acute distress.     Appearance: Normal appearance. He is not toxic-appearing.   HENT:      Head: Normocephalic and atraumatic.      Jaw: There is normal jaw occlusion.   Eyes:      General: Lids are normal.      Extraocular Movements: Extraocular movements intact.      Conjunctiva/sclera: Conjunctivae normal.      Pupils: Pupils are equal, round, and reactive to light.   Cardiovascular:      Rate and Rhythm: Normal rate and regular rhythm.      Pulses: Normal pulses.      Heart sounds: Normal heart sounds.   Pulmonary:      Effort: Pulmonary effort is normal. No respiratory distress.      Breath sounds: Normal breath sounds. No wheezing or rhonchi.   Abdominal:      General: Abdomen is flat.      Palpations: Abdomen is soft.      Tenderness: There is no abdominal tenderness. There is no guarding or rebound.   Musculoskeletal:         General: Normal range of motion.      Cervical back: Normal " range of motion and neck supple.      Right lower leg: No edema.      Left lower leg: No edema.   Skin:     General: Skin is warm and dry.   Neurological:      Mental Status: He is alert and oriented to person, place, and time. Mental status is at baseline.   Psychiatric:         Mood and Affect: Mood normal.                      Procedures:  Procedures      Medical Decision Making:      Comorbidities that affect care:    Diabetes, Hypertension    External Notes reviewed:    Previous Clinic Note: Patient was seen by Dr. Mendoza for referral for EGD and colonoscopy.      The following orders were placed and all results were independently analyzed by me:  Orders Placed This Encounter   Procedures    Blood Culture - Blood,    Blood Culture - Blood,    CT Abdomen Pelvis With Contrast    Comprehensive Metabolic Panel    Urinalysis With Microscopic If Indicated (No Culture) - Urine, Clean Catch    CBC Auto Differential    Urinalysis, Microscopic Only - Urine, Clean Catch    Lactic Acid, Plasma    CBC & Differential       Medications Given in the Emergency Department:  Medications   sodium chloride 0.9 % bolus 1,000 mL (0 mL Intravenous Stopped 10/22/23 2126)   ondansetron (ZOFRAN) injection 4 mg (4 mg Intravenous Given 10/22/23 1858)   cefTRIAXone (ROCEPHIN) IVPB 1,000 mg (0 mg Intravenous Stopped 10/22/23 2126)   iopamidol (ISOVUE-370) 76 % injection 100 mL (100 mL Intravenous Given 10/22/23 1924)        ED Course:    The patient was initially evaluated in the triage area where orders were placed. The patient was later dispositioned by Sherrell Delaney MD.      The patient was advised to stay for completion of workup which includes but is not limited to communication of labs and radiological results, reassessment and plan. The patient was advised that leaving prior to disposition by a provider could result in critical findings that are not communicated to the patient.     ED Course as of 10/24/23 0200   Sun Oct 22, 2023    1808   --- PROVIDER IN TRIAGE NOTE ---    Patient was seen and evaluated in triage by me ESTRELLA Mendoza.  Orders were written and the patient is currently awaiting disposition.   [MS]      ED Course User Index  [MS] Lorene Roberto ESTRELLA Kim       Labs:    Lab Results (last 24 hours)       ** No results found for the last 24 hours. **             Imaging:    No Radiology Exams Resulted Within Past 24 Hours      Differential Diagnosis and Discussion:      Abdominal Pain: Based on the patient's signs and symptoms, I considered abdominal aortic aneurysm, small bowel obstruction, pancreatitis, acute cholecystitis, acute appendecitis, peptic ulcer disease, gastritis, colitis, endocrine disorders, irritable bowel syndrome and other differential diagnosis an etiology of the patient's abdominal pain.    All labs were reviewed and interpreted by me.  CT scan radiology impression was interpreted by me.    MDM     Amount and/or Complexity of Data Reviewed  Clinical lab tests: reviewed  Tests in the radiology section of CPT®: reviewed       Based on the results of the patient´s urinalysis and urinary complaints, signs, symptoms, and diagnostic testing is consistent with a urinary tract infection. Patient is resting comfortably, is alert, and is in no distress.  The patient´s CBC that was reviewed and interpreted by me shows no abnormalities of critical concern. Of note, there is no anemia requiring a blood transfusion and the platelet count is acceptable.  The patient´s CMP that was reviewed and interpretted by me shows no abnormalities of critical concern. Of note, the patient´s sodium and potassium are acceptable. The patient´s liver enzymes are unremarkable. The patient´s renal function (creatinine) is preserved. The patient has a normal anion gap.  Urinalysis shows 3+ bacteriuria.  CT scan abdomen pelvis is consistent with infectious cystitis.  The repeat examination is unremarkable and benign. The patient has no  signs of urosepsis. The patient was started on antibiotics in the emergency department and will be discharged with antibiotics as an outpatient. The patient was counseled to return to the ER for fever >100.5, intractable pain or vomiting, or any other concerns that the may have. The patient has expressed a clear and thorough understanding and agreed to follow up as instructed.         Patient Care Considerations:    None      Consultants/Shared Management Plan:    None    Social Determinants of Health:    Patient is independent, reliable, and has access to care.       Disposition and Care Coordination:    Discharged: I considered escalation of care by admitting this patient for observation, however the patient has improved and is suitable and  stable for discharge.    I have explained the patient´s condition, diagnoses and treatment plan based on the information available to me at this time. I have answered questions and addressed any concerns. The patient has a good  understanding of the patient´s diagnosis, condition, and treatment plan as can be expected at this point. The vital signs have been stable. The patient´s condition is stable and appropriate for discharge from the emergency department.      The patient will pursue further outpatient evaluation with the primary care physician or other designated or consulting physician as outlined in the discharge instructions. They are agreeable to this plan of care and follow-up instructions have been explained in detail. The patient has received these instructions in written format and have expressed an understanding of the discharge instructions. The patient is aware that any significant change in condition or worsening of symptoms should prompt an immediate return to this or the closest emergency department or call to 911.  I have explained discharge medications and the need for follow up with the patient/caretakers. This was also printed in the discharge  instructions. Patient was discharged with the following medications and follow up:      Medication List        New Prescriptions      cephalexin 500 MG capsule  Commonly known as: KEFLEX  Take 1 capsule by mouth 4 (Four) Times a Day.            ASK your doctor about these medications      fluconazole 100 MG tablet  Commonly known as: Diflucan  Take 1 tablet by mouth Daily for 3 days.  Ask about: Should I take this medication?               Where to Get Your Medications        These medications were sent to [facility]/pharmacy #[id number] - [place], [place] - [address] - [phone] [facility] [phone]   [address] [place] [address]      Hours: 24-hours Phone: [phone]   cephalexin 500 MG capsule      Pipe Servin [place]  [id number] [address] [address] [phone]    In 2 days         Final diagnoses:   Acute UTI        ED Disposition       ED Disposition   Discharge    Condition   Stable    Comment   --               This medical record created using voice recognition software.             Sherrell Delaney MD  10/24/23 2348

## 2023-10-22 NOTE — ED NOTES
Patient wife at bedside reports he has been experiencing burning with urination x 2 days. Reportedly patient has been running a fever and having chills. Wife reports he has been incontinent x 2 days and began vomiting this morning.   Wife reports patient had a fall x 3 weeks out of his wheelchair.   Patient is in a wheelchair r/t stroke x 20 yrs ago and has aphasia and right sided weakness.

## 2023-10-24 LAB
BACTERIA SPEC AEROBE CULT: ABNORMAL
GRAM STN SPEC: ABNORMAL
ISOLATED FROM: ABNORMAL

## 2023-10-27 LAB — BACTERIA SPEC AEROBE CULT: NORMAL

## 2023-11-02 ENCOUNTER — OFFICE VISIT (OUTPATIENT)
Dept: SURGERY | Facility: CLINIC | Age: 63
End: 2023-11-02
Payer: OTHER GOVERNMENT

## 2023-11-02 VITALS
DIASTOLIC BLOOD PRESSURE: 79 MMHG | WEIGHT: 284 LBS | BODY MASS INDEX: 38.47 KG/M2 | SYSTOLIC BLOOD PRESSURE: 169 MMHG | HEIGHT: 72 IN | HEART RATE: 104 BPM

## 2023-11-02 DIAGNOSIS — Z98.890 S/P COLONOSCOPY WITH POLYPECTOMY: ICD-10-CM

## 2023-11-02 DIAGNOSIS — D36.9 TUBULAR ADENOMA: ICD-10-CM

## 2023-11-02 DIAGNOSIS — K29.30 CHRONIC SUPERFICIAL GASTRITIS WITHOUT BLEEDING: ICD-10-CM

## 2023-11-02 DIAGNOSIS — Z98.890 S/P ENDOSCOPY: Primary | ICD-10-CM

## 2023-11-02 DIAGNOSIS — B37.81 CANDIDA ESOPHAGITIS: ICD-10-CM

## 2023-11-02 PROCEDURE — 99024 POSTOP FOLLOW-UP VISIT: CPT | Performed by: NURSE PRACTITIONER

## 2023-11-03 NOTE — PROGRESS NOTES
Chief Complaint: Post-op Follow-up    Subjective     Follow-up visit       History of Present Illness  Mandeep Tracey is a 63 y.o. male presents to [facility] GENERAL SURGERY for follow-up visit.    Patient presents today for follow-up visit after undergoing EGD and colonoscopy on 10/17/2023 performed Dr. Angel Luis Mendoza.  Patient was with Candida esophagitis; chronic inactive gastritis per EGD/pathology.  Biopsy was negative for intestinal metaplasia dysplasia or malignancy.    Patient was with diverticulosis of the sigmoid; 3 tubular adenomas of the transverse colon and a tubular adenoma of the ileocecal valve per colonoscopy/pathology.    Clinical Information    Gastroesophageal reflux disease with esophagitis without hemorrhage  Nausea and vomiting, unspecified vomiting type  Blood in stool   Final Diagnosis   1. Stomach, antrum, biopsy:               - Gastric antrum mucosa with mild chronic inactive gastritis               - Negative for Helicobacter pylori on routine H&E stain               - Negative for intestinal metaplasia, dysplasia and malignancy        2.  Stomach polyp, biopsy:               -Superficial fragments of gastric mucosa with no significant pathologic change               -Negative for intestinal metaplasia, dysplasia and malignancy        3. Distal esophagus, biopsy:               - Candida esophagitis        4.  Transverse colon polyps, biopsy:               -Fragments of tubular adenoma        5. Ileocecal valve polyp, biopsy:               - Tubular adenoma         Electronically signed by Nicolás Arnett MD on 10/19/2023     Patient was prescribed Diflucan and nystatin swish and swallow.  Reports he finished all medications and is feeling better.  EGD and colonoscopy were performed without difficulty.  The patient tolerated the procedure well.    Denies any postoperative complications.      Objective     Past Medical History:   Diagnosis Date    Aphasia      Cardiomyopathy     CPAP (continuous positive airway pressure) dependence     Diabetes     Hemiparesis     Right side     Morbid obesity     Nonrheumatic mitral valve regurgitation     BELKYS on CPAP     Peptic ulcer disease     Postoperative nausea and vomiting 3/13/2021    Psoriasis     Pyogenic arthritis of right hip     Seizures     Sleep apnea     Stroke 2004    Ventricular ectopy     asymptomatic       Past Surgical History:   Procedure Laterality Date    CHOLECYSTECTOMY      COLONOSCOPY N/A 10/17/2023    Procedure: COLONOSCOPY WITH POLYPECTOMIES;  Surgeon: Angel Luis Mendoza MD;  Location: [facility] ENDOSCOPY;  Service: General;  Laterality: N/A;  COLON POLYPS, DIVERTICULOSIS    ENDOSCOPY N/A 10/17/2023    Procedure: ESOPHAGOGASTRODUODENOSCOPY WITH BIOPSIES;  Surgeon: Angel Luis Mendoza MD;  Location: [facility] ENDOSCOPY;  Service: General;  Laterality: N/A;  GASTRIC POLYP    EYE SURGERY      HIP SPACER INSERTION WITH ANTIBIOTIC CEMENT Right 1/9/2023    Procedure: RT. BIPOLAR HIP REMOVAL AND PLACEMENT OF SPACER;  Surgeon: Faustina Beebe MD;  Location: [facility] OR;  Service: Orthopedics;  Laterality: Right;    INCISION AND DRAINAGE HIP Right 3/12/2021    Procedure: HIP ANTERIOR INCISION AND DRAINAGE WITH HANA TABLE;  Surgeon: Tao Andrea MD;  Location: [facility] OR;  Service: Orthopedics;  Laterality: Right;    LUMBAR DISC SURGERY      US GUIDED FINE NEEDLE ASPIRATION  3/10/2021       Outpatient Medications Marked as Taking for the 11/2/23 encounter (Office Visit) with Charlene Sotelo APRN   Medication Sig Dispense Refill    acetaminophen (TYLENOL) 325 MG tablet Take 2 tablets by mouth Every 4 (Four) Hours As Needed for Mild Pain  or Headache.      Apixaban (ELIQUIS PO) Take  by mouth.      aspirin 81 MG chewable tablet Chew 1 tablet Daily.      bisacodyl (DULCOLAX) 10 MG suppository Insert 1 suppository into the rectum Daily As Needed for Constipation.      bumetanide (BUMEX) 2 MG tablet Take 1  tablet by mouth Daily.      cephalexin (KEFLEX) 500 MG capsule Take 1 capsule by mouth 4 (Four) Times a Day. 40 capsule 0    docusate sodium 100 MG capsule Take 2 capsules by mouth 2 (Two) Times a Day.      gabapentin (NEURONTIN) 300 MG capsule Take 2 capsules by mouth 3 (Three) Times a Day.      hydrOXYzine (ATARAX) 25 MG tablet Take 1 tablet by mouth 3 (Three) Times a Day As Needed for Itching.      insulin glargine (LANTUS, SEMGLEE) 100 UNIT/ML injection Inject 15 Units under the skin into the appropriate area as directed Every Night.  12    insulin lispro (ADMELOG) 100 UNIT/ML injection Inject 0-7 Units under the skin into the appropriate area as directed 3 (Three) Times a Day Before Meals.  12    isosorbide mononitrate (IMDUR) 30 MG 24 hr tablet Take 1 tablet by mouth Daily.      metFORMIN (GLUCOPHAGE) 1000 MG tablet Take 1 tablet by mouth 2 (Two) Times a Day With Meals.      metoprolol succinate XL (TOPROL-XL) 50 MG 24 hr tablet Take 1 tablet by mouth Daily.      rosuvastatin (CRESTOR) 20 MG tablet Take 2 tablets by mouth Daily.      sacubitril-valsartan (ENTRESTO) 24-26 MG tablet Take 1 tablet by mouth 2 (Two) Times a Day.      sertraline (ZOLOFT) 100 MG tablet Take 1 tablet by mouth Daily.      simethicone (Gas-X) 80 MG chewable tablet Take 2 tablets PO after completing movi prep and 2 tablets PO 4 hours prior to procedure. 4 tablet 0    trospium (SANCTURA) 20 MG tablet Take 1 tablet by mouth 2 (Two) Times a Day.         Allergies   Allergen Reactions    Fentanyl Mental Status Change    Ciprofloxacin Unknown - Low Severity    Depakote [Valproic Acid] Other (See Comments)     Behavioral changes    Quinolones Hives        Family History   Problem Relation Age of Onset    Hypertension Mother     Hyperlipidemia Mother     Arthritis Mother     Cancer Mother     Heart disease Father     Hyperlipidemia Sister     Drug abuse Sister     COPD Sister     Birth defects Sister     Early death Brother     Drug abuse  "Brother        Social History     Socioeconomic History    Marital status:    Tobacco Use    Smoking status: Former    Smokeless tobacco: Never    Tobacco comments:     quit 22 years ago    Vaping Use    Vaping Use: Never used   Substance and Sexual Activity    Alcohol use: Never    Drug use: Never    Sexual activity: Defer       Review of Systems   Constitutional:  Negative for chills and fever.   HENT:  Negative for trouble swallowing.    Gastrointestinal:  Negative for abdominal distention, abdominal pain, anal bleeding, blood in stool, constipation, diarrhea, nausea, rectal pain, vomiting, GERD and indigestion.        Vital Signs:   /79 (BP Location: Left arm)   Pulse 104   Ht 182.9 cm (72\")   Wt 129 kg (284 lb)   BMI 38.52 kg/m²      Physical Exam  Vitals and nursing note reviewed.   Constitutional:       General: He is not in acute distress.     Appearance: Normal appearance.   HENT:      Head: Normocephalic.   Cardiovascular:      Rate and Rhythm: Normal rate.   Pulmonary:      Effort: Pulmonary effort is normal.      Breath sounds: No stridor.   Abdominal:      Palpations: Abdomen is soft.      Tenderness: There is no guarding.   Musculoskeletal:         General: No deformity. Normal range of motion.   Skin:     General: Skin is warm and dry.      Coloration: Skin is not jaundiced.   Neurological:      General: No focal deficit present.      Mental Status: He is alert and oriented to person, place, and time.   Psychiatric:         Mood and Affect: Mood normal.         Thought Content: Thought content normal.          Result Review :          []  Laboratory  []  Radiology  []  Pathology  []  Microbiology  []  EKG/Telemetry   []  Cardiology/Vascular   []  Old records  Today I reviewed Dr. Mendoza's operative and pathology report.     Assessment and Plan    Diagnoses and all orders for this visit:    1. S/P endoscopy (Primary)    2. Candida esophagitis    3. Chronic superficial gastritis without " bleeding    4. S/P colonoscopy with polypectomy    5. Tubular adenoma        Follow Up   Return for Rescreen colon in 3 years; follow-up in the interim as needed.    Take PPI or H2 blockers as prescribed  Avoid ASA and other NSAIDs such as ibuprofen  Avoid spicy foods and caffeine  Avoid smoking and excessive alcohol intake  Reduce stress/start stress reduction techniques    Avoid high fat diets  Increase fiber intake    Per AGA guidelines patient will follow-up for colonoscopy surveillance as directed.    Patient was given instructions and counseling regarding his condition or for health maintenance advice. Please see specific information pulled into the AVS if appropriate.

## 2024-07-16 ENCOUNTER — APPOINTMENT (OUTPATIENT)
Dept: GENERAL RADIOLOGY | Facility: HOSPITAL | Age: 64
DRG: 871 | End: 2024-07-16
Payer: OTHER GOVERNMENT

## 2024-07-16 ENCOUNTER — HOSPITAL ENCOUNTER (INPATIENT)
Facility: HOSPITAL | Age: 64
LOS: 3 days | Discharge: HOME | DRG: 871 | End: 2024-07-19
Attending: EMERGENCY MEDICINE | Admitting: HOSPITALIST
Payer: OTHER GOVERNMENT

## 2024-07-16 DIAGNOSIS — R26.2 DIFFICULTY WALKING: ICD-10-CM

## 2024-07-16 DIAGNOSIS — R13.12 DYSPHAGIA, OROPHARYNGEAL: ICD-10-CM

## 2024-07-16 DIAGNOSIS — J18.9 PNEUMONIA DUE TO INFECTIOUS ORGANISM, UNSPECIFIED LATERALITY, UNSPECIFIED PART OF LUNG: Primary | ICD-10-CM

## 2024-07-16 PROBLEM — A41.9 SEPSIS: Status: ACTIVE | Noted: 2024-07-16

## 2024-07-16 LAB
ALBUMIN SERPL-MCNC: 4.4 G/DL (ref 3.5–5.2)
ALBUMIN/GLOB SERPL: 1.5 G/DL
ALP SERPL-CCNC: 46 U/L (ref 39–117)
ALT SERPL W P-5'-P-CCNC: 21 U/L (ref 1–41)
ANION GAP SERPL CALCULATED.3IONS-SCNC: 11.8 MMOL/L (ref 5–15)
AST SERPL-CCNC: 12 U/L (ref 1–40)
BACTERIA UR QL AUTO: ABNORMAL /HPF
BASOPHILS # BLD AUTO: 0.02 10*3/MM3 (ref 0–0.2)
BASOPHILS NFR BLD AUTO: 0.1 % (ref 0–1.5)
BILIRUB SERPL-MCNC: 0.8 MG/DL (ref 0–1.2)
BILIRUB UR QL STRIP: NEGATIVE
BUN SERPL-MCNC: 11 MG/DL (ref 8–23)
BUN/CREAT SERPL: 9.6 (ref 7–25)
CALCIUM SPEC-SCNC: 8.9 MG/DL (ref 8.6–10.5)
CHLORIDE SERPL-SCNC: 96 MMOL/L (ref 98–107)
CLARITY UR: CLEAR
CO2 SERPL-SCNC: 25.2 MMOL/L (ref 22–29)
COLOR UR: ABNORMAL
CREAT SERPL-MCNC: 1.15 MG/DL (ref 0.76–1.27)
D-LACTATE SERPL-SCNC: 2 MMOL/L (ref 0.5–2)
DEPRECATED RDW RBC AUTO: 48.8 FL (ref 37–54)
EGFRCR SERPLBLD CKD-EPI 2021: 72 ML/MIN/1.73
EOSINOPHIL # BLD AUTO: 0.01 10*3/MM3 (ref 0–0.4)
EOSINOPHIL NFR BLD AUTO: 0.1 % (ref 0.3–6.2)
ERYTHROCYTE [DISTWIDTH] IN BLOOD BY AUTOMATED COUNT: 14.7 % (ref 12.3–15.4)
FLUAV SUBTYP SPEC NAA+PROBE: NOT DETECTED
FLUBV RNA ISLT QL NAA+PROBE: NOT DETECTED
GLOBULIN UR ELPH-MCNC: 3 GM/DL
GLUCOSE SERPL-MCNC: 207 MG/DL (ref 65–99)
GLUCOSE UR STRIP-MCNC: ABNORMAL MG/DL
HCT VFR BLD AUTO: 42.8 % (ref 37.5–51)
HGB BLD-MCNC: 13.6 G/DL (ref 13–17.7)
HGB UR QL STRIP.AUTO: NEGATIVE
HOLD SPECIMEN: NORMAL
HOLD SPECIMEN: NORMAL
HYALINE CASTS UR QL AUTO: ABNORMAL /LPF
IMM GRANULOCYTES # BLD AUTO: 0.07 10*3/MM3 (ref 0–0.05)
IMM GRANULOCYTES NFR BLD AUTO: 0.5 % (ref 0–0.5)
KETONES UR QL STRIP: ABNORMAL
LEUKOCYTE ESTERASE UR QL STRIP.AUTO: NEGATIVE
LYMPHOCYTES # BLD AUTO: 1.06 10*3/MM3 (ref 0.7–3.1)
LYMPHOCYTES NFR BLD AUTO: 7.2 % (ref 19.6–45.3)
MAGNESIUM SERPL-MCNC: 1.6 MG/DL (ref 1.6–2.4)
MCH RBC QN AUTO: 28.7 PG (ref 26.6–33)
MCHC RBC AUTO-ENTMCNC: 31.8 G/DL (ref 31.5–35.7)
MCV RBC AUTO: 90.3 FL (ref 79–97)
MONOCYTES # BLD AUTO: 1.36 10*3/MM3 (ref 0.1–0.9)
MONOCYTES NFR BLD AUTO: 9.2 % (ref 5–12)
NEUTROPHILS NFR BLD AUTO: 12.19 10*3/MM3 (ref 1.7–7)
NEUTROPHILS NFR BLD AUTO: 82.9 % (ref 42.7–76)
NITRITE UR QL STRIP: NEGATIVE
NRBC BLD AUTO-RTO: 0 /100 WBC (ref 0–0.2)
PH UR STRIP.AUTO: 5.5 [PH] (ref 5–8)
PHOSPHATE SERPL-MCNC: 4.2 MG/DL (ref 2.5–4.5)
PLATELET # BLD AUTO: 160 10*3/MM3 (ref 140–450)
PMV BLD AUTO: 10.2 FL (ref 6–12)
POTASSIUM SERPL-SCNC: 4 MMOL/L (ref 3.5–5.2)
PROCALCITONIN SERPL-MCNC: 0.14 NG/ML (ref 0–0.25)
PROT SERPL-MCNC: 7.4 G/DL (ref 6–8.5)
PROT UR QL STRIP: ABNORMAL
QT INTERVAL: 348 MS
QTC INTERVAL: 468 MS
RBC # BLD AUTO: 4.74 10*6/MM3 (ref 4.14–5.8)
RBC # UR STRIP: ABNORMAL /HPF
REF LAB TEST METHOD: ABNORMAL
RSV RNA NPH QL NAA+NON-PROBE: NOT DETECTED
SARS-COV-2 RNA RESP QL NAA+PROBE: NOT DETECTED
SODIUM SERPL-SCNC: 133 MMOL/L (ref 136–145)
SP GR UR STRIP: 1.02 (ref 1–1.03)
SQUAMOUS #/AREA URNS HPF: ABNORMAL /HPF
TROPONIN T SERPL HS-MCNC: 47 NG/L
URATE SERPL-MCNC: 5.4 MG/DL (ref 3.4–7)
UROBILINOGEN UR QL STRIP: ABNORMAL
WBC # UR STRIP: ABNORMAL /HPF
WBC NRBC COR # BLD AUTO: 14.71 10*3/MM3 (ref 3.4–10.8)
WHOLE BLOOD HOLD COAG: NORMAL
WHOLE BLOOD HOLD SPECIMEN: NORMAL

## 2024-07-16 PROCEDURE — 25010000002 VANCOMYCIN 5 G RECONSTITUTED SOLUTION: Performed by: HOSPITALIST

## 2024-07-16 PROCEDURE — 87086 URINE CULTURE/COLONY COUNT: CPT | Performed by: EMERGENCY MEDICINE

## 2024-07-16 PROCEDURE — 94799 UNLISTED PULMONARY SVC/PX: CPT

## 2024-07-16 PROCEDURE — 81001 URINALYSIS AUTO W/SCOPE: CPT | Performed by: EMERGENCY MEDICINE

## 2024-07-16 PROCEDURE — 99223 1ST HOSP IP/OBS HIGH 75: CPT | Performed by: HOSPITALIST

## 2024-07-16 PROCEDURE — 87040 BLOOD CULTURE FOR BACTERIA: CPT | Performed by: EMERGENCY MEDICINE

## 2024-07-16 PROCEDURE — 25010000002 PIPERACILLIN SOD-TAZOBACTAM PER 1 G: Performed by: EMERGENCY MEDICINE

## 2024-07-16 PROCEDURE — 82948 REAGENT STRIP/BLOOD GLUCOSE: CPT

## 2024-07-16 PROCEDURE — 71045 X-RAY EXAM CHEST 1 VIEW: CPT

## 2024-07-16 PROCEDURE — 25010000002 METHYLPREDNISOLONE PER 40 MG: Performed by: HOSPITALIST

## 2024-07-16 PROCEDURE — 83605 ASSAY OF LACTIC ACID: CPT | Performed by: EMERGENCY MEDICINE

## 2024-07-16 PROCEDURE — 36415 COLL VENOUS BLD VENIPUNCTURE: CPT

## 2024-07-16 PROCEDURE — 25810000003 SODIUM CHLORIDE 0.9 % SOLUTION: Performed by: HOSPITALIST

## 2024-07-16 PROCEDURE — 84100 ASSAY OF PHOSPHORUS: CPT | Performed by: HOSPITALIST

## 2024-07-16 PROCEDURE — 93005 ELECTROCARDIOGRAM TRACING: CPT

## 2024-07-16 PROCEDURE — 94640 AIRWAY INHALATION TREATMENT: CPT

## 2024-07-16 PROCEDURE — 84484 ASSAY OF TROPONIN QUANT: CPT | Performed by: EMERGENCY MEDICINE

## 2024-07-16 PROCEDURE — 25010000002 METHYLPREDNISOLONE PER 125 MG: Performed by: EMERGENCY MEDICINE

## 2024-07-16 PROCEDURE — 84145 PROCALCITONIN (PCT): CPT | Performed by: HOSPITALIST

## 2024-07-16 PROCEDURE — 99285 EMERGENCY DEPT VISIT HI MDM: CPT

## 2024-07-16 PROCEDURE — 85025 COMPLETE CBC W/AUTO DIFF WBC: CPT | Performed by: EMERGENCY MEDICINE

## 2024-07-16 PROCEDURE — 25010000002 CEFTRIAXONE PER 250 MG: Performed by: EMERGENCY MEDICINE

## 2024-07-16 PROCEDURE — 83735 ASSAY OF MAGNESIUM: CPT | Performed by: EMERGENCY MEDICINE

## 2024-07-16 PROCEDURE — 73562 X-RAY EXAM OF KNEE 3: CPT

## 2024-07-16 PROCEDURE — 80053 COMPREHEN METABOLIC PANEL: CPT | Performed by: EMERGENCY MEDICINE

## 2024-07-16 PROCEDURE — 87637 SARSCOV2&INF A&B&RSV AMP PRB: CPT | Performed by: EMERGENCY MEDICINE

## 2024-07-16 PROCEDURE — 25810000003 SODIUM CHLORIDE 0.9 % SOLUTION: Performed by: EMERGENCY MEDICINE

## 2024-07-16 PROCEDURE — 63710000001 INSULIN LISPRO (HUMAN) PER 5 UNITS: Performed by: PHYSICIAN ASSISTANT

## 2024-07-16 PROCEDURE — 25010000002 DIPHENHYDRAMINE PER 50 MG: Performed by: EMERGENCY MEDICINE

## 2024-07-16 PROCEDURE — 84550 ASSAY OF BLOOD/URIC ACID: CPT | Performed by: HOSPITALIST

## 2024-07-16 PROCEDURE — 93005 ELECTROCARDIOGRAM TRACING: CPT | Performed by: EMERGENCY MEDICINE

## 2024-07-16 PROCEDURE — 63710000001 INSULIN GLARGINE PER 5 UNITS: Performed by: HOSPITALIST

## 2024-07-16 RX ORDER — BISACODYL 5 MG/1
5 TABLET, DELAYED RELEASE ORAL DAILY PRN
Status: DISCONTINUED | OUTPATIENT
Start: 2024-07-16 | End: 2024-07-19 | Stop reason: HOSPADM

## 2024-07-16 RX ORDER — BACLOFEN 10 MG/1
10 TABLET ORAL 3 TIMES DAILY PRN
Status: DISCONTINUED | OUTPATIENT
Start: 2024-07-16 | End: 2024-07-19 | Stop reason: HOSPADM

## 2024-07-16 RX ORDER — SODIUM CHLORIDE 0.9 % (FLUSH) 0.9 %
10 SYRINGE (ML) INJECTION AS NEEDED
Status: DISCONTINUED | OUTPATIENT
Start: 2024-07-16 | End: 2024-07-19 | Stop reason: HOSPADM

## 2024-07-16 RX ORDER — MULTIPLE VITAMINS W/ MINERALS TAB 9MG-400MCG
1 TAB ORAL DAILY
COMMUNITY

## 2024-07-16 RX ORDER — ACETAMINOPHEN 325 MG/1
650 TABLET ORAL EVERY 4 HOURS PRN
Status: DISCONTINUED | OUTPATIENT
Start: 2024-07-16 | End: 2024-07-16 | Stop reason: SDUPTHER

## 2024-07-16 RX ORDER — ACETAMINOPHEN 160 MG/5ML
650 SOLUTION ORAL EVERY 4 HOURS PRN
Status: DISCONTINUED | OUTPATIENT
Start: 2024-07-16 | End: 2024-07-16

## 2024-07-16 RX ORDER — SODIUM CHLORIDE 0.9 % (FLUSH) 0.9 %
10 SYRINGE (ML) INJECTION EVERY 12 HOURS SCHEDULED
Status: DISCONTINUED | OUTPATIENT
Start: 2024-07-16 | End: 2024-07-19 | Stop reason: HOSPADM

## 2024-07-16 RX ORDER — ASPIRIN 81 MG/1
81 TABLET, CHEWABLE ORAL DAILY
Status: DISCONTINUED | OUTPATIENT
Start: 2024-07-16 | End: 2024-07-19 | Stop reason: HOSPADM

## 2024-07-16 RX ORDER — MORPHINE SULFATE 2 MG/ML
2 INJECTION, SOLUTION INTRAMUSCULAR; INTRAVENOUS EVERY 4 HOURS PRN
Status: DISCONTINUED | OUTPATIENT
Start: 2024-07-16 | End: 2024-07-19 | Stop reason: HOSPADM

## 2024-07-16 RX ORDER — ARFORMOTEROL TARTRATE 15 UG/2ML
15 SOLUTION RESPIRATORY (INHALATION)
Status: DISCONTINUED | OUTPATIENT
Start: 2024-07-16 | End: 2024-07-19 | Stop reason: HOSPADM

## 2024-07-16 RX ORDER — DIPHENHYDRAMINE HYDROCHLORIDE 50 MG/ML
25 INJECTION INTRAMUSCULAR; INTRAVENOUS ONCE
Status: COMPLETED | OUTPATIENT
Start: 2024-07-16 | End: 2024-07-16

## 2024-07-16 RX ORDER — FAMOTIDINE 10 MG/ML
20 INJECTION, SOLUTION INTRAVENOUS ONCE
Status: COMPLETED | OUTPATIENT
Start: 2024-07-16 | End: 2024-07-16

## 2024-07-16 RX ORDER — SODIUM CHLORIDE 9 MG/ML
40 INJECTION, SOLUTION INTRAVENOUS AS NEEDED
Status: DISCONTINUED | OUTPATIENT
Start: 2024-07-16 | End: 2024-07-19 | Stop reason: HOSPADM

## 2024-07-16 RX ORDER — INSULIN LISPRO 100 [IU]/ML
3-14 INJECTION, SOLUTION INTRAVENOUS; SUBCUTANEOUS
Status: DISCONTINUED | OUTPATIENT
Start: 2024-07-16 | End: 2024-07-19 | Stop reason: HOSPADM

## 2024-07-16 RX ORDER — NICOTINE POLACRILEX 4 MG
15 LOZENGE BUCCAL
Status: DISCONTINUED | OUTPATIENT
Start: 2024-07-16 | End: 2024-07-19 | Stop reason: HOSPADM

## 2024-07-16 RX ORDER — METOPROLOL SUCCINATE 50 MG/1
50 TABLET, EXTENDED RELEASE ORAL DAILY
Status: DISCONTINUED | OUTPATIENT
Start: 2024-07-16 | End: 2024-07-19 | Stop reason: HOSPADM

## 2024-07-16 RX ORDER — BISACODYL 10 MG
10 SUPPOSITORY, RECTAL RECTAL DAILY PRN
Status: DISCONTINUED | OUTPATIENT
Start: 2024-07-16 | End: 2024-07-19 | Stop reason: HOSPADM

## 2024-07-16 RX ORDER — IXEKIZUMAB 80 MG/ML
80 INJECTION, SOLUTION SUBCUTANEOUS
COMMUNITY

## 2024-07-16 RX ORDER — TOPIRAMATE 50 MG/1
50 TABLET, FILM COATED ORAL 2 TIMES DAILY
COMMUNITY

## 2024-07-16 RX ORDER — GABAPENTIN 300 MG/1
600 CAPSULE ORAL 3 TIMES DAILY
Status: DISCONTINUED | OUTPATIENT
Start: 2024-07-16 | End: 2024-07-19 | Stop reason: HOSPADM

## 2024-07-16 RX ORDER — VANCOMYCIN/0.9 % SOD CHLORIDE 1.5G/250ML
1500 PLASTIC BAG, INJECTION (ML) INTRAVENOUS ONCE
Status: COMPLETED | OUTPATIENT
Start: 2024-07-16 | End: 2024-07-16

## 2024-07-16 RX ORDER — ROSUVASTATIN CALCIUM 20 MG/1
40 TABLET, COATED ORAL DAILY
Status: DISCONTINUED | OUTPATIENT
Start: 2024-07-16 | End: 2024-07-19 | Stop reason: HOSPADM

## 2024-07-16 RX ORDER — BACLOFEN 10 MG/1
1 TABLET ORAL 3 TIMES DAILY
COMMUNITY

## 2024-07-16 RX ORDER — ACETAMINOPHEN 325 MG/1
650 TABLET ORAL EVERY 6 HOURS PRN
Status: DISCONTINUED | OUTPATIENT
Start: 2024-07-16 | End: 2024-07-16 | Stop reason: SDUPTHER

## 2024-07-16 RX ORDER — ACETAMINOPHEN 160 MG/5ML
650 SOLUTION ORAL EVERY 4 HOURS PRN
Status: DISCONTINUED | OUTPATIENT
Start: 2024-07-16 | End: 2024-07-19 | Stop reason: HOSPADM

## 2024-07-16 RX ORDER — AMOXICILLIN 250 MG
2 CAPSULE ORAL 2 TIMES DAILY
Status: DISCONTINUED | OUTPATIENT
Start: 2024-07-16 | End: 2024-07-19 | Stop reason: HOSPADM

## 2024-07-16 RX ORDER — DEXTROSE MONOHYDRATE 25 G/50ML
25 INJECTION, SOLUTION INTRAVENOUS
Status: DISCONTINUED | OUTPATIENT
Start: 2024-07-16 | End: 2024-07-19 | Stop reason: HOSPADM

## 2024-07-16 RX ORDER — POLYETHYLENE GLYCOL 3350 17 G/17G
17 POWDER, FOR SOLUTION ORAL DAILY PRN
Status: DISCONTINUED | OUTPATIENT
Start: 2024-07-16 | End: 2024-07-19 | Stop reason: HOSPADM

## 2024-07-16 RX ORDER — ACETAMINOPHEN 650 MG/1
650 SUPPOSITORY RECTAL EVERY 4 HOURS PRN
Status: DISCONTINUED | OUTPATIENT
Start: 2024-07-16 | End: 2024-07-16

## 2024-07-16 RX ORDER — HYDROXYZINE HYDROCHLORIDE 25 MG/1
25 TABLET, FILM COATED ORAL 3 TIMES DAILY PRN
Status: DISCONTINUED | OUTPATIENT
Start: 2024-07-16 | End: 2024-07-19 | Stop reason: HOSPADM

## 2024-07-16 RX ORDER — ACETAMINOPHEN 500 MG
1000 TABLET ORAL EVERY 6 HOURS PRN
Status: DISCONTINUED | OUTPATIENT
Start: 2024-07-16 | End: 2024-07-19 | Stop reason: HOSPADM

## 2024-07-16 RX ORDER — ACETAMINOPHEN 650 MG/1
650 SUPPOSITORY RECTAL EVERY 4 HOURS PRN
Status: DISCONTINUED | OUTPATIENT
Start: 2024-07-16 | End: 2024-07-19 | Stop reason: HOSPADM

## 2024-07-16 RX ORDER — IBUPROFEN 600 MG/1
1 TABLET ORAL
Status: DISCONTINUED | OUTPATIENT
Start: 2024-07-16 | End: 2024-07-19 | Stop reason: HOSPADM

## 2024-07-16 RX ORDER — ACETAMINOPHEN 325 MG/1
650 TABLET ORAL EVERY 4 HOURS PRN
Status: DISCONTINUED | OUTPATIENT
Start: 2024-07-16 | End: 2024-07-16

## 2024-07-16 RX ORDER — GUAIFENESIN 600 MG/1
1200 TABLET, EXTENDED RELEASE ORAL EVERY 12 HOURS SCHEDULED
Status: DISCONTINUED | OUTPATIENT
Start: 2024-07-16 | End: 2024-07-19 | Stop reason: HOSPADM

## 2024-07-16 RX ORDER — IPRATROPIUM BROMIDE AND ALBUTEROL SULFATE 2.5; .5 MG/3ML; MG/3ML
3 SOLUTION RESPIRATORY (INHALATION)
Status: DISCONTINUED | OUTPATIENT
Start: 2024-07-16 | End: 2024-07-17

## 2024-07-16 RX ORDER — ONDANSETRON 4 MG/1
4 TABLET, ORALLY DISINTEGRATING ORAL EVERY 6 HOURS PRN
Status: DISCONTINUED | OUTPATIENT
Start: 2024-07-16 | End: 2024-07-19 | Stop reason: HOSPADM

## 2024-07-16 RX ORDER — ONDANSETRON 2 MG/ML
4 INJECTION INTRAMUSCULAR; INTRAVENOUS EVERY 6 HOURS PRN
Status: DISCONTINUED | OUTPATIENT
Start: 2024-07-16 | End: 2024-07-19 | Stop reason: HOSPADM

## 2024-07-16 RX ORDER — SERTRALINE HYDROCHLORIDE 100 MG/1
100 TABLET, FILM COATED ORAL DAILY
Status: DISCONTINUED | OUTPATIENT
Start: 2024-07-16 | End: 2024-07-19 | Stop reason: HOSPADM

## 2024-07-16 RX ORDER — ISOSORBIDE MONONITRATE 30 MG/1
30 TABLET, EXTENDED RELEASE ORAL DAILY
Status: DISCONTINUED | OUTPATIENT
Start: 2024-07-16 | End: 2024-07-19 | Stop reason: HOSPADM

## 2024-07-16 RX ORDER — ACETAMINOPHEN 650 MG/1
650 SUPPOSITORY RECTAL EVERY 4 HOURS PRN
Status: DISCONTINUED | OUTPATIENT
Start: 2024-07-16 | End: 2024-07-16 | Stop reason: SDUPTHER

## 2024-07-16 RX ORDER — INSULIN LISPRO 100 [IU]/ML
0-7 INJECTION, SOLUTION INTRAVENOUS; SUBCUTANEOUS
Status: DISCONTINUED | OUTPATIENT
Start: 2024-07-16 | End: 2024-07-16

## 2024-07-16 RX ORDER — OXYBUTYNIN CHLORIDE 5 MG/1
5 TABLET, EXTENDED RELEASE ORAL DAILY
Status: DISCONTINUED | OUTPATIENT
Start: 2024-07-16 | End: 2024-07-19 | Stop reason: HOSPADM

## 2024-07-16 RX ORDER — BUDESONIDE 0.5 MG/2ML
0.5 INHALANT ORAL
Status: DISCONTINUED | OUTPATIENT
Start: 2024-07-16 | End: 2024-07-19 | Stop reason: HOSPADM

## 2024-07-16 RX ADMIN — BUDESONIDE 0.5 MG: 0.5 INHALANT ORAL at 19:55

## 2024-07-16 RX ADMIN — SERTRALINE HYDROCHLORIDE 100 MG: 100 TABLET ORAL at 20:32

## 2024-07-16 RX ADMIN — SACUBITRIL AND VALSARTAN 1 TABLET: 24; 26 TABLET, FILM COATED ORAL at 20:31

## 2024-07-16 RX ADMIN — ASPIRIN 81 MG 81 MG: 81 TABLET ORAL at 20:31

## 2024-07-16 RX ADMIN — METHYLPREDNISOLONE SODIUM SUCCINATE 125 MG: 125 INJECTION INTRAMUSCULAR; INTRAVENOUS at 15:41

## 2024-07-16 RX ADMIN — SENNOSIDES AND DOCUSATE SODIUM 2 TABLET: 50; 8.6 TABLET ORAL at 20:30

## 2024-07-16 RX ADMIN — WATER 40 MG: 1 INJECTION INTRAMUSCULAR; INTRAVENOUS; SUBCUTANEOUS at 20:31

## 2024-07-16 RX ADMIN — Medication 10 ML: at 20:32

## 2024-07-16 RX ADMIN — GABAPENTIN 600 MG: 300 CAPSULE ORAL at 20:30

## 2024-07-16 RX ADMIN — VANCOMYCIN HYDROCHLORIDE 1500 MG: 5 INJECTION, POWDER, LYOPHILIZED, FOR SOLUTION INTRAVENOUS at 20:39

## 2024-07-16 RX ADMIN — ACETAMINOPHEN 650 MG: 650 SUPPOSITORY RECTAL at 16:46

## 2024-07-16 RX ADMIN — APIXABAN 5 MG: 5 TABLET, FILM COATED ORAL at 20:32

## 2024-07-16 RX ADMIN — INSULIN LISPRO 10 UNITS: 100 INJECTION, SOLUTION INTRAVENOUS; SUBCUTANEOUS at 20:32

## 2024-07-16 RX ADMIN — GUAIFENESIN 1200 MG: 600 TABLET ORAL at 20:30

## 2024-07-16 RX ADMIN — ISOSORBIDE MONONITRATE 30 MG: 30 TABLET, EXTENDED RELEASE ORAL at 20:30

## 2024-07-16 RX ADMIN — IPRATROPIUM BROMIDE AND ALBUTEROL SULFATE 3 ML: .5; 3 SOLUTION RESPIRATORY (INHALATION) at 19:55

## 2024-07-16 RX ADMIN — SODIUM CHLORIDE 1000 ML: 9 INJECTION, SOLUTION INTRAVENOUS at 14:02

## 2024-07-16 RX ADMIN — ARFORMOTEROL TARTRATE 15 MCG: 15 SOLUTION RESPIRATORY (INHALATION) at 19:55

## 2024-07-16 RX ADMIN — INSULIN GLARGINE 15 UNITS: 100 INJECTION, SOLUTION SUBCUTANEOUS at 20:32

## 2024-07-16 RX ADMIN — CEFTRIAXONE SODIUM 1000 MG: 1 INJECTION, POWDER, FOR SOLUTION INTRAMUSCULAR; INTRAVENOUS at 14:26

## 2024-07-16 RX ADMIN — METOPROLOL SUCCINATE 50 MG: 50 TABLET, EXTENDED RELEASE ORAL at 20:31

## 2024-07-16 RX ADMIN — ROSUVASTATIN 40 MG: 20 TABLET, FILM COATED ORAL at 20:30

## 2024-07-16 RX ADMIN — FAMOTIDINE 20 MG: 10 INJECTION INTRAVENOUS at 15:45

## 2024-07-16 RX ADMIN — PIPERACILLIN AND TAZOBACTAM 3.38 G: 3; .375 INJECTION, POWDER, FOR SOLUTION INTRAVENOUS at 15:54

## 2024-07-16 RX ADMIN — DIPHENHYDRAMINE HYDROCHLORIDE 25 MG: 50 INJECTION, SOLUTION INTRAMUSCULAR; INTRAVENOUS at 15:43

## 2024-07-16 RX ADMIN — OXYBUTYNIN CHLORIDE 5 MG: 5 TABLET, EXTENDED RELEASE ORAL at 20:32

## 2024-07-16 NOTE — PROGRESS NOTES
Respiratory Therapist Broncho-Pulmonary Hygiene Progress Note      Patient Name:  Mandeep Tracey  YOB: 1960    Mandeep Tracey meets the qualification for Level 2 of the Bronco-Pulmonary Hygiene Protocol. This was based on my daily patient assessment and includes review of chest x-ray results, cough ability and quality, oxygenation, secretions or risk for secretion development and patient mobility.     Broncho-Pulmonary Hygiene Assessment:    Level of Movement: Actively changing positions without assistance  Alert/ oriented/ cooperative    Breath Sounds: Clear to slightly diminished    Cough: Strong, effective    Chest X-Ray: Possible signs of consolidation and/or atelectasis or clear.     Sputum Productions: None or small amount of thin or watery secretions with effective cough    History and Physical: Chronic condition    SpO2 to Oxygen Need: greater than 92% on room air or  less than 3L nasal canula    Current SpO2 is: 93 on 3    Based on this information, I have completed the following interventions: Aerobika with bronchodialtor medication or TID      Electronically signed by Patrcik Sargent RRT, 07/16/24, 7:20 PM EDT.

## 2024-07-16 NOTE — ED PROVIDER NOTES
"SHARED VISIT NOTE:    Patient is 64 y.o. year old male that presents to the ED for evaluation of possible atrial fibrillation.     Physical Exam    ED Course:    /76 (BP Location: Left arm, Patient Position: Sitting)   Pulse 108   Temp (!) 101.8 °F (38.8 °C) (Oral)   Resp 20   Ht 182.9 cm (72\")   Wt 132 kg (290 lb 5.5 oz)   SpO2 90%   BMI 39.38 kg/m²   Results for orders placed or performed during the hospital encounter of 07/16/24   COVID-19, FLU A/B, RSV PCR 1 HR TAT - Swab, Nasopharynx    Specimen: Nasopharynx; Swab   Result Value Ref Range    COVID19 Not Detected Not Detected - Ref. Range    Influenza A PCR Not Detected Not Detected    Influenza B PCR Not Detected Not Detected    RSV, PCR Not Detected Not Detected   Comprehensive Metabolic Panel    Specimen: Blood   Result Value Ref Range    Glucose 207 (H) 65 - 99 mg/dL    BUN 11 8 - 23 mg/dL    Creatinine 1.15 0.76 - 1.27 mg/dL    Sodium 133 (L) 136 - 145 mmol/L    Potassium 4.0 3.5 - 5.2 mmol/L    Chloride 96 (L) 98 - 107 mmol/L    CO2 25.2 22.0 - 29.0 mmol/L    Calcium 8.9 8.6 - 10.5 mg/dL    Total Protein 7.4 6.0 - 8.5 g/dL    Albumin 4.4 3.5 - 5.2 g/dL    ALT (SGPT) 21 1 - 41 U/L    AST (SGOT) 12 1 - 40 U/L    Alkaline Phosphatase 46 39 - 117 U/L    Total Bilirubin 0.8 0.0 - 1.2 mg/dL    Globulin 3.0 gm/dL    A/G Ratio 1.5 g/dL    BUN/Creatinine Ratio 9.6 7.0 - 25.0    Anion Gap 11.8 5.0 - 15.0 mmol/L    eGFR 72.0 >60.0 mL/min/1.73   Single High Sensitivity Troponin T    Specimen: Blood   Result Value Ref Range    HS Troponin T 47 (H) <22 ng/L   Magnesium    Specimen: Blood   Result Value Ref Range    Magnesium 1.6 1.6 - 2.4 mg/dL   CBC Auto Differential    Specimen: Blood   Result Value Ref Range    WBC 14.71 (H) 3.40 - 10.80 10*3/mm3    RBC 4.74 4.14 - 5.80 10*6/mm3    Hemoglobin 13.6 13.0 - 17.7 g/dL    Hematocrit 42.8 37.5 - 51.0 %    MCV 90.3 79.0 - 97.0 fL    MCH 28.7 26.6 - 33.0 pg    MCHC 31.8 31.5 - 35.7 g/dL    RDW 14.7 12.3 - 15.4 % "    RDW-SD 48.8 37.0 - 54.0 fl    MPV 10.2 6.0 - 12.0 fL    Platelets 160 140 - 450 10*3/mm3    Neutrophil % 82.9 (H) 42.7 - 76.0 %    Lymphocyte % 7.2 (L) 19.6 - 45.3 %    Monocyte % 9.2 5.0 - 12.0 %    Eosinophil % 0.1 (L) 0.3 - 6.2 %    Basophil % 0.1 0.0 - 1.5 %    Immature Grans % 0.5 0.0 - 0.5 %    Neutrophils, Absolute 12.19 (H) 1.70 - 7.00 10*3/mm3    Lymphocytes, Absolute 1.06 0.70 - 3.10 10*3/mm3    Monocytes, Absolute 1.36 (H) 0.10 - 0.90 10*3/mm3    Eosinophils, Absolute 0.01 0.00 - 0.40 10*3/mm3    Basophils, Absolute 0.02 0.00 - 0.20 10*3/mm3    Immature Grans, Absolute 0.07 (H) 0.00 - 0.05 10*3/mm3    nRBC 0.0 0.0 - 0.2 /100 WBC   Lactic Acid, Plasma    Specimen: Blood   Result Value Ref Range    Lactate 2.0 0.5 - 2.0 mmol/L   ECG 12 Lead ED Triage Standing Order; Weak / Dizzy / AMS   Result Value Ref Range    QT Interval 348 ms    QTC Interval 468 ms   Green Top (Gel)   Result Value Ref Range    Extra Tube Hold for add-ons.    Lavender Top   Result Value Ref Range    Extra Tube hold for add-on    Gold Top - SST   Result Value Ref Range    Extra Tube Hold for add-ons.    Light Blue Top   Result Value Ref Range    Extra Tube Hold for add-ons.      Medications   sodium chloride 0.9 % flush 10 mL (has no administration in time range)   cefTRIAXone (ROCEPHIN) 1,000 mg in sodium chloride 0.9 % 100 mL IVPB-VTB (1,000 mg Intravenous New Bag 7/16/24 1426)   sodium chloride 0.9 % bolus 1,000 mL (1,000 mL Intravenous New Bag 7/16/24 1402)     XR Knee 3 View Right    Result Date: 7/16/2024  Narrative: XR KNEE 3 VW RIGHT Date of Exam: 7/16/2024 2:23 PM EDT Indication: Knee pain pain Comparison: None available. Findings: No fracture or acute bony abnormality. No significant arthritic change. No fluid in the suprapatellar bursa. Atherosclerotic calcification noted, with popliteal artery stent present     Impression: Impression: No acute bony or joint process demonstrated Electronically Signed: Jason Pretty   7/16/2024 2:36 PM EDT  Workstation ID: [id number]    XR Chest 1 View    Result Date: 7/16/2024  Narrative: XR CHEST 1 VW Date of Exam: 7/16/2024 1:11 PM EDT Indication: Weak/Dizzy/AMS triage protocol Comparison: None available. Findings: There are low lung volumes with poor inspiration. There is moderate elevation of the right diaphragm. There is mild linear atelectasis of the right lung base. Left lung is clear. There are no definite effusions. Heart and pulmonary vessels appear within normal limits.     Impression: Impression: Poor inspiration with mild atelectasis of the right lung base. Electronically Signed: Oralia Rios MD  7/16/2024 1:28 PM EDT  Workstation ID: [id number]    Doppler Ankle Brachial Index Single Level CAR    Result Date: 7/10/2024  Narrative: This result has an attachment that is not available. Table formatting from the original result was not included. Ankle Brachial Index - CPT 00902 Technique: Continuous wave Doppler evaluation of the lower extremity arteries was performed using Doppler, pressures, and waveforms. Indication: Peripheral Vascular Disease, right popliteal aneurysm repair with stent 4/22 READING MD FINDINGS: Right PROMISE Right TBI Left PROMISE Left TBI 0.96 0.46 N/C 0.78 Triphasic Doppler flow at the right distal posterior tibial, biphasic dorsalis pedis artery.              Triphasic Doppler flow at the left distal posterior tibial and dorsalis pedis artery.                     Digit pressures and waveforms suggest abnormal arterial flow to the small vessels on the right, normal on the left. Pulse volume recording waveform contours appear moderately abnormal at the right ankle and mildly abnormal at the metatarsal level. Appear normal at the left ankle and metatarsal level.                                    Impression: 1. Normal arterial perfusion in the right leg with an PROMISE of 0.96. 2. The left PROMISE is unreliable due to incompressibility secondary to atherosclerotic calcification. 3.  Abnormal arterial flow to the right great toe, normal flow to the left. 4.  Pvr's appear moderately abnormal at the right ankle, mild at the metatarsal level and     normal at the left ankle and metatarsal level. Compared to the previous study on 5/30/23, there is no change in the right PROMISE form 0.98 and a change in the left PROMISE from 1.19 to non compressible.     MDM:    Procedures    {Independent Review of (Optional):49574}    {Critical Care:19945}      SHARED VISIT ATTESTATION:    This visit was performed by both myself and an APC.  I performed the substantive portion of the medical decision making. The management plan was made or approved by me, and I take responsibility for patient management.           Sherrell Delaney MD  14:43 EDT  07/16/24

## 2024-07-16 NOTE — CASE MANAGEMENT/SOCIAL WORK
Discharge Planning Assessment   [facility]     Patient Name: Mandeep Tracey  MRN: [id number]  Today's Date: 7/16/2024    Admit Date: 7/16/2024        Discharge Needs Assessment       Row Name 07/16/24 6119       Living Environment    People in Home child(gael), adult;grandchild(gael);spouse    Name(s) of People in Home Lives with Wife, Arnaldo, daughter, Lina, Son in law Felix and grandson Chavez who is 2 years old.    Current Living Arrangements home    Potentially Unsafe Housing Conditions none    In the past 12 months has the electric, gas, oil, or water company threatened to shut off services in your home? No    Primary Care Provided by spouse/significant other;other (see comments)    Provides Primary Care For no one, unable/limited ability to care for self    Family Caregiver if Needed child(gael), adult;spouse;other (see comments)    Quality of Family Relationships helpful;involved;supportive    Able to Return to Prior Arrangements yes       Resource/Environmental Concerns    Resource/Environmental Concerns none    Transportation Concerns none       Transportation Needs    In the past 12 months, has lack of transportation kept you from medical appointments or from getting medications? no    In the past 12 months, has lack of transportation kept you from meetings, work, or from getting things needed for daily living? No       Food Insecurity    Within the past 12 months, you worried that your food would run out before you got the money to buy more. Never true    Within the past 12 months, the food you bought just didn't last and you didn't have money to get more. Never true       Transition Planning    Patient/Family Anticipates Transition to home with help/services;home with family    Patient/Family Anticipated Services at Transition other (see comments);outpatient care    Transportation Anticipated family or friend will provide       Discharge Needs Assessment    Readmission Within the Last 30 Days no previous  admission in last 30 days    Current Outpatient/Agency/Support Group homecare agency;clinic(s)    Equipment Currently Used at Home cpap;hospital bed;wheelchair;wheelchair, motorized;other (see comments);shower chair    Concerns to be Addressed no discharge needs identified    Anticipated Changes Related to Illness inability to care for self    Equipment Needed After Discharge none    Discharge Facility/Level of Care Needs other (see comments)                   Discharge Plan    No documentation.                 Continued Care and Services - Admitted Since 7/16/2024    No active coordination exists for this encounter.          Demographic Summary       Row Name 07/16/24 1647       General Information    Admission Type observation    Arrived From home    Referral Source emergency department    Reason for Consult discharge planning    Preferred Language English       Contact Information    Permission Granted to Share Info With ;, insurance    Contact Information Obtained for ;, insurance                   Functional Status       Row Name 07/16/24 1659       Functional Status    Usual Activity Tolerance fair    Current Activity Tolerance fair       Physical Activity    On average, how many days per week do you engage in moderate to strenuous exercise (like a brisk walk)? 0 days    On average, how many minutes do you engage in exercise at this level? 0 min    Number of minutes of exercise per week 0       Assessment of Health Literacy    How often do you have someone help you read hospital materials? Often    How often do you have problems learning about your medical condition because of difficulty understanding written information? Often    How often do you have a problem understanding what is told to you about your medical condition? Often    How confident are you filling out medical forms by yourself? A little bit    Health Literacy Moderate       Functional Status, IADL     Medications assistive equipment and person    Meal Preparation assistive equipment and person    Housekeeping assistive equipment and person    Laundry assistive equipment and person    Shopping assistive equipment and person       Mental Status    General Appearance WDL WDL       Mental Status Summary    Recent Changes in Mental Status/Cognitive Functioning hearing;decision-making/judgment       Employment/    Employment Status disabled;, previous service    Current or Previous Occupation            Current or Previous  Service active duty, past     Service Experiences experienced combat    Active Duty Status other (see comments)     [employer]                   Psychosocial    No documentation.                  Abuse/Neglect       Row Name 07/16/24 8006       Personal Safety    Feels Unsafe at Home or Work/School unable to answer (comment required)    Feels Threatened by Someone unable to answer (comment required)    Does Anyone Try to Keep You From Having Contact with Others or Doing Things Outside Your Home? unable to answer (comment required)    Physical Signs of Abuse Present patient unable to answer                   Legal       Row Name 07/16/24 7896       Financial Resource Strain    How hard is it for you to pay for the very basics like food, housing, medical care, and heating? Not hard       Financial/Legal    Source of Income [facility] pension;disability    Application for Public Assistance not applied       Legal    Criminal Activity/Legal Involvement none                   Substance Abuse       Row Name 07/16/24 5315       Substance Use    Substance Use Status never used       Family Member Substance Use (#4)    Family History of Substance Use none    Previous Substance Use Treatment none                   Patient Forms    No documentation.                 SW met with patient's wife at bedside. Pt was asleep at the time of needs assessment. Wife  reports that patient lives at home with her, their daughter, son in law and their 2 year old grandson. Their daughter has lived with them for about a year to help with taking care of patient. Pt is nonverbal and limited with his mobility. They have a caregiver that is in the home 5 days a week from [facility] [facility]. Wife reports that they have been established with [employer] [facility] for about 20 years. Wife reports that she is patient's POA and that patient is not equipped to make his own decisions. Wife reports that patient had a stroke in 2003 and recently has had a change in his cognition in which she is now over all financial decisions for him. Pt is a disabled [place]. Pt is hard of hearing and does have hearing aids from hearing loss in the . Pt has a Cpap machine, hospital bed, shower chair, wheelchair both regular and motorized, a scooter and a brace all [facility]. [facility] [place] and [facility]. Wife reports that at this time they have everything that they need for patient. No further needs at this time.    SAFIA Ly

## 2024-07-16 NOTE — ED PROVIDER NOTES
Time: 5:48 PM EDT  Date of encounter:  7/16/2024  Independent Historian/Clinical History and Information was obtained by:   Patient and Family    History is limited by: N/A    Chief Complaint: Fever      History of Present Illness:  Patient is a 64 y.o. year old male who presents to the emergency department for evaluation of fever, chills, and weakness.  Patient is also had a cough.  Patient also reports right lower extremity pain.  Patient has no nausea, vomiting, or diarrhea.  Patient denies abdominal pain.  Patient has no diarrhea.  Patient has no dysuria or urinary frequency.    HPI    Patient Care Team  Primary Care Provider: Pipe Servin MD    Past Medical History:     Allergies   Allergen Reactions    Fentanyl Mental Status Change    Rocephin [Ceftriaxone] Hives and Itching    Ciprofloxacin Unknown - Low Severity    Depakote [Valproic Acid] Other (See Comments)     Behavioral changes    Quinolones Hives     Past Medical History:   Diagnosis Date    Aphasia     Cardiomyopathy     CPAP (continuous positive airway pressure) dependence     Diabetes     Hemiparesis     Right side     Morbid obesity     Nonrheumatic mitral valve regurgitation     BELKYS on CPAP     Peptic ulcer disease     Postoperative nausea and vomiting 3/13/2021    Psoriasis     Pyogenic arthritis of right hip     Seizures     Sleep apnea     Stroke 2004    Ventricular ectopy     asymptomatic     Past Surgical History:   Procedure Laterality Date    CHOLECYSTECTOMY      COLONOSCOPY N/A 10/17/2023    Procedure: COLONOSCOPY WITH POLYPECTOMIES;  Surgeon: Angel Luis Mendoza MD;  Location: [facility] ENDOSCOPY;  Service: General;  Laterality: N/A;  COLON POLYPS, DIVERTICULOSIS    ENDOSCOPY N/A 10/17/2023    Procedure: ESOPHAGOGASTRODUODENOSCOPY WITH BIOPSIES;  Surgeon: Angel Luis Mendoza MD;  Location: [facility] ENDOSCOPY;  Service: General;  Laterality: N/A;  GASTRIC POLYP    EYE SURGERY      HIP SPACER INSERTION WITH ANTIBIOTIC CEMENT Right  1/9/2023    Procedure: RT. BIPOLAR HIP REMOVAL AND PLACEMENT OF SPACER;  Surgeon: Faustina Beebe MD;  Location:  [facility];  Service: Orthopedics;  Laterality: Right;    INCISION AND DRAINAGE HIP Right 3/12/2021    Procedure: HIP ANTERIOR INCISION AND DRAINAGE WITH HANA TABLE;  Surgeon: Tao Andrea MD;  Location: [facility] MAIN OR;  Service: Orthopedics;  Laterality: Right;    LUMBAR DISC SURGERY      US GUIDED FINE NEEDLE ASPIRATION  3/10/2021     Family History   Problem Relation Age of Onset    Hypertension Mother     Hyperlipidemia Mother     Arthritis Mother     Cancer Mother     Heart disease Father     Hyperlipidemia Sister     Drug abuse Sister     COPD Sister     Birth defects Sister     Early death Brother     Drug abuse Brother        Home Medications:  Prior to Admission medications    Medication Sig Start Date End Date Taking? Authorizing Provider   acetaminophen (TYLENOL) 325 MG tablet Take 2 tablets by mouth Every 4 (Four) Hours As Needed for Mild Pain  or Headache. 3/31/21  Yes Angel Luis Dejesus MD   apixaban (ELIQUIS) 5 MG tablet tablet Take 1 tablet by mouth 2 (Two) Times a Day.   Yes Rachel Perales MD   aspirin 81 MG chewable tablet Chew 1 tablet Daily.   Yes Rachel Perales MD   baclofen (LIORESAL) 10 MG tablet Take 1 tablet by mouth 3 (Three) Times a Day.   Yes Rachel Perales MD   bumetanide (BUMEX) 1 MG tablet Take 1 tablet by mouth Daily.   Yes Rachel Perales MD   gabapentin (NEURONTIN) 300 MG capsule Take 2 capsules by mouth 3 (Three) Times a Day. 1/16/23  Yes Fazal Givens MD   insulin glargine (LANTUS, SEMGLEE) 100 UNIT/ML injection Inject 15 Units under the skin into the appropriate area as directed Every Night.  Patient taking differently: Inject 10 Units under the skin into the appropriate area as directed Every Night. 1/16/23  Yes Fazal Givens MD   Ixekizumab (Taltz) 80 MG/ML solution auto-injector Inject 80 mg under the skin into  the appropriate area as directed Every 14 (Fourteen) Days.   Yes Rachel Perales MD   LACTOBACILLUS PO Take 1 tablet by mouth 3 (Three) Times a Day.   Yes Rachel Perales MD   metFORMIN (GLUCOPHAGE) 1000 MG tablet Take 1 tablet by mouth 2 (Two) Times a Day With Meals.   Yes Rachel Perales MD   metoprolol succinate XL (TOPROL-XL) 50 MG 24 hr tablet Take 1 tablet by mouth Daily. 1/16/23  Yes Fazal Givens MD   multivitamin with minerals tablet tablet Take 1 tablet by mouth Daily.   Yes Rachel Perales MD   rosuvastatin (CRESTOR) 20 MG tablet Take 1 tablet by mouth Daily.   Yes Rachel Perales MD   sacubitril-valsartan (ENTRESTO) 49-51 MG tablet Take 1 tablet by mouth 2 (Two) Times a Day.   Yes Rachel Perales MD   sertraline (ZOLOFT) 100 MG tablet Take 1.5 tablets by mouth Daily.   Yes Rachel Perales MD   topiramate (TOPAMAX) 50 MG tablet Take 1 tablet by mouth 2 (Two) Times a Day.   Yes Rachel Perales MD   trospium (SANCTURA) 20 MG tablet Take 1 tablet by mouth 2 (Two) Times a Day.   Yes Rachel Perales MD   bisacodyl (DULCOLAX) 10 MG suppository Insert 1 suppository into the rectum Daily As Needed for Constipation. 3/31/21 7/16/24  Angel Luis Dejesus MD   cephalexin (KEFLEX) 500 MG capsule Take 1 capsule by mouth 4 (Four) Times a Day. 10/22/23 7/16/24  Sherrell Delaney MD   clopidogrel (PLAVIX) 75 MG tablet Take 1 tablet by mouth Daily.  Patient not taking: Reported on 11/2/2023 7/16/24  Rachel Perales MD   docusate sodium 100 MG capsule Take 2 capsules by mouth 2 (Two) Times a Day. 3/17/21 7/16/24  Colby Ford MD   hydrOXYzine (ATARAX) 25 MG tablet Take 1 tablet by mouth 3 (Three) Times a Day As Needed for Itching.  7/16/24  Rachel Perales MD   insulin lispro (ADMELOG) 100 UNIT/ML injection Inject 0-7 Units under the skin into the appropriate area as directed 3 (Three) Times a Day Before Meals. 1/16/23 7/16/24  Fazal Givens  "MD Fadi   isosorbide mononitrate (IMDUR) 30 MG 24 hr tablet Take 1 tablet by mouth Daily.  7/16/24  Provider, MD Rachel   nystatin (MYCOSTATIN) 100,000 unit/mL suspension Swish and swallow 5 mL 3 (Three) Times a Day.  Patient not taking: Reported on 11/2/2023 10/20/23 7/16/24  Deepak, April, APRN   polyethylene glycol (polyethylene glycol) 17 g packet Take 17 g by mouth 2 (Two) Times a Day.  Patient not taking: Reported on 11/2/2023 3/31/21 7/16/24  Angel Luis Dejesus MD   simethicone (Gas-X) 80 MG chewable tablet Take 2 tablets PO after completing movi prep and 2 tablets PO 4 hours prior to procedure. 8/3/23 7/16/24  Deepak, April, APRN        Social History:   Social History     Tobacco Use    Smoking status: Former    Smokeless tobacco: Never    Tobacco comments:     quit 22 years ago    Vaping Use    Vaping status: Never Used   Substance Use Topics    Alcohol use: Never    Drug use: Never         Review of Systems:  Review of Systems   Constitutional:  Positive for fever. Negative for chills.   HENT:  Negative for congestion, rhinorrhea and sore throat.    Eyes:  Negative for pain and visual disturbance.   Respiratory:  Positive for cough. Negative for apnea, chest tightness and shortness of breath.    Cardiovascular:  Negative for chest pain and palpitations.   Gastrointestinal:  Negative for abdominal pain, diarrhea, nausea and vomiting.   Genitourinary:  Negative for difficulty urinating and dysuria.   Musculoskeletal:  Negative for joint swelling and myalgias.   Skin:  Negative for color change.   Neurological:  Negative for seizures and headaches.   Psychiatric/Behavioral: Negative.     All other systems reviewed and are negative.       Physical Exam:  /89   Pulse 106   Temp (!) 102.1 °F (38.9 °C) (Oral)   Resp 22   Ht 182.9 cm (72\")   Wt 132 kg (290 lb 5.5 oz)   SpO2 92%   BMI 39.38 kg/m²     Physical Exam  Vitals and nursing note reviewed.   Constitutional:       General: He is not in " acute distress.     Appearance: Normal appearance. He is not toxic-appearing.   HENT:      Head: Normocephalic and atraumatic.      Jaw: There is normal jaw occlusion.   Eyes:      General: Lids are normal.      Extraocular Movements: Extraocular movements intact.      Conjunctiva/sclera: Conjunctivae normal.      Pupils: Pupils are equal, round, and reactive to light.   Cardiovascular:      Rate and Rhythm: Normal rate and regular rhythm.      Pulses: Normal pulses.      Heart sounds: Normal heart sounds.   Pulmonary:      Effort: Pulmonary effort is normal. No respiratory distress.      Breath sounds: Normal breath sounds. No wheezing or rhonchi.   Abdominal:      General: Abdomen is flat.      Palpations: Abdomen is soft.      Tenderness: There is no abdominal tenderness. There is no guarding or rebound.   Musculoskeletal:         General: Normal range of motion.      Cervical back: Normal range of motion and neck supple.      Right lower leg: No edema.      Left lower leg: No edema.   Skin:     General: Skin is warm and dry.   Neurological:      Mental Status: He is alert and oriented to person, place, and time. Mental status is at baseline.   Psychiatric:         Mood and Affect: Mood normal.                  Procedures:  Procedures      Medical Decision Making:      Comorbidities that affect care:    Diabetes    External Notes reviewed:    Previous ED Note: Patient was last seen in the emergency department for dysuria.      The following orders were placed and all results were independently analyzed by me:  Orders Placed This Encounter   Procedures    COVID PRE-OP / PRE-PROCEDURE SCREENING ORDER (NO ISOLATION) - Swab, Nasopharynx    COVID-19, FLU A/B, RSV PCR 1 HR TAT - Swab, Nasopharynx    Blood Culture - Blood,    Blood Culture - Blood,    Urine Culture - Urine,    XR Chest 1 View    XR Knee 3 View Right    [place] Draw    Comprehensive Metabolic Panel    Single High Sensitivity Troponin T    Magnesium     Urinalysis With Microscopic If Indicated (No Culture) - Urine, Clean Catch    CBC Auto Differential    Lactic Acid, Plasma    Urinalysis, Microscopic Only - Urine, Clean Catch    Diet: Regular/House; Texture: Regular (IDDSI 7); Fluid Consistency: Thin (IDDSI 0)    Undress & Gown    Continuous Pulse Oximetry    Vital Signs    Straight Cath    Inpatient Hospitalist Consult    Oxygen Therapy- Nasal Cannula; Titrate 1-6 LPM Per SpO2; 90 - 95%    POC Glucose Once    ECG 12 Lead ED Triage Standing Order; Weak / Dizzy / AMS    Insert Peripheral IV    Initiate Observation Status    Fall Precautions    CBC & Differential    Green Top (Gel)    Lavender Top    Gold Top - SST    Light Blue Top       Medications Given in the Emergency Department:  Medications   sodium chloride 0.9 % flush 10 mL (has no administration in time range)   acetaminophen (TYLENOL) tablet 650 mg ( Oral Not Given:  See Alt 7/16/24 1646)     Or   acetaminophen (TYLENOL) suppository 650 mg (650 mg Rectal Given 7/16/24 1646)   sodium chloride 0.9 % bolus 1,000 mL (0 mL Intravenous Stopped 7/16/24 1500)   cefTRIAXone (ROCEPHIN) 1,000 mg in sodium chloride 0.9 % 100 mL IVPB-VTB (0 mg Intravenous Stopped 7/16/24 1451)   diphenhydrAMINE (BENADRYL) injection 25 mg (25 mg Intravenous Given 7/16/24 1543)   famotidine (PEPCID) injection 20 mg (20 mg Intravenous Given 7/16/24 1545)   methylPREDNISolone sodium succinate (SOLU-Medrol) 125 mg in sterile water (preservative free) 2 mL (125 mg Intravenous Given 7/16/24 1541)   piperacillin-tazobactam (ZOSYN) IVPB 3.375 g IVPB in 100 mL NS (VTB) (0 g Intravenous Stopped 7/16/24 1630)        ED Course:         Labs:    Lab Results (last 24 hours)       Procedure Component Value Units Date/Time    CBC & Differential [[id number]]  (Abnormal) Collected: 07/16/24 1340    Specimen: Blood Updated: 07/16/24 1350    Narrative:      The following orders were created for panel order CBC & Differential.  Procedure                                Abnormality         Status                     ---------                               -----------         ------                     CBC Auto Differential[[id number]]        Abnormal            Final result                 Please view results for these tests on the individual orders.    Comprehensive Metabolic Panel [[id number]]  (Abnormal) Collected: 07/16/24 1340    Specimen: Blood Updated: 07/16/24 1413     Glucose 207 mg/dL      BUN 11 mg/dL      Creatinine 1.15 mg/dL      Sodium 133 mmol/L      Potassium 4.0 mmol/L      Chloride 96 mmol/L      CO2 25.2 mmol/L      Calcium 8.9 mg/dL      Total Protein 7.4 g/dL      Albumin 4.4 g/dL      ALT (SGPT) 21 U/L      AST (SGOT) 12 U/L      Alkaline Phosphatase 46 U/L      Total Bilirubin 0.8 mg/dL      Globulin 3.0 gm/dL      A/G Ratio 1.5 g/dL      BUN/Creatinine Ratio 9.6     Anion Gap 11.8 mmol/L      eGFR 72.0 mL/min/1.73     Narrative:      GFR Normal >60  Chronic Kidney Disease <60  Kidney Failure <15      Single High Sensitivity Troponin T [[id number]]  (Abnormal) Collected: 07/16/24 1340    Specimen: Blood Updated: 07/16/24 1413     HS Troponin T 47 ng/L     Narrative:      High Sensitive Troponin T Reference Range:  <14.0 ng/L- Negative Female for AMI  <22.0 ng/L- Negative Male for AMI  >=14 - Abnormal Female indicating possible myocardial injury.  >=22 - Abnormal Male indicating possible myocardial injury.   Clinicians would have to utilize clinical acumen, EKG, Troponin, and serial changes to determine if it is an Acute Myocardial Infarction or myocardial injury due to an underlying chronic condition.         Magnesium [[id number]]  (Normal) Collected: 07/16/24 1340    Specimen: Blood Updated: 07/16/24 1413     Magnesium 1.6 mg/dL     CBC Auto Differential [[id number]]  (Abnormal) Collected: 07/16/24 1340    Specimen: Blood Updated: 07/16/24 1350     WBC 14.71 10*3/mm3      RBC 4.74 10*6/mm3      Hemoglobin 13.6 g/dL      Hematocrit 42.8 %      MCV 90.3  fL      MCH 28.7 pg      MCHC 31.8 g/dL      RDW 14.7 %      RDW-SD 48.8 fl      MPV 10.2 fL      Platelets 160 10*3/mm3      Neutrophil % 82.9 %      Lymphocyte % 7.2 %      Monocyte % 9.2 %      Eosinophil % 0.1 %      Basophil % 0.1 %      Immature Grans % 0.5 %      Neutrophils, Absolute 12.19 10*3/mm3      Lymphocytes, Absolute 1.06 10*3/mm3      Monocytes, Absolute 1.36 10*3/mm3      Eosinophils, Absolute 0.01 10*3/mm3      Basophils, Absolute 0.02 10*3/mm3      Immature Grans, Absolute 0.07 10*3/mm3      nRBC 0.0 /100 WBC     COVID PRE-OP / PRE-PROCEDURE SCREENING ORDER (NO ISOLATION) - Swab, Nasopharynx [[id number]]  (Normal) Collected: 07/16/24 1342    Specimen: Swab from Nasopharynx Updated: 07/16/24 1425    Narrative:      The following orders were created for panel order COVID PRE-OP / PRE-PROCEDURE SCREENING ORDER (NO ISOLATION) - Swab, Nasopharynx.  Procedure                               Abnormality         Status                     ---------                               -----------         ------                     COVID-19, FLU A/B, RSV P...[[id number]]  Normal              Final result                 Please view results for these tests on the individual orders.    COVID-19, FLU A/B, RSV PCR 1 HR TAT - Swab, Nasopharynx [[id number]]  (Normal) Collected: 07/16/24 1342    Specimen: Swab from Nasopharynx Updated: 07/16/24 1425     COVID19 Not Detected     Influenza A PCR Not Detected     Influenza B PCR Not Detected     RSV, PCR Not Detected    Narrative:      Fact sheet for providers: https://www.fda.gov/media/352116/download    Fact sheet for patients: https://www.fda.gov/media/872805/download    Test performed by PCR.    Lactic Acid, Plasma [[id number]]  (Normal) Collected: 07/16/24 1343    Specimen: Blood Updated: 07/16/24 1411     Lactate 2.0 mmol/L     Blood Culture - Blood, Arm, Left [[id number]] Collected: 07/16/24 1343    Specimen: Blood from Arm, Left Updated: 07/16/24 1348    Blood Culture -  Blood, Arm, Left [[id number]] Collected: 07/16/24 1355    Specimen: Blood from Arm, Left Updated: 07/16/24 1406    Urinalysis With Microscopic If Indicated (No Culture) - Urine, Clean Catch [[id number]]  (Abnormal) Collected: 07/16/24 1444    Specimen: Urine, Clean Catch Updated: 07/16/24 1459     Color, UA Dark Yellow     Appearance, UA Clear     pH, UA 5.5     Specific Gravity, UA 1.024     Glucose,  mg/dL (1+)     Ketones, UA Trace     Bilirubin, UA Negative     Blood, UA Negative     Protein, UA 30 mg/dL (1+)     Leuk Esterase, UA Negative     Nitrite, UA Negative     Urobilinogen, UA 1.0 E.U./dL    Urinalysis, Microscopic Only - Urine, Clean Catch [[id number]]  (Abnormal) Collected: 07/16/24 1444    Specimen: Urine, Clean Catch Updated: 07/16/24 1641     RBC, UA 3-5 /HPF      WBC, UA 0-2 /HPF      Bacteria, UA None Seen /HPF      Squamous Epithelial Cells, UA 0-2 /HPF      Hyaline Casts, UA 7-12 /LPF      Methodology Automated Microscopy    Urine Culture - Urine, Urine, Clean Catch [[id number]] Collected: 07/16/24 1444    Specimen: Urine, Clean Catch Updated: 07/16/24 1648             Imaging:    XR Knee 3 View Right    Result Date: 7/16/2024  XR KNEE 3 VW RIGHT Date of Exam: 7/16/2024 2:23 PM EDT Indication: Knee pain pain Comparison: None available. Findings: No fracture or acute bony abnormality. No significant arthritic change. No fluid in the suprapatellar bursa. Atherosclerotic calcification noted, with popliteal artery stent present     Impression: No acute bony or joint process demonstrated Electronically Signed: Jason Pretty  7/16/2024 2:36 PM EDT  Workstation ID: [id number]    XR Chest 1 View    Result Date: 7/16/2024  XR CHEST 1 VW Date of Exam: 7/16/2024 1:11 PM EDT Indication: Weak/Dizzy/AMS triage protocol Comparison: None available. Findings: There are low lung volumes with poor inspiration. There is moderate elevation of the right diaphragm. There is mild linear atelectasis of the right lung  base. Left lung is clear. There are no definite effusions. Heart and pulmonary vessels appear within normal limits.     Impression: Poor inspiration with mild atelectasis of the right lung base. Electronically Signed: Oralia Rios MD  7/16/2024 1:28 PM EDT  Workstation ID: [id number]       Differential Diagnosis and Discussion:    Fever: Based on the complaint of fever, differential diagnosis includes but is not limited to meningitis, pneumonia, pyelonephritis, acute uti,  systemic immune response syndrome, sepsis, viral syndrome, fungal infection, tick born illness and other bacterial infections.    All labs were reviewed and interpreted by me.  All X-rays impressions were independently interpreted by me.    MDM     The patient´s CBC that was reviewed and interpreted by me shows no abnormalities of critical concern. Of note, there is no anemia requiring a blood transfusion and the platelet count is acceptable.  The patient´s CMP that was reviewed and interpretted by me shows no abnormalities of critical concern. Of note, the patient´s sodium and potassium are acceptable. The patient´s liver enzymes are unremarkable. The patient´s renal function (creatinine) is preserved. The patient has a normal anion gap.  Chest x-ray with possible pneumonia.  Patient was given a 1 L normal saline bolus and [facility]os[facility] Emergency Department.          Patient Care Considerations:    None      Consultants/Shared Management Plan:    Case was discussed with Dr. Chaidez who agrees with admission.    Social Determinants of Health:    Patient is independent, reliable, and has access to care.       Disposition and Care Coordination:    Admit:   Through independent evaluation of the patient's history, physical, and imperical data, the patient meets criteria for inpatient admission to the hospital.        Final diagnoses:   Pneumonia due to infectious organism, unspecified laterality, unspecified part of lung        ED Disposition       ED  Disposition   Decision to Admit    Condition   --    Comment   Level of Care: Telemetry [5]   Diagnosis: Pneumonia [297183]   Admitting Physician: VALERI TRUJILLO [[id number]]   Attending Physician: VALERI TRUJILLO [[id number]]                 This medical record created using voice recognition software.             Sherrell Delaney MD  07/16/24 1942

## 2024-07-16 NOTE — PAYOR COMM NOTE
"Oscar Tong (64 y.o. Male)       Date of Birth   1960    Social Security Number       Address   [address] [place] [address] [place] [address]    Home Phone   [phone]    MRN   [id number]       Catholic   Mormonism    Marital Status                               Admission Date   24    Admission Type   Emergency    Admitting Provider   Karol Chaidez DO    Attending Provider   Karol Chaidez DO    Department, Room/Bed   [facility] 4TH FLOOR MEDICAL TELEMETRY UNIT, 424/1       Discharge Date       Discharge Disposition       Discharge Destination                                 Attending Provider: Karol Chaidez DO    Allergies: Fentanyl, Rocephin [Ceftriaxone], Ciprofloxacin, Depakote [Valproic Acid], Quinolones    Isolation: Contact   Infection: MRSA (22)   Code Status: CPR    Ht: 182.8 cm (71.97\")   Wt: 131 kg (288 lb 12.8 oz)    Admission Cmt: None   Principal Problem: Pneumonia [J18.9]                   Active Insurance as of 2024       Primary Coverage       Payor Plan Insurance Group Employer/Plan Group    [facility] DE[facility] [id number]       Payor Plan Address Payor Plan Phone Number Payor Plan Fax Number Effective Dates    [facility] OFFICE OF COMMUNITY CARE [phone]  2019 - None Entered    [address] [address]       [facility] [id number]         Subscriber Name Subscriber Birth Date Member ID       OSCAR TONG 1960 [id number]                     Emergency Contacts        (Rel.) Home Phone Work Phone Mobile Phone    JOSETTE TONG (Spouse) -- -- [phone]    Mandy Tong (Daughter) -- -- [phone]                 History & Physical        Karol Chaidez DO at 24 1812           [facility] HISTORY AND PHYSICAL  Date: 2024   Patient Name: Oscar Tong  : 1960  MRN: [id number]  Primary Care Physician:  Pipe Servin MD  Date of admission: 2024    Subjectivefever  Subjective   Chief Complaint: " fever    HPI:  Mandeep Tracey is a 64 y.o. male who presents to ED for fever, chills, and weakness.  Patient also has SOB.    Patient's vitals: temperature: 101.8; Pulse: 112; RR-22; BP: 135/89.  Patient is on 3 L of oxygen.      On labs, patient has a WBC-14.71.    Chest xray shows mild atelectasias.  Knee xray shows: No acute process.       Personal History     Past Medical History:  Past Medical History:   Diagnosis Date    Aphasia     Cardiomyopathy     CPAP (continuous positive airway pressure) dependence     Diabetes     Hemiparesis     Right side     Morbid obesity     Nonrheumatic mitral valve regurgitation     BELKYS on CPAP     Peptic ulcer disease     Postoperative nausea and vomiting 3/13/2021    Psoriasis     Pyogenic arthritis of right hip     Seizures     Sleep apnea     Stroke 2004    Ventricular ectopy     asymptomatic         Past Surgical History:  Past Surgical History:   Procedure Laterality Date    CHOLECYSTECTOMY      COLONOSCOPY N/A 10/17/2023    Procedure: COLONOSCOPY WITH POLYPECTOMIES;  Surgeon: Angel Luis Mendoza MD;  Location: [facility] ENDOSCOPY;  Service: General;  Laterality: N/A;  COLON POLYPS, DIVERTICULOSIS    ENDOSCOPY N/A 10/17/2023    Procedure: ESOPHAGOGASTRODUODENOSCOPY WITH BIOPSIES;  Surgeon: Angel Luis Mendoza MD;  Location: [facility] ENDOSCOPY;  Service: General;  Laterality: N/A;  GASTRIC POLYP    EYE SURGERY      HIP SPACER INSERTION WITH ANTIBIOTIC CEMENT Right 1/9/2023    Procedure: RT. BIPOLAR HIP REMOVAL AND PLACEMENT OF SPACER;  Surgeon: Faustina Beebe MD;  Location: [facility];  Service: Orthopedics;  Laterality: Right;    INCISION AND DRAINAGE HIP Right 3/12/2021    Procedure: HIP ANTERIOR INCISION AND DRAINAGE WITH HANA TABLE;  Surgeon: Tao Andrea MD;  Location: [facility];  Service: Orthopedics;  Laterality: Right;    LUMBAR DISC SURGERY      US GUIDED FINE NEEDLE ASPIRATION  3/10/2021        Family History:   Family History   Problem  Relation Age of Onset    Hypertension Mother     Hyperlipidemia Mother     Arthritis Mother     Cancer Mother     Heart disease Father     Hyperlipidemia Sister     Drug abuse Sister     COPD Sister     Birth defects Sister     Early death Brother     Drug abuse Brother         Social History:   Social History     Tobacco Use    Smoking status: Former    Smokeless tobacco: Never    Tobacco comments:     quit 22 years ago    Vaping Use    Vaping status: Never Used   Substance Use Topics    Alcohol use: Never    Drug use: Never        Home Medications:  Ixekizumab, Lactobacillus, acetaminophen, apixaban, aspirin, baclofen, bumetanide, gabapentin, insulin glargine, metFORMIN, metoprolol succinate XL, multivitamin with minerals, rosuvastatin, sacubitril-valsartan, sertraline, topiramate, and trospium    Allergies:  Allergies   Allergen Reactions    Fentanyl Mental Status Change    Rocephin [Ceftriaxone] Hives and Itching    Ciprofloxacin Unknown - Low Severity    Depakote [Valproic Acid] Other (See Comments)     Behavioral changes    Quinolones Hives       Review of Systems   All systems were reviewed and negative except for: sob    Objective  Objective     Vitals:   Temp:  [99.8 °F (37.7 °C)-102.1 °F (38.9 °C)] 99.8 °F (37.7 °C)  Heart Rate:  [103-112] 103  Resp:  [20-22] 22  BP: (131-149)/(76-97) 141/92  Flow (L/min):  [2-3] 3    Physical Exam   Physical Exam   Physical Exam  Vitals and nursing note reviewed.   Constitutional:       General: He is not in acute distress.     Appearance: Normal appearance. He is not toxic-appearing.   HENT:      Head: Normocephalic and atraumatic.      Jaw: There is normal jaw occlusion.   Eyes:      General: Lids are normal.      Extraocular Movements: Extraocular movements intact.      Conjunctiva/sclera: Conjunctivae normal.      Pupils: Pupils are equal, round, and reactive to light.   Cardiovascular:      Rate and Rhythm: Normal rate and regular rhythm.      Pulses: Normal pulses.       Heart sounds: Normal heart sounds.   Pulmonary:      Effort: Pulmonary effort is normal. No respiratory distress.      Breath sounds: Normal breath sounds. No wheezing or rhonchi.   Abdominal:      General: Abdomen is flat.      Palpations: Abdomen is soft.      Tenderness: There is no abdominal tenderness. There is no guarding or rebound.   Musculoskeletal:         General: Normal range of motion.      Cervical back: Normal range of motion and neck supple.      Right lower leg: No edema.      Left lower leg: No edema.   Skin:     General: Skin is warm and dry.   Neurological:      Mental Status: He is alert and oriented to person, place, and time. Mental status is at baseline.   Psychiatric:         Mood and Affect: Mood normal.         Result Review     Result Review:  I have personally reviewed the results from the time of this admission to 7/16/2024 18:46 EDT and agree with these findings:  [x]  Laboratory  [x]  Microbiology  [x]  Radiology  [x]  EKG/Telemetry   [x]  Cardiology/Vascular   [x]  Pathology  [x]  Old records  []  Other:    Lab Results (most recent)       Procedure Component Value Units Date/Time    Urine Culture - Urine, Urine, Clean Catch [[id number]] Collected: 07/16/24 1444    Specimen: Urine, Clean Catch Updated: 07/16/24 1648    Urinalysis, Microscopic Only - Urine, Clean Catch [[id number]]  (Abnormal) Collected: 07/16/24 1444    Specimen: Urine, Clean Catch Updated: 07/16/24 1641     RBC, UA 3-5 /HPF      WBC, UA 0-2 /HPF      Bacteria, UA None Seen /HPF      Squamous Epithelial Cells, UA 0-2 /HPF      Hyaline Casts, UA 7-12 /LPF      Methodology Automated Microscopy    Urinalysis With Microscopic If Indicated (No Culture) - Urine, Clean Catch [[id number]]  (Abnormal) Collected: 07/16/24 1444    Specimen: Urine, Clean Catch Updated: 07/16/24 1459     Color, UA Dark Yellow     Appearance, UA Clear     pH, UA 5.5     Specific Gravity, UA 1.024     Glucose,  mg/dL (1+)     Ketones, UA  Trace     Bilirubin, UA Negative     Blood, UA Negative     Protein, UA 30 mg/dL (1+)     Leuk Esterase, UA Negative     Nitrite, UA Negative     Urobilinogen, UA 1.0 E.U./dL    COVID PRE-OP / PRE-PROCEDURE SCREENING ORDER (NO ISOLATION) - Swab, Nasopharynx [[id number]]  (Normal) Collected: 07/16/24 1342    Specimen: Swab from Nasopharynx Updated: 07/16/24 1425    Narrative:      The following orders were created for panel order COVID PRE-OP / PRE-PROCEDURE SCREENING ORDER (NO ISOLATION) - Swab, Nasopharynx.  Procedure                               Abnormality         Status                     ---------                               -----------         ------                     COVID-19, FLU A/B, RSV P...[[id number]]  Normal              Final result                 Please view results for these tests on the individual orders.    COVID-19, FLU A/B, RSV PCR 1 HR TAT - Swab, Nasopharynx [[id number]]  (Normal) Collected: 07/16/24 1342    Specimen: Swab from Nasopharynx Updated: 07/16/24 1425     COVID19 Not Detected     Influenza A PCR Not Detected     Influenza B PCR Not Detected     RSV, PCR Not Detected    Narrative:      Fact sheet for providers: https://www.fda.gov/media/127750/download    Fact sheet for patients: https://www.fda.gov/media/375425/download    Test performed by PCR.    Comprehensive Metabolic Panel [[id number]]  (Abnormal) Collected: 07/16/24 1340    Specimen: Blood Updated: 07/16/24 1413     Glucose 207 mg/dL      BUN 11 mg/dL      Creatinine 1.15 mg/dL      Sodium 133 mmol/L      Potassium 4.0 mmol/L      Chloride 96 mmol/L      CO2 25.2 mmol/L      Calcium 8.9 mg/dL      Total Protein 7.4 g/dL      Albumin 4.4 g/dL      ALT (SGPT) 21 U/L      AST (SGOT) 12 U/L      Alkaline Phosphatase 46 U/L      Total Bilirubin 0.8 mg/dL      Globulin 3.0 gm/dL      A/G Ratio 1.5 g/dL      BUN/Creatinine Ratio 9.6     Anion Gap 11.8 mmol/L      eGFR 72.0 mL/min/1.73     Narrative:      GFR Normal >60  Chronic  Kidney Disease <60  Kidney Failure <15      Single High Sensitivity Troponin T [[id number]]  (Abnormal) Collected: 07/16/24 1340    Specimen: Blood Updated: 07/16/24 1413     HS Troponin T 47 ng/L     Narrative:      High Sensitive Troponin T Reference Range:  <14.0 ng/L- Negative Female for AMI  <22.0 ng/L- Negative Male for AMI  >=14 - Abnormal Female indicating possible myocardial injury.  >=22 - Abnormal Male indicating possible myocardial injury.   Clinicians would have to utilize clinical acumen, EKG, Troponin, and serial changes to determine if it is an Acute Myocardial Infarction or myocardial injury due to an underlying chronic condition.         Magnesium [[id number]]  (Normal) Collected: 07/16/24 1340    Specimen: Blood Updated: 07/16/24 1413     Magnesium 1.6 mg/dL     Lactic Acid, Plasma [[id number]]  (Normal) Collected: 07/16/24 1343    Specimen: Blood Updated: 07/16/24 1411     Lactate 2.0 mmol/L     Blood Culture - Blood, Arm, Left [[id number]] Collected: 07/16/24 1355    Specimen: Blood from Arm, Left Updated: 07/16/24 1406    [place] Draw [[id number]] Collected: 07/16/24 1340    Specimen: Blood Updated: 07/16/24 1400    Narrative:      The following orders were created for panel order [place] Draw.  Procedure                               Abnormality         Status                     ---------                               -----------         ------                     Green Top (Gel)[[id number]]                                  Final result               Lavender Top[[id number]]                                     Final result               Gold Top - SST[[id number]]                                   Final result               Light Blue Top[[id number]]                                   Final result                 Please view results for these tests on the individual orders.    Green Top (Gel) [[id number]] Collected: 07/16/24 1340    Specimen: Blood Updated: 07/16/24 1400     Extra Tube Hold for add-ons.      Comment: Auto resulted.       Lavender Top [[id number]] Collected: 07/16/24 1340    Specimen: Blood Updated: 07/16/24 1400     Extra Tube hold for add-on     Comment: Auto resulted       Gold Top - SST [[id number]] Collected: 07/16/24 1340    Specimen: Blood Updated: 07/16/24 1400     Extra Tube Hold for add-ons.     Comment: Auto resulted.       Light Blue Top [[id number]] Collected: 07/16/24 1340    Specimen: Blood Updated: 07/16/24 1400     Extra Tube Hold for add-ons.     Comment: Auto resulted       CBC & Differential [[id number]]  (Abnormal) Collected: 07/16/24 1340    Specimen: Blood Updated: 07/16/24 1350    Narrative:      The following orders were created for panel order CBC & Differential.  Procedure                               Abnormality         Status                     ---------                               -----------         ------                     CBC Auto Differential[[id number]]        Abnormal            Final result                 Please view results for these tests on the individual orders.    CBC Auto Differential [[id number]]  (Abnormal) Collected: 07/16/24 1340    Specimen: Blood Updated: 07/16/24 1350     WBC 14.71 10*3/mm3      RBC 4.74 10*6/mm3      Hemoglobin 13.6 g/dL      Hematocrit 42.8 %      MCV 90.3 fL      MCH 28.7 pg      MCHC 31.8 g/dL      RDW 14.7 %      RDW-SD 48.8 fl      MPV 10.2 fL      Platelets 160 10*3/mm3      Neutrophil % 82.9 %      Lymphocyte % 7.2 %      Monocyte % 9.2 %      Eosinophil % 0.1 %      Basophil % 0.1 %      Immature Grans % 0.5 %      Neutrophils, Absolute 12.19 10*3/mm3      Lymphocytes, Absolute 1.06 10*3/mm3      Monocytes, Absolute 1.36 10*3/mm3      Eosinophils, Absolute 0.01 10*3/mm3      Basophils, Absolute 0.02 10*3/mm3      Immature Grans, Absolute 0.07 10*3/mm3      nRBC 0.0 /100 WBC     Blood Culture - Blood, Arm, Left [[id number]] Collected: 07/16/24 1343    Specimen: Blood from Arm, Left Updated: 07/16/24 1348            XR Knee 3  View Right    Result Date: 7/16/2024  Impression: No acute bony or joint process demonstrated Electronically Signed: Jason Pretty  7/16/2024 2:36 PM EDT  Workstation ID: [id number]    XR Chest 1 View    Result Date: 7/16/2024  Impression: Poor inspiration with mild atelectasis of the right lung base. Electronically Signed: Oralia Rios MD  7/16/2024 1:28 PM EDT  Workstation ID: [id number]     Assessment & Plan  Assessment / Plan   #1 sepsis as evidenced by leukocytosis, fever, tachycardia  -started on zosyn and vancomycin and will deescalate based off cultures.  -IVFs with caution, patient has CHF history.    #2 CHF not in acute exacerbation  GDMT: on ARNI, diuretic, isosorbide, BB.    #3 depression-continue zoloft    #4 BPH-continue oxybutinin    #5 Knee pain-check uric acid    #6 possible COPD  -duoneb, brovana, pulmicort, solumederol    #7 DM-2 continue lantus and ISS      VTE Prophylaxis:  Pharmacologic VTE prophylaxis orders are present.        CODE STATUS:    Level Of Support Discussed With: Patient  Code Status (Patient has no pulse and is not breathing): CPR (Attempt to Resuscitate)  Medical Interventions (Patient has pulse or is breathing): Full Support      Admission Status:  I believe this patient meets inpatient status.    Electronically signed by Karol Chaidez DO, 07/16/24, 6:12 PM EDT.             Electronically signed by Karol Chaidez DO at 07/16/24 1848          Emergency Department Notes        Sherrell Delaney MD at 07/16/24 1748          Time: 5:48 PM EDT  Date of encounter:  7/16/2024  Independent Historian/Clinical History and Information was obtained by:   Patient and Family    History is limited by: N/A    Chief Complaint: Fever      History of Present Illness:  Patient is a 64 y.o. year old male who presents to the emergency department for evaluation of fever, chills, and weakness.  Patient is also had a cough.  Patient also reports right lower extremity pain.  Patient has no nausea,  vomiting, or diarrhea.  Patient denies abdominal pain.  Patient has no diarrhea.  Patient has no dysuria or urinary frequency.    HPI    Patient Care Team  Primary Care Provider: Pipe Servin MD    Past Medical History:     Allergies   Allergen Reactions    Fentanyl Mental Status Change    Rocephin [Ceftriaxone] Hives and Itching    Ciprofloxacin Unknown - Low Severity    Depakote [Valproic Acid] Other (See Comments)     Behavioral changes    Quinolones Hives     Past Medical History:   Diagnosis Date    Aphasia     Cardiomyopathy     CPAP (continuous positive airway pressure) dependence     Diabetes     Hemiparesis     Right side     Morbid obesity     Nonrheumatic mitral valve regurgitation     BELKYS on CPAP     Peptic ulcer disease     Postoperative nausea and vomiting 3/13/2021    Psoriasis     Pyogenic arthritis of right hip     Seizures     Sleep apnea     Stroke 2004    Ventricular ectopy     asymptomatic     Past Surgical History:   Procedure Laterality Date    CHOLECYSTECTOMY      COLONOSCOPY N/A 10/17/2023    Procedure: COLONOSCOPY WITH POLYPECTOMIES;  Surgeon: Angel Luis Mendoza MD;  Location: [facility] ENDOSCOPY;  Service: General;  Laterality: N/A;  COLON POLYPS, DIVERTICULOSIS    ENDOSCOPY N/A 10/17/2023    Procedure: ESOPHAGOGASTRODUODENOSCOPY WITH BIOPSIES;  Surgeon: Angel Luis Mendoza MD;  Location: [facility] ENDOSCOPY;  Service: General;  Laterality: N/A;  GASTRIC POLYP    EYE SURGERY      HIP SPACER INSERTION WITH ANTIBIOTIC CEMENT Right 1/9/2023    Procedure: RT. BIPOLAR HIP REMOVAL AND PLACEMENT OF SPACER;  Surgeon: Faustina Beebe MD;  Location: [facility]  Service: Orthopedics;  Laterality: Right;    INCISION AND DRAINAGE HIP Right 3/12/2021    Procedure: HIP ANTERIOR INCISION AND DRAINAGE WITH HANA TABLE;  Surgeon: Tao Andrea MD;  Location: [facility]  Service: Orthopedics;  Laterality: Right;    LUMBAR DISC SURGERY      US GUIDED FINE NEEDLE ASPIRATION  3/10/2021      Family History   Problem Relation Age of Onset    Hypertension Mother     Hyperlipidemia Mother     Arthritis Mother     Cancer Mother     Heart disease Father     Hyperlipidemia Sister     Drug abuse Sister     COPD Sister     Birth defects Sister     Early death Brother     Drug abuse Brother        Home Medications:  Prior to Admission medications    Medication Sig Start Date End Date Taking? Authorizing Provider   acetaminophen (TYLENOL) 325 MG tablet Take 2 tablets by mouth Every 4 (Four) Hours As Needed for Mild Pain  or Headache. 3/31/21  Yes Angel Luis Dejesus MD   apixaban (ELIQUIS) 5 MG tablet tablet Take 1 tablet by mouth 2 (Two) Times a Day.   Yes Rachel Perales MD   aspirin 81 MG chewable tablet Chew 1 tablet Daily.   Yes Rachel Perales MD   baclofen (LIORESAL) 10 MG tablet Take 1 tablet by mouth 3 (Three) Times a Day.   Yes Rachel ePrales MD   bumetanide (BUMEX) 1 MG tablet Take 1 tablet by mouth Daily.   Yes Rachel Perales MD   gabapentin (NEURONTIN) 300 MG capsule Take 2 capsules by mouth 3 (Three) Times a Day. 1/16/23  Yes Fazal Givens MD   insulin glargine (LANTUS, SEMGLEE) 100 UNIT/ML injection Inject 15 Units under the skin into the appropriate area as directed Every Night.  Patient taking differently: Inject 10 Units under the skin into the appropriate area as directed Every Night. 1/16/23  Yes Fazal Givens MD   Ixekizumab (Taltz) 80 MG/ML solution auto-injector Inject 80 mg under the skin into the appropriate area as directed Every 14 (Fourteen) Days.   Yes Rachel Perales MD   LACTOBACILLUS PO Take 1 tablet by mouth 3 (Three) Times a Day.   Yes Rachel Perales MD   metFORMIN (GLUCOPHAGE) 1000 MG tablet Take 1 tablet by mouth 2 (Two) Times a Day With Meals.   Yes Rachel Perales MD   metoprolol succinate XL (TOPROL-XL) 50 MG 24 hr tablet Take 1 tablet by mouth Daily. 1/16/23  Yes Fazal Givens MD   multivitamin with  minerals tablet tablet Take 1 tablet by mouth Daily.   Yes Rachel Perales MD   rosuvastatin (CRESTOR) 20 MG tablet Take 1 tablet by mouth Daily.   Yes Rachel Perales MD   sacubitril-valsartan (ENTRESTO) 49-51 MG tablet Take 1 tablet by mouth 2 (Two) Times a Day.   Yes Rachel Perales MD   sertraline (ZOLOFT) 100 MG tablet Take 1.5 tablets by mouth Daily.   Yes Rachel Perales MD   topiramate (TOPAMAX) 50 MG tablet Take 1 tablet by mouth 2 (Two) Times a Day.   Yes Rachel Perales MD   trospium (SANCTURA) 20 MG tablet Take 1 tablet by mouth 2 (Two) Times a Day.   Yes Rachel Perales MD   bisacodyl (DULCOLAX) 10 MG suppository Insert 1 suppository into the rectum Daily As Needed for Constipation. 3/31/21 7/16/24  Angel Luis Dejesus MD   cephalexin (KEFLEX) 500 MG capsule Take 1 capsule by mouth 4 (Four) Times a Day. 10/22/23 7/16/24  Sherrell Delaney MD   clopidogrel (PLAVIX) 75 MG tablet Take 1 tablet by mouth Daily.  Patient not taking: Reported on 11/2/2023 7/16/24  Rachel Perales MD   docusate sodium 100 MG capsule Take 2 capsules by mouth 2 (Two) Times a Day. 3/17/21 7/16/24  Colby Ford MD   hydrOXYzine (ATARAX) 25 MG tablet Take 1 tablet by mouth 3 (Three) Times a Day As Needed for Itching.  7/16/24  Rachel Perales MD   insulin lispro (ADMELOG) 100 UNIT/ML injection Inject 0-7 Units under the skin into the appropriate area as directed 3 (Three) Times a Day Before Meals. 1/16/23 7/16/24  Fazal Givens MD   isosorbide mononitrate (IMDUR) 30 MG 24 hr tablet Take 1 tablet by mouth Daily.  7/16/24  Rachel Perales MD   nystatin (MYCOSTATIN) 100,000 unit/mL suspension Swish and swallow 5 mL 3 (Three) Times a Day.  Patient not taking: Reported on 11/2/2023 10/20/23 7/16/24  Deepak, April, APRN   polyethylene glycol (polyethylene glycol) 17 g packet Take 17 g by mouth 2 (Two) Times a Day.  Patient not taking: Reported on 11/2/2023 3/31/21  "7/16/24  Angel Luis Dejesus MD   simethicone (Gas-X) 80 MG chewable tablet Take 2 tablets PO after completing movi prep and 2 tablets PO 4 hours prior to procedure. 8/3/23 7/16/24  Deepak, April, APRN        Social History:   Social History     Tobacco Use    Smoking status: Former    Smokeless tobacco: Never    Tobacco comments:     quit 22 years ago    Vaping Use    Vaping status: Never Used   Substance Use Topics    Alcohol use: Never    Drug use: Never         Review of Systems:  Review of Systems   Constitutional:  Positive for fever. Negative for chills.   HENT:  Negative for congestion, rhinorrhea and sore throat.    Eyes:  Negative for pain and visual disturbance.   Respiratory:  Positive for cough. Negative for apnea, chest tightness and shortness of breath.    Cardiovascular:  Negative for chest pain and palpitations.   Gastrointestinal:  Negative for abdominal pain, diarrhea, nausea and vomiting.   Genitourinary:  Negative for difficulty urinating and dysuria.   Musculoskeletal:  Negative for joint swelling and myalgias.   Skin:  Negative for color change.   Neurological:  Negative for seizures and headaches.   Psychiatric/Behavioral: Negative.     All other systems reviewed and are negative.       Physical Exam:  /89   Pulse 106   Temp (!) 102.1 °F (38.9 °C) (Oral)   Resp 22   Ht 182.9 cm (72\")   Wt 132 kg (290 lb 5.5 oz)   SpO2 92%   BMI 39.38 kg/m²     Physical Exam  Vitals and nursing note reviewed.   Constitutional:       General: He is not in acute distress.     Appearance: Normal appearance. He is not toxic-appearing.   HENT:      Head: Normocephalic and atraumatic.      Jaw: There is normal jaw occlusion.   Eyes:      General: Lids are normal.      Extraocular Movements: Extraocular movements intact.      Conjunctiva/sclera: Conjunctivae normal.      Pupils: Pupils are equal, round, and reactive to light.   Cardiovascular:      Rate and Rhythm: Normal rate and regular rhythm.      " Pulses: Normal pulses.      Heart sounds: Normal heart sounds.   Pulmonary:      Effort: Pulmonary effort is normal. No respiratory distress.      Breath sounds: Normal breath sounds. No wheezing or rhonchi.   Abdominal:      General: Abdomen is flat.      Palpations: Abdomen is soft.      Tenderness: There is no abdominal tenderness. There is no guarding or rebound.   Musculoskeletal:         General: Normal range of motion.      Cervical back: Normal range of motion and neck supple.      Right lower leg: No edema.      Left lower leg: No edema.   Skin:     General: Skin is warm and dry.   Neurological:      Mental Status: He is alert and oriented to person, place, and time. Mental status is at baseline.   Psychiatric:         Mood and Affect: Mood normal.                  Procedures:  Procedures      Medical Decision Making:      Comorbidities that affect care:    Diabetes    External Notes reviewed:    Previous ED Note: Patient was last seen in the emergency department for dysuria.      The following orders were placed and all results were independently analyzed by me:  Orders Placed This Encounter   Procedures    COVID PRE-OP / PRE-PROCEDURE SCREENING ORDER (NO ISOLATION) - Swab, Nasopharynx    COVID-19, FLU A/B, RSV PCR 1 HR TAT - Swab, Nasopharynx    Blood Culture - Blood,    Blood Culture - Blood,    Urine Culture - Urine,    XR Chest 1 View    XR Knee 3 View Right    [employer] Draw    Comprehensive Metabolic Panel    Single High Sensitivity Troponin T    Magnesium    Urinalysis With Microscopic If Indicated (No Culture) - Urine, Clean Catch    CBC Auto Differential    Lactic Acid, Plasma    Urinalysis, Microscopic Only - Urine, Clean Catch    Diet: Regular/House; Texture: Regular (IDDSI 7); Fluid Consistency: Thin (IDDSI 0)    Undress & Gown    Continuous Pulse Oximetry    Vital Signs    Straight Cath    Inpatient Hospitalist Consult    Oxygen Therapy- Nasal Cannula; Titrate 1-6 LPM Per SpO2; 90 - 95%    POC  Glucose Once    ECG 12 Lead ED Triage Standing Order; Weak / Dizzy / AMS    Insert Peripheral IV    Initiate Observation Status    Fall Precautions    CBC & Differential    Green Top (Gel)    Lavender Top    Gold Top - SST    Light Blue Top       Medications Given in the Emergency Department:  Medications   sodium chloride 0.9 % flush 10 mL (has no administration in time range)   acetaminophen (TYLENOL) tablet 650 mg ( Oral Not Given:  See Alt 7/16/24 1646)     Or   acetaminophen (TYLENOL) suppository 650 mg (650 mg Rectal Given 7/16/24 1646)   sodium chloride 0.9 % bolus 1,000 mL (0 mL Intravenous Stopped 7/16/24 1500)   cefTRIAXone (ROCEPHIN) 1,000 mg in sodium chloride 0.9 % 100 mL IVPB-VTB (0 mg Intravenous Stopped 7/16/24 1451)   diphenhydrAMINE (BENADRYL) injection 25 mg (25 mg Intravenous Given 7/16/24 1543)   famotidine (PEPCID) injection 20 mg (20 mg Intravenous Given 7/16/24 1545)   methylPREDNISolone sodium succinate (SOLU-Medrol) 125 mg in sterile water (preservative free) 2 mL (125 mg Intravenous Given 7/16/24 1541)   piperacillin-tazobactam (ZOSYN) IVPB 3.375 g IVPB in 100 mL NS (VTB) (0 g Intravenous Stopped 7/16/24 1630)        ED Course:         Labs:    Lab Results (last 24 hours)       Procedure Component Value Units Date/Time    CBC & Differential [[id number]]  (Abnormal) Collected: 07/16/24 1340    Specimen: Blood Updated: 07/16/24 1350    Narrative:      The following orders were created for panel order CBC & Differential.  Procedure                               Abnormality         Status                     ---------                               -----------         ------                     CBC Auto Differential[[id number]]        Abnormal            Final result                 Please view results for these tests on the individual orders.    Comprehensive Metabolic Panel [[id number]]  (Abnormal) Collected: 07/16/24 1340    Specimen: Blood Updated: 07/16/24 1413     Glucose 207 mg/dL      BUN  11 mg/dL      Creatinine 1.15 mg/dL      Sodium 133 mmol/L      Potassium 4.0 mmol/L      Chloride 96 mmol/L      CO2 25.2 mmol/L      Calcium 8.9 mg/dL      Total Protein 7.4 g/dL      Albumin 4.4 g/dL      ALT (SGPT) 21 U/L      AST (SGOT) 12 U/L      Alkaline Phosphatase 46 U/L      Total Bilirubin 0.8 mg/dL      Globulin 3.0 gm/dL      A/G Ratio 1.5 g/dL      BUN/Creatinine Ratio 9.6     Anion Gap 11.8 mmol/L      eGFR 72.0 mL/min/1.73     Narrative:      GFR Normal >60  Chronic Kidney Disease <60  Kidney Failure <15      Single High Sensitivity Troponin T [[id number]]  (Abnormal) Collected: 07/16/24 1340    Specimen: Blood Updated: 07/16/24 1413     HS Troponin T 47 ng/L     Narrative:      High Sensitive Troponin T Reference Range:  <14.0 ng/L- Negative Female for AMI  <22.0 ng/L- Negative Male for AMI  >=14 - Abnormal Female indicating possible myocardial injury.  >=22 - Abnormal Male indicating possible myocardial injury.   Clinicians would have to utilize clinical acumen, EKG, Troponin, and serial changes to determine if it is an Acute Myocardial Infarction or myocardial injury due to an underlying chronic condition.         Magnesium [[id number]]  (Normal) Collected: 07/16/24 1340    Specimen: Blood Updated: 07/16/24 1413     Magnesium 1.6 mg/dL     CBC Auto Differential [[id number]]  (Abnormal) Collected: 07/16/24 1340    Specimen: Blood Updated: 07/16/24 1350     WBC 14.71 10*3/mm3      RBC 4.74 10*6/mm3      Hemoglobin 13.6 g/dL      Hematocrit 42.8 %      MCV 90.3 fL      MCH 28.7 pg      MCHC 31.8 g/dL      RDW 14.7 %      RDW-SD 48.8 fl      MPV 10.2 fL      Platelets 160 10*3/mm3      Neutrophil % 82.9 %      Lymphocyte % 7.2 %      Monocyte % 9.2 %      Eosinophil % 0.1 %      Basophil % 0.1 %      Immature Grans % 0.5 %      Neutrophils, Absolute 12.19 10*3/mm3      Lymphocytes, Absolute 1.06 10*3/mm3      Monocytes, Absolute 1.36 10*3/mm3      Eosinophils, Absolute 0.01 10*3/mm3      Basophils,  Absolute 0.02 10*3/mm3      Immature Grans, Absolute 0.07 10*3/mm3      nRBC 0.0 /100 WBC     COVID PRE-OP / PRE-PROCEDURE SCREENING ORDER (NO ISOLATION) - Swab, Nasopharynx [[id number]]  (Normal) Collected: 07/16/24 1342    Specimen: Swab from Nasopharynx Updated: 07/16/24 1425    Narrative:      The following orders were created for panel order COVID PRE-OP / PRE-PROCEDURE SCREENING ORDER (NO ISOLATION) - Swab, Nasopharynx.  Procedure                               Abnormality         Status                     ---------                               -----------         ------                     COVID-19, FLU A/B, RSV P...[[id number]]  Normal              Final result                 Please view results for these tests on the individual orders.    COVID-19, FLU A/B, RSV PCR 1 HR TAT - Swab, Nasopharynx [[id number]]  (Normal) Collected: 07/16/24 1342    Specimen: Swab from Nasopharynx Updated: 07/16/24 1425     COVID19 Not Detected     Influenza A PCR Not Detected     Influenza B PCR Not Detected     RSV, PCR Not Detected    Narrative:      Fact sheet for providers: https://www.fda.gov/media/114573/download    Fact sheet for patients: https://www.fda.gov/media/696841/download    Test performed by PCR.    Lactic Acid, Plasma [[id number]]  (Normal) Collected: 07/16/24 1343    Specimen: Blood Updated: 07/16/24 1411     Lactate 2.0 mmol/L     Blood Culture - Blood, Arm, Left [[id number]] Collected: 07/16/24 1343    Specimen: Blood from Arm, Left Updated: 07/16/24 1348    Blood Culture - Blood, Arm, Left [[id number]] Collected: 07/16/24 1355    Specimen: Blood from Arm, Left Updated: 07/16/24 1406    Urinalysis With Microscopic If Indicated (No Culture) - Urine, Clean Catch [[id number]]  (Abnormal) Collected: 07/16/24 1444    Specimen: Urine, Clean Catch Updated: 07/16/24 1459     Color, UA Dark Yellow     Appearance, UA Clear     pH, UA 5.5     Specific Gravity, UA 1.024     Glucose,  mg/dL (1+)     Ketones, UA Trace      Bilirubin, UA Negative     Blood, UA Negative     Protein, UA 30 mg/dL (1+)     Leuk Esterase, UA Negative     Nitrite, UA Negative     Urobilinogen, UA 1.0 E.U./dL    Urinalysis, Microscopic Only - Urine, Clean Catch [[id number]]  (Abnormal) Collected: 07/16/24 1444    Specimen: Urine, Clean Catch Updated: 07/16/24 1641     RBC, UA 3-5 /HPF      WBC, UA 0-2 /HPF      Bacteria, UA None Seen /HPF      Squamous Epithelial Cells, UA 0-2 /HPF      Hyaline Casts, UA 7-12 /LPF      Methodology Automated Microscopy    Urine Culture - Urine, Urine, Clean Catch [[id number]] Collected: 07/16/24 1444    Specimen: Urine, Clean Catch Updated: 07/16/24 1648             Imaging:    XR Knee 3 View Right    Result Date: 7/16/2024  XR KNEE 3 VW RIGHT Date of Exam: 7/16/2024 2:23 PM EDT Indication: Knee pain pain Comparison: None available. Findings: No fracture or acute bony abnormality. No significant arthritic change. No fluid in the suprapatellar bursa. Atherosclerotic calcification noted, with popliteal artery stent present     Impression: No acute bony or joint process demonstrated Electronically Signed: Jason Pretty  7/16/2024 2:36 PM EDT  Workstation ID: [id number]    XR Chest 1 View    Result Date: 7/16/2024  XR CHEST 1 VW Date of Exam: 7/16/2024 1:11 PM EDT Indication: Weak/Dizzy/AMS triage protocol Comparison: None available. Findings: There are low lung volumes with poor inspiration. There is moderate elevation of the right diaphragm. There is mild linear atelectasis of the right lung base. Left lung is clear. There are no definite effusions. Heart and pulmonary vessels appear within normal limits.     Impression: Poor inspiration with mild atelectasis of the right lung base. Electronically Signed: Oralia Rios MD  7/16/2024 1:28 PM EDT  Workstation ID: [id number]       Differential Diagnosis and Discussion:    Fever: Based on the complaint of fever, differential diagnosis includes but is not limited to meningitis,  pneumonia, pyelonephritis, acute uti,  systemic immune response syndrome, sepsis, viral syndrome, fungal infection, tick born illness and other bacterial infections.    All labs were reviewed and interpreted by me.  All X-rays impressions were independently interpreted by me.    MDM     The patient´s CBC that was reviewed and interpreted by me shows no abnormalities of critical concern. Of note, there is no anemia requiring a blood transfusion and the platelet count is acceptable.  The patient´s CMP that was reviewed and interpretted by me shows no abnormalities of critical concern. Of note, the patient´s sodium and potassium are acceptable. The patient´s liver enzymes are unremarkable. The patient´s renal function (creatinine) is preserved. The patient has a normal anion gap.  Chest x-ray with possible pneumonia.  Patient was given a 1 L normal saline bolus and Zos[facility] Emergency Department.          Patient Care Considerations:    None      Consultants/Shared Management Plan:    Case was discussed with Dr. Trujillo who agrees with admission.    Social Determinants of Health:    Patient is independent, reliable, and has access to care.       Disposition and Care Coordination:    Admit:   Through independent evaluation of the patient's history, physical, and imperical data, the patient meets criteria for inpatient admission to the hospital.        Final diagnoses:   Pneumonia due to infectious organism, unspecified laterality, unspecified part of lung        ED Disposition       ED Disposition   Decision to Admit    Condition   --    Comment   Level of Care: Telemetry [5]   Diagnosis: Pneumonia [695107]   Admitting Physician: VALERI TRUJILLO [[id number]]   Attending Physician: VALERI TRUJILLO [[id number]]                 This medical record created using voice recognition software.             Sherrell Delaney MD  07/16/24 1755      Electronically signed by Sherrell Delaney MD at 07/16/24 1755       Vital Signs (last day)        Date/Time Temp Temp src Pulse Resp BP Patient Position SpO2    07/16/24 1800 99.8 (37.7) Oral 103 22 141/92 Lying 93    07/16/24 1715 -- -- 106 -- 135/89 -- 92    07/16/24 15:55:18 102.1 (38.9) Oral 112 22 141/97 Lying 91    07/16/24 1500 -- -- 112 -- 149/96 -- 90    07/16/24 1326 -- -- -- -- -- -- 90    07/16/24 1324 101.8 (38.8) Oral 108 20 131/76 Sitting 84          Oxygen Therapy (last day)       Date/Time SpO2 Device (Oxygen Therapy) Flow (L/min) Oxygen Concentration (%) ETCO2 (mmHg)    07/16/24 1800 93 nasal cannula 3 -- --    07/16/24 1715 92 nasal cannula 3 -- --    07/16/24 15:55:18 91 nasal cannula 3 -- --    07/16/24 1500 90 nasal cannula 2 -- --    07/16/24 1326 90 nasal cannula 2 -- --    07/16/24 1324 84 room air -- -- --          Facility-Administered Medications as of 7/16/2024   Medication Dose Route Frequency Provider Last Rate Last Admin    acetaminophen (TYLENOL) tablet 650 mg  650 mg Oral Q4H PRN Chaidez, Dimpi, DO        Or    acetaminophen (TYLENOL) 160 MG/5ML oral solution 650 mg  650 mg Oral Q4H PRN Chaidez, Dimpi, DO        Or    acetaminophen (TYLENOL) suppository 650 mg  650 mg Rectal Q4H PRN Chaidez, Dimpi, DO        apixaban (ELIQUIS) tablet 5 mg  5 mg Oral Q12H Chaidez, Dimpi, DO        arformoterol (BROVANA) nebulizer solution 15 mcg  15 mcg Nebulization BID - RT Chaidez, Dimpi, DO        aspirin chewable tablet 81 mg  81 mg Oral Daily Chaidez, Dimpi, DO        sennosides-docusate (PERICOLACE) 8.6-50 MG per tablet 2 tablet  2 tablet Oral BID Chaidez, Dimpi, DO        And    polyethylene glycol (MIRALAX) packet 17 g  17 g Oral Daily PRN Chaidez, Dimpi, DO        And    bisacodyl (DULCOLAX) EC tablet 5 mg  5 mg Oral Daily PRN Chaidez, Dimpi, DO        And    bisacodyl (DULCOLAX) suppository 10 mg  10 mg Rectal Daily PRN Chaidez, Dimpi, DO        budesonide (PULMICORT) nebulizer solution 0.5 mg  0.5 mg Nebulization BID - RT Chaidez, Dimpi, DO        [COMPLETED] cefTRIAXone (ROCEPHIN) 1,000 mg in sodium  chloride 0.9 % 100 mL IVPB-VTB  1,000 mg Intravenous Once Sherrell Delaney MD   Stopped at 07/16/24 1451    [COMPLETED] diphenhydrAMINE (BENADRYL) injection 25 mg  25 mg Intravenous Once Sherrell Delaney MD   25 mg at 07/16/24 1543    [COMPLETED] famotidine (PEPCID) injection 20 mg  20 mg Intravenous Once Sherrell Delaney MD   20 mg at 07/16/24 1545    gabapentin (NEURONTIN) capsule 600 mg  600 mg Oral TID Chaidez, Dimpi, DO        guaiFENesin (MUCINEX) 12 hr tablet 1,200 mg  1,200 mg Oral Q12H Chaidez, Dimpi, DO        hydrOXYzine (ATARAX) tablet 25 mg  25 mg Oral TID PRN Chaidez, Dimpi, DO        insulin glargine (LANTUS, SEMGLEE) injection 15 Units  15 Units Subcutaneous Nightly Chaidez, Dimpi, DO        Insulin Lispro (humaLOG) injection 0-7 Units  0-7 Units Subcutaneous TID AC Chaidez, Dimpi, DO        ipratropium-albuterol (DUO-NEB) nebulizer solution 3 mL  3 mL Nebulization 4x Daily - RT Chaidez, Dimpi, DO        isosorbide mononitrate (IMDUR) 24 hr tablet 30 mg  30 mg Oral Daily Chaidez, Dimpi, DO        melatonin tablet 5 mg  5 mg Oral Nightly PRN Chaidez, Dimpi, DO        [Held by provider] metFORMIN (GLUCOPHAGE) tablet 1,000 mg  1,000 mg Oral BID With Meals Chaidez, Dimpi, DO        [COMPLETED] methylPREDNISolone sodium succinate (SOLU-Medrol) 125 mg in sterile water (preservative free) 2 mL  125 mg Intravenous Once Sherrell Delaney MD   125 mg at 07/16/24 1541    methylPREDNISolone sodium succinate (SOLU-Medrol) 40 mg in sterile water (preservative free) 1 mL  40 mg Intravenous Q12H Chaidez, Dimpi, DO        metoprolol succinate XL (TOPROL-XL) 24 hr tablet 50 mg  50 mg Oral Daily Chaidez, Dimpi, DO        ondansetron ODT (ZOFRAN-ODT) disintegrating tablet 4 mg  4 mg Oral Q6H PRN Chaidez, Dimpi, DO        Or    ondansetron (ZOFRAN) injection 4 mg  4 mg Intravenous Q6H PRN Karol Chaidez,         oxybutynin XL (DITROPAN-XL) 24 hr tablet 5 mg  5 mg Oral Daily Karol Chaidez, DO        Pharmacy to dose vancomycin   Does  not apply Continuous PRN Chaidez, Dimpi, DO        [COMPLETED] piperacillin-tazobactam (ZOSYN) IVPB 3.375 g IVPB in 100 mL NS (VTB)  3.375 g Intravenous Once Sherrell Delaney MD   Stopped at 07/16/24 1630    rosuvastatin (CRESTOR) tablet 40 mg  40 mg Oral Daily Chaidez, Carlospi, DO        sacubitril-valsartan (ENTRESTO) 24-26 MG tablet 1 tablet  1 tablet Oral BID ChaidezCarlospi, DO        sertraline (ZOLOFT) tablet 100 mg  100 mg Oral Daily Chaidez, Carlospi, DO        [COMPLETED] sodium chloride 0.9 % bolus 1,000 mL  1,000 mL Intravenous Once Sherrell Delaney MD   Stopped at 07/16/24 1500    sodium chloride 0.9 % flush 10 mL  10 mL Intravenous PRN Sherrell Delaney MD        sodium chloride 0.9 % flush 10 mL  10 mL Intravenous Q12H Chaidez, Dimpi, DO        sodium chloride 0.9 % flush 10 mL  10 mL Intravenous PRN Chaidez, Dimpi, DO        sodium chloride 0.9 % infusion 40 mL  40 mL Intravenous PRN Chaidez, Dimpi, DO        [START ON 7/17/2024] vancomycin 1250 mg/250 mL 0.9% NS IVPB ([facility])  1,250 mg Intravenous Q12H Chaidez, Carlospi, DO        vancomycin IVPB 1500 mg in 0.9% NaCl (Premix) 500 mL  1,500 mg Intravenous Once Chaidez, Dimpi, DO         Orders (active)        Start     Ordered    07/17/24 0800  Oral Care  2 Times Daily       07/16/24 1846 07/17/24 0800  vancomycin 1250 mg/250 mL 0.9% NS IVPB ([facility])  Every 12 Hours         07/16/24 1852    07/17/24 0600  Basic Metabolic Panel  Daily       07/16/24 1846 07/17/24 0600  CBC & Differential  Daily       07/16/24 1846 07/16/24 2130  arformoterol (BROVANA) nebulizer solution 15 mcg  2 Times Daily - RT         07/16/24 1840 07/16/24 2130  budesonide (PULMICORT) nebulizer solution 0.5 mg  2 Times Daily - RT         07/16/24 1841 07/16/24 2100  apixaban (ELIQUIS) tablet 5 mg  Every 12 Hours Scheduled         07/16/24 1758 07/16/24 2100  gabapentin (NEURONTIN) capsule 600 mg  3 Times Daily         07/16/24 1758 07/16/24 2100  insulin glargine (LANTUS, SEMGLEE)  "injection 15 Units  Nightly         07/16/24 1758    07/16/24 2100  sacubitril-valsartan (ENTRESTO) 24-26 MG tablet 1 tablet  2 Times Daily         07/16/24 1758 07/16/24 2100  guaiFENesin (MUCINEX) 12 hr tablet 1,200 mg  Every 12 Hours Scheduled         07/16/24 1842 07/16/24 2100  sodium chloride 0.9 % flush 10 mL  Every 12 Hours Scheduled         07/16/24 1846 07/16/24 2100  sennosides-docusate (PERICOLACE) 8.6-50 MG per tablet 2 tablet  2 Times Daily        Placed in \"And\" Linked Group    07/16/24 1846 07/16/24 2030  ipratropium-albuterol (DUO-NEB) nebulizer solution 3 mL  4 Times Daily - RT         07/16/24 1840 07/16/24 2000  Vital Signs  Every 4 Hours       07/16/24 1846 07/16/24 1945  vancomycin IVPB 1500 mg in 0.9% NaCl (Premix) 500 mL  Once         07/16/24 1852 07/16/24 1930  methylPREDNISolone sodium succinate (SOLU-Medrol) 40 mg in sterile water (preservative free) 1 mL  Every 12 Hours         07/16/24 1838    07/16/24 1847  Magnesium  Daily       07/16/24 1846 07/16/24 1847  Phosphorus  Daily       07/16/24 1846 07/16/24 1846  acetaminophen (TYLENOL) tablet 650 mg  Every 4 Hours PRN        Placed in \"Or\" Linked Group    07/16/24 1846    07/16/24 1846  acetaminophen (TYLENOL) 160 MG/5ML oral solution 650 mg  Every 4 Hours PRN        Placed in \"Or\" Linked Group    07/16/24 1846    07/16/24 1846  acetaminophen (TYLENOL) suppository 650 mg  Every 4 Hours PRN        Placed in \"Or\" Linked Group    07/16/24 1846    07/16/24 1845  melatonin tablet 5 mg  Nightly PRN         07/16/24 1846    07/16/24 1845  polyethylene glycol (MIRALAX) packet 17 g  Daily PRN        Placed in \"And\" Linked Group    07/16/24 1846    07/16/24 1845  bisacodyl (DULCOLAX) EC tablet 5 mg  Daily PRN        Placed in \"And\" Linked Group    07/16/24 1846 07/16/24 1845  bisacodyl (DULCOLAX) suppository 10 mg  Daily PRN        Placed in \"And\" Linked Group    07/16/24 1846 07/16/24 1845  aspirin chewable tablet " "81 mg  Daily         07/16/24 1758 07/16/24 1845  Insulin Lispro (humaLOG) injection 0-7 Units  3 Times Daily Before Meals         07/16/24 1758 07/16/24 1845  isosorbide mononitrate (IMDUR) 24 hr tablet 30 mg  Daily         07/16/24 1758 07/16/24 1845  [Held by provider]  metFORMIN (GLUCOPHAGE) tablet 1,000 mg  2 Times Daily With Meals        (On hold since today at 1758 until manually unheld; held by Karol Chaidez DOHold Reason: Hold For Procedure)    07/16/24 1758 07/16/24 1845  metoprolol succinate XL (TOPROL-XL) 24 hr tablet 50 mg  Daily         07/16/24 1758 07/16/24 1845  rosuvastatin (CRESTOR) tablet 40 mg  Daily         07/16/24 1758 07/16/24 1845  sertraline (ZOLOFT) tablet 100 mg  Daily         07/16/24 1758 07/16/24 1845  oxybutynin XL (DITROPAN-XL) 24 hr tablet 5 mg  Daily         07/16/24 1758 07/16/24 1845  Code Status and Medical Interventions:  Continuous         07/16/24 1846 07/16/24 1845  Intake & Output  Every Shift       07/16/24 1846 07/16/24 1845  Weigh Patient  Once         07/16/24 1846 07/16/24 1845  Insert Peripheral IV  Once         07/16/24 1846 07/16/24 1845  Saline Lock & Maintain IV Access  Continuous         07/16/24 1846 07/16/24 1844  sodium chloride 0.9 % flush 10 mL  As Needed         07/16/24 1846 07/16/24 1844  sodium chloride 0.9 % infusion 40 mL  As Needed         07/16/24 1846 07/16/24 1844  ondansetron ODT (ZOFRAN-ODT) disintegrating tablet 4 mg  Every 6 Hours PRN        Placed in \"Or\" Linked Group    07/16/24 1846 07/16/24 1844  ondansetron (ZOFRAN) injection 4 mg  Every 6 Hours PRN        Placed in \"Or\" Linked Group    07/16/24 1846 07/16/24 1843  RT to Initiate Bronchopulmonary Hygiene Protocol  Once         07/16/24 1842 07/16/24 1839  Respiratory Culture - Sputum, Throat  Once         07/16/24 1838    07/16/24 1839  Legionella Antigen, Urine - Urine, Urine, Clean Catch  Once         07/16/24 1838    07/16/24 1839  " S. Pneumo Ag Urine or CSF - Urine, Urine, Clean Catch  Once         07/16/24 1838    07/16/24 1839  MRSA Screen, PCR (Inpatient) - Swab, Nares  Once         07/16/24 1839    07/16/24 1836  NIPPV (CPAP or BIPAP)  At Bedtime & As Needed-RT         07/16/24 1836    07/16/24 1831  Pharmacy to dose vancomycin  Continuous PRN         07/16/24 1831 07/16/24 1758  hydrOXYzine (ATARAX) tablet 25 mg  3 Times Daily PRN         07/16/24 1758    07/16/24 1649  Urine Culture - Urine, Urine, Clean Catch  Once         07/16/24 1648    07/16/24 1632  Diet: Regular/House; Texture: Regular (IDDSI 7); Fluid Consistency: Thin (IDDSI 0)  Diet Effective Now         07/16/24 1631    07/16/24 1604  Inpatient Hospitalist Consult  Once        Specialty:  Hospitalist  Provider:  Karol Chaidez DO    07/16/24 1603    07/16/24 1344  Blood Culture - Blood, Arm, Left  Once         07/16/24 1343    07/16/24 1344  Blood Culture - Blood, Arm, Left  Once         07/16/24 1343    07/16/24 1308  Cardiac Monitoring  Continuous        Comments: Follow Standing Orders As Outlined in Process Instructions (Open Order Report to View Full Instructions)    07/16/24 1307    07/16/24 1308  Fall Precautions  Continuous         07/16/24 1307    07/16/24 1308  Insert Peripheral IV  Once         07/16/24 1307    07/16/24 1307  sodium chloride 0.9 % flush 10 mL  As Needed         07/16/24 1307    Unscheduled  Oxygen Therapy- Nasal Cannula; Titrate 1-6 LPM Per SpO2; 90 - 95%  Continuous PRN       07/16/24 1307    Unscheduled  Straight Cath  As Needed       07/16/24 1404    Pending  Inpatient Diabetes Educator Consult  Once        Provider:  (Not yet assigned)    Pending

## 2024-07-16 NOTE — H&P
[facility] HISTORY AND PHYSICAL  Date: 2024   Patient Name: Mandeep Tracey  : 1960  MRN: [id number]  Primary Care Physician:  Pipe Servin MD  Date of admission: 2024    Subjective fever  Subjective   Chief Complaint: fever    HPI:  Mandeep Tracey is a 64 y.o. male with history of prior stroke, seizure disorder, history of hip infection, hypertension, cardiomyopathy, hyperlipidemia, morbid obesity, obstructive sleep apnea who presents to ED for fever, chills, and weakness.  Patient also reports shortness of breath.    Patient's vitals: temperature: 101.8; Pulse: 112; RR-22; BP: 135/89.  Patient is on 3 L of oxygen which is new.      On labs, patient has a WBC-14.71.  High-sensitivity troponin 47.  Sodium of 133.  Chloride of 96.  Glucose of 207.  Urine does not show urinary tract infection.    Chest xray shows mild atelectasias.  Knee xray shows: No acute process.      His recent echo shows an EF of 26 to 30%.  Left ventricle is dilated.    Personal History     Past Medical History:  Past Medical History:   Diagnosis Date    Aphasia     Cardiomyopathy     CPAP (continuous positive airway pressure) dependence     Diabetes     Hemiparesis     Right side     Morbid obesity     Nonrheumatic mitral valve regurgitation     BELKYS on CPAP     Peptic ulcer disease     Postoperative nausea and vomiting 3/13/2021    Psoriasis     Pyogenic arthritis of right hip     Seizures     Sleep apnea     Stroke 2004    Ventricular ectopy     asymptomatic         Past Surgical History:  Past Surgical History:   Procedure Laterality Date    CHOLECYSTECTOMY      COLONOSCOPY N/A 10/17/2023    Procedure: COLONOSCOPY WITH POLYPECTOMIES;  Surgeon: Angel Luis Mendoza MD;  Location: [facility] ENDOSCOPY;  Service: General;  Laterality: N/A;  COLON POLYPS, DIVERTICULOSIS    ENDOSCOPY N/A 10/17/2023    Procedure: ESOPHAGOGASTRODUODENOSCOPY WITH BIOPSIES;  Surgeon: Angel Luis Mendoza MD;  Location: [facility]  ENDOSCOPY;  Service: General;  Laterality: N/A;  GASTRIC POLYP    EYE SURGERY      HIP SPACER INSERTION WITH ANTIBIOTIC CEMENT Right 1/9/2023    Procedure: RT. BIPOLAR HIP REMOVAL AND PLACEMENT OF SPACER;  Surgeon: Faustina Beebe MD;  Location: [facility] OR;  Service: Orthopedics;  Laterality: Right;    INCISION AND DRAINAGE HIP Right 3/12/2021    Procedure: HIP ANTERIOR INCISION AND DRAINAGE WITH HANA TABLE;  Surgeon: Tao Andrea MD;  Location: [facility] OR;  Service: Orthopedics;  Laterality: Right;    LUMBAR DISC SURGERY      US GUIDED FINE NEEDLE ASPIRATION  3/10/2021        Family History:   Family History   Problem Relation Age of Onset    Hypertension Mother     Hyperlipidemia Mother     Arthritis Mother     Cancer Mother     Heart disease Father     Hyperlipidemia Sister     Drug abuse Sister     COPD Sister     Birth defects Sister     Early death Brother     Drug abuse Brother         Social History:   Social History     Tobacco Use    Smoking status: Former    Smokeless tobacco: Never    Tobacco comments:     quit 22 years ago    Vaping Use    Vaping status: Never Used   Substance Use Topics    Alcohol use: Never    Drug use: Never        Home Medications:  Ixekizumab, Lactobacillus, acetaminophen, apixaban, aspirin, baclofen, bumetanide, gabapentin, insulin glargine, metFORMIN, metoprolol succinate XL, multivitamin with minerals, rosuvastatin, sacubitril-valsartan, sertraline, topiramate, and trospium    Allergies:  Allergies   Allergen Reactions    Fentanyl Mental Status Change    Rocephin [Ceftriaxone] Hives and Itching    Ciprofloxacin Unknown - Low Severity    Depakote [Valproic Acid] Other (See Comments)     Behavioral changes    Quinolones Hives       Review of Systems   All systems were reviewed and negative except for: sob    Objective   Objective     Vitals:   Temp:  [99.8 °F (37.7 °C)-102.1 °F (38.9 °C)] 99.8 °F (37.7 °C)  Heart Rate:  [103-112] 103  Resp:  [20-22] 22  BP:  (131-149)/(76-97) 141/92  Flow (L/min):  [2-3] 3    Physical Exam   Physical Exam   Physical Exam  Vitals and nursing note reviewed.   Constitutional:       General: Patient is snoring, he does awaken and promptly falls back to sleep.     Appearance: Normal appearance. He is not toxic-appearing.   HENT:      Head: Normocephalic and atraumatic.      Jaw: There is normal jaw occlusion.   Eyes:      General: Lids are normal.      Extraocular Movements: Extraocular movements intact.      Conjunctiva/sclera: Conjunctivae normal.      Pupils: Pupils are equal, round, and reactive to light.   Cardiovascular:      Rate and Rhythm: Normal rate and regular rhythm.      Pulses: Normal pulses.      Heart sounds: Normal heart sounds.   Pulmonary:      Effort: Pulmonary effort is normal. No respiratory distress.      Breath sounds: Normal breath sounds. No wheezing or rhonchi.   Abdominal:      General: Abdomen is flat.      Palpations: Abdomen is soft.      Tenderness: There is no abdominal tenderness. There is no guarding or rebound.   Musculoskeletal:         General: Normal range of motion.      Cervical back: Normal range of motion and neck supple.      Right lower leg: No edema.      Left lower leg: No edema.   Skin:     General: Skin is warm and dry.   Neurological:      Mental Status: He is alert and oriented to person, place, and time. Mental status is at baseline.   Psychiatric:         Mood and Affect: Mood normal.         Result Review      Result Review:  I have personally reviewed the results from the time of this admission to 7/16/2024 18:46 EDT and agree with these findings:  [x]  Laboratory  [x]  Microbiology  [x]  Radiology  [x]  EKG/Telemetry   [x]  Cardiology/Vascular   [x]  Pathology  [x]  Old records  []  Other:    Lab Results (most recent)       Procedure Component Value Units Date/Time    Urine Culture - Urine, Urine, Clean Catch [[id number]] Collected: 07/16/24 1444    Specimen: Urine, Clean Catch Updated:  07/16/24 1648    Urinalysis, Microscopic Only - Urine, Clean Catch [[id number]]  (Abnormal) Collected: 07/16/24 1444    Specimen: Urine, Clean Catch Updated: 07/16/24 1641     RBC, UA 3-5 /HPF      WBC, UA 0-2 /HPF      Bacteria, UA None Seen /HPF      Squamous Epithelial Cells, UA 0-2 /HPF      Hyaline Casts, UA 7-12 /LPF      Methodology Automated Microscopy    Urinalysis With Microscopic If Indicated (No Culture) - Urine, Clean Catch [[id number]]  (Abnormal) Collected: 07/16/24 1444    Specimen: Urine, Clean Catch Updated: 07/16/24 1459     Color, UA Dark Yellow     Appearance, UA Clear     pH, UA 5.5     Specific Gravity, UA 1.024     Glucose,  mg/dL (1+)     Ketones, UA Trace     Bilirubin, UA Negative     Blood, UA Negative     Protein, UA 30 mg/dL (1+)     Leuk Esterase, UA Negative     Nitrite, UA Negative     Urobilinogen, UA 1.0 E.U./dL    COVID PRE-OP / PRE-PROCEDURE SCREENING ORDER (NO ISOLATION) - Swab, Nasopharynx [[id number]]  (Normal) Collected: 07/16/24 1342    Specimen: Swab from Nasopharynx Updated: 07/16/24 1425    Narrative:      The following orders were created for panel order COVID PRE-OP / PRE-PROCEDURE SCREENING ORDER (NO ISOLATION) - Swab, Nasopharynx.  Procedure                               Abnormality         Status                     ---------                               -----------         ------                     COVID-19, FLU A/B, RSV P...[[id number]]  Normal              Final result                 Please view results for these tests on the individual orders.    COVID-19, FLU A/B, RSV PCR 1 HR TAT - Swab, Nasopharynx [[id number]]  (Normal) Collected: 07/16/24 1342    Specimen: Swab from Nasopharynx Updated: 07/16/24 1425     COVID19 Not Detected     Influenza A PCR Not Detected     Influenza B PCR Not Detected     RSV, PCR Not Detected    Narrative:      Fact sheet for providers: https://www.fda.gov/media/887803/download    Fact sheet for patients:  https://www.fda.gov/media/879219/download    Test performed by PCR.    Comprehensive Metabolic Panel [[id number]]  (Abnormal) Collected: 07/16/24 1340    Specimen: Blood Updated: 07/16/24 1413     Glucose 207 mg/dL      BUN 11 mg/dL      Creatinine 1.15 mg/dL      Sodium 133 mmol/L      Potassium 4.0 mmol/L      Chloride 96 mmol/L      CO2 25.2 mmol/L      Calcium 8.9 mg/dL      Total Protein 7.4 g/dL      Albumin 4.4 g/dL      ALT (SGPT) 21 U/L      AST (SGOT) 12 U/L      Alkaline Phosphatase 46 U/L      Total Bilirubin 0.8 mg/dL      Globulin 3.0 gm/dL      A/G Ratio 1.5 g/dL      BUN/Creatinine Ratio 9.6     Anion Gap 11.8 mmol/L      eGFR 72.0 mL/min/1.73     Narrative:      GFR Normal >60  Chronic Kidney Disease <60  Kidney Failure <15      Single High Sensitivity Troponin T [[id number]]  (Abnormal) Collected: 07/16/24 1340    Specimen: Blood Updated: 07/16/24 1413     HS Troponin T 47 ng/L     Narrative:      High Sensitive Troponin T Reference Range:  <14.0 ng/L- Negative Female for AMI  <22.0 ng/L- Negative Male for AMI  >=14 - Abnormal Female indicating possible myocardial injury.  >=22 - Abnormal Male indicating possible myocardial injury.   Clinicians would have to utilize clinical acumen, EKG, Troponin, and serial changes to determine if it is an Acute Myocardial Infarction or myocardial injury due to an underlying chronic condition.         Magnesium [[id number]]  (Normal) Collected: 07/16/24 1340    Specimen: Blood Updated: 07/16/24 1413     Magnesium 1.6 mg/dL     Lactic Acid, Plasma [[id number]]  (Normal) Collected: 07/16/24 1343    Specimen: Blood Updated: 07/16/24 1411     Lactate 2.0 mmol/L     Blood Culture - Blood, Arm, Left [[id number]] Collected: 07/16/24 1355    Specimen: Blood from Arm, Left Updated: 07/16/24 1406    [place] Draw [[id number]] Collected: 07/16/24 1340    Specimen: Blood Updated: 07/16/24 1400    Narrative:      The following orders were created for panel order [place]  Draw.  Procedure                               Abnormality         Status                     ---------                               -----------         ------                     Green Top (Gel)[[id number]]                                  Final result               Lavender Top[[id number]]                                     Final result               Gold Top - SST[[id number]]                                   Final result               Light Blue Top[[id number]]                                   Final result                 Please view results for these tests on the individual orders.    Green Top (Gel) [[id number]] Collected: 07/16/24 1340    Specimen: Blood Updated: 07/16/24 1400     Extra Tube Hold for add-ons.     Comment: Auto resulted.       Lavender Top [[id number]] Collected: 07/16/24 1340    Specimen: Blood Updated: 07/16/24 1400     Extra Tube hold for add-on     Comment: Auto resulted       Gold Top - SST [[id number]] Collected: 07/16/24 1340    Specimen: Blood Updated: 07/16/24 1400     Extra Tube Hold for add-ons.     Comment: Auto resulted.       Light Blue Top [[id number]] Collected: 07/16/24 1340    Specimen: Blood Updated: 07/16/24 1400     Extra Tube Hold for add-ons.     Comment: Auto resulted       CBC & Differential [[id number]]  (Abnormal) Collected: 07/16/24 1340    Specimen: Blood Updated: 07/16/24 1350    Narrative:      The following orders were created for panel order CBC & Differential.  Procedure                               Abnormality         Status                     ---------                               -----------         ------                     CBC Auto Differential[[id number]]        Abnormal            Final result                 Please view results for these tests on the individual orders.    CBC Auto Differential [[id number]]  (Abnormal) Collected: 07/16/24 1340    Specimen: Blood Updated: 07/16/24 1350     WBC 14.71 10*3/mm3      RBC 4.74 10*6/mm3      Hemoglobin 13.6 g/dL       Hematocrit 42.8 %      MCV 90.3 fL      MCH 28.7 pg      MCHC 31.8 g/dL      RDW 14.7 %      RDW-SD 48.8 fl      MPV 10.2 fL      Platelets 160 10*3/mm3      Neutrophil % 82.9 %      Lymphocyte % 7.2 %      Monocyte % 9.2 %      Eosinophil % 0.1 %      Basophil % 0.1 %      Immature Grans % 0.5 %      Neutrophils, Absolute 12.19 10*3/mm3      Lymphocytes, Absolute 1.06 10*3/mm3      Monocytes, Absolute 1.36 10*3/mm3      Eosinophils, Absolute 0.01 10*3/mm3      Basophils, Absolute 0.02 10*3/mm3      Immature Grans, Absolute 0.07 10*3/mm3      nRBC 0.0 /100 WBC     Blood Culture - Blood, Arm, Left [[id number]] Collected: 07/16/24 1343    Specimen: Blood from Arm, Left Updated: 07/16/24 1348            XR Knee 3 View Right    Result Date: 7/16/2024  Impression: No acute bony or joint process demonstrated Electronically Signed: Jason Pretty  7/16/2024 2:36 PM EDT  Workstation ID: [id number]    XR Chest 1 View    Result Date: 7/16/2024  Impression: Poor inspiration with mild atelectasis of the right lung base. Electronically Signed: Oralia Rios MD  7/16/2024 1:28 PM EDT  Workstation ID: [id number]     Assessment & Plan   Assessment / Plan   #1 sepsis as evidenced by leukocytosis, fever, tachycardia  -started on zosyn and vancomycin and will deescalate based off cultures.  -Patient's med list suggests that he is on a biologic for psoriatic arthritis.  Taltz.  Therefore, patient is immunocompromised.  -IVFs with caution, patient has systolic CHF.  He has an EF of 26%.  -Follow-up blood cultures.  Complete respiratory panel ordered.    #2  Systolic CHF not in acute exacerbation  -EF 26%.  GDMT: on ARNI, diuretic, isosorbide, BB.  Patient on spironolactone.  In hospital treatment: Continue ARNI, isosorbide, beta-blocker.  Holding diuretic.    #3 depression-continue zoloft    #4 BPH-continue oxybutinin    #5  Right knee pain-check uric acid.    #6 possible COPD/asthma/obesity hypoventilation syndrome  -duoneb, brovana,  pulmicort, solumederol    #7 DM-2 continue lantus and ISS.  Hold metformin.    #8 obstructive sleep apnea CPAP ordered    #9 patient has a history of pyogenic arthritis of the right hip.  Patient grew out MRSA.  He was treated with a spacer and antibiotics are completed on 2/23/2023.      VTE Prophylaxis:  Pharmacologic VTE prophylaxis orders are present.        CODE STATUS:    Level Of Support Discussed With: Patient  Code Status (Patient has no pulse and is not breathing): CPR (Attempt to Resuscitate)  Medical Interventions (Patient has pulse or is breathing): Full Support      Admission Status:  I believe this patient meets inpatient status.    Electronically signed by Karol Chaidez DO, 07/16/24, 6:12 PM EDT.

## 2024-07-17 ENCOUNTER — APPOINTMENT (OUTPATIENT)
Dept: GENERAL RADIOLOGY | Facility: HOSPITAL | Age: 64
DRG: 871 | End: 2024-07-17
Payer: OTHER GOVERNMENT

## 2024-07-17 ENCOUNTER — APPOINTMENT (OUTPATIENT)
Dept: CT IMAGING | Facility: HOSPITAL | Age: 64
DRG: 871 | End: 2024-07-17
Payer: OTHER GOVERNMENT

## 2024-07-17 LAB
ANION GAP SERPL CALCULATED.3IONS-SCNC: 11.1 MMOL/L (ref 5–15)
B PARAPERT DNA SPEC QL NAA+PROBE: NOT DETECTED
B PERT DNA SPEC QL NAA+PROBE: NOT DETECTED
BACTERIA SPEC AEROBE CULT: NO GROWTH
BASOPHILS # BLD AUTO: 0.02 10*3/MM3 (ref 0–0.2)
BASOPHILS NFR BLD AUTO: 0.1 % (ref 0–1.5)
BUN SERPL-MCNC: 17 MG/DL (ref 8–23)
BUN/CREAT SERPL: 16.3 (ref 7–25)
C PNEUM DNA NPH QL NAA+NON-PROBE: NOT DETECTED
CALCIUM SPEC-SCNC: 8.7 MG/DL (ref 8.6–10.5)
CHLORIDE SERPL-SCNC: 99 MMOL/L (ref 98–107)
CO2 SERPL-SCNC: 22.9 MMOL/L (ref 22–29)
CREAT SERPL-MCNC: 1.04 MG/DL (ref 0.76–1.27)
DEPRECATED RDW RBC AUTO: 48 FL (ref 37–54)
EGFRCR SERPLBLD CKD-EPI 2021: 81.2 ML/MIN/1.73
EOSINOPHIL # BLD AUTO: 0 10*3/MM3 (ref 0–0.4)
EOSINOPHIL NFR BLD AUTO: 0 % (ref 0.3–6.2)
ERYTHROCYTE [DISTWIDTH] IN BLOOD BY AUTOMATED COUNT: 14.6 % (ref 12.3–15.4)
FLUAV SUBTYP SPEC NAA+PROBE: NOT DETECTED
FLUBV RNA ISLT QL NAA+PROBE: NOT DETECTED
GLUCOSE BLDC GLUCOMTR-MCNC: 259 MG/DL (ref 70–99)
GLUCOSE BLDC GLUCOMTR-MCNC: 276 MG/DL (ref 70–130)
GLUCOSE BLDC GLUCOMTR-MCNC: 277 MG/DL (ref 70–99)
GLUCOSE BLDC GLUCOMTR-MCNC: 368 MG/DL (ref 70–130)
GLUCOSE SERPL-MCNC: 371 MG/DL (ref 65–99)
HADV DNA SPEC NAA+PROBE: NOT DETECTED
HCOV 229E RNA SPEC QL NAA+PROBE: NOT DETECTED
HCOV HKU1 RNA SPEC QL NAA+PROBE: NOT DETECTED
HCOV NL63 RNA SPEC QL NAA+PROBE: NOT DETECTED
HCOV OC43 RNA SPEC QL NAA+PROBE: NOT DETECTED
HCT VFR BLD AUTO: 37.7 % (ref 37.5–51)
HGB BLD-MCNC: 12.2 G/DL (ref 13–17.7)
HMPV RNA NPH QL NAA+NON-PROBE: NOT DETECTED
HPIV1 RNA ISLT QL NAA+PROBE: NOT DETECTED
HPIV2 RNA SPEC QL NAA+PROBE: NOT DETECTED
HPIV3 RNA NPH QL NAA+PROBE: NOT DETECTED
HPIV4 P GENE NPH QL NAA+PROBE: NOT DETECTED
IMM GRANULOCYTES # BLD AUTO: 0.14 10*3/MM3 (ref 0–0.05)
IMM GRANULOCYTES NFR BLD AUTO: 0.8 % (ref 0–0.5)
LYMPHOCYTES # BLD AUTO: 0.84 10*3/MM3 (ref 0.7–3.1)
LYMPHOCYTES NFR BLD AUTO: 5 % (ref 19.6–45.3)
M PNEUMO IGG SER IA-ACNC: NOT DETECTED
MAGNESIUM SERPL-MCNC: 1.8 MG/DL (ref 1.6–2.4)
MCH RBC QN AUTO: 29.1 PG (ref 26.6–33)
MCHC RBC AUTO-ENTMCNC: 32.4 G/DL (ref 31.5–35.7)
MCV RBC AUTO: 90 FL (ref 79–97)
MONOCYTES # BLD AUTO: 0.83 10*3/MM3 (ref 0.1–0.9)
MONOCYTES NFR BLD AUTO: 4.9 % (ref 5–12)
MRSA DNA SPEC QL NAA+PROBE: NORMAL
NEUTROPHILS NFR BLD AUTO: 15 10*3/MM3 (ref 1.7–7)
NEUTROPHILS NFR BLD AUTO: 89.2 % (ref 42.7–76)
NRBC BLD AUTO-RTO: 0 /100 WBC (ref 0–0.2)
PHOSPHATE SERPL-MCNC: 2.4 MG/DL (ref 2.5–4.5)
PLATELET # BLD AUTO: 164 10*3/MM3 (ref 140–450)
PMV BLD AUTO: 10.7 FL (ref 6–12)
POTASSIUM SERPL-SCNC: 4.3 MMOL/L (ref 3.5–5.2)
RBC # BLD AUTO: 4.19 10*6/MM3 (ref 4.14–5.8)
RHINOVIRUS RNA SPEC NAA+PROBE: NOT DETECTED
RSV RNA NPH QL NAA+NON-PROBE: NOT DETECTED
SARS-COV-2 RNA RESP QL NAA+PROBE: NOT DETECTED
SODIUM SERPL-SCNC: 133 MMOL/L (ref 136–145)
WBC NRBC COR # BLD AUTO: 16.83 10*3/MM3 (ref 3.4–10.8)

## 2024-07-17 PROCEDURE — 71250 CT THORAX DX C-: CPT

## 2024-07-17 PROCEDURE — 94799 UNLISTED PULMONARY SVC/PX: CPT

## 2024-07-17 PROCEDURE — 84100 ASSAY OF PHOSPHORUS: CPT | Performed by: HOSPITALIST

## 2024-07-17 PROCEDURE — 87641 MR-STAPH DNA AMP PROBE: CPT | Performed by: HOSPITALIST

## 2024-07-17 PROCEDURE — 99233 SBSQ HOSP IP/OBS HIGH 50: CPT | Performed by: INTERNAL MEDICINE

## 2024-07-17 PROCEDURE — 63710000001 INSULIN GLARGINE PER 5 UNITS: Performed by: HOSPITALIST

## 2024-07-17 PROCEDURE — 82948 REAGENT STRIP/BLOOD GLUCOSE: CPT

## 2024-07-17 PROCEDURE — 25010000002 VANCOMYCIN 5 G RECONSTITUTED SOLUTION: Performed by: HOSPITALIST

## 2024-07-17 PROCEDURE — 97161 PT EVAL LOW COMPLEX 20 MIN: CPT

## 2024-07-17 PROCEDURE — 25810000003 SODIUM CHLORIDE 0.9 % SOLUTION: Performed by: HOSPITALIST

## 2024-07-17 PROCEDURE — 63710000001 INSULIN LISPRO (HUMAN) PER 5 UNITS: Performed by: PHYSICIAN ASSISTANT

## 2024-07-17 PROCEDURE — 0202U NFCT DS 22 TRGT SARS-COV-2: CPT | Performed by: INTERNAL MEDICINE

## 2024-07-17 PROCEDURE — 83735 ASSAY OF MAGNESIUM: CPT | Performed by: HOSPITALIST

## 2024-07-17 PROCEDURE — 25010000002 MORPHINE PER 10 MG: Performed by: FAMILY MEDICINE

## 2024-07-17 PROCEDURE — 25010000002 METHYLPREDNISOLONE PER 40 MG: Performed by: HOSPITALIST

## 2024-07-17 PROCEDURE — 85025 COMPLETE CBC W/AUTO DIFF WBC: CPT | Performed by: HOSPITALIST

## 2024-07-17 PROCEDURE — 80048 BASIC METABOLIC PNL TOTAL CA: CPT | Performed by: HOSPITALIST

## 2024-07-17 PROCEDURE — 73620 X-RAY EXAM OF FOOT: CPT

## 2024-07-17 PROCEDURE — 25010000002 AMPICILLIN-SULBACTAM PER 1.5 G: Performed by: INTERNAL MEDICINE

## 2024-07-17 PROCEDURE — 94664 DEMO&/EVAL PT USE INHALER: CPT

## 2024-07-17 RX ORDER — PANTOPRAZOLE SODIUM 40 MG/1
40 TABLET, DELAYED RELEASE ORAL
Status: DISCONTINUED | OUTPATIENT
Start: 2024-07-17 | End: 2024-07-19 | Stop reason: HOSPADM

## 2024-07-17 RX ORDER — IPRATROPIUM BROMIDE AND ALBUTEROL SULFATE 2.5; .5 MG/3ML; MG/3ML
3 SOLUTION RESPIRATORY (INHALATION) EVERY 4 HOURS PRN
Status: DISCONTINUED | OUTPATIENT
Start: 2024-07-17 | End: 2024-07-19 | Stop reason: HOSPADM

## 2024-07-17 RX ORDER — TOPIRAMATE 25 MG/1
50 TABLET ORAL 2 TIMES DAILY
Status: DISCONTINUED | OUTPATIENT
Start: 2024-07-17 | End: 2024-07-19 | Stop reason: HOSPADM

## 2024-07-17 RX ORDER — MULTIPLE VITAMINS W/ MINERALS TAB 9MG-400MCG
1 TAB ORAL DAILY
Status: DISCONTINUED | OUTPATIENT
Start: 2024-07-17 | End: 2024-07-19 | Stop reason: HOSPADM

## 2024-07-17 RX ADMIN — INSULIN GLARGINE 15 UNITS: 100 INJECTION, SOLUTION SUBCUTANEOUS at 21:52

## 2024-07-17 RX ADMIN — INSULIN LISPRO 8 UNITS: 100 INJECTION, SOLUTION INTRAVENOUS; SUBCUTANEOUS at 21:52

## 2024-07-17 RX ADMIN — INSULIN LISPRO 8 UNITS: 100 INJECTION, SOLUTION INTRAVENOUS; SUBCUTANEOUS at 12:26

## 2024-07-17 RX ADMIN — VANCOMYCIN HYDROCHLORIDE 1250 MG: 5 INJECTION, POWDER, LYOPHILIZED, FOR SOLUTION INTRAVENOUS at 20:15

## 2024-07-17 RX ADMIN — SACUBITRIL AND VALSARTAN 1 TABLET: 24; 26 TABLET, FILM COATED ORAL at 08:16

## 2024-07-17 RX ADMIN — BACLOFEN 10 MG: 10 TABLET ORAL at 21:53

## 2024-07-17 RX ADMIN — WATER 40 MG: 1 INJECTION INTRAMUSCULAR; INTRAVENOUS; SUBCUTANEOUS at 20:14

## 2024-07-17 RX ADMIN — Medication 1 TABLET: at 12:03

## 2024-07-17 RX ADMIN — GABAPENTIN 600 MG: 300 CAPSULE ORAL at 15:41

## 2024-07-17 RX ADMIN — SERTRALINE HYDROCHLORIDE 100 MG: 100 TABLET ORAL at 08:16

## 2024-07-17 RX ADMIN — AMPICILLIN AND SULBACTAM 3 G: 2; 1 INJECTION, POWDER, FOR SOLUTION INTRAVENOUS at 12:06

## 2024-07-17 RX ADMIN — ASPIRIN 81 MG 81 MG: 81 TABLET ORAL at 08:15

## 2024-07-17 RX ADMIN — EMPAGLIFLOZIN 10 MG: 10 TABLET, FILM COATED ORAL at 12:03

## 2024-07-17 RX ADMIN — SACUBITRIL AND VALSARTAN 1 TABLET: 24; 26 TABLET, FILM COATED ORAL at 21:53

## 2024-07-17 RX ADMIN — BUDESONIDE 0.5 MG: 0.5 INHALANT ORAL at 07:08

## 2024-07-17 RX ADMIN — SENNOSIDES AND DOCUSATE SODIUM 2 TABLET: 50; 8.6 TABLET ORAL at 08:16

## 2024-07-17 RX ADMIN — VANCOMYCIN HYDROCHLORIDE 1250 MG: 5 INJECTION, POWDER, LYOPHILIZED, FOR SOLUTION INTRAVENOUS at 08:13

## 2024-07-17 RX ADMIN — OXYBUTYNIN CHLORIDE 5 MG: 5 TABLET, EXTENDED RELEASE ORAL at 08:16

## 2024-07-17 RX ADMIN — SENNOSIDES AND DOCUSATE SODIUM 2 TABLET: 50; 8.6 TABLET ORAL at 21:52

## 2024-07-17 RX ADMIN — ROSUVASTATIN 40 MG: 20 TABLET, FILM COATED ORAL at 08:16

## 2024-07-17 RX ADMIN — INSULIN LISPRO 12 UNITS: 100 INJECTION, SOLUTION INTRAVENOUS; SUBCUTANEOUS at 08:18

## 2024-07-17 RX ADMIN — INSULIN LISPRO 8 UNITS: 100 INJECTION, SOLUTION INTRAVENOUS; SUBCUTANEOUS at 17:56

## 2024-07-17 RX ADMIN — AMPICILLIN AND SULBACTAM 3 G: 2; 1 INJECTION, POWDER, FOR SOLUTION INTRAVENOUS at 21:51

## 2024-07-17 RX ADMIN — BUDESONIDE 0.5 MG: 0.5 INHALANT ORAL at 19:28

## 2024-07-17 RX ADMIN — PANTOPRAZOLE SODIUM 40 MG: 40 TABLET, DELAYED RELEASE ORAL at 12:03

## 2024-07-17 RX ADMIN — GABAPENTIN 600 MG: 300 CAPSULE ORAL at 21:52

## 2024-07-17 RX ADMIN — GUAIFENESIN 1200 MG: 600 TABLET ORAL at 21:52

## 2024-07-17 RX ADMIN — ARFORMOTEROL TARTRATE 15 MCG: 15 SOLUTION RESPIRATORY (INHALATION) at 07:08

## 2024-07-17 RX ADMIN — AMPICILLIN AND SULBACTAM 3 G: 2; 1 INJECTION, POWDER, FOR SOLUTION INTRAVENOUS at 15:38

## 2024-07-17 RX ADMIN — Medication 10 ML: at 21:53

## 2024-07-17 RX ADMIN — GABAPENTIN 600 MG: 300 CAPSULE ORAL at 08:14

## 2024-07-17 RX ADMIN — Medication 10 ML: at 08:19

## 2024-07-17 RX ADMIN — ACETAMINOPHEN 1000 MG: 500 TABLET ORAL at 02:49

## 2024-07-17 RX ADMIN — MORPHINE SULFATE 2 MG: 2 INJECTION, SOLUTION INTRAMUSCULAR; INTRAVENOUS at 21:51

## 2024-07-17 RX ADMIN — ISOSORBIDE MONONITRATE 30 MG: 30 TABLET, EXTENDED RELEASE ORAL at 08:16

## 2024-07-17 RX ADMIN — TOPIRAMATE 50 MG: 25 TABLET, FILM COATED ORAL at 12:03

## 2024-07-17 RX ADMIN — MORPHINE SULFATE 2 MG: 2 INJECTION, SOLUTION INTRAMUSCULAR; INTRAVENOUS at 17:55

## 2024-07-17 RX ADMIN — GUAIFENESIN 1200 MG: 600 TABLET ORAL at 08:15

## 2024-07-17 RX ADMIN — APIXABAN 5 MG: 5 TABLET, FILM COATED ORAL at 08:14

## 2024-07-17 RX ADMIN — TOPIRAMATE 50 MG: 25 TABLET, FILM COATED ORAL at 21:52

## 2024-07-17 RX ADMIN — APIXABAN 5 MG: 5 TABLET, FILM COATED ORAL at 21:52

## 2024-07-17 RX ADMIN — ACETAMINOPHEN 1000 MG: 500 TABLET ORAL at 12:26

## 2024-07-17 RX ADMIN — IPRATROPIUM BROMIDE AND ALBUTEROL SULFATE 3 ML: .5; 3 SOLUTION RESPIRATORY (INHALATION) at 07:08

## 2024-07-17 RX ADMIN — BACLOFEN 10 MG: 10 TABLET ORAL at 02:52

## 2024-07-17 RX ADMIN — WATER 40 MG: 1 INJECTION INTRAMUSCULAR; INTRAVENOUS; SUBCUTANEOUS at 08:14

## 2024-07-17 RX ADMIN — ARFORMOTEROL TARTRATE 15 MCG: 15 SOLUTION RESPIRATORY (INHALATION) at 19:28

## 2024-07-17 NOTE — PLAN OF CARE
Goal Outcome Evaluation:  Plan of Care Reviewed With: patient           Outcome Evaluation: Pt presents with decreased strength, transfers and functional mobility. Pt will benefit from inpatient PT services and placement in a rehab facility upon discharge.      Anticipated Discharge Disposition (PT): home with home health, home with 24/7 care

## 2024-07-17 NOTE — PLAN OF CARE
Goal Outcome Evaluation:  Plan of Care Reviewed With: patient, spouse           Outcome Evaluation: VSS during shift. Patient had complaints of pain of right leg, treated per MAR. Patient has aphasia from old stroke but is able to communicate; A/O. CT completed, see notes. Xray completed, see notes. Wife at bedside. Continue plan of care.

## 2024-07-17 NOTE — PROGRESS NOTES
[facility]   Hospitalist Progress Note  Date: 2024  Patient Name: Mandeep Tracey  : 1960  MRN: [id number]  Date of admission: 2024      Subjective   Subjective     Chief Complaint: Fever    Summary:   Mandeep Tracey is a 64 y.o. male with history of prior stroke, seizure disorder, history of hip infection, hypertension, cardiomyopathy, hyperlipidemia, morbid obesity, obstructive sleep apnea who presents to ED for fever, chills, and weakness.  Patient also reports shortness of breath.     Patient's vitals: temperature: 101.8; Pulse: 112; RR-22; BP: 135/89.  Patient is on 3 L of oxygen which is new.       On labs, patient has a WBC-14.71.  High-sensitivity troponin 47.  Sodium of 133.  Chloride of 96.  Glucose of 207.  Urine does not show urinary tract infection.     Chest xray shows mild atelectasias.  Knee xray shows: No acute process.       His recent echo shows an EF of 26 to 30%.  Left ventricle is dilated.  CT chest consistent with right lower lobe pneumonia, has substernal left lobe of thyroid.  Patient has been at [facility] rehab from [facility] after GI bleed  Interval Followup:   Does have residual dysphasia from previous stroke  Blood pressure soft.  Tmax 102.  On 3 L with a 92% saturation.  Does not use oxygen at home.  Main complaint is pain in right lower extremity eventually pointed to right foot that is tender.  Minimal cough  Denies any shortness of air   no urinary complaints    Review of Systems   All systems were reviewed and negative except for: Summary and interval follow-up    Objective   Objective     Vitals:   Temp:  [94.6 °F (34.8 °C)-99.8 °F (37.7 °C)] 99 °F (37.2 °C)  Heart Rate:  [] 93  Resp:  [18-22] 18  BP: ()/(52-92) 122/65  Flow (L/min):  [3] 3  Physical Exam    Constitutional: Awake, alert, no acute distress   Eyes: Pupils equal, sclerae anicteric, no conjunctival injection   HENT: NCAT, mucous membranes moist   Neck: Supple, no thyromegaly, no  lymphadenopathy, trachea midline   Respiratory: Decreased to auscultation bilaterally, nonlabored respirations    Cardiovascular: RRR, no murmurs, rubs, or gallops, palpable pedal pulses bilaterally   Gastrointestinal: Positive bowel sounds, soft, nontender, nondistended   Musculoskeletal: Diffuse tenderness and swelling right foot.  Not red or warm.  Has chronic contracture.  Good capillary refill.  Sensation intact.  No bilateral ankle edema, no clubbing or cyanosis to extremities.  Right hip and knee range of motion intact without pain   Psychiatric: Appropriate affect, cooperative   Neurologic: Oriented x 3, right hemiplegia with contraction deformities of right hand and foot, Cranial Nerves grossly intact to confrontation, speech not clear   Skin: No rashes     Result Review    Result Review:  I have personally reviewed the results for the past 24 hours and agree with these findings:  [x]  Laboratory  [x]  Microbiology  [x]  Radiology  [x]  EKG/Telemetry sinus tachycardia 108.  PVCs.  QT 0.46  []  Cardiology/Vascular   []  Pathology  [x]  Old records  [x]  Other: Medications    Assessment & Plan   Assessment / Plan     Assessment:  Sepsis present on admission.  Has fever, leukocytosis and tachycardia.  Right lower lobe healthcare associated pneumonia.  Unspecified bacterial pathogen.  Right foot pain.  Etiology not clear.  History of CVA with residual right hemiplegia and dysphasia.  Seizure disorder.  Hypertension.  Cardiomyopathy with a EF of 26 to 30%.  Hypoxemia.  No home oxygen use.  Diabetes mellitus with  BELKYS on home CPAP.  History of peptic ulcer disease.  History of right septic hip due to MRSA.  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  Anemia . stable.  BPH.  Substernal extension of left thyroid lobe    Plan:  Continue supplemental oxygen keep sats more than 90%  IV Vanco and Unasyn.  Hold home Bumex.  Await culture data.  Check right foot x-ray.  Uric acid negative.  CT chest noted.  Sliding-scale insulin.  Home  CPAP  IV steroids switched to p.o.  Nebulizer treatment  Bronchodilator and bronchopulmonary team protocol.  Resume appropriate home medications  Started on Jardiance.  Check respiratory panel PCR.  COVID, flu and RSV negative in ED.  Right knee x-ray negative.  PT OT  Continue telemetry  Discussed plan with RN and .    VTE Prophylaxis:  Pharmacologic VTE prophylaxis orders are present.  Eliquis        CODE STATUS:   Level Of Support Discussed With: Patient  Code Status (Patient has no pulse and is not breathing): CPR (Attempt to Resuscitate)  Medical Interventions (Patient has pulse or is breathing): Full Support      Part of this note may be an electronic transcription/translation of spoken language to printed text using the Dragon Dictation System.     Electronically signed by Efrain Ross MD, 07/17/24, 4:23 PM EDT.

## 2024-07-17 NOTE — PROGRESS NOTES
"[facility] Clinical Pharmacy Services: Vancomycin Pharmacokinetic Initial Consult Note    Mandeep Tracey is a 64 y.o. male who is on day 2 of pharmacy to dose vancomycin for Pneumonia.    Consult Information  Consulting Provider: Karol Chaidez  Planned Duration of Therapy: 5 days per consult  Was Patient Receiving Prior to Admission/Consult?: No  Loading Dose Ordered or Given: 1500 mg on  at   PK/PD Target: -600 mg/L.hr   Relevant ID History: remote hx of MRSA bacteremia in   Other Antimicrobials: none    Imaging Reviewed?: Yes   XR Right knee: no acute bony or joint process demonstrated; popliteal artery stent present   CXR: poor inspiration with mild atelectasis of the right lung base    Microbiology Data  MRSA PCR performed: 24; Result: Pending  Culture/Source:    MRSA PCR: ordered   Strep/legionella urinary antigens: ordered    Respiratory cx: ordered   Urine cx: in process   Blood cx 2/2: in process   COVID/flu/RSV: negative    Vitals/Labs  Ht: 182.8 cm (71.97\"); Wt: 131 kg (288 lb 12.8 oz)  Temp (24hrs), Av.7 °F (37.1 °C), Min:94.6 °F (34.8 °C), Max:102.1 °F (38.9 °C)   Estimated Creatinine Clearance: 103 mL/min (by C-G formula based on SCr of 1.04 mg/dL).     Results from last 7 days   Lab Units 24  0506 24  1340   CREATININE mg/dL 1.04 1.15   WBC 10*3/mm3 16.83* 14.71*     Assessment/Plan:    Vancomycin Dose:  1250 mg IV every 12 hours; which provides the following predicted parameters:  AUC24,ss: 490 mg/L.hr  PAUC*: 72 %  Ctrough,ss: 16.2 mg/L  Pconc*: 31 %  Tox.: 12 %  Vanc Random ordered for  at 0600 with AM labs  Patient has order for Basic Metabolic Panel    Pharmacy will follow patient's kidney function and will adjust doses and obtain levels as necessary. Thank you for involving pharmacy in this patient's care. Please contact pharmacy with any questions or concerns.                           Myrtle Adame RPH  Clinical " Pharmacist

## 2024-07-17 NOTE — PLAN OF CARE
Goal Outcome Evaluation:  Plan of Care Reviewed With: patient        Progress: no change  Outcome Evaluation: Patient A&Ox2, disoriented to time and situation, patient with severe aphasia and hard to understand at times. Patient with c/o pain, prn pain meds administered per md order. VSS. Patient on 3L NC and wearing home cpap when sleeping. No acute changes throughout shift.                                Detail Level: Generalized Detail Level: Detailed

## 2024-07-17 NOTE — THERAPY EVALUATION
Acute Care - Physical Therapy Initial Evaluation   Naldo     Patient Name: Mandeep Tracey  : 1960  MRN: [id number]  Today's Date: 2024      Visit Dx:     ICD-10-CM ICD-9-CM   1. Pneumonia due to infectious organism, unspecified laterality, unspecified part of lung  J18.9 486   2. Difficulty walking  R26.2 719.7     Patient Active Problem List   Diagnosis    CVA (cerebral vascular accident)    Right hemiparesis    BELKYS on CPAP    Seizure disorder    Type 2 diabetes mellitus    Essential hypertension    Infection of right prosthetic hip joint    MSSA (methicillin susceptible Staphylococcus aureus) infection    Group B streptococcal infection    UTI (urinary tract infection)    Postoperative nausea and vomiting    Nonrheumatic mitral valve regurgitation    Cardiomyopathy    PVCs (premature ventricular contractions)    Pyogenic arthritis of right hip    Infection and inflammatory reaction due to internal right hip prosthesis, initial encounter    Gastroesophageal reflux disease with esophagitis without hemorrhage    Blood in stool    Nausea and vomiting    Pneumonia    Sepsis     Past Medical History:   Diagnosis Date    Aphasia     Cardiomyopathy     CPAP (continuous positive airway pressure) dependence     Diabetes     Hemiparesis     Right side     Morbid obesity     Nonrheumatic mitral valve regurgitation     BELKYS on CPAP     Peptic ulcer disease     Postoperative nausea and vomiting 3/13/2021    Psoriasis     Pyogenic arthritis of right hip     Seizures     Sleep apnea     Stroke 2004    Ventricular ectopy     asymptomatic     Past Surgical History:   Procedure Laterality Date    CHOLECYSTECTOMY      COLONOSCOPY N/A 10/17/2023    Procedure: COLONOSCOPY WITH POLYPECTOMIES;  Surgeon: Angel Luis Mendoza MD;  Location: [facility] ENDOSCOPY;  Service: General;  Laterality: N/A;  COLON POLYPS, DIVERTICULOSIS    ENDOSCOPY N/A 10/17/2023    Procedure: ESOPHAGOGASTRODUODENOSCOPY WITH BIOPSIES;  Surgeon: Kelly  Angel Luis Hurtado MD;  Location: [facility] ENDOSCOPY;  Service: General;  Laterality: N/A;  GASTRIC POLYP    EYE SURGERY      HIP SPACER INSERTION WITH ANTIBIOTIC CEMENT Right 1/9/2023    Procedure: RT. BIPOLAR HIP REMOVAL AND PLACEMENT OF SPACER;  Surgeon: Faustina Beebe MD;  Location: [facility];  Service: Orthopedics;  Laterality: Right;    INCISION AND DRAINAGE HIP Right 3/12/2021    Procedure: HIP ANTERIOR INCISION AND DRAINAGE WITH HANA TABLE;  Surgeon: Tao Andrea MD;  Location: [facility] OR;  Service: Orthopedics;  Laterality: Right;    LUMBAR DISC SURGERY      US GUIDED FINE NEEDLE ASPIRATION  3/10/2021     PT Assessment (Last 12 Hours)       PT Evaluation and Treatment       Row Name 07/17/24 1300          Physical Therapy Time and Intention    Subjective Information complains of;weakness  -DP     Document Type evaluation  -DP     Mode of Treatment individual therapy;physical therapy  -DP     Patient Effort adequate  -DP       Row Name 07/17/24 1300          General Information    Patient Profile Reviewed yes  -DP     Patient Observations alert;agree to therapy  -DP     General Observations of Patient Pt had a stroke in 2003 and has been wheel chair bound since then. Wife assists with all out of WC transfers and ADLs.  -DP     Prior Level of Function min assist:;transfer;bed mobility;ADL's  -DP     Equipment Currently Used at Home wheelchair  -DP     Existing Precautions/Restrictions fall  -DP     Barriers to Rehab none identified  -DP       Row Name 07/17/24 1300          Living Environment    Current Living Arrangements home  -DP     Home Accessibility wheelchair accessible  -DP     People in Home spouse  -DP       Row Name 07/17/24 1300          Range of Motion (ROM)    Range of Motion ROM is WFL  pt has a R ankle cam boot  -DP       Row Name 07/17/24 1300          Strength (Manual Muscle Testing)    Strength (Manual Muscle Testing) other (see comments)  LLE: 4/5, and RLE: 2-/5  -DP        Row Name 07/17/24 1300          Bed Mobility    Bed Mobility supine-sit-supine  -DP     Supine-Sit-Supine [place] (Bed Mobility) minimum assist (75% patient effort)  -DP       Row Name 07/17/24 1300          Transfers    Transfers sit-stand transfer;stand pivot/stand step transfer;toilet transfer  -DP       Row Name 07/17/24 1300          Sit-Stand Transfer    Sit-Stand [place] (Transfers) minimum assist (75% patient effort)  -DP     Assistive Device (Sit-Stand Transfers) other (see comments)  grab bars- pt pulls on grab bars to assist himself up  -DP       Row Name 07/17/24 1300          Stand Pivot/Stand Step Transfer    Stand Pivot/Stand Step Morrill (Transfers) minimum assist (75% patient effort);2 person assist  -DP       Row Name 07/17/24 1300          Gait/Stairs (Locomotion)    Comment, (Gait/Stairs) non ambulatory at baseline  -DP       Row Name 07/17/24 1300          Plan of Care Review    Plan of Care Reviewed With patient  -DP     Outcome Evaluation Pt presents with decreased strength, transfers and functional mobility. Pt will benefit from inpatient PT services and placement in a rehab facility upon discharge.  -DP       Row Name 07/17/24 1300          Therapy Assessment/Plan (PT)    Rehab Potential (PT) good, to achieve stated therapy goals  -DP     Criteria for Skilled Interventions Met (PT) yes;meets criteria  -DP     Therapy Frequency (PT) daily  -DP     Predicted Duration of Therapy Intervention (PT) 10 days  -DP     Problem List (PT) problems related to;mobility;balance  -DP     Activity Limitations Related to Problem List (PT) unable to ambulate safely;unable to transfer safely  -DP       Row Name 07/17/24 1300          PT Evaluation Complexity    History, PT Evaluation Complexity no personal factors and/or comorbidities  -DP     Examination of Body Systems (PT Eval Complexity) total of 4 or more elements  -DP     Clinical Presentation (PT Evaluation Complexity) stable  -DP      Clinical Decision Making (PT Evaluation Complexity) low complexity  -DP     Overall Complexity (PT Evaluation Complexity) low complexity  -DP       Row Name 07/17/24 1300          Physical Therapy Goals    Bed Mobility Goal Selection (PT) bed mobility, PT goal 1  -DP     Transfer Goal Selection (PT) transfer, PT goal 1  -DP       Row Name 07/17/24 1300          Bed Mobility Goal 1 (PT)    Activity/Assistive Device (Bed Mobility Goal 1, PT) sit to supine/supine to sit  -DP     [place] Level/Cues Needed (Bed Mobility Goal 1, PT) contact guard required  -DP     Time Frame (Bed Mobility Goal 1, PT) 10 days  -DP       Row Name 07/17/24 1300          Transfer Goal 1 (PT)    Activity/Assistive Device (Transfer Goal 1, PT) sit-to-stand/stand-to-sit  -DP     [place] Level/Cues Needed (Transfer Goal 1, PT) contact guard required  -DP     Time Frame (Transfer Goal 1, PT) 10 days  -DP               User Key  (r) = Recorded By, (t) = Taken By, (c) = Cosigned By      Initials Name Provider Type    Kimberly Jeffery, PT Physical Therapist                      PT Recommendation and Plan  Anticipated Discharge Disposition (PT): home with home health, home with 24/7 care  Planned Therapy Interventions (PT): balance training, bed mobility training, gait training, neuromuscular re-education, strengthening, transfer training  Therapy Frequency (PT): daily  Plan of Care Reviewed With: patient  Outcome Evaluation: Pt presents with decreased strength, transfers and functional mobility. Pt will benefit from inpatient PT services and placement in a rehab facility upon discharge.   Outcome Measures       Row Name 07/17/24 1300             How much help from another person do you currently need...    Turning from your back to your side while in flat bed without using bedrails? 3  -DP      Moving from lying on back to sitting on the side of a flat bed without bedrails? 3  -DP      Moving to and from a bed to a chair (including a  wheelchair)? 3  -DP      Standing up from a chair using your arms (e.g., wheelchair, bedside chair)? 3  -DP      Climbing 3-5 steps with a railing? 1  -DP      To walk in hospital room? 1  -DP      AM-PAC 6 Clicks Score (PT) 14  -DP      Highest Level of Mobility Goal 4 --> Transfer to chair/commode  -DP         Functional Assessment    Outcome Measure Options AM-PAC 6 Clicks Basic Mobility (PT)  -DP                User Key  (r) = Recorded By, (t) = Taken By, (c) = Cosigned By      Initials Name Provider Type    Kimberly Jeffery, PT Physical Therapist                     Time Calculation:    PT Charges       Row Name 07/17/24 1324             Time Calculation    PT Received On 07/17/24  -DP      PT Goal Re-Cert Due Date 07/26/24  -DP         Untimed Charges    PT Eval/Re-eval Minutes 50  -DP         Total Minutes    Untimed Charges Total Minutes 50  -DP       Total Minutes 50  -DP                User Key  (r) = Recorded By, (t) = Taken By, (c) = Cosigned By      Initials Name Provider Type    Kimberly Jeffery, PT Physical Therapist                      PT G-Codes  Outcome Measure Options: AM-PAC 6 Clicks Basic Mobility (PT)  AM-PAC 6 Clicks Score (PT): 14    Kimberly Chaidez, PT  7/17/2024

## 2024-07-17 NOTE — PLAN OF CARE
Goal Outcome Evaluation:   Patient on 3Lnc whenever RT came to do breathing and tolerating well at that time. Patient said that he did wear one at home but didn't have it with him. Set up hospital unit with cpap of 12 for qhs/prn. No issues or changes at this time.

## 2024-07-18 ENCOUNTER — APPOINTMENT (OUTPATIENT)
Dept: GENERAL RADIOLOGY | Facility: HOSPITAL | Age: 64
DRG: 871 | End: 2024-07-18
Payer: OTHER GOVERNMENT

## 2024-07-18 PROBLEM — A41.9 SEPSIS: Status: RESOLVED | Noted: 2024-07-16 | Resolved: 2024-07-18

## 2024-07-18 LAB
ALBUMIN SERPL-MCNC: 3.8 G/DL (ref 3.5–5.2)
ANION GAP SERPL CALCULATED.3IONS-SCNC: 12.5 MMOL/L (ref 5–15)
BASOPHILS # BLD AUTO: 0.02 10*3/MM3 (ref 0–0.2)
BASOPHILS NFR BLD AUTO: 0.1 % (ref 0–1.5)
BUN SERPL-MCNC: 21 MG/DL (ref 8–23)
BUN/CREAT SERPL: 22.3 (ref 7–25)
CALCIUM SPEC-SCNC: 9.2 MG/DL (ref 8.6–10.5)
CHLORIDE SERPL-SCNC: 100 MMOL/L (ref 98–107)
CO2 SERPL-SCNC: 23.5 MMOL/L (ref 22–29)
CREAT SERPL-MCNC: 0.94 MG/DL (ref 0.76–1.27)
DEPRECATED RDW RBC AUTO: 47.8 FL (ref 37–54)
EGFRCR SERPLBLD CKD-EPI 2021: 91.7 ML/MIN/1.73
EOSINOPHIL # BLD AUTO: 0 10*3/MM3 (ref 0–0.4)
EOSINOPHIL NFR BLD AUTO: 0 % (ref 0.3–6.2)
ERYTHROCYTE [DISTWIDTH] IN BLOOD BY AUTOMATED COUNT: 14.6 % (ref 12.3–15.4)
GLUCOSE BLDC GLUCOMTR-MCNC: 204 MG/DL (ref 70–99)
GLUCOSE BLDC GLUCOMTR-MCNC: 243 MG/DL (ref 70–99)
GLUCOSE BLDC GLUCOMTR-MCNC: 277 MG/DL (ref 70–99)
GLUCOSE SERPL-MCNC: 251 MG/DL (ref 65–99)
HCT VFR BLD AUTO: 35.5 % (ref 37.5–51)
HGB BLD-MCNC: 11.5 G/DL (ref 13–17.7)
IMM GRANULOCYTES # BLD AUTO: 0.12 10*3/MM3 (ref 0–0.05)
IMM GRANULOCYTES NFR BLD AUTO: 0.8 % (ref 0–0.5)
LYMPHOCYTES # BLD AUTO: 0.85 10*3/MM3 (ref 0.7–3.1)
LYMPHOCYTES NFR BLD AUTO: 5.5 % (ref 19.6–45.3)
MAGNESIUM SERPL-MCNC: 2 MG/DL (ref 1.6–2.4)
MCH RBC QN AUTO: 29 PG (ref 26.6–33)
MCHC RBC AUTO-ENTMCNC: 32.4 G/DL (ref 31.5–35.7)
MCV RBC AUTO: 89.4 FL (ref 79–97)
MONOCYTES # BLD AUTO: 0.88 10*3/MM3 (ref 0.1–0.9)
MONOCYTES NFR BLD AUTO: 5.7 % (ref 5–12)
NEUTROPHILS NFR BLD AUTO: 13.53 10*3/MM3 (ref 1.7–7)
NEUTROPHILS NFR BLD AUTO: 87.9 % (ref 42.7–76)
NRBC BLD AUTO-RTO: 0 /100 WBC (ref 0–0.2)
PHOSPHATE SERPL-MCNC: 3 MG/DL (ref 2.5–4.5)
PLATELET # BLD AUTO: 195 10*3/MM3 (ref 140–450)
PMV BLD AUTO: 11.5 FL (ref 6–12)
POTASSIUM SERPL-SCNC: 4.4 MMOL/L (ref 3.5–5.2)
RBC # BLD AUTO: 3.97 10*6/MM3 (ref 4.14–5.8)
SODIUM SERPL-SCNC: 136 MMOL/L (ref 136–145)
VANCOMYCIN SERPL-MCNC: 10.24 MCG/ML (ref 5–40)
WBC NRBC COR # BLD AUTO: 15.4 10*3/MM3 (ref 3.4–10.8)

## 2024-07-18 PROCEDURE — 94660 CPAP INITIATION&MGMT: CPT

## 2024-07-18 PROCEDURE — 63710000001 INSULIN GLARGINE PER 5 UNITS: Performed by: INTERNAL MEDICINE

## 2024-07-18 PROCEDURE — 94799 UNLISTED PULMONARY SVC/PX: CPT

## 2024-07-18 PROCEDURE — 25010000002 VANCOMYCIN 5 G RECONSTITUTED SOLUTION: Performed by: HOSPITALIST

## 2024-07-18 PROCEDURE — 80069 RENAL FUNCTION PANEL: CPT | Performed by: INTERNAL MEDICINE

## 2024-07-18 PROCEDURE — 25010000002 MORPHINE PER 10 MG: Performed by: FAMILY MEDICINE

## 2024-07-18 PROCEDURE — 92610 EVALUATE SWALLOWING FUNCTION: CPT

## 2024-07-18 PROCEDURE — 85025 COMPLETE CBC W/AUTO DIFF WBC: CPT | Performed by: HOSPITALIST

## 2024-07-18 PROCEDURE — 82948 REAGENT STRIP/BLOOD GLUCOSE: CPT

## 2024-07-18 PROCEDURE — 25010000002 AMPICILLIN-SULBACTAM PER 1.5 G: Performed by: INTERNAL MEDICINE

## 2024-07-18 PROCEDURE — 99233 SBSQ HOSP IP/OBS HIGH 50: CPT | Performed by: INTERNAL MEDICINE

## 2024-07-18 PROCEDURE — 83735 ASSAY OF MAGNESIUM: CPT | Performed by: HOSPITALIST

## 2024-07-18 PROCEDURE — 63710000001 INSULIN LISPRO (HUMAN) PER 5 UNITS: Performed by: PHYSICIAN ASSISTANT

## 2024-07-18 PROCEDURE — 25810000003 SODIUM CHLORIDE 0.9 % SOLUTION: Performed by: HOSPITALIST

## 2024-07-18 PROCEDURE — 94664 DEMO&/EVAL PT USE INHALER: CPT

## 2024-07-18 PROCEDURE — 73502 X-RAY EXAM HIP UNI 2-3 VIEWS: CPT

## 2024-07-18 PROCEDURE — 63710000001 INSULIN LISPRO (HUMAN) PER 5 UNITS: Performed by: INTERNAL MEDICINE

## 2024-07-18 PROCEDURE — 25010000002 METHYLPREDNISOLONE PER 40 MG: Performed by: HOSPITALIST

## 2024-07-18 PROCEDURE — 25010000002 METHYLPREDNISOLONE PER 40 MG: Performed by: INTERNAL MEDICINE

## 2024-07-18 PROCEDURE — 80202 ASSAY OF VANCOMYCIN: CPT | Performed by: HOSPITALIST

## 2024-07-18 PROCEDURE — 94760 N-INVAS EAR/PLS OXIMETRY 1: CPT

## 2024-07-18 RX ADMIN — SACUBITRIL AND VALSARTAN 1 TABLET: 24; 26 TABLET, FILM COATED ORAL at 20:43

## 2024-07-18 RX ADMIN — TOPIRAMATE 50 MG: 25 TABLET, FILM COATED ORAL at 20:43

## 2024-07-18 RX ADMIN — AMPICILLIN AND SULBACTAM 3 G: 2; 1 INJECTION, POWDER, FOR SOLUTION INTRAVENOUS at 10:35

## 2024-07-18 RX ADMIN — MORPHINE SULFATE 2 MG: 2 INJECTION, SOLUTION INTRAMUSCULAR; INTRAVENOUS at 10:36

## 2024-07-18 RX ADMIN — AMPICILLIN AND SULBACTAM 3 G: 2; 1 INJECTION, POWDER, FOR SOLUTION INTRAVENOUS at 20:50

## 2024-07-18 RX ADMIN — ROSUVASTATIN 40 MG: 20 TABLET, FILM COATED ORAL at 08:18

## 2024-07-18 RX ADMIN — ISOSORBIDE MONONITRATE 30 MG: 30 TABLET, EXTENDED RELEASE ORAL at 08:18

## 2024-07-18 RX ADMIN — INSULIN GLARGINE 15 UNITS: 100 INJECTION, SOLUTION SUBCUTANEOUS at 20:42

## 2024-07-18 RX ADMIN — INSULIN LISPRO 8 UNITS: 100 INJECTION, SOLUTION INTRAVENOUS; SUBCUTANEOUS at 11:59

## 2024-07-18 RX ADMIN — TOPIRAMATE 50 MG: 25 TABLET, FILM COATED ORAL at 10:35

## 2024-07-18 RX ADMIN — SENNOSIDES AND DOCUSATE SODIUM 2 TABLET: 50; 8.6 TABLET ORAL at 08:18

## 2024-07-18 RX ADMIN — INSULIN LISPRO 5 UNITS: 100 INJECTION, SOLUTION INTRAVENOUS; SUBCUTANEOUS at 08:19

## 2024-07-18 RX ADMIN — APIXABAN 5 MG: 5 TABLET, FILM COATED ORAL at 08:18

## 2024-07-18 RX ADMIN — EMPAGLIFLOZIN 10 MG: 10 TABLET, FILM COATED ORAL at 08:18

## 2024-07-18 RX ADMIN — SERTRALINE HYDROCHLORIDE 100 MG: 100 TABLET ORAL at 08:18

## 2024-07-18 RX ADMIN — ASPIRIN 81 MG 81 MG: 81 TABLET ORAL at 08:18

## 2024-07-18 RX ADMIN — SENNOSIDES AND DOCUSATE SODIUM 2 TABLET: 50; 8.6 TABLET ORAL at 20:43

## 2024-07-18 RX ADMIN — MORPHINE SULFATE 2 MG: 2 INJECTION, SOLUTION INTRAMUSCULAR; INTRAVENOUS at 01:55

## 2024-07-18 RX ADMIN — GABAPENTIN 600 MG: 300 CAPSULE ORAL at 20:43

## 2024-07-18 RX ADMIN — BUDESONIDE 0.5 MG: 0.5 INHALANT ORAL at 06:58

## 2024-07-18 RX ADMIN — GUAIFENESIN 1200 MG: 600 TABLET ORAL at 08:18

## 2024-07-18 RX ADMIN — APIXABAN 5 MG: 5 TABLET, FILM COATED ORAL at 20:43

## 2024-07-18 RX ADMIN — ARFORMOTEROL TARTRATE 15 MCG: 15 SOLUTION RESPIRATORY (INHALATION) at 19:12

## 2024-07-18 RX ADMIN — GABAPENTIN 600 MG: 300 CAPSULE ORAL at 08:18

## 2024-07-18 RX ADMIN — OXYBUTYNIN CHLORIDE 5 MG: 5 TABLET, EXTENDED RELEASE ORAL at 08:18

## 2024-07-18 RX ADMIN — Medication 10 ML: at 20:40

## 2024-07-18 RX ADMIN — AMPICILLIN AND SULBACTAM 3 G: 2; 1 INJECTION, POWDER, FOR SOLUTION INTRAVENOUS at 03:27

## 2024-07-18 RX ADMIN — AMPICILLIN AND SULBACTAM 3 G: 2; 1 INJECTION, POWDER, FOR SOLUTION INTRAVENOUS at 15:48

## 2024-07-18 RX ADMIN — Medication 10 ML: at 08:18

## 2024-07-18 RX ADMIN — VANCOMYCIN HYDROCHLORIDE 1750 MG: 5 INJECTION, POWDER, LYOPHILIZED, FOR SOLUTION INTRAVENOUS at 08:18

## 2024-07-18 RX ADMIN — GUAIFENESIN 1200 MG: 600 TABLET ORAL at 20:43

## 2024-07-18 RX ADMIN — ARFORMOTEROL TARTRATE 15 MCG: 15 SOLUTION RESPIRATORY (INHALATION) at 06:58

## 2024-07-18 RX ADMIN — PANTOPRAZOLE SODIUM 40 MG: 40 TABLET, DELAYED RELEASE ORAL at 05:48

## 2024-07-18 RX ADMIN — METOPROLOL SUCCINATE 50 MG: 50 TABLET, EXTENDED RELEASE ORAL at 08:18

## 2024-07-18 RX ADMIN — GABAPENTIN 600 MG: 300 CAPSULE ORAL at 15:48

## 2024-07-18 RX ADMIN — Medication 1 TABLET: at 08:18

## 2024-07-18 RX ADMIN — WATER 40 MG: 1 INJECTION INTRAMUSCULAR; INTRAVENOUS; SUBCUTANEOUS at 08:19

## 2024-07-18 RX ADMIN — INSULIN LISPRO 8 UNITS: 100 INJECTION, SOLUTION INTRAVENOUS; SUBCUTANEOUS at 17:17

## 2024-07-18 RX ADMIN — MORPHINE SULFATE 2 MG: 2 INJECTION, SOLUTION INTRAMUSCULAR; INTRAVENOUS at 17:17

## 2024-07-18 RX ADMIN — BUDESONIDE 0.5 MG: 0.5 INHALANT ORAL at 19:12

## 2024-07-18 RX ADMIN — MORPHINE SULFATE 2 MG: 2 INJECTION, SOLUTION INTRAMUSCULAR; INTRAVENOUS at 06:08

## 2024-07-18 RX ADMIN — INSULIN LISPRO 5 UNITS: 100 INJECTION, SOLUTION INTRAVENOUS; SUBCUTANEOUS at 20:42

## 2024-07-18 RX ADMIN — WATER 40 MG: 1 INJECTION INTRAMUSCULAR; INTRAVENOUS; SUBCUTANEOUS at 20:39

## 2024-07-18 RX ADMIN — SACUBITRIL AND VALSARTAN 1 TABLET: 24; 26 TABLET, FILM COATED ORAL at 08:18

## 2024-07-18 NOTE — PROGRESS NOTES
[facility]   Hospitalist Progress Note  Date: 2024  Patient Name: Mandeep Tracey  : 1960  MRN: [id number]  Date of admission: 2024      Subjective   Subjective     Chief Complaint: Fever    Summary:   Mandeep Tracey is a 64 y.o. male with history of prior stroke, seizure disorder, history of hip infection, hypertension, cardiomyopathy, hyperlipidemia, morbid obesity, obstructive sleep apnea who presents to ED for fever, chills, and weakness.  Patient also reports shortness of breath.     Patient's vitals: temperature: 101.8; Pulse: 112; RR-22; BP: 135/89.  Patient is on 3 L of oxygen which is new.       On labs, patient has a WBC-14.71.  High-sensitivity troponin 47.  Sodium of 133.  Chloride of 96.  Glucose of 207.  Urine does not show urinary tract infection.     Chest xray shows mild atelectasias.  Knee xray shows: No acute process.       His recent echo shows an EF of 26 to 30%.  Left ventricle is dilated.  CT chest consistent with right lower lobe pneumonia, has substernal left lobe of thyroid.  Patient has been at [facility] rehab from [facility] after GI bleed  Interval Followup:   Does have residual aphasia from previous stroke  No more fever.  Blood pressure stable.  On room air.  Main complaint is pain in right lower extremity.  Complaining of pain in right foot and right thigh  Minimal cough  Denies any shortness of air   no urinary complaints    Review of Systems   All systems were reviewed and negative except for: Summary and interval follow-up    Objective   Objective     Vitals:   Temp:  [97.3 °F (36.3 °C)-99.7 °F (37.6 °C)] 97.3 °F (36.3 °C)  Heart Rate:  [] 92  Resp:  [16-20] 18  BP: (101-126)/(56-88) 121/56  Flow (L/min):  [3] 3  Physical Exam    Constitutional: Awake, alert, no acute distress   Eyes: Pupils equal, sclerae anicteric, no conjunctival injection   HENT: NCAT, mucous membranes moist   Neck: Supple, no thyromegaly, no lymphadenopathy, trachea  midline   Respiratory: Decreased to auscultation bilaterally, nonlabored respirations    Cardiovascular: RRR, no murmurs, rubs, or gallops, palpable pedal pulses bilaterally   Gastrointestinal: Positive bowel sounds, soft, nontender, nondistended   Musculoskeletal: Diffuse tenderness and swelling right foot.  Not red or warm.  Has chronic contracture.  Good capillary refill.  Sensation intact.  No bilateral ankle edema, no clubbing or cyanosis to extremities.  Right hip and knee range of motion intact without pain   Psychiatric: Appropriate affect, cooperative   Neurologic: Oriented x 3, right hemiplegia with contraction deformities of right hand and foot, Cranial Nerves grossly intact to confrontation, speech not clear   Skin: No rashes     Result Review    Result Review:  I have personally reviewed the results for the past 24 hours and agree with these findings:  [x]  Laboratory  [x]  Microbiology  [x]  Radiology  [x]  EKG/Telemetry sinus tachycardia 108.  PVCs.  QT 0.46  []  Cardiology/Vascular   []  Pathology  [x]  Old records  [x]  Other: Medications    Assessment & Plan   Assessment / Plan     Assessment:  Sepsis present on admission.  Has fever, leukocytosis and tachycardia.  Improving  Right lower lobe healthcare associated pneumonia.  Unspecified bacterial pathogen.  Right lower extremity pain.  Right foot pain.  Etiology not clear.  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  Seizure disorder.  Hypertension.  Cardiomyopathy with a EF of 26 to 30%.  Hypoxemia.  No home oxygen use.  Resolved  Diabetes mellitus with  BELKYS on home CPAP.  History of peptic ulcer disease.  History of right septic hip due to MRSA.  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  Anemia . stable.  BPH.  Substernal extension of left thyroid lobe    Plan:  Continue supplemental oxygen keep sats more than 90%  IV  Unasyn.  DC Vanco.  MRSA PCR negative  Hold home Bumex.  Await culture data.   right foot x-ray.   Negative  Check x-ray right hip  Uric acid negative.  CT chest noted.  Chronic right lower lobe atelectasis  Sliding-scale insulin.  Home CPAP  Ordered prednisone  Nebulizer treatment  Bronchodilator and bronchopulmonary team protocol.  Resume appropriate home medications  Started on Jardiance.   respiratory panel PCR.  Negative  COVID, flu and RSV negative in ED.  Right hip x-ray  PT OT  DC telemetry  Discussed plan with RN and .  Return to rehab in a.m.    VTE Prophylaxis:  Pharmacologic VTE prophylaxis orders are present.  Eliquis        CODE STATUS:   Level Of Support Discussed With: Patient  Code Status (Patient has no pulse and is not breathing): CPR (Attempt to Resuscitate)  Medical Interventions (Patient has pulse or is breathing): Full Support      Part of this note may be an electronic transcription/translation of spoken language to printed text using the Dragon Dictation System.     Electronically signed by Efrain Ross MD, 07/18/24, 5:53 PM EDT.

## 2024-07-18 NOTE — THERAPY EVALUATION
Acute Care - Speech Language Pathology   Swallow Initial Evaluation  Naldo     Patient Name: Mandeep Tracey  : 1960  MRN: [id number]  Today's Date: 2024               Admit Date: 2024    Visit Dx:     ICD-10-CM ICD-9-CM   1. Pneumonia due to infectious organism, unspecified laterality, unspecified part of lung  J18.9 486   2. Difficulty walking  R26.2 719.7   3. Dysphagia, oropharyngeal  R13.12 787.22     Patient Active Problem List   Diagnosis    CVA (cerebral vascular accident)    Right hemiparesis    BELKYS on CPAP    Seizure disorder    Type 2 diabetes mellitus    Essential hypertension    Infection of right prosthetic hip joint    MSSA (methicillin susceptible Staphylococcus aureus) infection    Group B streptococcal infection    UTI (urinary tract infection)    Postoperative nausea and vomiting    Nonrheumatic mitral valve regurgitation    Cardiomyopathy    PVCs (premature ventricular contractions)    Pyogenic arthritis of right hip    Infection and inflammatory reaction due to internal right hip prosthesis, initial encounter    Gastroesophageal reflux disease with esophagitis without hemorrhage    Blood in stool    Nausea and vomiting    Pneumonia     Past Medical History:   Diagnosis Date    Aphasia     Cardiomyopathy     CPAP (continuous positive airway pressure) dependence     Diabetes     Hemiparesis     Right side     Morbid obesity     Nonrheumatic mitral valve regurgitation     BELKYS on CPAP     Peptic ulcer disease     Postoperative nausea and vomiting 3/13/2021    Psoriasis     Pyogenic arthritis of right hip     Seizures     Sleep apnea     Stroke 2004    Ventricular ectopy     asymptomatic     Past Surgical History:   Procedure Laterality Date    CHOLECYSTECTOMY      COLONOSCOPY N/A 10/17/2023    Procedure: COLONOSCOPY WITH POLYPECTOMIES;  Surgeon: Angel Luis Mendoza MD;  Location: [facility] ENDOSCOPY;  Service: General;  Laterality: N/A;  COLON POLYPS, DIVERTICULOSIS    ENDOSCOPY  N/A 10/17/2023    Procedure: ESOPHAGOGASTRODUODENOSCOPY WITH BIOPSIES;  Surgeon: Angel Luis Mendoza MD;  Location: [facility] ENDOSCOPY;  Service: General;  Laterality: N/A;  GASTRIC POLYP    EYE SURGERY      HIP SPACER INSERTION WITH ANTIBIOTIC CEMENT Right 1/9/2023    Procedure: RT. BIPOLAR HIP REMOVAL AND PLACEMENT OF SPACER;  Surgeon: Faustina Beebe MD;  Location: [facility] OR;  Service: Orthopedics;  Laterality: Right;    INCISION AND DRAINAGE HIP Right 3/12/2021    Procedure: HIP ANTERIOR INCISION AND DRAINAGE WITH HANA TABLE;  Surgeon: Tao Andrea MD;  Location: [facility] OR;  Service: Orthopedics;  Laterality: Right;    LUMBAR DISC SURGERY      US GUIDED FINE NEEDLE ASPIRATION  3/10/2021         Inpatient Speech Pathology Dysphagia Evaluation        PAIN SCALE: None noted    PRECAUTIONS/CONTRAINDICATIONS: Contact precautions    SUSPECTED ABUSE/NEGLECT/EXPLOITATION: None noted    SOCIAL/PSYCHOLOGICAL NEEDS/BARRIERS: None noted    PAST SOCIAL HISTORY: Lives at home    PRIOR FUNCTION: Independent    PATIENT GOALS/EXPECTATIONS: Did not report    HISTORY: This patient is a 64-year-old admitted with the above diagnosis.  Patient with history of pneumonia chest x-ray shows right basilar consolidation.  Patient with history of prior CVA.  Denies difficulty swallowing at home.    CURRENT DIET LEVEL: Regular diet-thin liquids    OBJECTIVE:    TEST ADMINISTERED: Clinical dysphagia evaluation    COGNITION/SAFETY AWARENESS: Alert    BEHAVIORAL OBSERVATIONS: Cooperative    ORAL MOTOR EXAM: Lingual and labial strength and range of motion within functional limits    VOICE QUALITY: Within normal limits    REFLEX EXAM: Deferred    POSTURE: 90 degrees upright    FEEDING/SWALLOWING FUNCTION: Assessed with full range of consistencies with thin liquids from spoon cup and straw, purée consistency soft and hard solids    CLINICAL OBSERVATIONS: Oral stage is characterized by good bolus preparation and control.   Timely oral transit.  Pharyngeal phase disorganized with audible swallow noted.  Swallow completed with a spoon and cup trials of thin liquids without clinical sign or symptom of aspiration.  Wet voicing with large sequential straw trials of thin liquids.  Swallow completed with purée and soft/regular solid, no clinical signs of aspiration noted.  Denies globus sensation after completion of swallow.    DYSPHAGIA CRITERIA: N/AA    FUNCTIONAL ASSESSMENT INSTRUMENT: Patient currently scored a level 6 of 7 on Functional Communication Measures for swallowing indicating a 1-19% limitation in function.    ASSESSMENT/ PLAN OF CARE:  Pt presents with functional oropharyngeal swallow.  No clinical signs or symptoms of aspiration noted with single sips of thin liquids with cup.  Patient with wet voicing with large sequential straw drinks.    REHAB POTENTIAL:  Pt has good rehab potential.  The following limitations may influence improvement/ length of tx medical status.    RECOMMENDATIONS:   1.   DIET: Regular diet-single sips of thin liquids    2.  POSITION: 90 degrees upright     3.  COMPENSATORY STRATEGIES: No straws, slow rate    Pt/responsible party agrees with plan of care and has been informed of all alternatives, risks and benefits.    EDUCATION  The patient has been educated in the following areas:   Dysphagia (Swallowing Impairment).          Donita Sood, SLP  7/18/2024

## 2024-07-18 NOTE — PROGRESS NOTES
"[facility] Clinical Pharmacy Services: Vancomycin Monitoring Note    Mandeep Tracey is a 64 y.o. male who is on day  of pharmacy to dose vancomycin for Pneumonia.    Previous Vancomycin Dose:   1250 mg IV every  12  hours  Imaging Reviewed?: Yes   XR Right knee: no acute bony or joint process demonstrated; popliteal artery stent present   CXR: poor inspiration with mild atelectasis of the right lung base   Chest CT: some consolidation with air bronchograms in the right basilar area which has been suggested and may relate to more chronic atelectasis although an underlying pneumonia cannot be excluded to account for this    XR Right Foot: Diffuse osteopenia. No acute fractures. Mild degenerative change       Updated Cultures and Sensitivities:    MRSA PCR: negative   Strep/legionella urinary antigens: ordered    Respiratory cx: ordered   Urine cx: No growth   Blood cx : NG24h   COVID/flu/RSV: negative   Respiratory panel: negative    Vitals/Labs  Ht: 182.8 cm (71.97\"); Wt: 131 kg (288 lb 12.8 oz)   Temp (24hrs), Av.6 °F (37 °C), Min:97.7 °F (36.5 °C), Max:99.7 °F (37.6 °C)   Estimated Creatinine Clearance: 114 mL/min (by C-G formula based on SCr of 0.94 mg/dL).       Results from last 7 days   Lab Units 24  0501 24  0506 24  1340   VANCOMYCIN RM mcg/mL 10.24  --   --    CREATININE mg/dL 0.94 1.04 1.15   WBC 10*3/mm3 15.40* 16.83* 14.71*     Assessment/Plan    Current Vancomycin Dose:  1750 mg IV every 12 hours; which provides the following predicted parameters:  AUC24,ss: 536 mg/L.hr  PAUC*: 90 %  Ctrough,ss: 17.9 mg/L  Pconc*: 40 %  Tox.: 14 %  Next Vanc Random ordered for  at AM labs.  We will continue to monitor patient changes and renal function. Renal Function panel ordered for the morning.     Thank you for involving pharmacy in this patient's care. Please contact pharmacy with any questions or concerns.    Steffany Ho [facility]  Clinical " Pharmacist

## 2024-07-18 NOTE — PLAN OF CARE
Goal Outcome Evaluation:  Plan of Care Reviewed With: patient           Outcome Evaluation: Pt arrived to 3NT from 4MTU. Sliding scale insulin required at dinner time. Pt c/o pain 7/10 to right hip/extremity, PRN morphine administered. Continuing with plan of care.

## 2024-07-18 NOTE — PLAN OF CARE
Goal Outcome Evaluation:                 FUNCTIONAL ASSESSMENT INSTRUMENT: Patient currently scored a level 6 of 7 on Functional Communication Measures for swallowing indicating a 1-19% limitation in function.    ASSESSMENT/ PLAN OF CARE:  Pt presents with functional oropharyngeal swallow.  No clinical signs or symptoms of aspiration noted with single sips of thin liquids with cup.  Patient with wet voicing with large sequential straw drinks.    REHAB POTENTIAL:  Pt has good rehab potential.  The following limitations may influence improvement/ length of tx medical status.    RECOMMENDATIONS:   1.   DIET: Regular diet-single sips of thin liquids    2.  POSITION: 90 degrees upright     3.  COMPENSATORY STRATEGIES: No straws, slow rate    Pt/responsible party agrees with plan of care and has been informed of all alternatives, risks and benefits.    EDUCATION  The patient has been educated in the following areas:   Dysphagia (Swallowing Impairment).

## 2024-07-18 NOTE — PLAN OF CARE
Goal Outcome Evaluation:  Plan of Care Reviewed With: patient           Outcome Evaluation: Patient A&Ox4. VSS during shift. Patient had complaints of pain of right leg, treated per MAR. Patient has aphasia from old stroke but is able to communicate; utilizing cpap at night. Continue plan of care.

## 2024-07-19 ENCOUNTER — READMISSION MANAGEMENT (OUTPATIENT)
Dept: CALL CENTER | Facility: HOSPITAL | Age: 64
End: 2024-07-19
Payer: OTHER GOVERNMENT

## 2024-07-19 VITALS
BODY MASS INDEX: 39.12 KG/M2 | WEIGHT: 288.8 LBS | TEMPERATURE: 97.9 F | HEART RATE: 81 BPM | RESPIRATION RATE: 20 BRPM | SYSTOLIC BLOOD PRESSURE: 115 MMHG | HEIGHT: 72 IN | DIASTOLIC BLOOD PRESSURE: 75 MMHG | OXYGEN SATURATION: 94 %

## 2024-07-19 PROBLEM — J18.9 PNEUMONIA, UNSPECIFIED ORGANISM: Status: ACTIVE | Noted: 2024-07-19

## 2024-07-19 PROBLEM — J18.9 PNEUMONIA, UNSPECIFIED ORGANISM: Status: RESOLVED | Noted: 2024-07-19 | Resolved: 2024-07-19

## 2024-07-19 LAB
ALBUMIN SERPL-MCNC: 3.9 G/DL (ref 3.5–5.2)
ANION GAP SERPL CALCULATED.3IONS-SCNC: 13.8 MMOL/L (ref 5–15)
BASOPHILS # BLD AUTO: 0 10*3/MM3 (ref 0–0.2)
BASOPHILS NFR BLD AUTO: 0 % (ref 0–1.5)
BUN SERPL-MCNC: 21 MG/DL (ref 8–23)
BUN/CREAT SERPL: 22.1 (ref 7–25)
CALCIUM SPEC-SCNC: 9.3 MG/DL (ref 8.6–10.5)
CHLORIDE SERPL-SCNC: 98 MMOL/L (ref 98–107)
CO2 SERPL-SCNC: 22.2 MMOL/L (ref 22–29)
CREAT SERPL-MCNC: 0.95 MG/DL (ref 0.76–1.27)
DEPRECATED RDW RBC AUTO: 49.2 FL (ref 37–54)
EGFRCR SERPLBLD CKD-EPI 2021: 90.5 ML/MIN/1.73
EOSINOPHIL # BLD AUTO: 0 10*3/MM3 (ref 0–0.4)
EOSINOPHIL NFR BLD AUTO: 0 % (ref 0.3–6.2)
ERYTHROCYTE [DISTWIDTH] IN BLOOD BY AUTOMATED COUNT: 14.8 % (ref 12.3–15.4)
GLUCOSE BLDC GLUCOMTR-MCNC: 213 MG/DL (ref 70–99)
GLUCOSE BLDC GLUCOMTR-MCNC: 258 MG/DL (ref 70–99)
GLUCOSE BLDC GLUCOMTR-MCNC: 261 MG/DL (ref 70–99)
GLUCOSE BLDC GLUCOMTR-MCNC: 276 MG/DL (ref 70–99)
GLUCOSE BLDC GLUCOMTR-MCNC: 301 MG/DL (ref 70–99)
GLUCOSE BLDC GLUCOMTR-MCNC: 368 MG/DL (ref 70–99)
GLUCOSE SERPL-MCNC: 249 MG/DL (ref 65–99)
HCT VFR BLD AUTO: 37.6 % (ref 37.5–51)
HGB BLD-MCNC: 11.8 G/DL (ref 13–17.7)
IMM GRANULOCYTES # BLD AUTO: 0.12 10*3/MM3 (ref 0–0.05)
IMM GRANULOCYTES NFR BLD AUTO: 1 % (ref 0–0.5)
LYMPHOCYTES # BLD AUTO: 1.08 10*3/MM3 (ref 0.7–3.1)
LYMPHOCYTES NFR BLD AUTO: 8.7 % (ref 19.6–45.3)
MCH RBC QN AUTO: 28.4 PG (ref 26.6–33)
MCHC RBC AUTO-ENTMCNC: 31.4 G/DL (ref 31.5–35.7)
MCV RBC AUTO: 90.6 FL (ref 79–97)
MONOCYTES # BLD AUTO: 0.64 10*3/MM3 (ref 0.1–0.9)
MONOCYTES NFR BLD AUTO: 5.1 % (ref 5–12)
NEUTROPHILS NFR BLD AUTO: 10.62 10*3/MM3 (ref 1.7–7)
NEUTROPHILS NFR BLD AUTO: 85.2 % (ref 42.7–76)
NRBC BLD AUTO-RTO: 0 /100 WBC (ref 0–0.2)
PHOSPHATE SERPL-MCNC: 3.8 MG/DL (ref 2.5–4.5)
PLATELET # BLD AUTO: 232 10*3/MM3 (ref 140–450)
PMV BLD AUTO: 11.1 FL (ref 6–12)
POTASSIUM SERPL-SCNC: 4.6 MMOL/L (ref 3.5–5.2)
RBC # BLD AUTO: 4.15 10*6/MM3 (ref 4.14–5.8)
SODIUM SERPL-SCNC: 134 MMOL/L (ref 136–145)
WBC NRBC COR # BLD AUTO: 12.46 10*3/MM3 (ref 3.4–10.8)

## 2024-07-19 PROCEDURE — 94799 UNLISTED PULMONARY SVC/PX: CPT

## 2024-07-19 PROCEDURE — 25010000002 METHYLPREDNISOLONE PER 40 MG: Performed by: INTERNAL MEDICINE

## 2024-07-19 PROCEDURE — 94660 CPAP INITIATION&MGMT: CPT

## 2024-07-19 PROCEDURE — 85025 COMPLETE CBC W/AUTO DIFF WBC: CPT | Performed by: INTERNAL MEDICINE

## 2024-07-19 PROCEDURE — 80069 RENAL FUNCTION PANEL: CPT | Performed by: INTERNAL MEDICINE

## 2024-07-19 PROCEDURE — 82948 REAGENT STRIP/BLOOD GLUCOSE: CPT

## 2024-07-19 PROCEDURE — 99239 HOSP IP/OBS DSCHRG MGMT >30: CPT | Performed by: INTERNAL MEDICINE

## 2024-07-19 PROCEDURE — 63710000001 INSULIN LISPRO (HUMAN) PER 5 UNITS: Performed by: INTERNAL MEDICINE

## 2024-07-19 PROCEDURE — 25010000002 AMPICILLIN-SULBACTAM PER 1.5 G: Performed by: INTERNAL MEDICINE

## 2024-07-19 PROCEDURE — 25010000002 MORPHINE PER 10 MG: Performed by: INTERNAL MEDICINE

## 2024-07-19 PROCEDURE — 82948 REAGENT STRIP/BLOOD GLUCOSE: CPT | Performed by: INTERNAL MEDICINE

## 2024-07-19 RX ORDER — PREDNISONE 20 MG/1
40 TABLET ORAL
Status: DISCONTINUED | OUTPATIENT
Start: 2024-07-20 | End: 2024-07-19 | Stop reason: HOSPADM

## 2024-07-19 RX ORDER — PREDNISONE 20 MG/1
40 TABLET ORAL DAILY
Qty: 6 TABLET | Refills: 0 | Status: SHIPPED | OUTPATIENT
Start: 2024-07-20 | End: 2024-07-23

## 2024-07-19 RX ORDER — FAMOTIDINE 20 MG/1
20 TABLET, FILM COATED ORAL NIGHTLY
Qty: 30 TABLET | Refills: 0 | Status: SHIPPED | OUTPATIENT
Start: 2024-07-19 | End: 2024-08-18

## 2024-07-19 RX ORDER — AMOXICILLIN AND CLAVULANATE POTASSIUM 875; 125 MG/1; MG/1
1 TABLET, FILM COATED ORAL 2 TIMES DAILY
Qty: 4 TABLET | Refills: 0 | Status: SHIPPED | OUTPATIENT
Start: 2024-07-20 | End: 2024-07-22

## 2024-07-19 RX ADMIN — INSULIN LISPRO 8 UNITS: 100 INJECTION, SOLUTION INTRAVENOUS; SUBCUTANEOUS at 13:07

## 2024-07-19 RX ADMIN — PANTOPRAZOLE SODIUM 40 MG: 40 TABLET, DELAYED RELEASE ORAL at 05:40

## 2024-07-19 RX ADMIN — Medication 10 ML: at 09:14

## 2024-07-19 RX ADMIN — SACUBITRIL AND VALSARTAN 1 TABLET: 24; 26 TABLET, FILM COATED ORAL at 09:14

## 2024-07-19 RX ADMIN — SENNOSIDES AND DOCUSATE SODIUM 2 TABLET: 50; 8.6 TABLET ORAL at 09:14

## 2024-07-19 RX ADMIN — METOPROLOL SUCCINATE 50 MG: 50 TABLET, EXTENDED RELEASE ORAL at 09:14

## 2024-07-19 RX ADMIN — EMPAGLIFLOZIN 10 MG: 10 TABLET, FILM COATED ORAL at 09:14

## 2024-07-19 RX ADMIN — ROSUVASTATIN 40 MG: 20 TABLET, FILM COATED ORAL at 09:14

## 2024-07-19 RX ADMIN — ASPIRIN 81 MG 81 MG: 81 TABLET ORAL at 09:14

## 2024-07-19 RX ADMIN — BUDESONIDE 0.5 MG: 0.5 INHALANT ORAL at 10:50

## 2024-07-19 RX ADMIN — TOPIRAMATE 50 MG: 25 TABLET, FILM COATED ORAL at 09:14

## 2024-07-19 RX ADMIN — APIXABAN 5 MG: 5 TABLET, FILM COATED ORAL at 09:14

## 2024-07-19 RX ADMIN — GUAIFENESIN 1200 MG: 600 TABLET ORAL at 09:14

## 2024-07-19 RX ADMIN — MORPHINE SULFATE 2 MG: 2 INJECTION, SOLUTION INTRAMUSCULAR; INTRAVENOUS at 02:21

## 2024-07-19 RX ADMIN — Medication 1 TABLET: at 09:14

## 2024-07-19 RX ADMIN — AMPICILLIN AND SULBACTAM 3 G: 2; 1 INJECTION, POWDER, FOR SOLUTION INTRAVENOUS at 03:53

## 2024-07-19 RX ADMIN — AMPICILLIN AND SULBACTAM 3 G: 2; 1 INJECTION, POWDER, FOR SOLUTION INTRAVENOUS at 09:14

## 2024-07-19 RX ADMIN — MORPHINE SULFATE 2 MG: 2 INJECTION, SOLUTION INTRAMUSCULAR; INTRAVENOUS at 09:14

## 2024-07-19 RX ADMIN — WATER 40 MG: 1 INJECTION INTRAMUSCULAR; INTRAVENOUS; SUBCUTANEOUS at 09:14

## 2024-07-19 RX ADMIN — GABAPENTIN 600 MG: 300 CAPSULE ORAL at 09:14

## 2024-07-19 RX ADMIN — ARFORMOTEROL TARTRATE 15 MCG: 15 SOLUTION RESPIRATORY (INHALATION) at 10:50

## 2024-07-19 RX ADMIN — OXYBUTYNIN CHLORIDE 5 MG: 5 TABLET, EXTENDED RELEASE ORAL at 09:14

## 2024-07-19 RX ADMIN — ISOSORBIDE MONONITRATE 30 MG: 30 TABLET, EXTENDED RELEASE ORAL at 09:14

## 2024-07-19 RX ADMIN — SERTRALINE HYDROCHLORIDE 100 MG: 100 TABLET ORAL at 09:14

## 2024-07-19 RX ADMIN — INSULIN LISPRO 5 UNITS: 100 INJECTION, SOLUTION INTRAVENOUS; SUBCUTANEOUS at 09:14

## 2024-07-19 NOTE — PLAN OF CARE
Goal Outcome Evaluation:Patient alert and able to make needs known, patient with dysphagia but utilizes smartphone to help with talking. Patient with c/o pain x1 thus far this shift, see emar. Patient with elevated blood glucose,SSI administered as directed.  Patient with no acute events thus far this shift.  Continue plan of care.

## 2024-07-19 NOTE — OUTREACH NOTE
Prep Survey      Flowsheet Row Responses   [facility] patient discharged from? [facility]   Is LACE score < 7 ? No   Eligibility Readm Mgmt   Discharge diagnosis PNA   Does the patient have one of the following disease processes/diagnoses(primary or secondary)? Pneumonia   Does the patient have Home health ordered? No   Is there a DME ordered? No   Prep survey completed? Yes            SARAI BOND - Registered Nurse

## 2024-07-19 NOTE — PLAN OF CARE
Problem: Adult Inpatient Plan of Care  Goal: Plan of Care Review  Outcome: Met  Flowsheets (Taken 7/19/2024 0453)  Progress: improving  Plan of Care Reviewed With: patient  Outcome Evaluation: Patient discharged during Epic Downtime.  Goal: Patient-Specific Goal (Individualized)  Outcome: Met  Goal: Absence of Hospital-Acquired Illness or Injury  Outcome: Met  Goal: Optimal Comfort and Wellbeing  Outcome: Met  Goal: Readiness for Transition of Care  Outcome: Met     Problem: Skin Injury Risk Increased  Goal: Skin Health and Integrity  Outcome: Met     Problem: Fall Injury Risk  Goal: Absence of Fall and Fall-Related Injury  Outcome: Met     Problem: Gas Exchange Impaired  Goal: Optimal Gas Exchange  Outcome: Met     Problem: Confusion Acute  Goal: Optimal Cognitive Function  Outcome: Met     Problem: Noninvasive Ventilation Acute  Goal: Effective Unassisted Ventilation and Oxygenation  Outcome: Met     Problem: Adjustment to Illness (Sepsis/Septic Shock)  Goal: Optimal Coping  Outcome: Met     Problem: Bleeding (Sepsis/Septic Shock)  Goal: Absence of Bleeding  Outcome: Met     Problem: Glycemic Control Impaired (Sepsis/Septic Shock)  Goal: Blood Glucose Level Within Desired Range  Outcome: Met     Problem: Infection Progression (Sepsis/Septic Shock)  Goal: Absence of Infection Signs and Symptoms  Outcome: Met     Problem: Nutrition Impaired (Sepsis/Septic Shock)  Goal: Optimal Nutrition Intake  Outcome: Met   Goal Outcome Evaluation:  Plan of Care Reviewed With: patient        Progress: improving  Outcome Evaluation: Patient discharged during Epic Downtime.

## 2024-07-19 NOTE — DISCHARGE SUMMARY
[facility]        HOSPITALIST  DISCHARGE SUMMARY    Patient Name: Mandeep Tracey  : 1960  MRN: [id number]    Date of Admission: 2024  Date of Discharge:  2024  Primary Care Physician: Pipe Servin MD    Consults       Date and Time Order Name Status Description    2024  4:03 PM Inpatient Hospitalist Consult              Active and Resolved Hospital Problems:  Active Hospital Problems    Diagnosis POA    **Pneumonia [J18.9] Yes      Resolved Hospital Problems    Diagnosis POA    Pneumonia, unspecified organism [J18.9] Yes    Sepsis [A41.9] Yes     Sepsis present on admission.  Has fever, leukocytosis and tachycardia.  Resolved  Right lower lobe healthcare associated pneumonia.  Unspecified bacterial pathogen.  Right lower extremity pain.  Right foot pain.  Etiology not clear.  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  Seizure disorder.  Hypertension.  Cardiomyopathy with a EF of 26 to 30%.  Hypoxemia.  No home oxygen use.  Resolved  Diabetes mellitus with  BELKYS on home CPAP.  History of peptic ulcer disease.  History of right septic hip due to MRSA.  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  Anemia . stable.  BPH.  Substernal extension of left thyroid lobe     Hospital Course     Hospital Course:  Mandeep Tracey is a 64 y.o. male  with history of prior stroke, seizure disorder, history of hip infection, hypertension, cardiomyopathy, hyperlipidemia, morbid obesity, obstructive sleep apnea who presents to ED for fever, chills, and weakness.  Patient also reports shortness of breath.     Patient's vitals: temperature: 101.8; Pulse: 112; RR-22; BP: 135/89.  Patient is on 3 L of oxygen which is new.       On labs, patient has a WBC-14.71.  High-sensitivity troponin 47.  Sodium of 133.  Chloride of 96.  Glucose of 207.  Urine does not show urinary tract infection.     Chest xray shows mild atelectasias.  Knee xray shows: No acute process.       His recent echo  shows an EF of 26 to 30%.  Left ventricle is dilated.  CT chest consistent with right lower lobe pneumonia, has substernal left lobe of thyroid.  Patient has been at [facility] rehab from [facility] after GI bleed  Interval Followup:   Does have residual aphasia from previous stroke  No more fever.  Blood pressure stable.  On room air.  Right leg pain better.  X-ray right hip noted no acute finding  Minimal cough  Denies any shortness of air   no urinary complaints  Wants to go home.  Does not want rehab.      DISCHARGE Follow Up Recommendations for labs and diagnostics: PCP      Day of Discharge     Vital Signs:  Temp:  [97.5 °F (36.4 °C)-97.9 °F (36.6 °C)] 97.9 °F (36.6 °C)  Heart Rate:  [75-96] 81  Resp:  [18-20] 20  BP: (114-125)/(64-76) 115/75    Physical Exam:   Constitutional: Awake, alert, no acute distress              Eyes: Pupils equal, sclerae anicteric, no conjunctival injection              HENT: NCAT, mucous membranes moist              Neck: Supple, no thyromegaly, no lymphadenopathy, trachea midline              Respiratory: Decreased to auscultation bilaterally, nonlabored respirations               Cardiovascular: RRR, no murmurs, rubs, or gallops, palpable pedal pulses bilaterally              Gastrointestinal: Positive bowel sounds, soft, nontender, nondistended              Musculoskeletal: Diffuse mild swelling right foot.  No longer tender.  Not red or warm.  Has chronic contracture.  Good capillary refill.  Sensation intact.  No bilateral ankle edema, no clubbing or cyanosis to extremities.  Right hip and knee range of motion intact without pain              Psychiatric: Appropriate affect, cooperative              Neurologic: Oriented x 3, right hemiplegia with contraction deformities of right hand and foot, Cranial Nerves grossly intact to confrontation, speech not clear              Skin: No rashes        Discharge Details        Discharge Medications        New Medications         Instructions Start Date   amoxicillin-clavulanate 875-125 MG per tablet  Commonly known as: AUGMENTIN   1 tablet, Oral, 2 Times Daily   Start Date: July 20, 2024     empagliflozin 10 MG tablet tablet  Commonly known as: JARDIANCE   10 mg, Oral, Daily   Start Date: July 20, 2024     famotidine 20 MG tablet  Commonly known as: Pepcid   20 mg, Oral, Nightly      predniSONE 20 MG tablet  Commonly known as: DELTASONE   40 mg, Oral, Daily   Start Date: July 20, 2024            Continue These Medications        Instructions Start Date   acetaminophen 325 MG tablet  Commonly known as: TYLENOL   650 mg, Oral, Every 4 Hours PRN      apixaban 5 MG tablet tablet  Commonly known as: ELIQUIS   5 mg, Oral, 2 Times Daily      aspirin 81 MG chewable tablet   81 mg, Oral, Daily      baclofen 10 MG tablet  Commonly known as: LIORESAL   1 tablet, Oral, 3 Times Daily      bumetanide 1 MG tablet  Commonly known as: BUMEX   1 mg, Oral, Daily      gabapentin 300 MG capsule  Commonly known as: NEURONTIN   600 mg, Oral, 3 Times Daily      insulin glargine 100 UNIT/ML injection  Commonly known as: LANTUS, SEMGLEE   15 Units, Subcutaneous, Nightly      LACTOBACILLUS PO   1 tablet, Oral, 3 Times Daily      metFORMIN 1000 MG tablet  Commonly known as: GLUCOPHAGE   1,000 mg, Oral, 2 Times Daily With Meals      metoprolol succinate XL 50 MG 24 hr tablet  Commonly known as: TOPROL-XL   50 mg, Oral, Daily      multivitamin with minerals tablet tablet   1 tablet, Oral, Daily      rosuvastatin 20 MG tablet  Commonly known as: CRESTOR   20 mg, Oral, Daily      sacubitril-valsartan 49-51 MG tablet  Commonly known as: ENTRESTO   1 tablet, Oral, 2 Times Daily      sertraline 100 MG tablet  Commonly known as: ZOLOFT   150 mg, Oral, Daily      Taltz 80 MG/ML solution auto-injector  Generic drug: Ixekizumab   80 mg, Subcutaneous, Every 14 Days      topiramate 50 MG tablet  Commonly known as: TOPAMAX   50 mg, Oral, 2 Times Daily      trospium 20 MG  tablet  Commonly known as: SANCTURA   20 mg, Oral, 2 Times Daily               Allergies   Allergen Reactions    Fentanyl Mental Status Change    Rocephin [Ceftriaxone] Hives and Itching     Has tolerated cefazolin, cefepime, zosyn, penicillin -- Myrtle Adame PharmD    Ciprofloxacin Unknown - Low Severity    Depakote [Valproic Acid] Other (See Comments)     Behavioral changes    Quinolones Hives       Discharge Disposition:  Home or Self Care.  In private vehicle with family member    Diet:  Diet Instructions       Diet: Cardiac Diets, Diabetic Diets; Healthy Heart (2-3 Na+); Regular (IDDSI 7); Thin (IDDSI 0); Consistent Carbohydrate      Discharge Diet:  Cardiac Diets  Diabetic Diets       Cardiac Diet: Healthy Heart (2-3 Na+)    Texture: Regular (IDDSI 7)    Fluid Consistency: Thin (IDDSI 0)    Diabetic Diet: Consistent Carbohydrate            Discharge Activity:   Activity Instructions       Activity as Tolerated              CODE STATUS:  Code Status and Medical Interventions:   Ordered at: 07/16/24 1846     Level Of Support Discussed With:    Patient     Code Status (Patient has no pulse and is not breathing):    CPR (Attempt to Resuscitate)     Medical Interventions (Patient has pulse or is breathing):    Full Support         No future appointments.    Additional Instructions for the Follow-ups that You Need to Schedule       Discharge Follow-up with PCP   As directed       Currently Documented PCP:    Pipe Servin MD    PCP Phone Number:    [phone]     Follow Up Details: 1 week                Pertinent  and/or Most Recent Results     PROCEDURES:   * Cannot find OR case *     LAB RESULTS:      Lab 07/19/24  0541 07/18/24  0501 07/17/24  0506 07/16/24  1343 07/16/24  1340   WBC 12.46* 15.40* 16.83*  --  14.71*   HEMOGLOBIN 11.8* 11.5* 12.2*  --  13.6   HEMATOCRIT 37.6 35.5* 37.7  --  42.8   PLATELETS 232 195 164  --  160   NEUTROS ABS 10.62* 13.53* 15.00*  --  12.19*   IMMATURE GRANS (ABS) 0.12* 0.12*  0.14*  --  0.07*   LYMPHS ABS 1.08 0.85 0.84  --  1.06   MONOS ABS 0.64 0.88 0.83  --  1.36*   EOS ABS 0.00 0.00 0.00  --  0.01   MCV 90.6 89.4 90.0  --  90.3   PROCALCITONIN  --   --   --   --  0.14   LACTATE  --   --   --  2.0  --          Lab 07/19/24  0541 07/18/24  0501 07/17/24  0506 07/16/24  1340   SODIUM 134* 136 133* 133*   POTASSIUM 4.6 4.4 4.3 4.0   CHLORIDE 98 100 99 96*   CO2 22.2 23.5 22.9 25.2   ANION GAP 13.8 12.5 11.1 11.8   BUN 21 21 17 11   CREATININE 0.95 0.94 1.04 1.15   EGFR 90.5 91.7 81.2 72.0   GLUCOSE 249* 251* 371* 207*   CALCIUM 9.3 9.2 8.7 8.9   MAGNESIUM  --  2.0 1.8 1.6   PHOSPHORUS 3.8 3.0 2.4* 4.2         Lab 07/19/24  0541 07/18/24  0501 07/16/24  1340   TOTAL PROTEIN  --   --  7.4   ALBUMIN 3.9 3.8 4.4   GLOBULIN  --   --  3.0   ALT (SGPT)  --   --  21   AST (SGOT)  --   --  12   BILIRUBIN  --   --  0.8   ALK PHOS  --   --  46         Lab 07/16/24  1340   HSTROP T 47*                 Brief Urine Lab Results  (Last result in the past 365 days)        Color   Clarity   Blood   Leuk Est   Nitrite   Protein   CREAT   Urine HCG        07/16/24 1444 Dark Yellow   Clear   Negative   Negative   Negative   30 mg/dL (1+)                 Microbiology Results (last 10 days)       Procedure Component Value - Date/Time    Respiratory Panel PCR w/COVID-19(SARS-CoV-2) MILKA/CARISSA/DIANA/PAD/COR/KYMBERLY In-House, NP Swab in [facility], 2 HR TAT - Swab, Nasopharynx [[id number]]  (Normal) Collected: 07/17/24 1304    Lab Status: Final result Specimen: Swab from Nasopharynx Updated: 07/17/24 1400     ADENOVIRUS, PCR Not Detected     Coronavirus 229E Not Detected     Coronavirus HKU1 Not Detected     Coronavirus NL63 Not Detected     Coronavirus OC43 Not Detected     COVID19 Not Detected     Human Metapneumovirus Not Detected     Human Rhinovirus/Enterovirus Not Detected     Influenza A PCR Not Detected     Influenza B PCR Not Detected     Parainfluenza Virus 1 Not Detected     Parainfluenza Virus 2 Not Detected      Parainfluenza Virus 3 Not Detected     Parainfluenza Virus 4 Not Detected     RSV, PCR Not Detected     Bordetella pertussis pcr Not Detected     Bordetella parapertussis PCR Not Detected     Chlamydophila pneumoniae PCR Not Detected     Mycoplasma pneumo by PCR Not Detected    Narrative:      In the setting of a positive respiratory panel with a viral infection PLUS a negative procalcitonin without other underlying concern for bacterial infection, consider observing off antibiotics or discontinuation of antibiotics and continue supportive care. If the respiratory panel is positive for atypical bacterial infection (Bordetella pertussis, Chlamydophila pneumoniae, or Mycoplasma pneumoniae), consider antibiotic de-escalation to target atypical bacterial infection.    MRSA Screen, PCR (Inpatient) - Swab, Nares [[id number]]  (Normal) Collected: 07/17/24 1228    Lab Status: Final result Specimen: Swab from Nares Updated: 07/17/24 1420     MRSA PCR No MRSA Detected    Narrative:      The negative predictive value of this diagnostic test is high and should only be used to consider de-escalating anti-MRSA therapy. A positive result may indicate colonization with MRSA and must be correlated clinically.    Urine Culture - Urine, Urine, Clean Catch [[id number]]  (Normal) Collected: 07/16/24 1444    Lab Status: Final result Specimen: Urine, Clean Catch Updated: 07/17/24 2222     Urine Culture No growth    Blood Culture - Blood, Arm, Left [[id number]]  (Normal) Collected: 07/16/24 1355    Lab Status: Preliminary result Specimen: Blood from Arm, Left Updated: 07/19/24 1520     Blood Culture No growth at 3 days    Blood Culture - Blood, Arm, Left [[id number]]  (Normal) Collected: 07/16/24 1343    Lab Status: Preliminary result Specimen: Blood from Arm, Left Updated: 07/19/24 1520     Blood Culture No growth at 3 days    COVID PRE-OP / PRE-PROCEDURE SCREENING ORDER (NO ISOLATION) - Swab, Nasopharynx [[id number]]  (Normal) Collected:  07/16/24 1342    Lab Status: Final result Specimen: Swab from Nasopharynx Updated: 07/16/24 1425    Narrative:      The following orders were created for panel order COVID PRE-OP / PRE-PROCEDURE SCREENING ORDER (NO ISOLATION) - Swab, Nasopharynx.  Procedure                               Abnormality         Status                     ---------                               -----------         ------                     COVID-19, FLU A/B, RSV P...[[id number]]  Normal              Final result                 Please view results for these tests on the individual orders.    COVID-19, FLU A/B, RSV PCR 1 HR TAT - Swab, Nasopharynx [[id number]]  (Normal) Collected: 07/16/24 1342    Lab Status: Final result Specimen: Swab from Nasopharynx Updated: 07/16/24 1425     COVID19 Not Detected     Influenza A PCR Not Detected     Influenza B PCR Not Detected     RSV, PCR Not Detected    Narrative:      Fact sheet for providers: https://www.fda.gov/media/081779/download    Fact sheet for patients: https://www.fda.gov/media/724115/download    Test performed by PCR.            XR Hip With or Without Pelvis 2 - 3 View Right    Result Date: 7/18/2024  Impression: Impression: The appearance of the right hip is not significantly changed. Osteopenia with probable antibiotic infused cement with arthroplasty changes. Osteopenia and moderate arthritis of the left hip. Electronically Signed: Jennifer Alejandro MD  7/18/2024 9:59 PM EDT  Workstation ID: [id number]    XR Foot 2 View Right    Result Date: 7/17/2024  Impression: Impression: Diffuse osteopenia. No acute fractures. Mild degenerative change. If continued pain MRI of the foot recommended for better evaluation. Electronically Signed: Jennifer Alejandro MD  7/17/2024 5:40 PM EDT  Workstation ID: [id number]    CT Chest Without Contrast Diagnostic    Result Date: 7/17/2024  Impression: Impression: 1.There is some consolidation with air bronchograms in the right basilar area which has been suggested and may  relate to more chronic atelectasis although an underlying pneumonia cannot be excluded to account for this. There is elevation right hemidiaphragm. 2.Minimal atelectasis left basilar area. 3.Coronary artery calcification. 4.Substernal extension left lobe thyroid 5.Sclerotic area left fifth rib unchanged Electronically Signed: Christoph Monahan MD  7/17/2024 11:37 AM EDT  Workstation ID: [id number]    XR Knee 3 View Right    Result Date: 7/16/2024  Impression: Impression: No acute bony or joint process demonstrated Electronically Signed: Jason Pretty  7/16/2024 2:36 PM EDT  Workstation ID: [id number]    XR Chest 1 View    Result Date: 7/16/2024  Impression: Impression: Poor inspiration with mild atelectasis of the right lung base. Electronically Signed: Oralia iRos MD  7/16/2024 1:28 PM EDT  Workstation ID: [id number]    Doppler Ankle Brachial Index Single Level CAR    Result Date: 7/10/2024  Impression: 1. Normal arterial perfusion in the right leg with an PROMISE of 0.96. 2. The left PROMISE is unreliable due to incompressibility secondary to atherosclerotic calcification. 3. Abnormal arterial flow to the right great toe, normal flow to the left. 4.  Pvr's appear moderately abnormal at the right ankle, mild at the metatarsal level and     normal at the left ankle and metatarsal level. Compared to the previous study on 5/30/23, there is no change in the right PROMISE form 0.98 and a change in the left PROMISE from 1.19 to non compressible.             Results for orders placed during the hospital encounter of 01/07/23    Adult Transthoracic Echo Complete W/ Cont if Necessary Per Protocol    Interpretation Summary    Left ventricular systolic function is severely decreased. Left ventricular ejection fraction appears to be 26 - 30%.    The left ventricular cavity is moderately dilated.    The following left ventricular wall segments are akinetic: apical septal and apex.    Left ventricular diastolic function was indeterminate.    Severe  mitral valve regurgitation is present.      Imaging Results (All)       Procedure Component Value Units Date/Time    XR Hip With or Without Pelvis 2 - 3 View Right [[id number]] Collected: 07/18/24 2157     Updated: 07/18/24 2201    Narrative:      XR HIP W OR WO PELVIS 2-3 VIEW RIGHT    Date of Exam: 7/18/2024 9:18 PM EDT    Indication: Pain right leg.  History of MRSA right hip septic arthritis    Comparison: Hip and pelvis radiograph 1/9/2023 [facility]    Findings:  Osteopenia. There is high density material in the right hip likely due to antibiotic cement and a femoral component. There is osteopenia. There is stable lucency along the acetabular portion. There are vascular calcifications. There is moderate arthritis   of the left hip.      Impression:      Impression:  The appearance of the right hip is not significantly changed. Osteopenia with probable antibiotic infused cement with arthroplasty changes.  Osteopenia and moderate arthritis of the left hip.      Electronically Signed: Jennifer Alejandro MD    7/18/2024 9:59 PM EDT    Workstation ID: [id number]    XR Foot 2 View Right [[id number]] Collected: 07/17/24 1738     Updated: 07/17/24 1742    Narrative:      XR FOOT 2 VW RIGHT    Date of Exam: 7/17/2024 5:21 PM EDT    Indication: Diffuse pain and swelling    Comparison: None available.    Findings:   Osteopenia. This limits evaluation. Flexion of the second through fourth digits on evaluation of these digits. Vascular calcifications. Mild joint space narrowing first metatarsophalangeal joint. Moderate-sized plantar calcaneal spur. Small enthesophyte   Achilles tendon insertion site. Vascular calcifications.          Impression:      Impression:  Diffuse osteopenia. No acute fractures. Mild degenerative change. If continued pain MRI of the foot recommended for better evaluation.      Electronically Signed: Jennifer Alejandro MD    7/17/2024 5:40 PM EDT    Workstation ID: [id number]    CT Chest Without Contrast  Diagnostic [[id number]] Collected: 07/17/24 1127     Updated: 07/17/24 1139    Narrative:      CT CHEST WO CONTRAST DIAGNOSTIC    Date of Exam: 7/17/2024 11:08 AM EDT    Indication: Sepsis.  Hypoxia.    Comparison: CT chest June 16, 2020    Technique: Axial CT images were obtained of the chest without contrast administration.  Reconstructed coronal and sagittal images were also obtained. Automated exposure control and iterative construction methods were used.      Findings:  There is consolidation in the right lower lobe with air bronchograms that could be secondary to an underlying pneumonia. A similar finding has been suggested on the prior study. It is possible this might relate to more chronic atelectasis. There is   elevation right hemidiaphragm unchanged. There is some minimal atelectasis left basilar area. There are no pleural effusions. The upper lung zones seem clear.    There is coronary artery calcification. No pathologic-appearing mediastinal lymph nodes are noted.    Upper slices through the lower neck reveal substernal extension of the left lobe of the thyroid.    Lower slices through the upper abdomen reveal no definite acute findings.    There are degenerative changes involving the dorsal spine. There is a sclerotic involving the left fifth rib anteriorly that could be reflective of benign change and has been suggested.      Impression:      Impression:  1.There is some consolidation with air bronchograms in the right basilar area which has been suggested and may relate to more chronic atelectasis although an underlying pneumonia cannot be excluded to account for this. There is elevation right   hemidiaphragm.  2.Minimal atelectasis left basilar area.  3.Coronary artery calcification.  4.Substernal extension left lobe thyroid  5.Sclerotic area left fifth rib unchanged        Electronically Signed: Christoph Monahan MD    7/17/2024 11:37 AM EDT    Workstation ID: [id number]    XR Knee 3 View Right [[id number]]  Collected: 07/16/24 1435     Updated: 07/16/24 1439    Narrative:      XR KNEE 3 VW RIGHT    Date of Exam: 7/16/2024 2:23 PM EDT    Indication: Knee pain  pain    Comparison: None available.    Findings:  No fracture or acute bony abnormality. No significant arthritic change. No fluid in the suprapatellar bursa. Atherosclerotic calcification noted, with popliteal artery stent present      Impression:      Impression:  No acute bony or joint process demonstrated      Electronically Signed: Jason Pretty    7/16/2024 2:36 PM EDT    Workstation ID: [id number]    XR Chest 1 View [[id number]] Collected: 07/16/24 1327     Updated: 07/16/24 1330    Narrative:      XR CHEST 1 VW    Date of Exam: 7/16/2024 1:11 PM EDT    Indication: Weak/Dizzy/AMS triage protocol    Comparison: None available.    Findings:  There are low lung volumes with poor inspiration. There is moderate elevation of the right diaphragm. There is mild linear atelectasis of the right lung base. Left lung is clear. There are no definite effusions. Heart and pulmonary vessels appear within   normal limits.      Impression:      Impression:  Poor inspiration with mild atelectasis of the right lung base.      Electronically Signed: Oralia Rios MD    7/16/2024 1:28 PM EDT    Workstation ID: [id number]             Labs Pending at Discharge:  Pending Labs       Order Current Status    Blood Culture - Blood, Arm, Left Preliminary result    Blood Culture - Blood, Arm, Left Preliminary result                Time spent on Discharge including face to face service: 35 minutes  Part of this note may be an electronic transcription/translation of spoken language to printed text using the Dragon Dictation System.     TElectronically signed by Efrain Ross MD, 07/19/24, 5:01 PM EDT.

## 2024-07-19 NOTE — SIGNIFICANT NOTE
07/19/24 1030   Coping/Psychosocial   Observed Emotional State calm;cooperative   Verbalized Emotional State relief   Trust Relationship/Rapport empathic listening provided   Involvement in Care interacting with patient   Additional Documentation Spiritual Care (Group)   Spiritual Care   Use of Spiritual Resources non-Jewish use of spiritual care   Spiritual Care Source  initiative   Spiritual Care Follow-Up follow-up, none required as presently assessed   Response to Spiritual Care receptive of support   Spiritual Care Interventions supportive conversation provided   Spiritual Care Visit Type initial   Receptivity to Spiritual Care visit welcomed

## 2024-07-21 LAB
BACTERIA SPEC AEROBE CULT: NORMAL
BACTERIA SPEC AEROBE CULT: NORMAL

## 2024-07-23 ENCOUNTER — READMISSION MANAGEMENT (OUTPATIENT)
Dept: CALL CENTER | Facility: HOSPITAL | Age: 64
End: 2024-07-23
Payer: OTHER GOVERNMENT

## 2024-07-23 NOTE — OUTREACH NOTE
COPD/PN Week 1 Survey      Flowsheet Row Responses   [facility] patient discharged from? [facility]   Does the patient have one of the following disease processes/diagnoses(primary or secondary)? Pneumonia   Week 1 attempt successful? Yes   Call start time 1326   Call end time 1329   Discharge diagnosis PNA   Meds reviewed with patient/caregiver? Yes   Is the patient having any side effects they believe may be caused by any medication additions or changes? No   Does the patient have all medications ordered at discharge? Yes   Is the patient taking all medications as directed (includes completed medication regime)? Yes   Medication comments antibiotic, steroid   Does the patient have a primary care provider?  Yes   Does the patient have an appointment with their PCP or specialist within 7 days of discharge? No   Nursing Interventions Educated patient on importance of making appointment   Has the patient kept scheduled appointments due by today? N/A   Has home health visited the patient within 72 hours of discharge? N/A   Pulse Ox monitoring None   Psychosocial issues? No   Did the patient receive a copy of their discharge instructions? No   Nursing interventions Reviewed instructions with patient   What is the patient's perception of their health status since discharge? Improving   Nursing Interventions Nurse provided patient education   If the patient is a current smoker, are they able to teach back resources for cessation? Not a smoker   Is the patient/caregiver able to teach back the hierarchy of who to call/visit for symptoms/problems? PCP, Specialist, Home health nurse, Urgent Care, ED, 911 Yes   Patient reports what zone on this call? Green Zone   Is the patient/caregiver able to teach back signs and symptoms of worsening condition: Fever/chills, Shortness of breath, Chest pain   Is the patient/caregiver able to teach back importance of completing antibiotic course of treatment? Yes   Week 1 call completed?  Yes   Revoked No further contact(revokes)-requires comment  [Doing well, no c/v.]   Is the patient interested in additional calls from an ambulatory ? No   Would this patient benefit from a Referral to [facility] Social Work? No   Call end time [id number]            Nicole GARCÍA - Registered Nurse

## 2024-07-26 LAB
QT INTERVAL: 348 MS
QTC INTERVAL: 468 MS

## 2024-08-30 ENCOUNTER — LAB REQUISITION (OUTPATIENT)
Dept: LAB | Facility: HOSPITAL | Age: 64
End: 2024-08-30
Payer: MEDICARE

## 2024-08-30 DIAGNOSIS — N39.0 URINARY TRACT INFECTION, SITE NOT SPECIFIED: ICD-10-CM

## 2024-08-30 LAB
BILIRUB UR QL STRIP: NEGATIVE
CLARITY UR: CLEAR
COLOR UR: YELLOW
GLUCOSE UR STRIP-MCNC: NEGATIVE MG/DL
HGB UR QL STRIP.AUTO: NEGATIVE
KETONES UR QL STRIP: ABNORMAL
LEUKOCYTE ESTERASE UR QL STRIP.AUTO: NEGATIVE
NITRITE UR QL STRIP: NEGATIVE
PH UR STRIP.AUTO: 5.5 [PH] (ref 5–8)
PROT UR QL STRIP: NEGATIVE
SP GR UR STRIP: 1.02 (ref 1–1.03)
UROBILINOGEN UR QL STRIP: ABNORMAL

## 2024-08-30 PROCEDURE — 81003 URINALYSIS AUTO W/O SCOPE: CPT | Performed by: INTERNAL MEDICINE

## 2024-08-30 PROCEDURE — 87086 URINE CULTURE/COLONY COUNT: CPT | Performed by: INTERNAL MEDICINE

## 2024-08-31 LAB — BACTERIA SPEC AEROBE CULT: NORMAL

## 2025-02-04 NOTE — TELEPHONE ENCOUNTER
Letter re-faxed to the number provided.    Spiritual Plan of Care    Pt Name: Roxi Corrigan  Pt : 1937  Date: 2025    Visit Type: In person    Referral Source: Patient    Reason for Visit: Trigger    Visited With: Patient    Length of Visit: 45 minutes    Requires Follow-up: No    Follow-up Date: 25    Follow-up Reason: Quynh rey     Spiritual Care Consult Needed: Spiritual Care eval completed    Spiritual Care Visit Preference:     Taxonomy:    Intended Effects: Demonstrate caring and concern, Aligning care plan with patient's values, Lessen anxiety, Kiana Affirmation, Promote a sense of peace  Methods: Offer support, Explore quality of life, Encourage story-telling, Encourage sharing of feelings, Assist with spiritual/Temple practices  Interventions: Active listening, Assist with identifying strengths, Discuss concerns, Discuss coping mechanisms with someone, [place]      Patient Affect at Time of Visit: Alert, Open to  Visit, Pleasant, Scared, Tired  Patient Assessment: Anxious, Fear, Relies on kiana, Support system, Hopeful  Patient  Intervention: Empathic Listening, Emotional Support, Prayer, Complimentary/Alternative Modalities (comment), Clarify Feelings    Spiritual Plan of Care: Routine follow up (Quynh rey)    Patient Reported Outcome:  Coping techniques strengthened    Kiana Community:  [facility] Phone Number:  [phone]    Piper expressed fear related to her shortness of breath and weakness. She was experiencing anxiousness related to her upcoming CT transport and scan. Writer taught her alternatives for lowering anxiousness and becoming peaceful. Piper has strong support from her daughter-in-law/Shirley and a son. Two sons live in other areas and a third (Shirley's ) . Piper has strength through her kiana. Prayer is especially helpful. Writer prayed with her. Writer to follow routinely.

## 2025-05-24 ENCOUNTER — HOSPITAL ENCOUNTER (EMERGENCY)
Facility: HOSPITAL | Age: 65
Discharge: HOME | End: 2025-05-24
Attending: EMERGENCY MEDICINE
Payer: OTHER GOVERNMENT

## 2025-05-24 ENCOUNTER — APPOINTMENT (OUTPATIENT)
Dept: GENERAL RADIOLOGY | Facility: HOSPITAL | Age: 65
End: 2025-05-24
Payer: OTHER GOVERNMENT

## 2025-05-24 VITALS
DIASTOLIC BLOOD PRESSURE: 67 MMHG | TEMPERATURE: 98.5 F | WEIGHT: 282.19 LBS | OXYGEN SATURATION: 94 % | RESPIRATION RATE: 16 BRPM | BODY MASS INDEX: 38.22 KG/M2 | SYSTOLIC BLOOD PRESSURE: 123 MMHG | HEIGHT: 72 IN | HEART RATE: 91 BPM

## 2025-05-24 DIAGNOSIS — R07.89 CHEST WALL PAIN: Primary | ICD-10-CM

## 2025-05-24 LAB
ALBUMIN SERPL-MCNC: 4.3 G/DL (ref 3.5–5.2)
ALBUMIN/GLOB SERPL: 1.5 G/DL
ALP SERPL-CCNC: 54 U/L (ref 39–117)
ALT SERPL W P-5'-P-CCNC: 20 U/L (ref 1–41)
ANION GAP SERPL CALCULATED.3IONS-SCNC: 12.5 MMOL/L (ref 5–15)
AST SERPL-CCNC: 15 U/L (ref 1–40)
BASOPHILS # BLD AUTO: 0.02 10*3/MM3 (ref 0–0.2)
BASOPHILS NFR BLD AUTO: 0.2 % (ref 0–1.5)
BILIRUB SERPL-MCNC: 0.3 MG/DL (ref 0–1.2)
BUN SERPL-MCNC: 11 MG/DL (ref 8–23)
BUN/CREAT SERPL: 12.1 (ref 7–25)
CALCIUM SPEC-SCNC: 9.7 MG/DL (ref 8.6–10.5)
CHLORIDE SERPL-SCNC: 98 MMOL/L (ref 98–107)
CO2 SERPL-SCNC: 21.5 MMOL/L (ref 22–29)
CREAT SERPL-MCNC: 0.91 MG/DL (ref 0.76–1.27)
DEPRECATED RDW RBC AUTO: 47.5 FL (ref 37–54)
EGFRCR SERPLBLD CKD-EPI 2021: 94.7 ML/MIN/1.73
EOSINOPHIL # BLD AUTO: 0.42 10*3/MM3 (ref 0–0.4)
EOSINOPHIL NFR BLD AUTO: 4.5 % (ref 0.3–6.2)
ERYTHROCYTE [DISTWIDTH] IN BLOOD BY AUTOMATED COUNT: 14.6 % (ref 12.3–15.4)
GEN 5 1HR TROPONIN T REFLEX: 28 NG/L
GLOBULIN UR ELPH-MCNC: 2.9 GM/DL
GLUCOSE SERPL-MCNC: 300 MG/DL (ref 65–99)
HCT VFR BLD AUTO: 43.3 % (ref 37.5–51)
HGB BLD-MCNC: 14.1 G/DL (ref 13–17.7)
HOLD SPECIMEN: NORMAL
HOLD SPECIMEN: NORMAL
IMM GRANULOCYTES # BLD AUTO: 0.03 10*3/MM3 (ref 0–0.05)
IMM GRANULOCYTES NFR BLD AUTO: 0.3 % (ref 0–0.5)
LIPASE SERPL-CCNC: 62 U/L (ref 13–60)
LYMPHOCYTES # BLD AUTO: 1.61 10*3/MM3 (ref 0.7–3.1)
LYMPHOCYTES NFR BLD AUTO: 17.1 % (ref 19.6–45.3)
MAGNESIUM SERPL-MCNC: 1.6 MG/DL (ref 1.6–2.4)
MCH RBC QN AUTO: 28.8 PG (ref 26.6–33)
MCHC RBC AUTO-ENTMCNC: 32.6 G/DL (ref 31.5–35.7)
MCV RBC AUTO: 88.4 FL (ref 79–97)
MONOCYTES # BLD AUTO: 0.6 10*3/MM3 (ref 0.1–0.9)
MONOCYTES NFR BLD AUTO: 6.4 % (ref 5–12)
NEUTROPHILS NFR BLD AUTO: 6.75 10*3/MM3 (ref 1.7–7)
NEUTROPHILS NFR BLD AUTO: 71.5 % (ref 42.7–76)
NRBC BLD AUTO-RTO: 0 /100 WBC (ref 0–0.2)
NT-PROBNP SERPL-MCNC: 84.9 PG/ML (ref 0–900)
PLATELET # BLD AUTO: 214 10*3/MM3 (ref 140–450)
PMV BLD AUTO: 10.9 FL (ref 6–12)
POTASSIUM SERPL-SCNC: 4.6 MMOL/L (ref 3.5–5.2)
PROT SERPL-MCNC: 7.2 G/DL (ref 6–8.5)
QT INTERVAL: 371 MS
QTC INTERVAL: 465 MS
RBC # BLD AUTO: 4.9 10*6/MM3 (ref 4.14–5.8)
SODIUM SERPL-SCNC: 132 MMOL/L (ref 136–145)
TROPONIN T % DELTA: -7
TROPONIN T NUMERIC DELTA: -2 NG/L
TROPONIN T SERPL HS-MCNC: 30 NG/L
WBC NRBC COR # BLD AUTO: 9.43 10*3/MM3 (ref 3.4–10.8)
WHOLE BLOOD HOLD COAG: NORMAL
WHOLE BLOOD HOLD SPECIMEN: NORMAL

## 2025-05-24 PROCEDURE — 83735 ASSAY OF MAGNESIUM: CPT | Performed by: EMERGENCY MEDICINE

## 2025-05-24 PROCEDURE — 99284 EMERGENCY DEPT VISIT MOD MDM: CPT

## 2025-05-24 PROCEDURE — 71045 X-RAY EXAM CHEST 1 VIEW: CPT

## 2025-05-24 PROCEDURE — 93005 ELECTROCARDIOGRAM TRACING: CPT

## 2025-05-24 PROCEDURE — 93005 ELECTROCARDIOGRAM TRACING: CPT | Performed by: EMERGENCY MEDICINE

## 2025-05-24 PROCEDURE — 83880 ASSAY OF NATRIURETIC PEPTIDE: CPT | Performed by: EMERGENCY MEDICINE

## 2025-05-24 PROCEDURE — 84484 ASSAY OF TROPONIN QUANT: CPT | Performed by: EMERGENCY MEDICINE

## 2025-05-24 PROCEDURE — 85025 COMPLETE CBC W/AUTO DIFF WBC: CPT | Performed by: EMERGENCY MEDICINE

## 2025-05-24 PROCEDURE — 80053 COMPREHEN METABOLIC PANEL: CPT | Performed by: EMERGENCY MEDICINE

## 2025-05-24 PROCEDURE — 83690 ASSAY OF LIPASE: CPT | Performed by: EMERGENCY MEDICINE

## 2025-05-24 PROCEDURE — 36415 COLL VENOUS BLD VENIPUNCTURE: CPT

## 2025-05-24 RX ORDER — ASPIRIN 81 MG/1
324 TABLET, CHEWABLE ORAL ONCE
Status: COMPLETED | OUTPATIENT
Start: 2025-05-24 | End: 2025-05-24

## 2025-05-24 RX ORDER — SODIUM CHLORIDE 0.9 % (FLUSH) 0.9 %
10 SYRINGE (ML) INJECTION AS NEEDED
Status: DISCONTINUED | OUTPATIENT
Start: 2025-05-24 | End: 2025-05-25 | Stop reason: HOSPADM

## 2025-05-24 RX ADMIN — ASPIRIN 243 MG: 81 TABLET, CHEWABLE ORAL at 20:36

## 2025-05-25 NOTE — ED NOTES
Pt takes 81mg aspirin daily, last dose 0800 today, other 243mg given here.   Pt also took 1 Nitro sublingual at 1947.

## 2025-05-25 NOTE — ED PROVIDER NOTES
Time: 9:11 PM EDT  Date of encounter:  5/24/2025  Independent Historian/Clinical History and Information was obtained by:   Patient    History is limited by: N/A    Chief Complaint: Chest pain      History of Present Illness:  Patient is a 65 y.o. year old male who presents to the emergency department for evaluation of chest pain.  Patient has a history of diabetes, CVA, coronary disease, obesity who presents with complaints of chest discomfort.  States that he has been having some chest spasms on the left side of the chest that he described as spasms.  Was given nitro by his wife who stated did not really help.  States that he feels better at this time but it does hurt when he moves.  Denies any shortness of breath, nausea, vomiting.  Patient is on Eliquis and aspirin.  No other complaints at this time.      Patient Care Team  Primary Care Provider: Pipe Servin MD    Past Medical History:     Allergies   Allergen Reactions    Fentanyl Mental Status Change    Rocephin [Ceftriaxone] Hives and Itching     Has tolerated cefazolin, cefepime, zosyn, penicillin -- Myrtle Adame, AmyD    Ciprofloxacin Unknown - Low Severity    Depakote [Valproic Acid] Other (See Comments)     Behavioral changes    Quinolones Hives     Past Medical History:   Diagnosis Date    Aphasia     Cardiomyopathy     CPAP (continuous positive airway pressure) dependence     Diabetes     Hemiparesis     Right side     Morbid obesity     Nonrheumatic mitral valve regurgitation     BELKYS on CPAP     Peptic ulcer disease     Postoperative nausea and vomiting 3/13/2021    Psoriasis     Pyogenic arthritis of right hip     Seizures     Sleep apnea     Stroke 2004    Ventricular ectopy     asymptomatic     Past Surgical History:   Procedure Laterality Date    CHOLECYSTECTOMY      COLONOSCOPY N/A 10/17/2023    Procedure: COLONOSCOPY WITH POLYPECTOMIES;  Surgeon: Angel Luis Mendoza MD;  Location: [facility] ENDOSCOPY;  Service: General;  Laterality: N/A;   COLON POLYPS, DIVERTICULOSIS    ENDOSCOPY N/A 10/17/2023    Procedure: ESOPHAGOGASTRODUODENOSCOPY WITH BIOPSIES;  Surgeon: Angel Luis Mendoza MD;  Location: [facility] ENDOSCOPY;  Service: General;  Laterality: N/A;  GASTRIC POLYP    EYE SURGERY      HIP SPACER INSERTION WITH ANTIBIOTIC CEMENT Right 1/9/2023    Procedure: RT. BIPOLAR HIP REMOVAL AND PLACEMENT OF SPACER;  Surgeon: Faustina Beebe MD;  Location: [facility] MAIN OR;  Service: Orthopedics;  Laterality: Right;    INCISION AND DRAINAGE HIP Right 3/12/2021    Procedure: HIP ANTERIOR INCISION AND DRAINAGE WITH HANA TABLE;  Surgeon: Tao Andrea MD;  Location: [facility] MAIN OR;  Service: Orthopedics;  Laterality: Right;    LUMBAR DISC SURGERY      US GUIDED FINE NEEDLE ASPIRATION  3/10/2021     Family History   Problem Relation Age of Onset    Hypertension Mother     Hyperlipidemia Mother     Arthritis Mother     Cancer Mother     Heart disease Father     Hyperlipidemia Sister     Drug abuse Sister     COPD Sister     Birth defects Sister     Early death Brother     Drug abuse Brother        Home Medications:  Prior to Admission medications    Medication Sig Start Date End Date Taking? Authorizing Provider   acetaminophen (TYLENOL) 325 MG tablet Take 2 tablets by mouth Every 4 (Four) Hours As Needed for Mild Pain  or Headache. 3/31/21   Angel Luis Dejesus MD   apixaban (ELIQUIS) 5 MG tablet tablet Take 1 tablet by mouth 2 (Two) Times a Day.    ProviderRachel MD   aspirin 81 MG chewable tablet Chew 1 tablet Daily.    Rachel Perales MD   baclofen (LIORESAL) 10 MG tablet Take 1 tablet by mouth 3 (Three) Times a Day.    Rachel Perales MD   bumetanide (BUMEX) 1 MG tablet Take 1 tablet by mouth Daily.    Rachel Perales MD   gabapentin (NEURONTIN) 300 MG capsule Take 2 capsules by mouth 3 (Three) Times a Day. 1/16/23   Fazal Givens MD   insulin glargine (LANTUS, SEMGLEE) 100 UNIT/ML injection Inject 15 Units under the skin  "into the appropriate area as directed Every Night.  Patient taking differently: Inject 10 Units under the skin into the appropriate area as directed Every Night. 1/16/23   Fazal Givens MD   Ixekizumab (Taltz) 80 MG/ML solution auto-injector Inject 80 mg under the skin into the appropriate area as directed Every 14 (Fourteen) Days.    Rachel Perales MD   LACTOBACILLUS PO Take 1 tablet by mouth 3 (Three) Times a Day.    Rachel Perales MD   metFORMIN (GLUCOPHAGE) 1000 MG tablet Take 1 tablet by mouth 2 (Two) Times a Day With Meals.    Rachel Perales MD   metoprolol succinate XL (TOPROL-XL) 50 MG 24 hr tablet Take 1 tablet by mouth Daily. 1/16/23   Fazal Givens MD   multivitamin with minerals tablet tablet Take 1 tablet by mouth Daily.    Rachel Perales MD   rosuvastatin (CRESTOR) 20 MG tablet Take 1 tablet by mouth Daily.    Rachel Perales MD   sacubitril-valsartan (ENTRESTO) 49-51 MG tablet Take 1 tablet by mouth 2 (Two) Times a Day.    Rachel Perales MD   sertraline (ZOLOFT) 100 MG tablet Take 1.5 tablets by mouth Daily.    Rachel Perales MD   topiramate (TOPAMAX) 50 MG tablet Take 1 tablet by mouth 2 (Two) Times a Day.    Rachel Perales MD   trospium (SANCTURA) 20 MG tablet Take 1 tablet by mouth 2 (Two) Times a Day.    Rachel Perales MD        Social History:   Social History     Tobacco Use    Smoking status: Former    Smokeless tobacco: Never    Tobacco comments:     quit 22 years ago    Vaping Use    Vaping status: Never Used   Substance Use Topics    Alcohol use: Never    Drug use: Never         Review of Systems:  Review of Systems     Physical Exam:  /67 (BP Location: Left arm, Patient Position: Lying)   Pulse 91   Temp 98.5 °F (36.9 °C) (Oral)   Resp 16   Ht 182.9 cm (72\")   Wt 128 kg (282 lb 3 oz)   SpO2 94%   BMI 38.27 kg/m²     Physical Exam  Vitals and nursing note reviewed.   Constitutional:       Appearance: " Normal appearance.   HENT:      Head: Normocephalic and atraumatic.   Eyes:      General: No scleral icterus.  Cardiovascular:      Rate and Rhythm: Normal rate and regular rhythm.      Heart sounds: Normal heart sounds.   Pulmonary:      Effort: Pulmonary effort is normal.      Breath sounds: Normal breath sounds.      Comments: Anterior chest wall tenderness on the left and left shoulder  Chest:      Chest wall: Tenderness present.   Abdominal:      Palpations: Abdomen is soft.      Tenderness: There is no abdominal tenderness.   Musculoskeletal:         General: Normal range of motion.      Cervical back: Normal range of motion.   Skin:     Findings: No rash.   Neurological:      Mental Status: He is alert. Mental status is at baseline.      Comments: Chronic right-sided deficits and dysarthria                    Medical Decision Making:      Comorbidities that affect care:    Coronary Artery Disease, Diabetes, Hypertension    External Notes reviewed:    Reviewed discharge summary from 7/19/2024      The following orders were placed and all results were independently analyzed by me:  Orders Placed This Encounter   Procedures    XR Chest 1 View    [place] Draw    High Sensitivity Troponin T    Comprehensive Metabolic Panel    Lipase    BNP    Magnesium    CBC Auto Differential    High Sensitivity Troponin T 1Hr    NPO Diet NPO Type: Strict NPO    Undress & Gown    Continuous Pulse Oximetry    Oxygen Therapy- Nasal Cannula; Titrate 1-6 LPM Per SpO2; 90 - 95%    ECG 12 Lead ED Triage Standing Order; Chest Pain    ECG 12 Lead ED Triage Standing Order; Chest Pain    Insert Peripheral IV    CBC & Differential    Green Top (Gel)    Lavender Top    Gold Top - SST    Light Blue Top       Medications Given in the Emergency Department:  Medications   sodium chloride 0.9 % flush 10 mL (has no administration in time range)   aspirin chewable tablet 324 mg (243 mg Oral Given 5/24/25 2036)        ED Course:    ED Course as of  05/24/25 2241   Sat May 24, 2025   2047 EKG inter by me  Time: 2022  Heart rate 95  Sinus, left bundle branch block, no acute ischemia [MA]      ED Course User Index  [MA] Angel Luis Norton MD       Labs:    Lab Results (last 24 hours)       Procedure Component Value Units Date/Time    High Sensitivity Troponin T [[id number]]  (Abnormal) Collected: 05/24/25 2032    Specimen: Blood Updated: 05/24/25 2113     HS Troponin T 30 ng/L     Narrative:      High Sensitive Troponin T Reference Range:  <14.0 ng/L- Negative Female for AMI  <22.0 ng/L- Negative Male for AMI  >=14 - Abnormal Female indicating possible myocardial injury.  >=22 - Abnormal Male indicating possible myocardial injury.   Clinicians would have to utilize clinical acumen, EKG, Troponin, and serial changes to determine if it is an Acute Myocardial Infarction or myocardial injury due to an underlying chronic condition.         CBC & Differential [[id number]]  (Abnormal) Collected: 05/24/25 2032    Specimen: Blood Updated: 05/24/25 2041    Narrative:      The following orders were created for panel order CBC & Differential.  Procedure                               Abnormality         Status                     ---------                               -----------         ------                     CBC Auto Differential[[id number]]        Abnormal            Final result                 Please view results for these tests on the individual orders.    Comprehensive Metabolic Panel [[id number]]  (Abnormal) Collected: 05/24/25 2032    Specimen: Blood Updated: 05/24/25 2113     Glucose 300 mg/dL      BUN 11 mg/dL      Creatinine 0.91 mg/dL      Sodium 132 mmol/L      Potassium 4.6 mmol/L      Comment: Slight hemolysis detected by analyzer. Result may be falsely elevated.        Chloride 98 mmol/L      CO2 21.5 mmol/L      Calcium 9.7 mg/dL      Total Protein 7.2 g/dL      Albumin 4.3 g/dL      ALT (SGPT) 20 U/L      AST (SGOT) 15 U/L      Alkaline Phosphatase 54  U/L      Total Bilirubin 0.3 mg/dL      Globulin 2.9 gm/dL      A/G Ratio 1.5 g/dL      BUN/Creatinine Ratio 12.1     Anion Gap 12.5 mmol/L      eGFR 94.7 mL/min/1.73     Narrative:      GFR Categories in Chronic Kidney Disease (CKD)              GFR Category          GFR (mL/min/1.73)    Interpretation  G1                    90 or greater        Normal or high (1)  G2                    60-89                Mild decrease (1)  G3a                   45-59                Mild to moderate decrease  G3b                   30-44                Moderate to severe decrease  G4                    15-29                Severe decrease  G5                    14 or less           Kidney failure    (1)In the absence of evidence of kidney disease, neither GFR category G1 or G2 fulfill the criteria for CKD.    eGFR calculation 2021 CKD-EPI creatinine equation, which does not include race as a factor    Lipase [[id number]]  (Abnormal) Collected: 05/24/25 2032    Specimen: Blood Updated: 05/24/25 2113     Lipase 62 U/L     BNP [[id number]]  (Normal) Collected: 05/24/25 2032    Specimen: Blood Updated: 05/24/25 2108     proBNP 84.9 pg/mL     Narrative:      This assay is used as an aid in the diagnosis of individuals suspected of having heart failure. It can be used as an aid in the diagnosis of acute decompensated heart failure (ADHF) in patients presenting with signs and symptoms of ADHF to the emergency department (ED). In addition, NT-proBNP of <300 pg/mL indicates ADHF is not likely.    Age Range Result Interpretation  NT-proBNP Concentration (pg/mL:      <50             Positive            >450                   Gray                 300-450                    Negative             <300    50-75           Positive            >900                  Gray                300-900                  Negative            <300      >75             Positive            >1800                  Gray                300-1800                  Negative             <300    Magnesium [[id number]]  (Normal) Collected: 05/24/25 2032    Specimen: Blood Updated: 05/24/25 2113     Magnesium 1.6 mg/dL     CBC Auto Differential [[id number]]  (Abnormal) Collected: 05/24/25 2032    Specimen: Blood Updated: 05/24/25 2041     WBC 9.43 10*3/mm3      RBC 4.90 10*6/mm3      Hemoglobin 14.1 g/dL      Hematocrit 43.3 %      MCV 88.4 fL      MCH 28.8 pg      MCHC 32.6 g/dL      RDW 14.6 %      RDW-SD 47.5 fl      MPV 10.9 fL      Platelets 214 10*3/mm3      Neutrophil % 71.5 %      Lymphocyte % 17.1 %      Monocyte % 6.4 %      Eosinophil % 4.5 %      Basophil % 0.2 %      Immature Grans % 0.3 %      Neutrophils, Absolute 6.75 10*3/mm3      Lymphocytes, Absolute 1.61 10*3/mm3      Monocytes, Absolute 0.60 10*3/mm3      Eosinophils, Absolute 0.42 10*3/mm3      Basophils, Absolute 0.02 10*3/mm3      Immature Grans, Absolute 0.03 10*3/mm3      nRBC 0.0 /100 WBC     High Sensitivity Troponin T 1Hr [[id number]]  (Abnormal) Collected: 05/24/25 2151    Specimen: Blood Updated: 05/24/25 2212     HS Troponin T 28 ng/L      Troponin T Numeric Delta -2 ng/L      Troponin T % Delta -7    Narrative:      High Sensitive Troponin T Reference Range:  <14.0 ng/L- Negative Female for AMI  <22.0 ng/L- Negative Male for AMI  >=14 - Abnormal Female indicating possible myocardial injury.  >=22 - Abnormal Male indicating possible myocardial injury.   Clinicians would have to utilize clinical acumen, EKG, Troponin, and serial changes to determine if it is an Acute Myocardial Infarction or myocardial injury due to an underlying chronic condition.                  Imaging:    XR Chest 1 View  Result Date: 5/24/2025  XR CHEST 1 VW Date of Exam: 5/24/2025 8:35 PM EDT Indication: Chest Pain Triage Protocol Comparison: 7/16/2024 Findings: Heart is enlarged. There is chronic elevation of the right hemidiaphragm with chronic right basilar atelectasis. Lungs are otherwise clear. Pulmonary vascularity is normal. No  pneumothorax.     Cardiomegaly with chronic elevation of the right hemidiaphragm and chronic right basilar atelectasis. Electronically Signed: Elroy Carbajal MD  5/24/2025 8:41 PM EDT  Workstation ID: [id number]        Differential Diagnosis and Discussion:    Chest Pain:  Based on the patient's signs and symptoms, I considered aortic dissection, myocardial infaction, pulmonary embolism, cardiac tamponade, pericarditis, pneumothorax, musculoskeletal chest pain and other differential diagnosis as an etiology of the patient's chest pain.     PROCEDURES:    Labs were collected in the emergency department and all labs were reviewed and interpreted by me.  X-ray were performed in the emergency department and all X-ray impressions were independently interpreted by me.  An EKG was performed and the EKG was interpreted by me.    ECG 12 Lead ED Triage Standing Order; Chest Pain   Preliminary Result   HEART RATE=95  bpm   RR Rycfatkv=763  ms   NH Kekypkex=960  ms   P Horizontal Axis=-3  deg   P Front Axis=62  deg   QRSD Hdsfjpon=341  ms   QT Btuhyuod=627  ms   EXwQ=019  ms   QRS Axis=15  deg   T Wave Axis=88  deg   - OTHERWISE NORMAL ECG -   Sinus rhythm   Low voltage, precordial leads   Date and Time of Study:2025-05-24 20:22:36          Procedures    MDM     Amount and/or Complexity of Data Reviewed  Clinical lab tests: reviewed  Tests in the radiology section of CPT®: reviewed  Tests in the medicine section of CPT®: reviewed  Decide to obtain previous medical records or to obtain history from someone other than the patient: yes                       Patient Care Considerations:          Consultants/Shared Management Plan:    None    Social Determinants of Health:    Patient is independent, reliable, and has access to care.       Disposition and Care Coordination:    Discharged: I considered escalation of care by admitting this patient to the hospital, however patient has 2 negative sets of enzymes as well as pain appears to  be musculoskeletal in nature    I have explained the patient´s condition, diagnoses and treatment plan based on the information available to me at this time. I have answered questions and addressed any concerns. The patient has a good  understanding of the patient´s diagnosis, condition, and treatment plan as can be expected at this point. The vital signs have been stable. The patient´s condition is stable and appropriate for discharge from the emergency department.      The patient will pursue further outpatient evaluation with the primary care physician or other designated or consulting physician as outlined in the discharge instructions. They are agreeable to this plan of care and follow-up instructions have been explained in detail. The patient has received these instructions in written format and have expressed an understanding of the discharge instructions. The patient is aware that any significant change in condition or worsening of symptoms should prompt an immediate return to this or the closest emergency department or call to 911.      Final diagnoses:   Chest wall pain        ED Disposition       ED Disposition   Discharge    Condition   Stable    Comment   --               This medical record created using voice recognition software.             Angel Luis Norton MD  05/24/25 [id number]

## 2025-06-12 LAB
QT INTERVAL: 371 MS
QTC INTERVAL: 465 MS

## 2025-06-19 ENCOUNTER — TRANSCRIBE ORDERS (OUTPATIENT)
Dept: ADMINISTRATIVE | Facility: HOSPITAL | Age: 65
End: 2025-06-19
Payer: MEDICARE

## 2025-06-19 DIAGNOSIS — I63.512: ICD-10-CM

## 2025-06-19 DIAGNOSIS — I63.511: Primary | ICD-10-CM

## 2025-07-25 ENCOUNTER — APPOINTMENT (OUTPATIENT)
Dept: CT IMAGING | Facility: HOSPITAL | Age: 65
End: 2025-07-25
Payer: OTHER GOVERNMENT

## 2025-07-25 ENCOUNTER — HOSPITAL ENCOUNTER (EMERGENCY)
Facility: HOSPITAL | Age: 65
Discharge: HOME | End: 2025-07-25
Attending: EMERGENCY MEDICINE
Payer: OTHER GOVERNMENT

## 2025-07-25 VITALS
BODY MASS INDEX: 39.74 KG/M2 | SYSTOLIC BLOOD PRESSURE: 124 MMHG | WEIGHT: 293.43 LBS | DIASTOLIC BLOOD PRESSURE: 93 MMHG | RESPIRATION RATE: 20 BRPM | HEIGHT: 72 IN | TEMPERATURE: 99.1 F | OXYGEN SATURATION: 96 % | HEART RATE: 82 BPM

## 2025-07-25 DIAGNOSIS — N39.0 ACUTE UTI: Primary | ICD-10-CM

## 2025-07-25 LAB
ALBUMIN SERPL-MCNC: 4.3 G/DL (ref 3.5–5.2)
ALBUMIN/GLOB SERPL: 1.5 G/DL
ALP SERPL-CCNC: 43 U/L (ref 39–117)
ALT SERPL W P-5'-P-CCNC: 11 U/L (ref 1–41)
ANION GAP SERPL CALCULATED.3IONS-SCNC: 12.8 MMOL/L (ref 5–15)
AST SERPL-CCNC: 10 U/L (ref 1–40)
BACTERIA UR QL AUTO: ABNORMAL /HPF
BASOPHILS # BLD AUTO: 0.01 10*3/MM3 (ref 0–0.2)
BASOPHILS NFR BLD AUTO: 0.1 % (ref 0–1.5)
BILIRUB SERPL-MCNC: 0.3 MG/DL (ref 0–1.2)
BILIRUB UR QL STRIP: NEGATIVE
BUN SERPL-MCNC: 8.4 MG/DL (ref 8–23)
BUN/CREAT SERPL: 10 (ref 7–25)
CALCIUM SPEC-SCNC: 9 MG/DL (ref 8.6–10.5)
CHLORIDE SERPL-SCNC: 100 MMOL/L (ref 98–107)
CLARITY UR: ABNORMAL
CO2 SERPL-SCNC: 23.2 MMOL/L (ref 22–29)
COLOR UR: YELLOW
CREAT SERPL-MCNC: 0.84 MG/DL (ref 0.76–1.27)
D-LACTATE SERPL-SCNC: 2 MMOL/L (ref 0.5–2)
D-LACTATE SERPL-SCNC: 2.4 MMOL/L (ref 0.5–2)
DEPRECATED RDW RBC AUTO: 44.1 FL (ref 37–54)
EGFRCR SERPLBLD CKD-EPI 2021: 98 ML/MIN/1.73
EOSINOPHIL # BLD AUTO: 0.34 10*3/MM3 (ref 0–0.4)
EOSINOPHIL NFR BLD AUTO: 4 % (ref 0.3–6.2)
ERYTHROCYTE [DISTWIDTH] IN BLOOD BY AUTOMATED COUNT: 13.8 % (ref 12.3–15.4)
GLOBULIN UR ELPH-MCNC: 2.8 GM/DL
GLUCOSE SERPL-MCNC: 374 MG/DL (ref 65–99)
GLUCOSE UR STRIP-MCNC: ABNORMAL MG/DL
HCT VFR BLD AUTO: 40.7 % (ref 37.5–51)
HGB BLD-MCNC: 13.6 G/DL (ref 13–17.7)
HGB UR QL STRIP.AUTO: ABNORMAL
HOLD SPECIMEN: NORMAL
HOLD SPECIMEN: NORMAL
HYALINE CASTS UR QL AUTO: ABNORMAL /LPF
IMM GRANULOCYTES # BLD AUTO: 0.03 10*3/MM3 (ref 0–0.05)
IMM GRANULOCYTES NFR BLD AUTO: 0.4 % (ref 0–0.5)
KETONES UR QL STRIP: ABNORMAL
LEUKOCYTE ESTERASE UR QL STRIP.AUTO: ABNORMAL
LIPASE SERPL-CCNC: 39 U/L (ref 13–60)
LYMPHOCYTES # BLD AUTO: 1.7 10*3/MM3 (ref 0.7–3.1)
LYMPHOCYTES NFR BLD AUTO: 20 % (ref 19.6–45.3)
MCH RBC QN AUTO: 29.1 PG (ref 26.6–33)
MCHC RBC AUTO-ENTMCNC: 33.4 G/DL (ref 31.5–35.7)
MCV RBC AUTO: 87 FL (ref 79–97)
MONOCYTES # BLD AUTO: 0.46 10*3/MM3 (ref 0.1–0.9)
MONOCYTES NFR BLD AUTO: 5.4 % (ref 5–12)
NEUTROPHILS NFR BLD AUTO: 5.98 10*3/MM3 (ref 1.7–7)
NEUTROPHILS NFR BLD AUTO: 70.1 % (ref 42.7–76)
NITRITE UR QL STRIP: NEGATIVE
NRBC BLD AUTO-RTO: 0 /100 WBC (ref 0–0.2)
PH UR STRIP.AUTO: 5.5 [PH] (ref 5–8)
PLATELET # BLD AUTO: 182 10*3/MM3 (ref 140–450)
PMV BLD AUTO: 10.6 FL (ref 6–12)
POTASSIUM SERPL-SCNC: 4.3 MMOL/L (ref 3.5–5.2)
PROT SERPL-MCNC: 7.1 G/DL (ref 6–8.5)
PROT UR QL STRIP: ABNORMAL
RBC # BLD AUTO: 4.68 10*6/MM3 (ref 4.14–5.8)
RBC # UR STRIP: ABNORMAL /HPF
REF LAB TEST METHOD: ABNORMAL
SODIUM SERPL-SCNC: 136 MMOL/L (ref 136–145)
SP GR UR STRIP: 1.02 (ref 1–1.03)
SQUAMOUS #/AREA URNS HPF: ABNORMAL /HPF
UROBILINOGEN UR QL STRIP: ABNORMAL
WBC # UR STRIP: ABNORMAL /HPF
WBC NRBC COR # BLD AUTO: 8.52 10*3/MM3 (ref 3.4–10.8)
WHOLE BLOOD HOLD COAG: NORMAL
WHOLE BLOOD HOLD SPECIMEN: NORMAL

## 2025-07-25 PROCEDURE — 83605 ASSAY OF LACTIC ACID: CPT

## 2025-07-25 PROCEDURE — 99285 EMERGENCY DEPT VISIT HI MDM: CPT

## 2025-07-25 PROCEDURE — 87186 SC STD MICRODIL/AGAR DIL: CPT

## 2025-07-25 PROCEDURE — 36415 COLL VENOUS BLD VENIPUNCTURE: CPT

## 2025-07-25 PROCEDURE — 25810000003 SODIUM CHLORIDE 0.9 % SOLUTION

## 2025-07-25 PROCEDURE — 87086 URINE CULTURE/COLONY COUNT: CPT

## 2025-07-25 PROCEDURE — 87077 CULTURE AEROBIC IDENTIFY: CPT

## 2025-07-25 PROCEDURE — 25510000001 IOPAMIDOL PER 1 ML: Performed by: EMERGENCY MEDICINE

## 2025-07-25 PROCEDURE — 83605 ASSAY OF LACTIC ACID: CPT | Performed by: EMERGENCY MEDICINE

## 2025-07-25 PROCEDURE — 80053 COMPREHEN METABOLIC PANEL: CPT

## 2025-07-25 PROCEDURE — 85025 COMPLETE CBC W/AUTO DIFF WBC: CPT

## 2025-07-25 PROCEDURE — 81001 URINALYSIS AUTO W/SCOPE: CPT

## 2025-07-25 PROCEDURE — 74177 CT ABD & PELVIS W/CONTRAST: CPT

## 2025-07-25 PROCEDURE — 83690 ASSAY OF LIPASE: CPT

## 2025-07-25 RX ORDER — SULFAMETHOXAZOLE AND TRIMETHOPRIM 800; 160 MG/1; MG/1
1 TABLET ORAL 2 TIMES DAILY
Qty: 14 TABLET | Refills: 0 | Status: SHIPPED | OUTPATIENT
Start: 2025-07-25 | End: 2025-08-01

## 2025-07-25 RX ORDER — PHENAZOPYRIDINE HYDROCHLORIDE 100 MG/1
200 TABLET, FILM COATED ORAL ONCE
Status: COMPLETED | OUTPATIENT
Start: 2025-07-25 | End: 2025-07-25

## 2025-07-25 RX ORDER — IOPAMIDOL 755 MG/ML
100 INJECTION, SOLUTION INTRAVASCULAR
Status: COMPLETED | OUTPATIENT
Start: 2025-07-25 | End: 2025-07-25

## 2025-07-25 RX ORDER — SULFAMETHOXAZOLE AND TRIMETHOPRIM 800; 160 MG/1; MG/1
1 TABLET ORAL ONCE
Status: COMPLETED | OUTPATIENT
Start: 2025-07-25 | End: 2025-07-25

## 2025-07-25 RX ORDER — SODIUM CHLORIDE 0.9 % (FLUSH) 0.9 %
10 SYRINGE (ML) INJECTION AS NEEDED
Status: DISCONTINUED | OUTPATIENT
Start: 2025-07-25 | End: 2025-07-26 | Stop reason: HOSPADM

## 2025-07-25 RX ADMIN — SULFAMETHOXAZOLE AND TRIMETHOPRIM 1 TABLET: 800; 160 TABLET ORAL at 21:09

## 2025-07-25 RX ADMIN — IOPAMIDOL 91 ML: 755 INJECTION, SOLUTION INTRAVENOUS at 20:09

## 2025-07-25 RX ADMIN — SODIUM CHLORIDE 1000 ML: 9 INJECTION, SOLUTION INTRAVENOUS at 19:55

## 2025-07-25 RX ADMIN — PHENAZOPYRIDINE 200 MG: 100 TABLET ORAL at 20:20

## 2025-07-25 NOTE — ED PROVIDER NOTES
Time: 4:27 PM EDT  Date of encounter:  7/25/2025  Independent Historian/Clinical History and Information was obtained by:   Family    History is limited by: Language Barrier    Chief Complaint   Patient presents with    Abdominal Pain         History of Present Illness:  Patient is a 65 y.o. year old male who presents to the emergency department accompanied by his wife for evaluation of lower abdominal pain, concentrated smash foul-smelling urine, fever for 2 to 3 days.  Patient is nonverbal and history was provided by wife.  ESTRELLA Villa    Wife at bedside states patient has been having suprapubic pain, dysuria, urinary frequency, cloudy urine with foul-smelling odor and dark-colored urine and diarrhea for the past 3 days.  Is a history of kidney stones.  Denies recent travel or suspicious food intake. Denies nausea or vomiting    Patient Care Team  Primary Care Provider: Pipe Servin MD    Past Medical History:     Allergies   Allergen Reactions    Fentanyl Mental Status Change    Rocephin [Ceftriaxone] Hives and Itching     Has tolerated cefazolin, cefepime, zosyn, penicillin -- Myrtle Adame, AmyD    Ciprofloxacin Unknown - Low Severity    Depakote [Valproic Acid] Other (See Comments)     Behavioral changes    Quinolones Hives     Past Medical History:   Diagnosis Date    Aphasia     Cardiomyopathy     CPAP (continuous positive airway pressure) dependence     Diabetes     Hemiparesis     Right side     Morbid obesity     Nonrheumatic mitral valve regurgitation     BELKYS on CPAP     Peptic ulcer disease     Postoperative nausea and vomiting 3/13/2021    Psoriasis     Pyogenic arthritis of right hip     Seizures     Sleep apnea     Stroke 2004    Ventricular ectopy     asymptomatic     Past Surgical History:   Procedure Laterality Date    CHOLECYSTECTOMY      COLONOSCOPY N/A 10/17/2023    Procedure: COLONOSCOPY WITH POLYPECTOMIES;  Surgeon: Angel Luis Mendoza MD;  Location: [facility] ENDOSCOPY;  Service:  General;  Laterality: N/A;  COLON POLYPS, DIVERTICULOSIS    ENDOSCOPY N/A 10/17/2023    Procedure: ESOPHAGOGASTRODUODENOSCOPY WITH BIOPSIES;  Surgeon: Angel Luis Mendoza MD;  Location: [facility] ENDOSCOPY;  Service: General;  Laterality: N/A;  GASTRIC POLYP    EYE SURGERY      HIP SPACER INSERTION WITH ANTIBIOTIC CEMENT Right 1/9/2023    Procedure: RT. BIPOLAR HIP REMOVAL AND PLACEMENT OF SPACER;  Surgeon: Faustina Beebe MD;  Location: [facility] OR;  Service: Orthopedics;  Laterality: Right;    INCISION AND DRAINAGE HIP Right 3/12/2021    Procedure: HIP ANTERIOR INCISION AND DRAINAGE WITH HANA TABLE;  Surgeon: Tao Andrea MD;  Location: [facility] OR;  Service: Orthopedics;  Laterality: Right;    LUMBAR DISC SURGERY      US GUIDED FINE NEEDLE ASPIRATION  3/10/2021     Family History   Problem Relation Age of Onset    Hypertension Mother     Hyperlipidemia Mother     Arthritis Mother     Cancer Mother     Heart disease Father     Hyperlipidemia Sister     Drug abuse Sister     COPD Sister     Birth defects Sister     Early death Brother     Drug abuse Brother        Home Medications:  Prior to Admission medications    Medication Sig Start Date End Date Taking? Authorizing Provider   acetaminophen (TYLENOL) 325 MG tablet Take 2 tablets by mouth Every 4 (Four) Hours As Needed for Mild Pain  or Headache. 3/31/21   Angel Luis Dejesus MD   apixaban (ELIQUIS) 5 MG tablet tablet Take 1 tablet by mouth 2 (Two) Times a Day.    ProviderRachel MD   aspirin 81 MG chewable tablet Chew 1 tablet Daily.    Rachel Perales MD   baclofen (LIORESAL) 10 MG tablet Take 1 tablet by mouth 3 (Three) Times a Day.    Rachel Perales MD   bumetanide (BUMEX) 1 MG tablet Take 1 tablet by mouth Daily.    Rachel Perales MD   gabapentin (NEURONTIN) 300 MG capsule Take 2 capsules by mouth 3 (Three) Times a Day. 1/16/23   Fazal Givens MD   insulin glargine (LANTUS, SEMGLEE) 100 UNIT/ML injection  Inject 15 Units under the skin into the appropriate area as directed Every Night.  Patient taking differently: Inject 10 Units under the skin into the appropriate area as directed Every Night. 1/16/23   Fazal Givens MD   Ixekizumab (Taltz) 80 MG/ML solution auto-injector Inject 80 mg under the skin into the appropriate area as directed Every 14 (Fourteen) Days.    Rachel Perales MD   LACTOBACILLUS PO Take 1 tablet by mouth 3 (Three) Times a Day.    Rachel Perales MD   metFORMIN (GLUCOPHAGE) 1000 MG tablet Take 1 tablet by mouth 2 (Two) Times a Day With Meals.    Rachel Perales MD   metoprolol succinate XL (TOPROL-XL) 50 MG 24 hr tablet Take 1 tablet by mouth Daily. 1/16/23   Fazal Givens MD   multivitamin with minerals tablet tablet Take 1 tablet by mouth Daily.    Rachel Perales MD   rosuvastatin (CRESTOR) 20 MG tablet Take 1 tablet by mouth Daily.    Rachel Perales MD   sacubitril-valsartan (ENTRESTO) 49-51 MG tablet Take 1 tablet by mouth 2 (Two) Times a Day.    Rachel Perales MD   sertraline (ZOLOFT) 100 MG tablet Take 1.5 tablets by mouth Daily.    Rachel Perales MD   topiramate (TOPAMAX) 50 MG tablet Take 1 tablet by mouth 2 (Two) Times a Day.    Rachel Perales MD   trospium (SANCTURA) 20 MG tablet Take 1 tablet by mouth 2 (Two) Times a Day.    Rachel Perales MD        Social History:   Social History     Tobacco Use    Smoking status: Former    Smokeless tobacco: Never    Tobacco comments:     quit 22 years ago    Vaping Use    Vaping status: Never Used   Substance Use Topics    Alcohol use: Never    Drug use: Never         Review of Systems:  Review of Systems   Constitutional:  Positive for fever.   Gastrointestinal:  Positive for abdominal pain and diarrhea. Negative for nausea and vomiting.   Genitourinary:  Positive for dysuria, frequency and urgency. Negative for flank pain and hematuria.        Physical Exam:  /93    "Pulse 82   Temp 99.1 °F (37.3 °C) (Oral)   Resp 20   Ht 182.9 cm (72\")   Wt 133 kg (293 lb 6.9 oz)   SpO2 96%   BMI 39.80 kg/m²         Physical Exam  Vitals and nursing note reviewed.   Constitutional:       General: He is not in acute distress.     Appearance: Normal appearance. He is obese. He is not ill-appearing, toxic-appearing or diaphoretic.   HENT:      Head: Normocephalic and atraumatic.      Nose: Nose normal.      Mouth/Throat:      Mouth: Mucous membranes are moist.   Eyes:      Extraocular Movements: Extraocular movements intact.      Conjunctiva/sclera: Conjunctivae normal.      Pupils: Pupils are equal, round, and reactive to light.   Cardiovascular:      Rate and Rhythm: Normal rate and regular rhythm.      Heart sounds: Normal heart sounds.   Pulmonary:      Effort: Pulmonary effort is normal.      Breath sounds: Normal breath sounds.   Abdominal:      General: Abdomen is flat. There is no distension.      Palpations: Abdomen is soft.      Tenderness: There is abdominal tenderness (mild) in the suprapubic area. There is no right CVA tenderness, left CVA tenderness, guarding or rebound.   Musculoskeletal:         General: Normal range of motion.      Cervical back: Normal range of motion and neck supple.   Skin:     General: Skin is warm and dry.      Coloration: Skin is not cyanotic.   Neurological:      General: No focal deficit present.      Mental Status: He is alert and oriented to person, place, and time.   Psychiatric:         Attention and Perception: Attention and perception normal.         Mood and Affect: Mood normal.         Behavior: Behavior normal.                      Medical Decision Making:      Comorbidities that affect care:    Stroke, aphasia, Diabetes, Obesity    External Notes reviewed:    Previous ED Note: [facility] ED visit 5/24/2025 for chest wall pain      The following orders were placed and all results were independently analyzed by me:  Orders Placed This Encounter "   Procedures    Urine Culture - Urine,    CT Abdomen Pelvis With Contrast    Bliss Draw    Comprehensive Metabolic Panel    Lipase    Urinalysis With Microscopic If Indicated (No Culture) - Urine, Clean Catch    Lactic Acid, Plasma    CBC Auto Differential    Urinalysis, Microscopic Only - Urine, Clean Catch    STAT Lactic Acid, Reflex    NPO Diet NPO Type: Strict NPO    Undress & Gown    Insert Peripheral IV    CBC & Differential    Green Top (Gel)    Lavender Top    Gold Top - SST    Light Blue Top       Medications Given in the Emergency Department:  Medications   sodium chloride 0.9 % flush 10 mL (has no administration in time range)   sodium chloride 0.9 % bolus 1,000 mL (1,000 mL Intravenous New Bag 7/25/25 1955)   phenazopyridine (PYRIDIUM) tablet 200 mg (200 mg Oral Given 7/25/25 2020)   iopamidol (ISOVUE-370) 76 % injection 100 mL (91 mL Intravenous Given 7/25/25 2009)   sulfamethoxazole-trimethoprim (BACTRIM DS,SEPTRA DS) 800-160 MG per tablet 1 tablet (1 tablet Oral Given 7/25/25 2109)        ED Course:    The patient was initially evaluated in the triage area where orders were placed. The patient was later dispositioned by Parth Denis PA-C.      The patient was advised to stay for completion of workup which includes but is not limited to communication of labs and radiological results, reassessment and plan. The patient was advised that leaving prior to disposition by a provider could result in critical findings that are not communicated to the patient.     ED Course as of 07/25/25 2142 Fri Jul 25, 2025 2028 RN recollected lactic prior to IV fluids being ordered/finished [AJ]   2101 CT abdomen pelvis negative. [AJ]      ED Course User Index  [AJ] Parth Denis PA-C       Labs:    Lab Results (last 24 hours)       Procedure Component Value Units Date/Time    Urinalysis With Microscopic If Indicated (No Culture) - Urine, Clean Catch [[id number]]  (Abnormal) Collected: 07/25/25 1708     Specimen: Urine, Clean Catch Updated: 07/25/25 1715     Color, UA Yellow     Appearance, UA Cloudy     pH, UA 5.5     Specific Gravity, UA 1.025     Glucose, UA >=1000 mg/dL (3+)     Ketones, UA Trace     Bilirubin, UA Negative     Blood, UA Large (3+)     Protein,  mg/dL (2+)     Leuk Esterase, UA Moderate (2+)     Nitrite, UA Negative     Urobilinogen, UA 0.2 E.U./dL    Urinalysis, Microscopic Only - Urine, Clean Catch [[id number]]  (Abnormal) Collected: 07/25/25 1708    Specimen: Urine, Clean Catch Updated: 07/25/25 1715     RBC, UA 21-50 /HPF      WBC, UA Too Numerous to Count /HPF      Bacteria, UA None Seen /HPF      Squamous Epithelial Cells, UA 0-2 /HPF      Hyaline Casts, UA None Seen /LPF      Methodology Automated Microscopy    Urine Culture - Urine, Urine, Clean Catch [[id number]] Collected: 07/25/25 1708    Specimen: Urine, Clean Catch Updated: 07/25/25 2116    CBC & Differential [[id number]]  (Normal) Collected: 07/25/25 1724    Specimen: Blood Updated: 07/25/25 1736    Narrative:      The following orders were created for panel order CBC & Differential.  Procedure                               Abnormality         Status                     ---------                               -----------         ------                     CBC Auto Differential[[id number]]        Normal              Final result                 Please view results for these tests on the individual orders.    Comprehensive Metabolic Panel [[id number]]  (Abnormal) Collected: 07/25/25 1724    Specimen: Blood Updated: 07/25/25 1800     Glucose 374 mg/dL      BUN 8.4 mg/dL      Creatinine 0.84 mg/dL      Sodium 136 mmol/L      Potassium 4.3 mmol/L      Chloride 100 mmol/L      CO2 23.2 mmol/L      Calcium 9.0 mg/dL      Total Protein 7.1 g/dL      Albumin 4.3 g/dL      ALT (SGPT) 11 U/L      AST (SGOT) 10 U/L      Alkaline Phosphatase 43 U/L      Total Bilirubin 0.3 mg/dL      Globulin 2.8 gm/dL      A/G Ratio 1.5 g/dL       BUN/Creatinine Ratio 10.0     Anion Gap 12.8 mmol/L      eGFR 98.0 mL/min/1.73     Narrative:      GFR Categories in Chronic Kidney Disease (CKD)              GFR Category          GFR (mL/min/1.73)    Interpretation  G1                    90 or greater        Normal or high (1)  G2                    60-89                Mild decrease (1)  G3a                   45-59                Mild to moderate decrease  G3b                   30-44                Moderate to severe decrease  G4                    15-29                Severe decrease  G5                    14 or less           Kidney failure    (1)In the absence of evidence of kidney disease, neither GFR category G1 or G2 fulfill the criteria for CKD.    eGFR calculation 2021 CKD-EPI creatinine equation, which does not include race as a factor    Lipase [[id number]]  (Normal) Collected: 07/25/25 1724    Specimen: Blood Updated: 07/25/25 1800     Lipase 39 U/L     Lactic Acid, Plasma [[id number]]  (Abnormal) Collected: 07/25/25 1724    Specimen: Blood Updated: 07/25/25 1820     Lactate 2.4 mmol/L     CBC Auto Differential [[id number]]  (Normal) Collected: 07/25/25 1724    Specimen: Blood Updated: 07/25/25 1736     WBC 8.52 10*3/mm3      RBC 4.68 10*6/mm3      Hemoglobin 13.6 g/dL      Hematocrit 40.7 %      MCV 87.0 fL      MCH 29.1 pg      MCHC 33.4 g/dL      RDW 13.8 %      RDW-SD 44.1 fl      MPV 10.6 fL      Platelets 182 10*3/mm3      Neutrophil % 70.1 %      Lymphocyte % 20.0 %      Monocyte % 5.4 %      Eosinophil % 4.0 %      Basophil % 0.1 %      Immature Grans % 0.4 %      Neutrophils, Absolute 5.98 10*3/mm3      Lymphocytes, Absolute 1.70 10*3/mm3      Monocytes, Absolute 0.46 10*3/mm3      Eosinophils, Absolute 0.34 10*3/mm3      Basophils, Absolute 0.01 10*3/mm3      Immature Grans, Absolute 0.03 10*3/mm3      nRBC 0.0 /100 WBC     STAT Lactic Acid, Reflex [[id number]]  (Normal) Collected: 07/25/25 2024    Specimen: Blood Updated: 07/25/25 2046      Lactate 2.0 mmol/L              Imaging:    CT Abdomen Pelvis With Contrast  Result Date: 7/25/2025  CT ABDOMEN PELVIS W CONTRAST Date of Exam: 7/25/2025 8:03 PM EDT Indication: Dysuria, hematuria. Comparison: October 2023 Technique: Axial CT images were obtained of the abdomen and pelvis after the uneventful intravenous administration of iodinated contrast. Reconstructed coronal and sagittal images were also obtained. Automated exposure control and iterative construction methods were used. Findings: Lower Thorax: Prominent scarring atelectasis right lower lobe posteriorly with air bronchograms evident unchanged compared to the prior examination Peritoneum: No free air Appendix: Appendix is well seen and is normal. Kidneys: There are numerous left renal cysts exophytic in location throughout the kidney. Multiple right cortical cysts are evident. There is mild right perinephric stranding. There is no evidence of hydronephrosis. Ureters: No obstructing calculi or mass. Urinary bladder: Bladder wall is moderately thickened Liver: No focal hepatic lesions. Normal size liver. Gallbladder and bile ducts: Gallbladder is absent Spleen: Spleen is normal size.  No focal splenic lesions. Adrenal glands: Unremarkable. Pancreas: No focal masses.  No pancreatic duct dilation. No surrounding inflammation. Abdominal aorta and Vascular Structures: Aorta is mildly to moderately calcified. There is moderate common iliac calcification. Stomach and Bowel: Mild distal esophageal wall thickening Stomach appears intact Small bowel duodenum appear unremarkable. Widespread diverticula are evident in the colon Reproductive Organs: Within normal limits. Lymph nodes: No pathologically enlarged lymph nodes. Soft tissues: Unremarkable. Osseous structures: Advanced degenerative changes noted in the spine. Right hip prosthesis appears to be intact     Moderate cystitis Electronically Signed: Kike Cardenas MD  7/25/2025 8:51 PM EDT  Workstation  ID: [id number]        Differential Diagnosis and Discussion:      Abdominal Pain: Based on the patient's signs and symptoms, I considered abdominal aortic aneurysm, small bowel obstruction, pancreatitis, acute cholecystitis, acute appendecitis, peptic ulcer disease, gastritis, colitis, endocrine disorders, irritable bowel syndrome and other differential diagnosis an etiology of the patient's abdominal pain.  Dysuria: Differential diagnosis includes but is not limited to urethritis, cystitis, pyelonephritis, ureteral calculi, neoplasm, chemical irritant, urethral stricture, and trauma    PROCEDURES:    Labs were collected in the emergency department and all labs were reviewed and interpreted by me.  CT scan was performed in the emergency department and the CT scan radiology impression was interpreted by me.    No orders to display        Procedures    MDM     Amount and/or Complexity of Data Reviewed  Clinical lab tests: reviewed  Tests in the radiology section of CPT®: reviewed                     Patient Care Considerations:    SEPSIS was considered but is NOT present in the emergency department as SIRS criteria is not present.      Consultants/Shared Management Plan:    SHARED VISIT: I have discussed the case with my supervising physician, Dr. Irwin who states reviewed labs and imaging. The substantive portion of the medical decision was made by the attesting physician who made or approve the management plan and will take responsibility for the patient.  Clinical findings were discussed and ultimate disposition was made in consult with supervising physician.    Social Determinants of Health:    Patient has presented with family members who are responsible, reliable and will ensure follow up care.      Disposition and Care Coordination:    Discharged: I considered escalation of care by admitting this patient to the hospital, however patient is not septic.  He can take oral antibiotics and follow-up with PCP    I  have explained the patient´s condition, diagnoses and treatment plan based on the information available to me at this time. I have answered questions and addressed any concerns. The patient has a good  understanding of the patient´s diagnosis, condition, and treatment plan as can be expected at this point. The vital signs have been stable. The patient´s condition is stable and appropriate for discharge from the emergency department.      The patient will pursue further outpatient evaluation with the primary care physician or other designated or consulting physician as outlined in the discharge instructions. They are agreeable to this plan of care and follow-up instructions have been explained in detail. The patient has received these instructions in written format and has expressed an understanding of the discharge instructions. The patient is aware that any significant change in condition or worsening of symptoms should prompt an immediate return to this or the closest emergency department or call to 911.  I have explained discharge medications and the need for follow up with the patient/caretakers. This was also printed in the discharge instructions. Patient was discharged with the following medications and follow up:      Medication List        New Prescriptions      sulfamethoxazole-trimethoprim 800-160 MG per tablet  Commonly known as: BACTRIM DS,SEPTRA DS  Take 1 tablet by mouth 2 (Two) Times a Day for 7 days.            Changed      insulin glargine 100 UNIT/ML injection  Commonly known as: LANSTEPH BROWNEGLEE  Inject 15 Units under the skin into the appropriate area as directed Every Night.  What changed: how much to take               Where to Get Your Medications        These medications were sent to [facility]/pharmacy #[id number] - [place], [place] - [address] - [phone] [facility] [phone]   [address] [place] [address]      Hours: 24-hours Phone: [phone]   sulfamethoxazole-trimethoprim  800-160 MG per tablet      Pipe Servin MD  [address] [facility] [address]  [place] [id number]  [phone]             Final diagnoses:   Acute UTI        ED Disposition       ED Disposition   Discharge    Condition   Stable    Comment   --               This medical record created using voice recognition software.             Parth Denis PA-C  07/25/25 2103       Parth Denis PA-C  07/25/25 214

## 2025-07-26 NOTE — DISCHARGE INSTRUCTIONS
Lab work showed no concerning abnormalities.  Urine shows that you did have a UTI, you have been given Azo and Bactrim during your ED visit.  I have sent the rest of the Bactrim to complete to the pharmacy.  Please follow-up with PCP    If any worsening symptoms return to the ED

## 2025-07-26 NOTE — ED PROVIDER NOTES
"SHARED VISIT ATTESTATION:    This visit was performed by myself and an APC.  I personally approved the management plan/medical decision making and take responsibility for the patient management.      SHARED VISIT NOTE:    Patient is 65 y.o. year old male that presents to the ED for evaluation of urinary symptoms and suprapubic pain..         ED Course:    /93   Pulse 82   Temp 99.1 °F (37.3 °C) (Oral)   Resp 20   Ht 182.9 cm (72\")   Wt 133 kg (293 lb 6.9 oz)   SpO2 96%   BMI 39.80 kg/m²       The following orders were placed and all results were independently analyzed by me:  Orders Placed This Encounter   Procedures    Urine Culture - Urine,    CT Abdomen Pelvis With Contrast    [place] Draw    Comprehensive Metabolic Panel    Lipase    Urinalysis With Microscopic If Indicated (No Culture) - Urine, Clean Catch    Lactic Acid, Plasma    CBC Auto Differential    Urinalysis, Microscopic Only - Urine, Clean Catch    STAT Lactic Acid, Reflex    Undress & Gown    CBC & Differential    Green Top (Gel)    Lavender Top    Gold Top - SST    Light Blue Top       Medications Given in the Emergency Department:  Medications   sodium chloride 0.9 % bolus 1,000 mL (0 mL Intravenous Stopped 7/25/25 2025)   phenazopyridine (PYRIDIUM) tablet 200 mg (200 mg Oral Given 7/25/25 2020)   iopamidol (ISOVUE-370) 76 % injection 100 mL (91 mL Intravenous Given 7/25/25 2009)   sulfamethoxazole-trimethoprim (BACTRIM DS,SEPTRA DS) 800-160 MG per tablet 1 tablet (1 tablet Oral Given 7/25/25 2109)        ED Course:    ED Course as of 07/26/25 0002 Fri Jul 25, 2025 2028 RN recollected lactic prior to IV fluids being ordered/finished [AJ]   2101 CT abdomen pelvis negative. [AJ]      ED Course User Index  [AJ] Parth Denis, PADavidC       Labs:    Lab Results (last 24 hours)       Procedure Component Value Units Date/Time    Urinalysis With Microscopic If Indicated (No Culture) - Urine, Clean Catch [[id number]]  (Abnormal) " Collected: 07/25/25 1708    Specimen: Urine, Clean Catch Updated: 07/25/25 1715     Color, UA Yellow     Appearance, UA Cloudy     pH, UA 5.5     Specific Gravity, UA 1.025     Glucose, UA >=1000 mg/dL (3+)     Ketones, UA Trace     Bilirubin, UA Negative     Blood, UA Large (3+)     Protein,  mg/dL (2+)     Leuk Esterase, UA Moderate (2+)     Nitrite, UA Negative     Urobilinogen, UA 0.2 E.U./dL    Urinalysis, Microscopic Only - Urine, Clean Catch [[id number]]  (Abnormal) Collected: 07/25/25 1708    Specimen: Urine, Clean Catch Updated: 07/25/25 1715     RBC, UA 21-50 /HPF      WBC, UA Too Numerous to Count /HPF      Bacteria, UA None Seen /HPF      Squamous Epithelial Cells, UA 0-2 /HPF      Hyaline Casts, UA None Seen /LPF      Methodology Automated Microscopy    Urine Culture - Urine, Urine, Clean Catch [[id number]] Collected: 07/25/25 1708    Specimen: Urine, Clean Catch Updated: 07/25/25 2116    CBC & Differential [[id number]]  (Normal) Collected: 07/25/25 1724    Specimen: Blood Updated: 07/25/25 1736    Narrative:      The following orders were created for panel order CBC & Differential.  Procedure                               Abnormality         Status                     ---------                               -----------         ------                     CBC Auto Differential[[id number]]        Normal              Final result                 Please view results for these tests on the individual orders.    Comprehensive Metabolic Panel [[id number]]  (Abnormal) Collected: 07/25/25 1724    Specimen: Blood Updated: 07/25/25 1800     Glucose 374 mg/dL      BUN 8.4 mg/dL      Creatinine 0.84 mg/dL      Sodium 136 mmol/L      Potassium 4.3 mmol/L      Chloride 100 mmol/L      CO2 23.2 mmol/L      Calcium 9.0 mg/dL      Total Protein 7.1 g/dL      Albumin 4.3 g/dL      ALT (SGPT) 11 U/L      AST (SGOT) 10 U/L      Alkaline Phosphatase 43 U/L      Total Bilirubin 0.3 mg/dL      Globulin 2.8 gm/dL      A/G  Ratio 1.5 g/dL      BUN/Creatinine Ratio 10.0     Anion Gap 12.8 mmol/L      eGFR 98.0 mL/min/1.73     Narrative:      GFR Categories in Chronic Kidney Disease (CKD)              GFR Category          GFR (mL/min/1.73)    Interpretation  G1                    90 or greater        Normal or high (1)  G2                    60-89                Mild decrease (1)  G3a                   45-59                Mild to moderate decrease  G3b                   30-44                Moderate to severe decrease  G4                    15-29                Severe decrease  G5                    14 or less           Kidney failure    (1)In the absence of evidence of kidney disease, neither GFR category G1 or G2 fulfill the criteria for CKD.    eGFR calculation 2021 CKD-EPI creatinine equation, which does not include race as a factor    Lipase [[id number]]  (Normal) Collected: 07/25/25 1724    Specimen: Blood Updated: 07/25/25 1800     Lipase 39 U/L     Lactic Acid, Plasma [[id number]]  (Abnormal) Collected: 07/25/25 1724    Specimen: Blood Updated: 07/25/25 1820     Lactate 2.4 mmol/L     CBC Auto Differential [[id number]]  (Normal) Collected: 07/25/25 1724    Specimen: Blood Updated: 07/25/25 1736     WBC 8.52 10*3/mm3      RBC 4.68 10*6/mm3      Hemoglobin 13.6 g/dL      Hematocrit 40.7 %      MCV 87.0 fL      MCH 29.1 pg      MCHC 33.4 g/dL      RDW 13.8 %      RDW-SD 44.1 fl      MPV 10.6 fL      Platelets 182 10*3/mm3      Neutrophil % 70.1 %      Lymphocyte % 20.0 %      Monocyte % 5.4 %      Eosinophil % 4.0 %      Basophil % 0.1 %      Immature Grans % 0.4 %      Neutrophils, Absolute 5.98 10*3/mm3      Lymphocytes, Absolute 1.70 10*3/mm3      Monocytes, Absolute 0.46 10*3/mm3      Eosinophils, Absolute 0.34 10*3/mm3      Basophils, Absolute 0.01 10*3/mm3      Immature Grans, Absolute 0.03 10*3/mm3      nRBC 0.0 /100 WBC     STAT Lactic Acid, Reflex [[id number]]  (Normal) Collected: 07/25/25 2024    Specimen: Blood Updated:  07/25/25 2046     Lactate 2.0 mmol/L              Imaging:    CT Abdomen Pelvis With Contrast  Result Date: 7/25/2025  CT ABDOMEN PELVIS W CONTRAST Date of Exam: 7/25/2025 8:03 PM EDT Indication: Dysuria, hematuria. Comparison: October 2023 Technique: Axial CT images were obtained of the abdomen and pelvis after the uneventful intravenous administration of iodinated contrast. Reconstructed coronal and sagittal images were also obtained. Automated exposure control and iterative construction methods were used. Findings: Lower Thorax: Prominent scarring atelectasis right lower lobe posteriorly with air bronchograms evident unchanged compared to the prior examination Peritoneum: No free air Appendix: Appendix is well seen and is normal. Kidneys: There are numerous left renal cysts exophytic in location throughout the kidney. Multiple right cortical cysts are evident. There is mild right perinephric stranding. There is no evidence of hydronephrosis. Ureters: No obstructing calculi or mass. Urinary bladder: Bladder wall is moderately thickened Liver: No focal hepatic lesions. Normal size liver. Gallbladder and bile ducts: Gallbladder is absent Spleen: Spleen is normal size.  No focal splenic lesions. Adrenal glands: Unremarkable. Pancreas: No focal masses.  No pancreatic duct dilation. No surrounding inflammation. Abdominal aorta and Vascular Structures: Aorta is mildly to moderately calcified. There is moderate common iliac calcification. Stomach and Bowel: Mild distal esophageal wall thickening Stomach appears intact Small bowel duodenum appear unremarkable. Widespread diverticula are evident in the colon Reproductive Organs: Within normal limits. Lymph nodes: No pathologically enlarged lymph nodes. Soft tissues: Unremarkable. Osseous structures: Advanced degenerative changes noted in the spine. Right hip prosthesis appears to be intact     Moderate cystitis Electronically Signed: Kike Cardenas MD  7/25/2025 8:51 PM  EDT  Workstation ID: [id number]      MDM:    Procedures    Labs were collected in the emergency department and all labs were reviewed and interpreted by me.  CT scan was performed in the emergency department and the CT scan radiology impression was interpreted by me.                     Pipe Irwin,   00:02 EDT  07/26/25         Pipe Irwin DO  07/26/25 0002

## 2025-07-27 ENCOUNTER — RESULTS FOLLOW-UP (OUTPATIENT)
Dept: EMERGENCY DEPT | Facility: HOSPITAL | Age: 65
End: 2025-07-27
Payer: OTHER GOVERNMENT

## 2025-07-28 LAB — BACTERIA SPEC AEROBE CULT: ABNORMAL

## 2025-07-28 NOTE — TELEPHONE ENCOUNTER
Patient answered and was informed of test results.  Patient reports his symptoms are improved with Bactrim.  Patient informed to follow-up if he has any further symptoms.

## 2025-08-01 ENCOUNTER — HOSPITAL ENCOUNTER (EMERGENCY)
Facility: HOSPITAL | Age: 65
Discharge: HOME | End: 2025-08-02
Attending: EMERGENCY MEDICINE
Payer: OTHER GOVERNMENT

## 2025-08-01 ENCOUNTER — APPOINTMENT (OUTPATIENT)
Dept: CT IMAGING | Facility: HOSPITAL | Age: 65
End: 2025-08-01
Payer: OTHER GOVERNMENT

## 2025-08-01 DIAGNOSIS — N20.0 LEFT NEPHROLITHIASIS: ICD-10-CM

## 2025-08-01 DIAGNOSIS — R31.0 GROSS HEMATURIA: Primary | ICD-10-CM

## 2025-08-01 PROCEDURE — 74176 CT ABD & PELVIS W/O CONTRAST: CPT

## 2025-08-01 PROCEDURE — 99284 EMERGENCY DEPT VISIT MOD MDM: CPT

## 2025-08-02 VITALS
HEART RATE: 94 BPM | BODY MASS INDEX: 39.48 KG/M2 | RESPIRATION RATE: 18 BRPM | TEMPERATURE: 98.3 F | DIASTOLIC BLOOD PRESSURE: 85 MMHG | OXYGEN SATURATION: 93 % | WEIGHT: 291.45 LBS | HEIGHT: 72 IN | SYSTOLIC BLOOD PRESSURE: 130 MMHG

## 2025-08-02 LAB
ALBUMIN SERPL-MCNC: 4.5 G/DL (ref 3.5–5.2)
ALBUMIN/GLOB SERPL: 1.6 G/DL
ALP SERPL-CCNC: 47 U/L (ref 39–117)
ALT SERPL W P-5'-P-CCNC: 19 U/L (ref 1–41)
ANION GAP SERPL CALCULATED.3IONS-SCNC: 13.7 MMOL/L (ref 5–15)
AST SERPL-CCNC: 12 U/L (ref 1–40)
BACTERIA UR QL AUTO: ABNORMAL /HPF
BASOPHILS # BLD AUTO: 0.03 10*3/MM3 (ref 0–0.2)
BASOPHILS NFR BLD AUTO: 0.4 % (ref 0–1.5)
BILIRUB SERPL-MCNC: 0.3 MG/DL (ref 0–1.2)
BILIRUB UR QL STRIP: NEGATIVE
BUN SERPL-MCNC: 14 MG/DL (ref 8–23)
BUN/CREAT SERPL: 13.7 (ref 7–25)
CALCIUM SPEC-SCNC: 9.8 MG/DL (ref 8.6–10.5)
CHLORIDE SERPL-SCNC: 97 MMOL/L (ref 98–107)
CK SERPL-CCNC: 153 U/L (ref 20–200)
CLARITY UR: ABNORMAL
CO2 SERPL-SCNC: 24.3 MMOL/L (ref 22–29)
COLOR UR: YELLOW
CREAT SERPL-MCNC: 1.02 MG/DL (ref 0.76–1.27)
DEPRECATED RDW RBC AUTO: 45.1 FL (ref 37–54)
EGFRCR SERPLBLD CKD-EPI 2021: 82.6 ML/MIN/1.73
EOSINOPHIL # BLD AUTO: 0.36 10*3/MM3 (ref 0–0.4)
EOSINOPHIL NFR BLD AUTO: 4.4 % (ref 0.3–6.2)
ERYTHROCYTE [DISTWIDTH] IN BLOOD BY AUTOMATED COUNT: 13.7 % (ref 12.3–15.4)
GLOBULIN UR ELPH-MCNC: 2.8 GM/DL
GLUCOSE SERPL-MCNC: 309 MG/DL (ref 65–99)
GLUCOSE UR STRIP-MCNC: ABNORMAL MG/DL
HCT VFR BLD AUTO: 43 % (ref 37.5–51)
HGB BLD-MCNC: 14.1 G/DL (ref 13–17.7)
HGB UR QL STRIP.AUTO: ABNORMAL
HYALINE CASTS UR QL AUTO: ABNORMAL /LPF
IMM GRANULOCYTES # BLD AUTO: 0.02 10*3/MM3 (ref 0–0.05)
IMM GRANULOCYTES NFR BLD AUTO: 0.2 % (ref 0–0.5)
KETONES UR QL STRIP: ABNORMAL
LEUKOCYTE ESTERASE UR QL STRIP.AUTO: ABNORMAL
LYMPHOCYTES # BLD AUTO: 1.88 10*3/MM3 (ref 0.7–3.1)
LYMPHOCYTES NFR BLD AUTO: 22.7 % (ref 19.6–45.3)
MCH RBC QN AUTO: 29.5 PG (ref 26.6–33)
MCHC RBC AUTO-ENTMCNC: 32.8 G/DL (ref 31.5–35.7)
MCV RBC AUTO: 90 FL (ref 79–97)
MONOCYTES # BLD AUTO: 0.46 10*3/MM3 (ref 0.1–0.9)
MONOCYTES NFR BLD AUTO: 5.6 % (ref 5–12)
NEUTROPHILS NFR BLD AUTO: 5.52 10*3/MM3 (ref 1.7–7)
NEUTROPHILS NFR BLD AUTO: 66.7 % (ref 42.7–76)
NITRITE UR QL STRIP: NEGATIVE
NRBC BLD AUTO-RTO: 0 /100 WBC (ref 0–0.2)
PH UR STRIP.AUTO: 5.5 [PH] (ref 5–8)
PLATELET # BLD AUTO: 193 10*3/MM3 (ref 140–450)
PMV BLD AUTO: 11 FL (ref 6–12)
POTASSIUM SERPL-SCNC: 4.4 MMOL/L (ref 3.5–5.2)
PROT SERPL-MCNC: 7.3 G/DL (ref 6–8.5)
PROT UR QL STRIP: ABNORMAL
RBC # BLD AUTO: 4.78 10*6/MM3 (ref 4.14–5.8)
RBC # UR STRIP: ABNORMAL /HPF
REF LAB TEST METHOD: ABNORMAL
SODIUM SERPL-SCNC: 135 MMOL/L (ref 136–145)
SP GR UR STRIP: 1.03 (ref 1–1.03)
SQUAMOUS #/AREA URNS HPF: ABNORMAL /HPF
UROBILINOGEN UR QL STRIP: ABNORMAL
WBC # UR STRIP: ABNORMAL /HPF
WBC NRBC COR # BLD AUTO: 8.27 10*3/MM3 (ref 3.4–10.8)

## 2025-08-02 PROCEDURE — 25810000003 SODIUM CHLORIDE 0.9 % SOLUTION: Performed by: NURSE PRACTITIONER

## 2025-08-02 PROCEDURE — 81001 URINALYSIS AUTO W/SCOPE: CPT | Performed by: NURSE PRACTITIONER

## 2025-08-02 PROCEDURE — 82550 ASSAY OF CK (CPK): CPT | Performed by: NURSE PRACTITIONER

## 2025-08-02 PROCEDURE — 80053 COMPREHEN METABOLIC PANEL: CPT | Performed by: NURSE PRACTITIONER

## 2025-08-02 PROCEDURE — 85025 COMPLETE CBC W/AUTO DIFF WBC: CPT | Performed by: NURSE PRACTITIONER

## 2025-08-02 PROCEDURE — 87086 URINE CULTURE/COLONY COUNT: CPT | Performed by: NURSE PRACTITIONER

## 2025-08-02 PROCEDURE — 96360 HYDRATION IV INFUSION INIT: CPT

## 2025-08-02 RX ADMIN — SODIUM CHLORIDE 1000 ML: 9 INJECTION, SOLUTION INTRAVENOUS at 01:17

## 2025-08-02 NOTE — ED PROVIDER NOTES
SHARED VISIT ATTESTATION:    This visit was performed by myself and an APC.  I personally approved the management plan/medical decision making and take responsibility for the patient management.      SHARED VISIT NOTE:    Patient is 65 y.o. year old male that presents to the ED for evaluation of dysuria.     Physical Exam    ED Course:    There were no vitals taken for this visit.      The following orders were placed and all results were independently analyzed by me:  Orders Placed This Encounter   Procedures   • CT Abdomen Pelvis Stone Protocol   • Urinalysis With Culture If Indicated - Urine, Clean Catch   • Comprehensive Metabolic Panel   • CBC Auto Differential   • CBC & Differential       Medications Given in the Emergency Department:  Medications - No data to display     ED Course:    ED Course as of 08/02/25 0749   Sat Aug 02, 2025   0030 CT Abdomen Pelvis Stone Protocol  No acute obstructive uropathy [DS]      ED Course User Index  [DS] Kailey Leslie APRN       Labs:    Lab Results (last 24 hours)       ** No results found for the last 24 hours. **             Imaging:    No Radiology Exams Resulted Within Past 24 Hours    MDM:    Procedures                         Morgan Peace MD  23:57 EDT  08/01/25         Morgan Peace MD  08/02/25 0749

## 2025-08-02 NOTE — DISCHARGE INSTRUCTIONS
As we discussed there is no acute obstructive uropathy your kidney functions were normal but there was blood in your urine.  A referral has been placed to urology for any additional testing and a urine culture is pending.    Drink plenty of fluid.    Follow-up with urology and PCP.

## 2025-08-02 NOTE — ED PROVIDER NOTES
Time: 10:37 PM EDT  Date of encounter:  8/1/2025  Independent Historian/Clinical History and Information was obtained by:   Patient and Family    History is limited by: N/A    Chief Complaint: Dysuria      History of Present Illness:  Patient is a 65 y.o. year old male who presents to the emergency department for evaluation of dysuria and discolored urine.  Patient was seen last week for pain with urination was diagnosed with UTI and placed on Bactrim.  He is finishing that and has 1 more dose.  Noticed today that his urine is very dark-colored despite him drinking plenty.  Also still complaining of urinary symptoms with burning and also low back and low abdomen pain.  No known fever.  No nausea vomiting diarrhea.  Still eating and drinking.  Normal bowel movements.  Patient has had kidney stones in the past but it has been many years.  Patient is on Eliquis      Patient Care Team  Primary Care Provider: Pipe Servin MD    Past Medical History:     Allergies   Allergen Reactions    Fentanyl Mental Status Change    Rocephin [Ceftriaxone] Hives and Itching     Has tolerated cefazolin, cefepime, zosyn, penicillin -- Myrtle Adame PharmD    Ciprofloxacin Unknown - Low Severity    Depakote [Valproic Acid] Other (See Comments)     Behavioral changes    Quinolones Hives     Past Medical History:   Diagnosis Date    Aphasia     Cardiomyopathy     CPAP (continuous positive airway pressure) dependence     Diabetes     Hemiparesis     Right side     Morbid obesity     Nonrheumatic mitral valve regurgitation     BELKYS on CPAP     Peptic ulcer disease     Postoperative nausea and vomiting 3/13/2021    Psoriasis     Pyogenic arthritis of right hip     Seizures     Sleep apnea     Stroke 2004    Ventricular ectopy     asymptomatic     Past Surgical History:   Procedure Laterality Date    CHOLECYSTECTOMY      COLONOSCOPY N/A 10/17/2023    Procedure: COLONOSCOPY WITH POLYPECTOMIES;  Surgeon: Agnel Luis Mendoza MD;  Location:  [facility] ENDOSCOPY;  Service: General;  Laterality: N/A;  COLON POLYPS, DIVERTICULOSIS    ENDOSCOPY N/A 10/17/2023    Procedure: ESOPHAGOGASTRODUODENOSCOPY WITH BIOPSIES;  Surgeon: Angel Luis Mendoza MD;  Location: [facility] ENDOSCOPY;  Service: General;  Laterality: N/A;  GASTRIC POLYP    EYE SURGERY      HIP SPACER INSERTION WITH ANTIBIOTIC CEMENT Right 1/9/2023    Procedure: RT. BIPOLAR HIP REMOVAL AND PLACEMENT OF SPACER;  Surgeon: Faustina Beebe MD;  Location: [facility] OR;  Service: Orthopedics;  Laterality: Right;    INCISION AND DRAINAGE HIP Right 3/12/2021    Procedure: HIP ANTERIOR INCISION AND DRAINAGE WITH HANA TABLE;  Surgeon: Tao Andrea MD;  Location: [facility] OR;  Service: Orthopedics;  Laterality: Right;    LUMBAR DISC SURGERY      US GUIDED FINE NEEDLE ASPIRATION  3/10/2021     Family History   Problem Relation Age of Onset    Hypertension Mother     Hyperlipidemia Mother     Arthritis Mother     Cancer Mother     Heart disease Father     Hyperlipidemia Sister     Drug abuse Sister     COPD Sister     Birth defects Sister     Early death Brother     Drug abuse Brother        Home Medications:  Prior to Admission medications    Medication Sig Start Date End Date Taking? Authorizing Provider   acetaminophen (TYLENOL) 325 MG tablet Take 2 tablets by mouth Every 4 (Four) Hours As Needed for Mild Pain  or Headache. 3/31/21   Angel Luis Dejesus MD   apixaban (ELIQUIS) 5 MG tablet tablet Take 1 tablet by mouth 2 (Two) Times a Day.    ProviderRachel MD   aspirin 81 MG chewable tablet Chew 1 tablet Daily.    ProviderRachel MD   baclofen (LIORESAL) 10 MG tablet Take 1 tablet by mouth 3 (Three) Times a Day.    Rachel Perales MD   bumetanide (BUMEX) 1 MG tablet Take 1 tablet by mouth Daily.    Rachel Perales MD   gabapentin (NEURONTIN) 300 MG capsule Take 2 capsules by mouth 3 (Three) Times a Day. 1/16/23   Fazal Givens MD   insulin glargine (LANTUS,  SEMGLEE) 100 UNIT/ML injection Inject 15 Units under the skin into the appropriate area as directed Every Night.  Patient taking differently: Inject 10 Units under the skin into the appropriate area as directed Every Night. 1/16/23   Fazal Givens MD   Ixekizumab (Taltz) 80 MG/ML solution auto-injector Inject 80 mg under the skin into the appropriate area as directed Every 14 (Fourteen) Days.    Rachel Perales MD   LACTOBACILLUS PO Take 1 tablet by mouth 3 (Three) Times a Day.    Rachel Perales MD   metFORMIN (GLUCOPHAGE) 1000 MG tablet Take 1 tablet by mouth 2 (Two) Times a Day With Meals.    Rachel Perales MD   metoprolol succinate XL (TOPROL-XL) 50 MG 24 hr tablet Take 1 tablet by mouth Daily. 1/16/23   Fazal Givens MD   multivitamin with minerals tablet tablet Take 1 tablet by mouth Daily.    Rachle Perales MD   rosuvastatin (CRESTOR) 20 MG tablet Take 1 tablet by mouth Daily.    Rachel Perales MD   sacubitril-valsartan (ENTRESTO) 49-51 MG tablet Take 1 tablet by mouth 2 (Two) Times a Day.    Rachel Perales MD   sertraline (ZOLOFT) 100 MG tablet Take 1.5 tablets by mouth Daily.    Rachel Perales MD   sulfamethoxazole-trimethoprim (BACTRIM DS,SEPTRA DS) 800-160 MG per tablet Take 1 tablet by mouth 2 (Two) Times a Day for 7 days. 7/25/25 8/1/25  Parth Denis PA-C   topiramate (TOPAMAX) 50 MG tablet Take 1 tablet by mouth 2 (Two) Times a Day.    Rachel Perales MD   trospium (SANCTURA) 20 MG tablet Take 1 tablet by mouth 2 (Two) Times a Day.    Rachel Perales MD        Social History:   Social History     Tobacco Use    Smoking status: Former    Smokeless tobacco: Never    Tobacco comments:     quit 22 years ago    Vaping Use    Vaping status: Never Used   Substance Use Topics    Alcohol use: Never    Drug use: Never         Review of Systems:  Review of Systems   Constitutional:  Negative for activity change, appetite change,  "chills and fever.   Gastrointestinal:  Positive for abdominal pain. Negative for diarrhea, nausea and vomiting.   Genitourinary:  Positive for difficulty urinating, dysuria, flank pain and hematuria (Urine in the urinal seemed black and or almost tea colored today). Negative for penile discharge, penile pain, penile swelling, scrotal swelling and testicular pain.   Musculoskeletal:  Positive for back pain.   Skin: Negative.    Psychiatric/Behavioral: Negative.     All other systems reviewed and are negative.       Physical Exam:  /86   Pulse 95   Temp 98.3 °F (36.8 °C) (Oral)   Resp 18   Ht 182.9 cm (72\")   Wt 132 kg (291 lb 7.2 oz)   SpO2 92%   BMI 39.53 kg/m²     Physical Exam  Vitals and nursing note reviewed.   Constitutional:       General: He is not in acute distress.     Appearance: Normal appearance. He is obese. He is not toxic-appearing.   HENT:      Head: Normocephalic and atraumatic.      Mouth/Throat:      Mouth: Mucous membranes are moist.   Eyes:      General: No scleral icterus.     Conjunctiva/sclera: Conjunctivae normal.   Cardiovascular:      Rate and Rhythm: Normal rate and regular rhythm.      Heart sounds: Normal heart sounds.   Pulmonary:      Effort: Pulmonary effort is normal. No respiratory distress.      Breath sounds: Normal breath sounds.   Abdominal:      Palpations: Abdomen is soft.      Tenderness: There is no abdominal tenderness. There is right CVA tenderness and left CVA tenderness.      Comments: Protuberant   Genitourinary:     Penis: Normal.       Testes: Normal.   Musculoskeletal:         General: Tenderness (Mild bilateral lower lumbar soft tissue achiness and tenderness with palpation) present. Normal range of motion.      Cervical back: Normal range of motion and neck supple.   Skin:     General: Skin is warm and dry.   Neurological:      Mental Status: He is alert. Mental status is at baseline.   Psychiatric:         Mood and Affect: Mood normal.         " Behavior: Behavior normal.              Medical Decision Making:      Comorbidities that affect care:    Stroke, peptic ulcer disease, seizures, sleep apnea, Diabetes, Obesity    External Notes reviewed:    Previous Labs: Urine culture results on the 27th showed MRSA of the urine and was susceptible to Bactrim      The following orders were placed and all results were independently analyzed by me:  Orders Placed This Encounter   Procedures    Urine Culture - Urine,    CT Abdomen Pelvis Stone Protocol    Urinalysis With Culture If Indicated - Urine, Clean Catch    Comprehensive Metabolic Panel    CBC Auto Differential    Urinalysis, Microscopic Only - Urine, Clean Catch    CK    Ambulatory Referral to Urology    CBC & Differential       Medications Given in the Emergency Department:  Medications   sodium chloride 0.9 % bolus 1,000 mL (has no administration in time range)        ED Course:    ED Course as of 08/02/25 0109   Sat Aug 02, 2025   0030 CT Abdomen Pelvis Stone Protocol  No acute obstructive uropathy [DS]      ED Course User Index  [DS] Kailey Leslie APRN       Labs:    Lab Results (last 24 hours)       Procedure Component Value Units Date/Time    Urinalysis With Culture If Indicated - Urine, Clean Catch [[id number]]  (Abnormal) Collected: 08/02/25 0014    Specimen: Urine, Clean Catch Updated: 08/02/25 0026     Color, UA Yellow     Appearance, UA Cloudy     pH, UA 5.5     Specific Gravity, UA 1.027     Glucose, UA >=1000 mg/dL (3+)     Ketones, UA Trace     Bilirubin, UA Negative     Blood, UA Large (3+)     Protein,  mg/dL (2+)     Leuk Esterase, UA Small (1+)     Nitrite, UA Negative     Urobilinogen, UA 1.0 E.U./dL    Narrative:      In absence of clinical symptoms, the presence of pyuria, bacteria, and/or nitrites on the urinalysis result does not correlate with infection.    CBC & Differential [[id number]]  (Normal) Collected: 08/02/25 0014    Specimen: Blood Updated: 08/02/25 0021    Narrative:       The following orders were created for panel order CBC & Differential.  Procedure                               Abnormality         Status                     ---------                               -----------         ------                     CBC Auto Differential[[id number]]        Normal              Final result                 Please view results for these tests on the individual orders.    Comprehensive Metabolic Panel [[id number]]  (Abnormal) Collected: 08/02/25 0014    Specimen: Blood Updated: 08/02/25 0041     Glucose 309 mg/dL      BUN 14.0 mg/dL      Creatinine 1.02 mg/dL      Sodium 135 mmol/L      Potassium 4.4 mmol/L      Comment: Slight hemolysis detected by analyzer. Result may be falsely elevated.        Chloride 97 mmol/L      CO2 24.3 mmol/L      Calcium 9.8 mg/dL      Total Protein 7.3 g/dL      Albumin 4.5 g/dL      ALT (SGPT) 19 U/L      AST (SGOT) 12 U/L      Alkaline Phosphatase 47 U/L      Total Bilirubin 0.3 mg/dL      Globulin 2.8 gm/dL      A/G Ratio 1.6 g/dL      BUN/Creatinine Ratio 13.7     Anion Gap 13.7 mmol/L      eGFR 82.6 mL/min/1.73     Narrative:      GFR Categories in Chronic Kidney Disease (CKD)              GFR Category          GFR (mL/min/1.73)    Interpretation  G1                    90 or greater        Normal or high (1)  G2                    60-89                Mild decrease (1)  G3a                   45-59                Mild to moderate decrease  G3b                   30-44                Moderate to severe decrease  G4                    15-29                Severe decrease  G5                    14 or less           Kidney failure    (1)In the absence of evidence of kidney disease, neither GFR category G1 or G2 fulfill the criteria for CKD.    eGFR calculation 2021 CKD-EPI creatinine equation, which does not include race as a factor    CBC Auto Differential [[id number]]  (Normal) Collected: 08/02/25 0014    Specimen: Blood Updated: 08/02/25 0021     WBC 8.27  10*3/mm3      RBC 4.78 10*6/mm3      Hemoglobin 14.1 g/dL      Hematocrit 43.0 %      MCV 90.0 fL      MCH 29.5 pg      MCHC 32.8 g/dL      RDW 13.7 %      RDW-SD 45.1 fl      MPV 11.0 fL      Platelets 193 10*3/mm3      Neutrophil % 66.7 %      Lymphocyte % 22.7 %      Monocyte % 5.6 %      Eosinophil % 4.4 %      Basophil % 0.4 %      Immature Grans % 0.2 %      Neutrophils, Absolute 5.52 10*3/mm3      Lymphocytes, Absolute 1.88 10*3/mm3      Monocytes, Absolute 0.46 10*3/mm3      Eosinophils, Absolute 0.36 10*3/mm3      Basophils, Absolute 0.03 10*3/mm3      Immature Grans, Absolute 0.02 10*3/mm3      nRBC 0.0 /100 WBC     Urinalysis, Microscopic Only - Urine, Clean Catch [[id number]]  (Abnormal) Collected: 08/02/25 0014    Specimen: Urine, Clean Catch Updated: 08/02/25 0044     RBC, UA 21-50 /HPF      WBC, UA 11-20 /HPF      Bacteria, UA None Seen /HPF      Squamous Epithelial Cells, UA 3-6 /HPF      Hyaline Casts, UA 0-2 /LPF      Methodology Automated Microscopy    Urine Culture - Urine, Urine, Clean Catch [[id number]] Collected: 08/02/25 0014    Specimen: Urine, Clean Catch Updated: 08/02/25 0044    CK [[id number]] Collected: 08/02/25 0014    Specimen: Blood Updated: 08/02/25 0057             Imaging:    CT Abdomen Pelvis Stone Protocol  Result Date: 8/2/2025  CT ABDOMEN PELVIS STONE PROTOCOL Date of Exam: 8/1/2025 11:23 PM EDT Indication: Back pain hematuria dysuria. Comparison: 7/25/2025 Technique: Axial CT images were obtained of the abdomen and pelvis without the administration of contrast. Reconstructed coronal and sagittal images were also obtained. Automated exposure control and iterative construction methods were used. Findings: There is chronic elevation of the right hemidiaphragm with chronic scarring/atelectasis in the right lower lobe. There is coronary artery disease in addition to atherosclerotic disease. No abdominal aortic aneurysm is identified. Gallbladder is surgically absent. No biliary  obstruction. There are bilateral renal cysts, better seen on the prior contrast study. There is a tiny nonobstructing stone in the lower pole of the left kidney. No ureteral stones or hydronephrosis on either side. There is some mild stranding around both kidneys that is similar to prior and nonspecific. The unenhanced solid abdominal organs are otherwise within normal limits. There are some calcifications within a mildly enlarged prostate gland. Urinary bladder is grossly normal. There is a right hip arthroplasty with associated streak artifact. The appendix is normal. There is mild colonic diverticulosis. No evidence of diverticulitis or colitis. No small bowel obstruction is seen. Stomach is normal. No adenopathy or free fluid is identified. There is diffuse degenerative disease in the lumbar spine.     1.Tiny nonobstructing left intrarenal stone. No ureteral stones or hydronephrosis on either side. 2.Colonic diverticulosis without evidence of diverticulitis. 3.Additional findings as given above. Electronically Signed: Elroy Carbajal MD  8/2/2025 12:16 AM EDT  Workstation ID: [id number]        Differential Diagnosis and Discussion:    Abdominal Pain: Based on the patient's signs and symptoms, I considered abdominal aortic aneurysm, small bowel obstruction, pancreatitis, acute cholecystitis, acute appendecitis, peptic ulcer disease, gastritis, colitis, endocrine disorders, irritable bowel syndrome and other differential diagnosis an etiology of the patient's abdominal pain.  Back Pain: The patient presents with back pain. My differential diagnosis includes but is not limited to acute spinal epidural abscess, acute spinal epidural bleed, cauda equina syndrome, abdominal aortic aneurysm, aortic dissection, kidney stone, pyelonephritis, musculoskeletal back pain, spinal fracture, and osteoarthritis.   Dysuria: Differential diagnosis includes but is not limited to urethritis, cystitis, pyelonephritis, ureteral calculi,  neoplasm, chemical irritant, urethral stricture, and trauma    PROCEDURES:    Labs were collected in the emergency department and all labs were reviewed and interpreted by me.  CT scan was performed in the emergency department and the CT scan radiology impression was interpreted by me.    No orders to display       Procedures    MDM  Number of Diagnoses or Management Options  Gross hematuria  Left nephrolithiasis  Diagnosis management comments: I have explained the patient´s condition, diagnoses and treatment plan based on the information available to me at this time. I have answered questions and addressed any concerns. The patient has a good  understanding of the patient´s diagnosis, condition, and treatment plan as can be expected at this point. The vital signs have been stable. The patient´s condition is stable and appropriate for discharge from the emergency department.      The patient will pursue further outpatient evaluation with the primary care physician or other designated or consulting physician as outlined in the discharge instructions. They are agreeable to this plan of care and follow-up instructions have been explained in detail. The patient has received these instructions in written format and have expressed an understanding of the discharge instructions. The patient is aware that any significant change in condition or worsening of symptoms should prompt an immediate return to this or the closest emergency department or call to 911.       Amount and/or Complexity of Data Reviewed  Clinical lab tests: reviewed and ordered  Tests in the radiology section of CPT®: reviewed and ordered  Tests in the medicine section of CPT®: ordered and reviewed  Obtain history from someone other than the patient: yes (Family at bedside)    Risk of Complications, Morbidity, and/or Mortality  Presenting problems: moderate  Diagnostic procedures: moderate  Management options: low    Patient Progress  Patient progress:  stable         Patient Care Considerations:    ANTIBIOTICS: I considered prescribing antibiotics as an outpatient however no bacterial focus of infection was found.      Consultants/Shared Management Plan:    SHARED VISIT: I have discussed the case with my supervising physician, Dr. Peace who states okay to send urine for culture and have patient follow-up outpatient. The substantive portion of the medical decision was made by the attesting physician who made or approve the management plan and will take responsibility for the patient.  Clinical findings were discussed and ultimate disposition was made in consult with supervising physician.    Social Determinants of Health:    Patient has presented with family members who are responsible, reliable and will ensure follow up care.      Disposition and Care Coordination:    Discharged: I considered escalation of care by admitting this patient to the hospital, however no surgical emergencies or conditions that would require continued hospitalization noted    I have explained the patient´s condition, diagnoses and treatment plan based on the information available to me at this time. I have answered questions and addressed any concerns. The patient has a good  understanding of the patient´s diagnosis, condition, and treatment plan as can be expected at this point. The vital signs have been stable. The patient´s condition is stable and appropriate for discharge from the emergency department.      The patient will pursue further outpatient evaluation with the primary care physician or other designated or consulting physician as outlined in the discharge instructions. They are agreeable to this plan of care and follow-up instructions have been explained in detail. The patient has received these instructions in written format and has expressed an understanding of the discharge instructions. The patient is aware that any significant change in condition or worsening of symptoms  should prompt an immediate return to this or the closest emergency department or call to 911.  I have explained discharge medications and the need for follow up with the patient/caretakers. This was also printed in the discharge instructions. Patient was discharged with the following medications and follow up:      Medication List        Changed      insulin glargine 100 UNIT/ML injection  Commonly known as: LANSTEPH BROWNEGLEE  Inject 15 Units under the skin into the appropriate area as directed Every Night.  What changed: how much to take            Stop      sulfamethoxazole-trimethoprim 800-160 MG per tablet  Commonly known as: BACTRIM DS,SEPTRA DS           [facility] UROLOGY  [address]  [facility] [address]  [facility] [address] [phone]           Final diagnoses:   Gross hematuria   Left nephrolithiasis        ED Disposition       ED Disposition   Discharge    Condition   Stable    Comment   --               This medical record created using voice recognition software.             Kailey Leslie, ESTRELLA  08/02/25 0109     chest pain

## 2025-08-03 LAB — BACTERIA SPEC AEROBE CULT: NO GROWTH

## 2025-08-19 ENCOUNTER — HOSPITAL ENCOUNTER (OUTPATIENT)
Dept: MRI IMAGING | Facility: HOSPITAL | Age: 65
Discharge: HOME | End: 2025-08-19
Payer: MEDICARE

## 2025-08-19 DIAGNOSIS — I63.512: ICD-10-CM

## 2025-08-19 DIAGNOSIS — I63.511: ICD-10-CM

## 2025-08-19 PROCEDURE — 70544 MR ANGIOGRAPHY HEAD W/O DYE: CPT

## 2025-08-19 PROCEDURE — 70547 MR ANGIOGRAPHY NECK W/O DYE: CPT

## 2025-08-19 PROCEDURE — 70551 MRI BRAIN STEM W/O DYE: CPT

## 2025-08-26 ENCOUNTER — HOSPITAL ENCOUNTER (EMERGENCY)
Facility: HOSPITAL | Age: 65
Discharge: HOME | End: 2025-08-26
Attending: EMERGENCY MEDICINE
Payer: OTHER GOVERNMENT

## 2025-08-26 PROCEDURE — 99211 OFF/OP EST MAY X REQ PHY/QHP: CPT | Performed by: EMERGENCY MEDICINE

## (undated) DEVICE — DRAPE,REIN 53X77,STERILE: Brand: MEDLINE

## (undated) DEVICE — DRSNG WND GEL FIBR OPTICELL AG PLS W/SLV LF 4X5IN  STRL

## (undated) DEVICE — MAT FLR ABSORBENT LG 4FT 10 2.5FT

## (undated) DEVICE — HANDPIECE SET WITH COAXIAL HIGH FLOW TIP AND SUCTION TUBE: Brand: INTERPULSE

## (undated) DEVICE — GLV SURG SENSICARE W/ALOE PF LF 8 STRL

## (undated) DEVICE — Device: Brand: DEFENDO AIR/WATER/SUCTION AND BIOPSY VALVE

## (undated) DEVICE — 1000 S-DRAPE TOWEL DRAPE 10/BX: Brand: STERI-DRAPE™

## (undated) DEVICE — PREP SOL POVIDONE/IODINE BT 4OZ

## (undated) DEVICE — PROXIMATE RH ROTATING HEAD SKIN STAPLERS (35 WIDE) CONTAINS 35 STAINLESS STEEL STAPLES: Brand: PROXIMATE

## (undated) DEVICE — CEMENT MIXING SYSTEM WITH FEMORAL BREAKWAY NOZZLE: Brand: REVOLUTION

## (undated) DEVICE — DRSNG SURESITE WNDW 2.38X2.75

## (undated) DEVICE — GLV SURG BIOGEL LTX PF 8 1/2

## (undated) DEVICE — DECANTER BAG 9": Brand: MEDLINE INDUSTRIES, INC.

## (undated) DEVICE — TBG PENCL TELESCP MEGADYNE SMOKE EVAC 10FT

## (undated) DEVICE — SOL IRRG H2O PL/BG 1000ML STRL

## (undated) DEVICE — SNAR POLYP CAPTIFLEX XS/OVL 11X2.4MM 240CM 1P/U

## (undated) DEVICE — Device

## (undated) DEVICE — ADHS SKIN SURG TISS VISC PREMIERPRO EXOFIN HI/VISC FAST/DRY

## (undated) DEVICE — SPNG LAP 18X18IN LF STRL PK/5

## (undated) DEVICE — SOL ISO/ALC RUB 70PCT 4OZ

## (undated) DEVICE — CULT AER/ANAEROB FASTIDIOUS BACT

## (undated) DEVICE — CONTAINER,SPECIMEN,OR STERILE,4OZ: Brand: MEDLINE

## (undated) DEVICE — DRSNG SURESITE WNDW 4X4.5

## (undated) DEVICE — 450 ML BOTTLE OF 0.05% CHLORHEXIDINE GLUCONATE IN 99.95% STERILE WATER FOR IRRIGATION, USP AND APPLICATOR.: Brand: IRRISEPT ANTIMICROBIAL WOUND LAVAGE

## (undated) DEVICE — DRSNG TELFA PAD NONADH STR 1S 3X8IN

## (undated) DEVICE — TRAP FLD MINIVAC MEGADYNE 100ML

## (undated) DEVICE — GLV SURG SIGNATURE ESSENTIAL PF LTX SZ8

## (undated) DEVICE — COAXIAL FEMORAL CANAL TIP

## (undated) DEVICE — SUT VIC 0 CT1 CR8 27IN JJ41G

## (undated) DEVICE — BLCK/BITE BLOX WO/DENTL/RIM W/STRAP 54F

## (undated) DEVICE — OPTIFOAM GENTLE SA, POSTOP, 4X8: Brand: MEDLINE

## (undated) DEVICE — SOL WND BACTISURE LAVG 1L

## (undated) DEVICE — PK HIP TOTL 40

## (undated) DEVICE — SINGLE-USE BIOPSY FORCEPS: Brand: RADIAL JAW 4

## (undated) DEVICE — DRAPE,U/ SHT,SPLIT,PLAS,STERIL: Brand: MEDLINE

## (undated) DEVICE — SUT PDS 2 0 CT1 27IN CLR Z259H

## (undated) DEVICE — OPTIFOAM GENTLE SA, POSTOP, 4X12: Brand: MEDLINE

## (undated) DEVICE — PILLW ABD MD

## (undated) DEVICE — SOL IRR H2O BTL 1000ML STRL

## (undated) DEVICE — SUT MNCRYL 3/0 PS2 18IN MCP497G

## (undated) DEVICE — ANTIBACTERIAL UNDYED BRAIDED (POLYGLACTIN 910), SYNTHETIC ABSORBABLE SUTURE: Brand: COATED VICRYL

## (undated) DEVICE — APPL CHLORAPREP HI/LITE 26ML ORNG

## (undated) DEVICE — HEWSON SUTURE RETRIEVER: Brand: HEWSON SUTURE RETRIEVER

## (undated) DEVICE — GLV SURG PREMIERPRO ORTHO LTX PF SZ8 BRN

## (undated) DEVICE — 3M™ IOBAN™ 2 ANTIMICROBIAL INCISE DRAPE 6640EZ: Brand: IOBAN™ 2

## (undated) DEVICE — SKIN PREP TRAY W/CHG: Brand: MEDLINE INDUSTRIES, INC.

## (undated) DEVICE — TUBING, SUCTION, 1/4" X 10', STRAIGHT: Brand: MEDLINE

## (undated) DEVICE — THE SINGLE USE ETRAP – POLYP TRAP IS USED FOR SUCTION RETRIEVAL OF ENDOSCOPICALLY REMOVED POLYPS.: Brand: ETRAP

## (undated) DEVICE — CONN JET HYDRA H20 AUXILIARY DISP

## (undated) DEVICE — GLV SURG BIOGEL LTX PF 8

## (undated) DEVICE — KT DRN EVAC WND PVC PCH WTROC RND 10F400

## (undated) DEVICE — DRAPE,HIP,W/POUCHES,STERILE: Brand: MEDLINE

## (undated) DEVICE — POOLE SUCTION HANDLE: Brand: CARDINAL HEALTH

## (undated) DEVICE — SUT ETHILON NLY PS/1 3/0 45CM BLK

## (undated) DEVICE — DRP C/ARM 41X74IN

## (undated) DEVICE — 3M™ IOBAN™ 2 ANTIMICROBIAL INCISE DRAPE 6650EZ: Brand: IOBAN™ 2

## (undated) DEVICE — TOTAL TRAY, 16FR 10ML SIL FOLEY, URN: Brand: MEDLINE

## (undated) DEVICE — PK ANT HIP 40

## (undated) DEVICE — SYS CLS SKIN PREMIERPRO EXOFINFUSION 22CM

## (undated) DEVICE — PAD,ABDOMINAL,8"X10",ST,LF: Brand: MEDLINE

## (undated) DEVICE — THIN OSTEOTOME BLADE 10MM X 5 IN: Brand: RENOVATION

## (undated) DEVICE — LINER SURG CANSTR SXN S/RIGD 1500CC

## (undated) DEVICE — BG RESUS AIRFLOW MANL W/MASK A/ 1900ML 1P/U

## (undated) DEVICE — PAD GRND REM POLYHESIVE A/ DISP

## (undated) DEVICE — SOLIDIFIER LIQLOC PLS 1500CC BT

## (undated) DEVICE — INTENDED FOR TISSUE SEPARATION, AND OTHER PROCEDURES THAT REQUIRE A SHARP SURGICAL BLADE TO PUNCTURE OR CUT.: Brand: BARD-PARKER ® CARBON RIB-BACK BLADES

## (undated) DEVICE — MARKR SKIN W/RULR AND LBL

## (undated) DEVICE — 6617 IOBAN II PATIENT ISOLATION DRAPE 5/BX,4BX/CS: Brand: STERI-DRAPE™ IOBAN™ 2

## (undated) DEVICE — PENCL E/S ULTRAVAC TELESCP NOSE HOLSTR 10FT

## (undated) DEVICE — 3 BONE CEMENT MIXER: Brand: MIXEVAC

## (undated) DEVICE — SYR CONTRL PRESS/LO FIX/M/LL W/THMB/RNG 10ML

## (undated) DEVICE — TRY SKINPREP DRYPREP

## (undated) DEVICE — STPLR SKIN VISISTAT WD 35CT

## (undated) DEVICE — SPNG GZ WOVN 4X4IN 12PLY 10/BX STRL